# Patient Record
Sex: FEMALE | Race: BLACK OR AFRICAN AMERICAN | Employment: FULL TIME | URBAN - METROPOLITAN AREA
[De-identification: names, ages, dates, MRNs, and addresses within clinical notes are randomized per-mention and may not be internally consistent; named-entity substitution may affect disease eponyms.]

---

## 2017-01-01 ENCOUNTER — HOSPITAL ENCOUNTER (OUTPATIENT)
Dept: RADIATION ONCOLOGY | Age: 57
Discharge: HOME | End: 2017-12-05
Payer: COMMERCIAL

## 2017-01-01 ENCOUNTER — HOSPITAL ENCOUNTER (OUTPATIENT)
Dept: INFUSION THERAPY | Age: 57
Discharge: HOME | End: 2017-09-01
Payer: COMMERCIAL

## 2017-01-01 ENCOUNTER — HOSPITAL ENCOUNTER (OUTPATIENT)
Dept: LAB | Age: 57
Discharge: HOME | End: 2017-11-16
Payer: COMMERCIAL

## 2017-01-01 ENCOUNTER — PATIENT OUTREACH (OUTPATIENT)
Dept: CASE MANAGEMENT | Age: 57
End: 2017-01-01

## 2017-01-01 ENCOUNTER — DOCUMENTATION ONLY (OUTPATIENT)
Dept: ONCOLOGY | Age: 57
End: 2017-01-01

## 2017-01-01 ENCOUNTER — HOSPITAL ENCOUNTER (OUTPATIENT)
Dept: INFUSION THERAPY | Age: 57
Discharge: HOME | End: 2017-08-04
Payer: MEDICAID

## 2017-01-01 ENCOUNTER — TELEPHONE (OUTPATIENT)
Dept: ONCOLOGY | Age: 57
End: 2017-01-01

## 2017-01-01 ENCOUNTER — HOSPITAL ENCOUNTER (OUTPATIENT)
Dept: CT IMAGING | Age: 57
Discharge: HOME | End: 2017-11-15
Attending: INTERNAL MEDICINE
Payer: COMMERCIAL

## 2017-01-01 ENCOUNTER — HOSPITAL ENCOUNTER (OUTPATIENT)
Dept: RADIATION ONCOLOGY | Age: 57
Discharge: HOME | End: 2017-12-20
Payer: COMMERCIAL

## 2017-01-01 ENCOUNTER — HOSPITAL ENCOUNTER (OUTPATIENT)
Dept: INFUSION THERAPY | Age: 57
Discharge: HOME | End: 2017-07-05
Payer: MEDICAID

## 2017-01-01 ENCOUNTER — HOSPITAL ENCOUNTER (OUTPATIENT)
Dept: LAB | Age: 57
Discharge: HOME | End: 2017-07-05
Payer: MEDICAID

## 2017-01-01 ENCOUNTER — HOSPITAL ENCOUNTER (OUTPATIENT)
Dept: INFUSION THERAPY | Age: 57
Discharge: HOME | End: 2017-07-07
Payer: MEDICAID

## 2017-01-01 ENCOUNTER — HOSPITAL ENCOUNTER (OUTPATIENT)
Dept: RADIATION ONCOLOGY | Age: 57
Discharge: HOME | End: 2017-11-22
Payer: COMMERCIAL

## 2017-01-01 ENCOUNTER — HOSPITAL ENCOUNTER (OUTPATIENT)
Dept: RADIATION ONCOLOGY | Age: 57
Discharge: HOME | End: 2017-12-18
Payer: COMMERCIAL

## 2017-01-01 ENCOUNTER — ANESTHESIA EVENT (OUTPATIENT)
Dept: SURGERY | Age: 57
End: 2017-01-01
Payer: COMMERCIAL

## 2017-01-01 ENCOUNTER — APPOINTMENT (OUTPATIENT)
Dept: RADIATION ONCOLOGY | Age: 57
End: 2017-01-01
Payer: COMMERCIAL

## 2017-01-01 ENCOUNTER — HOSPITAL ENCOUNTER (OUTPATIENT)
Dept: LAB | Age: 57
Discharge: HOME | End: 2017-06-13
Payer: COMMERCIAL

## 2017-01-01 ENCOUNTER — HOSPITAL ENCOUNTER (OUTPATIENT)
Dept: INFUSION THERAPY | Age: 57
Discharge: HOME | End: 2017-08-02
Payer: MEDICAID

## 2017-01-01 ENCOUNTER — HOSPITAL ENCOUNTER (OUTPATIENT)
Dept: INFUSION THERAPY | Age: 57
End: 2017-01-01
Payer: MEDICAID

## 2017-01-01 ENCOUNTER — HOSPITAL ENCOUNTER (OUTPATIENT)
Dept: INFUSION THERAPY | Age: 57
Discharge: HOME | End: 2017-10-27
Payer: COMMERCIAL

## 2017-01-01 ENCOUNTER — HOSPITAL ENCOUNTER (OUTPATIENT)
Dept: INFUSION THERAPY | Age: 57
Discharge: HOME | End: 2017-05-16
Payer: COMMERCIAL

## 2017-01-01 ENCOUNTER — HOSPITAL ENCOUNTER (OUTPATIENT)
Dept: INFUSION THERAPY | Age: 57
Discharge: HOME | End: 2017-12-22
Payer: COMMERCIAL

## 2017-01-01 ENCOUNTER — HOSPITAL ENCOUNTER (OUTPATIENT)
Dept: INFUSION THERAPY | Age: 57
Discharge: HOME | End: 2017-06-15
Payer: MEDICAID

## 2017-01-01 ENCOUNTER — APPOINTMENT (OUTPATIENT)
Dept: RADIATION ONCOLOGY | Age: 57
End: 2017-01-01

## 2017-01-01 ENCOUNTER — HOSPITAL ENCOUNTER (OUTPATIENT)
Dept: RADIATION ONCOLOGY | Age: 57
Discharge: HOME | End: 2017-12-06
Payer: COMMERCIAL

## 2017-01-01 ENCOUNTER — HOSPITAL ENCOUNTER (OUTPATIENT)
Dept: SURGERY | Age: 57
Discharge: HOME | End: 2017-06-09

## 2017-01-01 ENCOUNTER — HOSPITAL ENCOUNTER (OUTPATIENT)
Dept: INFUSION THERAPY | Age: 57
Discharge: HOME | End: 2017-08-03
Payer: MEDICAID

## 2017-01-01 ENCOUNTER — HOSPITAL ENCOUNTER (OUTPATIENT)
Dept: INFUSION THERAPY | Age: 57
Discharge: HOME | End: 2017-07-06
Payer: MEDICAID

## 2017-01-01 ENCOUNTER — HOSPITAL ENCOUNTER (OUTPATIENT)
Dept: INFUSION THERAPY | Age: 57
Discharge: HOME | End: 2017-08-30
Payer: MEDICAID

## 2017-01-01 ENCOUNTER — HOSPITAL ENCOUNTER (OUTPATIENT)
Dept: RADIATION ONCOLOGY | Age: 57
Discharge: HOME | End: 2017-12-27
Payer: COMMERCIAL

## 2017-01-01 ENCOUNTER — HOSPITAL ENCOUNTER (OUTPATIENT)
Dept: RADIATION ONCOLOGY | Age: 57
Discharge: HOME | End: 2017-12-07
Payer: COMMERCIAL

## 2017-01-01 ENCOUNTER — HOSPITAL ENCOUNTER (OUTPATIENT)
Dept: LAB | Age: 57
Discharge: HOME | End: 2017-09-29
Payer: COMMERCIAL

## 2017-01-01 ENCOUNTER — HOSPITAL ENCOUNTER (OUTPATIENT)
Dept: CT IMAGING | Age: 57
Discharge: HOME | End: 2017-06-22
Attending: INTERNAL MEDICINE
Payer: COMMERCIAL

## 2017-01-01 ENCOUNTER — HOSPITAL ENCOUNTER (OUTPATIENT)
Dept: RADIATION ONCOLOGY | Age: 57
Discharge: HOME | End: 2017-12-14
Payer: COMMERCIAL

## 2017-01-01 ENCOUNTER — HOSPITAL ENCOUNTER (OUTPATIENT)
Dept: INFUSION THERAPY | Age: 57
Discharge: HOME | End: 2017-06-13
Payer: MEDICAID

## 2017-01-01 ENCOUNTER — HOSPITAL ENCOUNTER (OUTPATIENT)
Dept: RADIATION ONCOLOGY | Age: 57
Discharge: HOME | End: 2017-12-28
Payer: COMMERCIAL

## 2017-01-01 ENCOUNTER — HOSPITAL ENCOUNTER (OUTPATIENT)
Dept: INFUSION THERAPY | Age: 57
Discharge: HOME | End: 2017-08-31
Payer: MEDICAID

## 2017-01-01 ENCOUNTER — HOSPITAL ENCOUNTER (OUTPATIENT)
Dept: LAB | Age: 57
Discharge: HOME | End: 2017-08-30
Payer: MEDICAID

## 2017-01-01 ENCOUNTER — HOSPITAL ENCOUNTER (OUTPATIENT)
Dept: RADIATION ONCOLOGY | Age: 57
Discharge: HOME | End: 2017-12-21
Payer: COMMERCIAL

## 2017-01-01 ENCOUNTER — HOSPITAL ENCOUNTER (OUTPATIENT)
Dept: LAB | Age: 57
Discharge: HOME | End: 2017-08-02
Payer: MEDICAID

## 2017-01-01 ENCOUNTER — ANESTHESIA (OUTPATIENT)
Dept: SURGERY | Age: 57
End: 2017-01-01
Payer: COMMERCIAL

## 2017-01-01 ENCOUNTER — HOSPITAL ENCOUNTER (OUTPATIENT)
Dept: RADIATION ONCOLOGY | Age: 57
Discharge: HOME | End: 2017-11-27
Payer: COMMERCIAL

## 2017-01-01 ENCOUNTER — HOSPITAL ENCOUNTER (OUTPATIENT)
Dept: INFUSION THERAPY | Age: 57
Discharge: HOME | End: 2017-06-06
Payer: MEDICAID

## 2017-01-01 ENCOUNTER — HOSPITAL ENCOUNTER (OUTPATIENT)
Dept: RADIATION ONCOLOGY | Age: 57
Discharge: HOME | End: 2017-12-22
Payer: COMMERCIAL

## 2017-01-01 ENCOUNTER — HOSPITAL ENCOUNTER (OUTPATIENT)
Dept: RADIATION ONCOLOGY | Age: 57
Discharge: HOME | End: 2017-12-19
Payer: COMMERCIAL

## 2017-01-01 ENCOUNTER — HOSPITAL ENCOUNTER (OUTPATIENT)
Dept: LAB | Age: 57
Discharge: HOME | End: 2017-05-15
Payer: COMMERCIAL

## 2017-01-01 ENCOUNTER — HOSPITAL ENCOUNTER (OUTPATIENT)
Dept: CT IMAGING | Age: 57
Discharge: HOME | End: 2017-09-25
Attending: INTERNAL MEDICINE
Payer: COMMERCIAL

## 2017-01-01 ENCOUNTER — APPOINTMENT (OUTPATIENT)
Dept: INTERVENTIONAL RADIOLOGY/VASCULAR | Age: 57
End: 2017-01-01
Attending: RADIOLOGY
Payer: COMMERCIAL

## 2017-01-01 ENCOUNTER — HOSPITAL ENCOUNTER (OUTPATIENT)
Dept: LAB | Age: 57
Discharge: HOME | End: 2017-12-05
Payer: COMMERCIAL

## 2017-01-01 ENCOUNTER — HOSPITAL ENCOUNTER (OUTPATIENT)
Dept: INFUSION THERAPY | Age: 57
Discharge: HOME | End: 2017-10-03
Payer: COMMERCIAL

## 2017-01-01 ENCOUNTER — HOSPITAL ENCOUNTER (OUTPATIENT)
Dept: RADIATION ONCOLOGY | Age: 57
Discharge: HOME | End: 2017-12-26
Payer: COMMERCIAL

## 2017-01-01 ENCOUNTER — HOSPITAL ENCOUNTER (OUTPATIENT)
Dept: INFUSION THERAPY | Age: 57
Discharge: HOME | End: 2017-05-17
Payer: COMMERCIAL

## 2017-01-01 ENCOUNTER — HOSPITAL ENCOUNTER (OUTPATIENT)
Dept: PET IMAGING | Age: 57
Discharge: HOME | End: 2017-11-30
Payer: COMMERCIAL

## 2017-01-01 ENCOUNTER — HOSPITAL ENCOUNTER (OUTPATIENT)
Dept: LAB | Age: 57
Discharge: HOME | End: 2017-06-06
Payer: COMMERCIAL

## 2017-01-01 ENCOUNTER — HOSPITAL ENCOUNTER (OUTPATIENT)
Dept: INFUSION THERAPY | Age: 57
Discharge: HOME | End: 2017-05-19
Payer: COMMERCIAL

## 2017-01-01 ENCOUNTER — HOSPITAL ENCOUNTER (OUTPATIENT)
Dept: RADIATION ONCOLOGY | Age: 57
Discharge: HOME | End: 2017-12-11
Payer: COMMERCIAL

## 2017-01-01 ENCOUNTER — HOSPITAL ENCOUNTER (OUTPATIENT)
Dept: INFUSION THERAPY | Age: 57
Discharge: HOME | End: 2017-11-24
Payer: COMMERCIAL

## 2017-01-01 ENCOUNTER — HOSPITAL ENCOUNTER (OUTPATIENT)
Dept: RADIATION ONCOLOGY | Age: 57
Discharge: HOME | End: 2017-12-04
Payer: COMMERCIAL

## 2017-01-01 ENCOUNTER — HOSPITAL ENCOUNTER (OUTPATIENT)
Dept: INFUSION THERAPY | Age: 57
Discharge: HOME | End: 2017-06-14
Payer: MEDICAID

## 2017-01-01 ENCOUNTER — HOSPITAL ENCOUNTER (OUTPATIENT)
Dept: RADIATION ONCOLOGY | Age: 57
Discharge: HOME | End: 2017-12-15
Payer: COMMERCIAL

## 2017-01-01 ENCOUNTER — HOSPITAL ENCOUNTER (OUTPATIENT)
Dept: INFUSION THERAPY | Age: 57
Discharge: HOME | End: 2017-05-18
Payer: COMMERCIAL

## 2017-01-01 ENCOUNTER — HOSPITAL ENCOUNTER (OUTPATIENT)
Dept: RADIATION ONCOLOGY | Age: 57
Discharge: HOME | End: 2017-12-13
Payer: COMMERCIAL

## 2017-01-01 ENCOUNTER — HOSPITAL ENCOUNTER (OUTPATIENT)
Dept: RADIATION ONCOLOGY | Age: 57
Discharge: HOME | End: 2017-12-12
Payer: COMMERCIAL

## 2017-01-01 ENCOUNTER — HOSPITAL ENCOUNTER (OUTPATIENT)
Age: 57
Setting detail: OUTPATIENT SURGERY
Discharge: HOME | End: 2017-06-12
Attending: RADIOLOGY | Admitting: RADIOLOGY
Payer: COMMERCIAL

## 2017-01-01 VITALS
DIASTOLIC BLOOD PRESSURE: 78 MMHG | TEMPERATURE: 98.1 F | BODY MASS INDEX: 17.34 KG/M2 | WEIGHT: 117.4 LBS | RESPIRATION RATE: 16 BRPM | HEART RATE: 103 BPM | OXYGEN SATURATION: 96 % | SYSTOLIC BLOOD PRESSURE: 133 MMHG

## 2017-01-01 VITALS
DIASTOLIC BLOOD PRESSURE: 95 MMHG | WEIGHT: 122 LBS | HEART RATE: 106 BPM | HEIGHT: 68 IN | BODY MASS INDEX: 18.49 KG/M2 | SYSTOLIC BLOOD PRESSURE: 150 MMHG | OXYGEN SATURATION: 92 % | RESPIRATION RATE: 18 BRPM | TEMPERATURE: 97.9 F

## 2017-01-01 VITALS
SYSTOLIC BLOOD PRESSURE: 159 MMHG | RESPIRATION RATE: 16 BRPM | TEMPERATURE: 98.2 F | OXYGEN SATURATION: 98 % | HEART RATE: 88 BPM | WEIGHT: 109 LBS | DIASTOLIC BLOOD PRESSURE: 95 MMHG | HEIGHT: 67 IN | BODY MASS INDEX: 17.11 KG/M2

## 2017-01-01 VITALS
OXYGEN SATURATION: 96 % | RESPIRATION RATE: 16 BRPM | TEMPERATURE: 97.7 F | HEART RATE: 70 BPM | SYSTOLIC BLOOD PRESSURE: 171 MMHG | DIASTOLIC BLOOD PRESSURE: 81 MMHG

## 2017-01-01 VITALS
WEIGHT: 115 LBS | SYSTOLIC BLOOD PRESSURE: 150 MMHG | DIASTOLIC BLOOD PRESSURE: 90 MMHG | TEMPERATURE: 97.7 F | RESPIRATION RATE: 16 BRPM | HEART RATE: 87 BPM | OXYGEN SATURATION: 100 % | BODY MASS INDEX: 17.49 KG/M2

## 2017-01-01 VITALS
WEIGHT: 119.4 LBS | OXYGEN SATURATION: 94 % | BODY MASS INDEX: 18.15 KG/M2 | HEART RATE: 95 BPM | TEMPERATURE: 98.2 F | RESPIRATION RATE: 16 BRPM | SYSTOLIC BLOOD PRESSURE: 172 MMHG | DIASTOLIC BLOOD PRESSURE: 93 MMHG

## 2017-01-01 VITALS
TEMPERATURE: 98 F | DIASTOLIC BLOOD PRESSURE: 74 MMHG | SYSTOLIC BLOOD PRESSURE: 128 MMHG | OXYGEN SATURATION: 98 % | BODY MASS INDEX: 17.54 KG/M2 | HEART RATE: 82 BPM | WEIGHT: 112 LBS | RESPIRATION RATE: 18 BRPM

## 2017-01-01 VITALS
RESPIRATION RATE: 18 BRPM | HEART RATE: 109 BPM | TEMPERATURE: 97.7 F | DIASTOLIC BLOOD PRESSURE: 85 MMHG | BODY MASS INDEX: 19.45 KG/M2 | OXYGEN SATURATION: 94 % | WEIGHT: 124.2 LBS | SYSTOLIC BLOOD PRESSURE: 139 MMHG

## 2017-01-01 VITALS
DIASTOLIC BLOOD PRESSURE: 43 MMHG | OXYGEN SATURATION: 96 % | HEART RATE: 87 BPM | SYSTOLIC BLOOD PRESSURE: 115 MMHG | RESPIRATION RATE: 16 BRPM | TEMPERATURE: 97.8 F | BODY MASS INDEX: 16.91 KG/M2 | WEIGHT: 111.2 LBS

## 2017-01-01 VITALS
DIASTOLIC BLOOD PRESSURE: 84 MMHG | OXYGEN SATURATION: 97 % | WEIGHT: 119.4 LBS | BODY MASS INDEX: 17.63 KG/M2 | SYSTOLIC BLOOD PRESSURE: 136 MMHG | TEMPERATURE: 98 F | RESPIRATION RATE: 16 BRPM | HEART RATE: 88 BPM

## 2017-01-01 VITALS
RESPIRATION RATE: 18 BRPM | DIASTOLIC BLOOD PRESSURE: 83 MMHG | TEMPERATURE: 97.8 F | BODY MASS INDEX: 17.45 KG/M2 | WEIGHT: 111.4 LBS | SYSTOLIC BLOOD PRESSURE: 159 MMHG | OXYGEN SATURATION: 98 % | HEART RATE: 75 BPM

## 2017-01-01 VITALS
HEART RATE: 88 BPM | OXYGEN SATURATION: 95 % | WEIGHT: 110.8 LBS | DIASTOLIC BLOOD PRESSURE: 92 MMHG | RESPIRATION RATE: 18 BRPM | SYSTOLIC BLOOD PRESSURE: 160 MMHG | BODY MASS INDEX: 17.35 KG/M2 | TEMPERATURE: 97.8 F

## 2017-01-01 VITALS
SYSTOLIC BLOOD PRESSURE: 140 MMHG | WEIGHT: 116.4 LBS | HEART RATE: 89 BPM | BODY MASS INDEX: 17.19 KG/M2 | OXYGEN SATURATION: 99 % | TEMPERATURE: 97.8 F | DIASTOLIC BLOOD PRESSURE: 83 MMHG

## 2017-01-01 VITALS
TEMPERATURE: 98.1 F | BODY MASS INDEX: 17.79 KG/M2 | SYSTOLIC BLOOD PRESSURE: 138 MMHG | WEIGHT: 117 LBS | RESPIRATION RATE: 16 BRPM | HEART RATE: 89 BPM | DIASTOLIC BLOOD PRESSURE: 91 MMHG | OXYGEN SATURATION: 97 %

## 2017-01-01 VITALS
DIASTOLIC BLOOD PRESSURE: 99 MMHG | RESPIRATION RATE: 18 BRPM | TEMPERATURE: 98 F | OXYGEN SATURATION: 94 % | HEART RATE: 92 BPM | BODY MASS INDEX: 18.14 KG/M2 | SYSTOLIC BLOOD PRESSURE: 146 MMHG | WEIGHT: 115.8 LBS

## 2017-01-01 VITALS
RESPIRATION RATE: 16 BRPM | BODY MASS INDEX: 17.63 KG/M2 | TEMPERATURE: 97.9 F | OXYGEN SATURATION: 99 % | DIASTOLIC BLOOD PRESSURE: 75 MMHG | WEIGHT: 119.4 LBS | SYSTOLIC BLOOD PRESSURE: 124 MMHG | HEART RATE: 89 BPM

## 2017-01-01 VITALS
HEART RATE: 97 BPM | DIASTOLIC BLOOD PRESSURE: 92 MMHG | WEIGHT: 115.4 LBS | SYSTOLIC BLOOD PRESSURE: 157 MMHG | RESPIRATION RATE: 18 BRPM | BODY MASS INDEX: 18.07 KG/M2 | OXYGEN SATURATION: 96 % | TEMPERATURE: 97.8 F

## 2017-01-01 VITALS — WEIGHT: 110 LBS | BODY MASS INDEX: 16.29 KG/M2 | HEIGHT: 69 IN

## 2017-01-01 VITALS
WEIGHT: 119.7 LBS | BODY MASS INDEX: 17.68 KG/M2 | HEART RATE: 89 BPM | DIASTOLIC BLOOD PRESSURE: 80 MMHG | TEMPERATURE: 97.9 F | SYSTOLIC BLOOD PRESSURE: 133 MMHG | OXYGEN SATURATION: 97 % | RESPIRATION RATE: 18 BRPM

## 2017-01-01 VITALS
BODY MASS INDEX: 18.67 KG/M2 | WEIGHT: 119.2 LBS | RESPIRATION RATE: 18 BRPM | DIASTOLIC BLOOD PRESSURE: 97 MMHG | HEART RATE: 78 BPM | SYSTOLIC BLOOD PRESSURE: 156 MMHG | TEMPERATURE: 97.2 F | OXYGEN SATURATION: 99 %

## 2017-01-01 DIAGNOSIS — C34.91 SMALL CELL CARCINOMA OF RIGHT LUNG (HCC): ICD-10-CM

## 2017-01-01 DIAGNOSIS — C34.90 SMALL CELL CARCINOMA OF LUNG, UNSPECIFIED LATERALITY: ICD-10-CM

## 2017-01-01 DIAGNOSIS — I87.1 SVC (SUPERIOR VENA CAVA OBSTRUCTION): ICD-10-CM

## 2017-01-01 DIAGNOSIS — C79.51 BONE METASTASES (HCC): ICD-10-CM

## 2017-01-01 DIAGNOSIS — M54.6 THORACIC BACK PAIN: ICD-10-CM

## 2017-01-01 DIAGNOSIS — F32.89 OTHER DEPRESSION: Primary | ICD-10-CM

## 2017-01-01 DIAGNOSIS — F41.9 ANXIETY: ICD-10-CM

## 2017-01-01 DIAGNOSIS — S22.009A CLOSED FRACTURE OF DORSAL (THORACIC) VERTEBRA (HCC): ICD-10-CM

## 2017-01-01 LAB
ALBUMIN SERPL BCP-MCNC: 3.8 G/DL (ref 3.5–5)
ALBUMIN SERPL BCP-MCNC: 3.9 G/DL (ref 3.5–5)
ALBUMIN SERPL BCP-MCNC: 3.9 G/DL (ref 3.5–5)
ALBUMIN SERPL BCP-MCNC: 4 G/DL (ref 3.5–5)
ALBUMIN SERPL BCP-MCNC: 4 G/DL (ref 3.5–5)
ALBUMIN SERPL-MCNC: 3.8 G/DL (ref 3.5–5)
ALBUMIN SERPL-MCNC: 3.9 G/DL (ref 3.5–5)
ALBUMIN/GLOB SERPL: 1 {RATIO} (ref 1.2–3.5)
ALBUMIN/GLOB SERPL: 1.1 {RATIO} (ref 1.2–3.5)
ALP SERPL-CCNC: 104 U/L (ref 50–136)
ALP SERPL-CCNC: 117 U/L (ref 50–136)
ALP SERPL-CCNC: 122 U/L (ref 50–136)
ALP SERPL-CCNC: 77 U/L (ref 50–136)
ALP SERPL-CCNC: 77 U/L (ref 50–136)
ALP SERPL-CCNC: 88 U/L (ref 50–136)
ALP SERPL-CCNC: 90 U/L (ref 50–136)
ALP SERPL-CCNC: 95 U/L (ref 50–136)
ALP SERPL-CCNC: 97 U/L (ref 50–136)
ALT SERPL-CCNC: 16 U/L (ref 12–65)
ALT SERPL-CCNC: 17 U/L (ref 12–65)
ALT SERPL-CCNC: 20 U/L (ref 12–65)
ALT SERPL-CCNC: 20 U/L (ref 12–65)
ALT SERPL-CCNC: 31 U/L (ref 12–65)
ALT SERPL-CCNC: 31 U/L (ref 12–65)
ALT SERPL-CCNC: 35 U/L (ref 12–65)
ALT SERPL-CCNC: 36 U/L (ref 12–65)
ALT SERPL-CCNC: 40 U/L (ref 12–65)
ANION GAP BLD CALC-SCNC: 6 MMOL/L (ref 7–16)
ANION GAP BLD CALC-SCNC: 6 MMOL/L (ref 7–16)
ANION GAP BLD CALC-SCNC: 7 MMOL/L (ref 7–16)
ANION GAP BLD CALC-SCNC: 8 MMOL/L (ref 7–16)
ANION GAP BLD CALC-SCNC: 8 MMOL/L (ref 7–16)
ANION GAP SERPL CALC-SCNC: 4 MMOL/L (ref 7–16)
ANION GAP SERPL CALC-SCNC: 6 MMOL/L (ref 7–16)
ANION GAP SERPL CALC-SCNC: 7 MMOL/L (ref 7–16)
ANION GAP SERPL CALC-SCNC: 7 MMOL/L (ref 7–16)
ANION GAP SERPL CALC-SCNC: 8 MMOL/L (ref 7–16)
ANION GAP SERPL CALC-SCNC: 8 MMOL/L (ref 7–16)
AST SERPL W P-5'-P-CCNC: 21 U/L (ref 15–37)
AST SERPL W P-5'-P-CCNC: 24 U/L (ref 15–37)
AST SERPL W P-5'-P-CCNC: 30 U/L (ref 15–37)
AST SERPL-CCNC: 15 U/L (ref 15–37)
AST SERPL-CCNC: 15 U/L (ref 15–37)
AST SERPL-CCNC: 18 U/L (ref 15–37)
AST SERPL-CCNC: 19 U/L (ref 15–37)
BASOPHILS # BLD AUTO: 0 K/UL (ref 0–0.2)
BASOPHILS # BLD: 0 % (ref 0–2)
BASOPHILS # BLD: 0 K/UL (ref 0–0.2)
BASOPHILS # BLD: 1 % (ref 0–2)
BASOPHILS NFR BLD: 0 % (ref 0–2)
BASOPHILS NFR BLD: 1 % (ref 0–2)
BILIRUB SERPL-MCNC: 0.1 MG/DL (ref 0.2–1.1)
BILIRUB SERPL-MCNC: 0.2 MG/DL (ref 0.2–1.1)
BUN SERPL-MCNC: 10 MG/DL (ref 6–23)
BUN SERPL-MCNC: 11 MG/DL (ref 6–23)
BUN SERPL-MCNC: 12 MG/DL (ref 6–23)
BUN SERPL-MCNC: 12 MG/DL (ref 6–23)
BUN SERPL-MCNC: 13 MG/DL (ref 6–23)
BUN SERPL-MCNC: 15 MG/DL (ref 6–23)
BUN SERPL-MCNC: 16 MG/DL (ref 6–23)
BUN SERPL-MCNC: 17 MG/DL (ref 6–23)
BUN SERPL-MCNC: 18 MG/DL (ref 6–23)
BUN SERPL-MCNC: 20 MG/DL (ref 6–23)
BUN SERPL-MCNC: 25 MG/DL (ref 6–23)
CALCIUM SERPL-MCNC: 8.1 MG/DL (ref 8.3–10.4)
CALCIUM SERPL-MCNC: 8.2 MG/DL (ref 8.3–10.4)
CALCIUM SERPL-MCNC: 8.2 MG/DL (ref 8.3–10.4)
CALCIUM SERPL-MCNC: 8.8 MG/DL (ref 8.3–10.4)
CALCIUM SERPL-MCNC: 8.8 MG/DL (ref 8.3–10.4)
CALCIUM SERPL-MCNC: 8.9 MG/DL (ref 8.3–10.4)
CALCIUM SERPL-MCNC: 8.9 MG/DL (ref 8.3–10.4)
CALCIUM SERPL-MCNC: 9 MG/DL (ref 8.3–10.4)
CALCIUM SERPL-MCNC: 9.1 MG/DL (ref 8.3–10.4)
CALCIUM SERPL-MCNC: 9.6 MG/DL (ref 8.3–10.4)
CHLORIDE SERPL-SCNC: 104 MMOL/L (ref 98–107)
CHLORIDE SERPL-SCNC: 105 MMOL/L (ref 98–107)
CHLORIDE SERPL-SCNC: 106 MMOL/L (ref 98–107)
CHLORIDE SERPL-SCNC: 107 MMOL/L (ref 98–107)
CHLORIDE SERPL-SCNC: 108 MMOL/L (ref 98–107)
CO2 SERPL-SCNC: 24 MMOL/L (ref 21–32)
CO2 SERPL-SCNC: 25 MMOL/L (ref 21–32)
CO2 SERPL-SCNC: 26 MMOL/L (ref 21–32)
CO2 SERPL-SCNC: 27 MMOL/L (ref 21–32)
CO2 SERPL-SCNC: 28 MMOL/L (ref 21–32)
CO2 SERPL-SCNC: 29 MMOL/L (ref 21–32)
CREAT BLD-MCNC: 1.3 MG/DL (ref 0.8–1.5)
CREAT SERPL-MCNC: 0.83 MG/DL (ref 0.6–1)
CREAT SERPL-MCNC: 0.97 MG/DL (ref 0.6–1)
CREAT SERPL-MCNC: 0.97 MG/DL (ref 0.6–1)
CREAT SERPL-MCNC: 1.02 MG/DL (ref 0.6–1)
CREAT SERPL-MCNC: 1.03 MG/DL (ref 0.6–1)
CREAT SERPL-MCNC: 1.05 MG/DL (ref 0.6–1)
CREAT SERPL-MCNC: 1.09 MG/DL (ref 0.6–1)
CREAT SERPL-MCNC: 1.28 MG/DL (ref 0.6–1)
CREAT SERPL-MCNC: 1.31 MG/DL (ref 0.6–1)
CREAT SERPL-MCNC: 1.42 MG/DL (ref 0.6–1)
CREAT SERPL-MCNC: 1.5 MG/DL (ref 0.6–1)
DIFFERENTIAL METHOD BLD: ABNORMAL
EOSINOPHIL # BLD: 0 K/UL (ref 0–0.8)
EOSINOPHIL # BLD: 0.1 K/UL (ref 0–0.8)
EOSINOPHIL NFR BLD: 0 % (ref 0.5–7.8)
EOSINOPHIL NFR BLD: 1 % (ref 0.5–7.8)
EOSINOPHIL NFR BLD: 2 % (ref 0.5–7.8)
ERYTHROCYTE [DISTWIDTH] IN BLOOD BY AUTOMATED COUNT: 15.5 % (ref 11.9–14.6)
ERYTHROCYTE [DISTWIDTH] IN BLOOD BY AUTOMATED COUNT: 16 % (ref 11.9–14.6)
ERYTHROCYTE [DISTWIDTH] IN BLOOD BY AUTOMATED COUNT: 16.1 % (ref 11.9–14.6)
ERYTHROCYTE [DISTWIDTH] IN BLOOD BY AUTOMATED COUNT: 16.3 % (ref 11.9–14.6)
ERYTHROCYTE [DISTWIDTH] IN BLOOD BY AUTOMATED COUNT: 16.7 % (ref 11.9–14.6)
ERYTHROCYTE [DISTWIDTH] IN BLOOD BY AUTOMATED COUNT: 17.6 % (ref 11.9–14.6)
ERYTHROCYTE [DISTWIDTH] IN BLOOD BY AUTOMATED COUNT: 18.2 % (ref 11.9–14.6)
ERYTHROCYTE [DISTWIDTH] IN BLOOD BY AUTOMATED COUNT: 18.3 % (ref 11.9–14.6)
ERYTHROCYTE [DISTWIDTH] IN BLOOD BY AUTOMATED COUNT: 19.4 % (ref 11.9–14.6)
FERRITIN SERPL-MCNC: 84 NG/ML (ref 8–388)
GLOBULIN SER CALC-MCNC: 3.6 G/DL (ref 2.3–3.5)
GLOBULIN SER CALC-MCNC: 3.7 G/DL (ref 2.3–3.5)
GLOBULIN SER CALC-MCNC: 3.8 G/DL (ref 2.3–3.5)
GLOBULIN SER CALC-MCNC: 3.9 G/DL (ref 2.3–3.5)
GLOBULIN SER CALC-MCNC: 4 G/DL (ref 2.3–3.5)
GLUCOSE SERPL-MCNC: 101 MG/DL (ref 65–100)
GLUCOSE SERPL-MCNC: 120 MG/DL (ref 65–100)
GLUCOSE SERPL-MCNC: 126 MG/DL (ref 65–100)
GLUCOSE SERPL-MCNC: 137 MG/DL (ref 65–100)
GLUCOSE SERPL-MCNC: 172 MG/DL (ref 65–100)
GLUCOSE SERPL-MCNC: 91 MG/DL (ref 65–100)
GLUCOSE SERPL-MCNC: 93 MG/DL (ref 65–100)
GLUCOSE SERPL-MCNC: 95 MG/DL (ref 65–100)
GLUCOSE SERPL-MCNC: 99 MG/DL (ref 65–100)
HCT VFR BLD AUTO: 31.6 % (ref 35.8–46.3)
HCT VFR BLD AUTO: 32.1 % (ref 35.8–46.3)
HCT VFR BLD AUTO: 32.2 % (ref 35.8–46.3)
HCT VFR BLD AUTO: 32.2 % (ref 35.8–46.3)
HCT VFR BLD AUTO: 33.7 % (ref 35.8–46.3)
HCT VFR BLD AUTO: 33.7 % (ref 35.8–46.3)
HCT VFR BLD AUTO: 34.3 % (ref 35.8–46.3)
HCT VFR BLD AUTO: 36.3 % (ref 35.8–46.3)
HCT VFR BLD AUTO: 37.4 % (ref 35.8–46.3)
HGB BLD-MCNC: 10 G/DL (ref 11.7–15.4)
HGB BLD-MCNC: 10.2 G/DL (ref 11.7–15.4)
HGB BLD-MCNC: 10.3 G/DL (ref 11.7–15.4)
HGB BLD-MCNC: 10.7 G/DL (ref 11.7–15.4)
HGB BLD-MCNC: 10.9 G/DL (ref 11.7–15.4)
HGB BLD-MCNC: 11 G/DL (ref 11.7–15.4)
HGB BLD-MCNC: 11.5 G/DL (ref 11.7–15.4)
HGB BLD-MCNC: 11.8 G/DL (ref 11.7–15.4)
HGB BLD-MCNC: 9.9 G/DL (ref 11.7–15.4)
IRON SATN MFR SERPL: 23 %
IRON SERPL-MCNC: 74 UG/DL (ref 35–150)
LYMPHOCYTES # BLD AUTO: 33 % (ref 13–44)
LYMPHOCYTES # BLD AUTO: 35 % (ref 13–44)
LYMPHOCYTES # BLD AUTO: 39 % (ref 13–44)
LYMPHOCYTES # BLD AUTO: 50 % (ref 13–44)
LYMPHOCYTES # BLD AUTO: 59 % (ref 13–44)
LYMPHOCYTES # BLD: 1.2 K/UL (ref 0.5–4.6)
LYMPHOCYTES # BLD: 1.8 K/UL (ref 0.5–4.6)
LYMPHOCYTES # BLD: 2.2 K/UL (ref 0.5–4.6)
LYMPHOCYTES # BLD: 2.3 K/UL (ref 0.5–4.6)
LYMPHOCYTES # BLD: 2.3 K/UL (ref 0.5–4.6)
LYMPHOCYTES # BLD: 2.5 K/UL (ref 0.5–4.6)
LYMPHOCYTES # BLD: 2.5 K/UL (ref 0.5–4.6)
LYMPHOCYTES # BLD: 2.6 K/UL (ref 0.5–4.6)
LYMPHOCYTES # BLD: 3.3 K/UL (ref 0.5–4.6)
LYMPHOCYTES NFR BLD: 23 % (ref 13–44)
LYMPHOCYTES NFR BLD: 32 % (ref 13–44)
LYMPHOCYTES NFR BLD: 33 % (ref 13–44)
LYMPHOCYTES NFR BLD: 39 % (ref 13–44)
MAGNESIUM SERPL-MCNC: 1.9 MG/DL (ref 1.8–2.4)
MAGNESIUM SERPL-MCNC: 2.1 MG/DL (ref 1.8–2.4)
MAGNESIUM SERPL-MCNC: 2.2 MG/DL (ref 1.8–2.4)
MAGNESIUM SERPL-MCNC: 2.4 MG/DL (ref 1.8–2.4)
MCH RBC QN AUTO: 23.6 PG (ref 26.1–32.9)
MCH RBC QN AUTO: 23.7 PG (ref 26.1–32.9)
MCH RBC QN AUTO: 23.7 PG (ref 26.1–32.9)
MCH RBC QN AUTO: 23.8 PG (ref 26.1–32.9)
MCH RBC QN AUTO: 24 PG (ref 26.1–32.9)
MCH RBC QN AUTO: 24.1 PG (ref 26.1–32.9)
MCH RBC QN AUTO: 24.1 PG (ref 26.1–32.9)
MCH RBC QN AUTO: 24.2 PG (ref 26.1–32.9)
MCH RBC QN AUTO: 24.6 PG (ref 26.1–32.9)
MCHC RBC AUTO-ENTMCNC: 31.2 G/DL (ref 31.4–35)
MCHC RBC AUTO-ENTMCNC: 31.3 G/DL (ref 31.4–35)
MCHC RBC AUTO-ENTMCNC: 31.6 G/DL (ref 31.4–35)
MCHC RBC AUTO-ENTMCNC: 31.7 G/DL (ref 31.4–35)
MCHC RBC AUTO-ENTMCNC: 31.7 G/DL (ref 31.4–35)
MCHC RBC AUTO-ENTMCNC: 31.8 G/DL (ref 31.4–35)
MCHC RBC AUTO-ENTMCNC: 32 G/DL (ref 31.4–35)
MCHC RBC AUTO-ENTMCNC: 32.1 G/DL (ref 31.4–35)
MCHC RBC AUTO-ENTMCNC: 32.3 G/DL (ref 31.4–35)
MCV RBC AUTO: 74.4 FL (ref 79.6–97.8)
MCV RBC AUTO: 74.4 FL (ref 79.6–97.8)
MCV RBC AUTO: 74.7 FL (ref 79.6–97.8)
MCV RBC AUTO: 75.4 FL (ref 79.6–97.8)
MCV RBC AUTO: 75.6 FL (ref 79.6–97.8)
MCV RBC AUTO: 75.8 FL (ref 79.6–97.8)
MCV RBC AUTO: 75.9 FL (ref 79.6–97.8)
MCV RBC AUTO: 76.1 FL (ref 79.6–97.8)
MCV RBC AUTO: 77.6 FL (ref 79.6–97.8)
MONOCYTES # BLD: 0.4 K/UL (ref 0.1–1.3)
MONOCYTES # BLD: 0.4 K/UL (ref 0.1–1.3)
MONOCYTES # BLD: 0.6 K/UL (ref 0.1–1.3)
MONOCYTES # BLD: 0.7 K/UL (ref 0.1–1.3)
MONOCYTES # BLD: 0.8 K/UL (ref 0.1–1.3)
MONOCYTES # BLD: 0.9 K/UL (ref 0.1–1.3)
MONOCYTES # BLD: 1.1 K/UL (ref 0.1–1.3)
MONOCYTES # BLD: 1.1 K/UL (ref 0.1–1.3)
MONOCYTES # BLD: 1.3 K/UL (ref 0.1–1.3)
MONOCYTES NFR BLD AUTO: 12 % (ref 4–12)
MONOCYTES NFR BLD AUTO: 13 % (ref 4–12)
MONOCYTES NFR BLD AUTO: 16 % (ref 4–12)
MONOCYTES NFR BLD AUTO: 18 % (ref 4–12)
MONOCYTES NFR BLD AUTO: 23 % (ref 4–12)
MONOCYTES NFR BLD: 11 % (ref 4–12)
MONOCYTES NFR BLD: 12 % (ref 4–12)
MONOCYTES NFR BLD: 8 % (ref 4–12)
MONOCYTES NFR BLD: 8 % (ref 4–12)
NEUTS SEG # BLD: 0.9 K/UL (ref 1.7–8.2)
NEUTS SEG # BLD: 1.2 K/UL (ref 1.7–8.2)
NEUTS SEG # BLD: 2.9 K/UL (ref 1.7–8.2)
NEUTS SEG # BLD: 3.2 K/UL (ref 1.7–8.2)
NEUTS SEG # BLD: 3.4 K/UL (ref 1.7–8.2)
NEUTS SEG # BLD: 3.6 K/UL (ref 1.7–8.2)
NEUTS SEG # BLD: 3.9 K/UL (ref 1.7–8.2)
NEUTS SEG # BLD: 4 K/UL (ref 1.7–8.2)
NEUTS SEG # BLD: 4.2 K/UL (ref 1.7–8.2)
NEUTS SEG NFR BLD AUTO: 22 % (ref 43–78)
NEUTS SEG NFR BLD AUTO: 25 % (ref 43–78)
NEUTS SEG NFR BLD AUTO: 45 % (ref 43–78)
NEUTS SEG NFR BLD AUTO: 52 % (ref 43–78)
NEUTS SEG NFR BLD AUTO: 53 % (ref 43–78)
NEUTS SEG NFR BLD: 50 % (ref 43–78)
NEUTS SEG NFR BLD: 55 % (ref 43–78)
NEUTS SEG NFR BLD: 59 % (ref 43–78)
NEUTS SEG NFR BLD: 68 % (ref 43–78)
NRBC # BLD: 0 K/UL (ref 0–0.2)
NRBC # BLD: 0.01 K/UL (ref 0–0.2)
NRBC BLD-RTO: 0 PER 100 WBC (ref 0–2)
NRBC BLD-RTO: 0.2 PER 100 WBC (ref 0–2)
PHOSPHATE SERPL-MCNC: 2.4 MG/DL (ref 2.5–4.5)
PHOSPHATE SERPL-MCNC: 2.8 MG/DL (ref 2.5–4.5)
PHOSPHATE SERPL-MCNC: 3.9 MG/DL (ref 2.5–4.5)
PLATELET # BLD AUTO: 297 K/UL (ref 150–450)
PLATELET # BLD AUTO: 312 K/UL (ref 150–450)
PLATELET # BLD AUTO: 346 K/UL (ref 150–450)
PLATELET # BLD AUTO: 369 K/UL (ref 150–450)
PLATELET # BLD AUTO: 383 K/UL (ref 150–450)
PLATELET # BLD AUTO: 393 K/UL (ref 150–450)
PLATELET # BLD AUTO: 452 K/UL (ref 150–450)
PLATELET # BLD AUTO: 455 K/UL (ref 150–450)
PLATELET # BLD AUTO: 547 K/UL (ref 150–450)
PMV BLD AUTO: 10.2 FL (ref 10.8–14.1)
PMV BLD AUTO: 8.9 FL (ref 10.8–14.1)
PMV BLD AUTO: 9.1 FL (ref 10.8–14.1)
PMV BLD AUTO: 9.4 FL (ref 10.8–14.1)
PMV BLD AUTO: 9.5 FL (ref 10.8–14.1)
PMV BLD AUTO: 9.5 FL (ref 10.8–14.1)
POTASSIUM SERPL-SCNC: 3.6 MMOL/L (ref 3.5–5.1)
POTASSIUM SERPL-SCNC: 4 MMOL/L (ref 3.5–5.1)
POTASSIUM SERPL-SCNC: 4.1 MMOL/L (ref 3.5–5.1)
POTASSIUM SERPL-SCNC: 4.2 MMOL/L (ref 3.5–5.1)
POTASSIUM SERPL-SCNC: 4.4 MMOL/L (ref 3.5–5.1)
POTASSIUM SERPL-SCNC: 4.5 MMOL/L (ref 3.5–5.1)
POTASSIUM SERPL-SCNC: 4.7 MMOL/L (ref 3.5–5.1)
POTASSIUM SERPL-SCNC: 4.9 MMOL/L (ref 3.5–5.1)
PROT SERPL-MCNC: 7.5 G/DL (ref 6.3–8.2)
PROT SERPL-MCNC: 7.6 G/DL (ref 6.3–8.2)
PROT SERPL-MCNC: 7.7 G/DL (ref 6.3–8.2)
PROT SERPL-MCNC: 7.7 G/DL (ref 6.3–8.2)
PROT SERPL-MCNC: 7.8 G/DL (ref 6.3–8.2)
PROT SERPL-MCNC: 8 G/DL (ref 6.3–8.2)
RBC # BLD AUTO: 4.15 M/UL (ref 4.05–5.25)
RBC # BLD AUTO: 4.18 M/UL (ref 4.05–5.25)
RBC # BLD AUTO: 4.22 M/UL (ref 4.05–5.25)
RBC # BLD AUTO: 4.25 M/UL (ref 4.05–5.25)
RBC # BLD AUTO: 4.44 M/UL (ref 4.05–5.25)
RBC # BLD AUTO: 4.53 M/UL (ref 4.05–5.25)
RBC # BLD AUTO: 4.59 M/UL (ref 4.05–5.25)
RBC # BLD AUTO: 4.88 M/UL (ref 4.05–5.25)
RBC # BLD AUTO: 4.96 M/UL (ref 4.05–5.25)
SODIUM SERPL-SCNC: 137 MMOL/L (ref 136–145)
SODIUM SERPL-SCNC: 138 MMOL/L (ref 136–145)
SODIUM SERPL-SCNC: 139 MMOL/L (ref 136–145)
SODIUM SERPL-SCNC: 142 MMOL/L (ref 136–145)
TIBC SERPL-MCNC: 323 UG/DL (ref 250–450)
WBC # BLD AUTO: 4 K/UL (ref 4.3–11.1)
WBC # BLD AUTO: 4.9 K/UL (ref 4.3–11.1)
WBC # BLD AUTO: 5.2 K/UL (ref 4.3–11.1)
WBC # BLD AUTO: 5.5 K/UL (ref 4.3–11.1)
WBC # BLD AUTO: 5.7 K/UL (ref 4.3–11.1)
WBC # BLD AUTO: 6.6 K/UL (ref 4.3–11.1)
WBC # BLD AUTO: 7.4 K/UL (ref 4.3–11.1)
WBC # BLD AUTO: 7.9 K/UL (ref 4.3–11.1)
WBC # BLD AUTO: 8.6 K/UL (ref 4.3–11.1)

## 2017-01-01 PROCEDURE — 74011000258 HC RX REV CODE- 258: Performed by: INTERNAL MEDICINE

## 2017-01-01 PROCEDURE — 77387 GUIDANCE FOR RADJ TX DLVR: CPT

## 2017-01-01 PROCEDURE — 74011636320 HC RX REV CODE- 636/320: Performed by: INTERNAL MEDICINE

## 2017-01-01 PROCEDURE — 96415 CHEMO IV INFUSION ADDL HR: CPT

## 2017-01-01 PROCEDURE — 76060000033 HC ANESTHESIA 1 TO 1.5 HR: Performed by: RADIOLOGY

## 2017-01-01 PROCEDURE — 77412 RADIATION TX DELIVERY LVL 3: CPT

## 2017-01-01 PROCEDURE — 96375 TX/PRO/DX INJ NEW DRUG ADDON: CPT

## 2017-01-01 PROCEDURE — 74177 CT ABD & PELVIS W/CONTRAST: CPT

## 2017-01-01 PROCEDURE — 96417 CHEMO IV INFUS EACH ADDL SEQ: CPT

## 2017-01-01 PROCEDURE — 96372 THER/PROPH/DIAG INJ SC/IM: CPT

## 2017-01-01 PROCEDURE — 74011250636 HC RX REV CODE- 250/636: Performed by: INTERNAL MEDICINE

## 2017-01-01 PROCEDURE — 77280 THER RAD SIMULAJ FIELD SMPL: CPT

## 2017-01-01 PROCEDURE — A9552 F18 FDG: HCPCS

## 2017-01-01 PROCEDURE — 96413 CHEMO IV INFUSION 1 HR: CPT

## 2017-01-01 PROCEDURE — 85025 COMPLETE CBC W/AUTO DIFF WBC: CPT | Performed by: INTERNAL MEDICINE

## 2017-01-01 PROCEDURE — 88305 TISSUE EXAM BY PATHOLOGIST: CPT | Performed by: RADIOLOGY

## 2017-01-01 PROCEDURE — 74011250636 HC RX REV CODE- 250/636

## 2017-01-01 PROCEDURE — 82728 ASSAY OF FERRITIN: CPT | Performed by: INTERNAL MEDICINE

## 2017-01-01 PROCEDURE — 96361 HYDRATE IV INFUSION ADD-ON: CPT

## 2017-01-01 PROCEDURE — 74011250636 HC RX REV CODE- 250/636: Performed by: RADIOLOGY

## 2017-01-01 PROCEDURE — 83540 ASSAY OF IRON: CPT | Performed by: INTERNAL MEDICINE

## 2017-01-01 PROCEDURE — 77334 RADIATION TREATMENT AID(S): CPT

## 2017-01-01 PROCEDURE — 99211 OFF/OP EST MAY X REQ PHY/QHP: CPT

## 2017-01-01 PROCEDURE — 96367 TX/PROPH/DG ADDL SEQ IV INF: CPT

## 2017-01-01 PROCEDURE — 77399 UNLISTED PX MED RADJ PHYSICS: CPT

## 2017-01-01 PROCEDURE — 96523 IRRIG DRUG DELIVERY DEVICE: CPT

## 2017-01-01 PROCEDURE — 84100 ASSAY OF PHOSPHORUS: CPT | Performed by: INTERNAL MEDICINE

## 2017-01-01 PROCEDURE — 80053 COMPREHEN METABOLIC PANEL: CPT | Performed by: INTERNAL MEDICINE

## 2017-01-01 PROCEDURE — 77030003560 HC NDL HUBR BARD -A

## 2017-01-01 PROCEDURE — 83735 ASSAY OF MAGNESIUM: CPT | Performed by: INTERNAL MEDICINE

## 2017-01-01 PROCEDURE — 77030011218 HC DEV BIOP BN KYPH -C

## 2017-01-01 PROCEDURE — 80048 BASIC METABOLIC PNL TOTAL CA: CPT | Performed by: INTERNAL MEDICINE

## 2017-01-01 PROCEDURE — 82310 ASSAY OF CALCIUM: CPT | Performed by: INTERNAL MEDICINE

## 2017-01-01 PROCEDURE — 22513 PERQ VERTEBRAL AUGMENTATION: CPT

## 2017-01-01 PROCEDURE — 77030010507 HC ADH SKN DERMBND J&J -B

## 2017-01-01 PROCEDURE — 77300 RADIATION THERAPY DOSE PLAN: CPT

## 2017-01-01 PROCEDURE — 77336 RADIATION PHYSICS CONSULT: CPT

## 2017-01-01 PROCEDURE — 74011000250 HC RX REV CODE- 250: Performed by: RADIOLOGY

## 2017-01-01 PROCEDURE — 77295 3-D RADIOTHERAPY PLAN: CPT

## 2017-01-01 PROCEDURE — C1713 ANCHOR/SCREW BN/BN,TIS/BN: HCPCS

## 2017-01-01 PROCEDURE — 77290 THER RAD SIMULAJ FIELD CPLX: CPT

## 2017-01-01 PROCEDURE — 77030034842 HC TAMP SPN BN INFL EXP II KYPH -I

## 2017-01-01 PROCEDURE — 77331 SPECIAL RADIATION DOSIMETRY: CPT

## 2017-01-01 PROCEDURE — 36415 COLL VENOUS BLD VENIPUNCTURE: CPT | Performed by: INTERNAL MEDICINE

## 2017-01-01 PROCEDURE — 77293 RESPIRATOR MOTION MGMT SIMUL: CPT

## 2017-01-01 PROCEDURE — 82565 ASSAY OF CREATININE: CPT

## 2017-01-01 PROCEDURE — 74011250637 HC RX REV CODE- 250/637: Performed by: RADIOLOGY

## 2017-01-01 RX ORDER — HEPARIN 100 UNIT/ML
500 SYRINGE INTRAVENOUS AS NEEDED
Status: DISPENSED | OUTPATIENT
Start: 2017-01-01 | End: 2017-01-01

## 2017-01-01 RX ORDER — FUROSEMIDE 10 MG/ML
20 INJECTION INTRAMUSCULAR; INTRAVENOUS AS NEEDED
Status: ACTIVE | OUTPATIENT
Start: 2017-01-01 | End: 2017-01-01

## 2017-01-01 RX ORDER — METOCLOPRAMIDE HYDROCHLORIDE 5 MG/ML
10 INJECTION INTRAMUSCULAR; INTRAVENOUS ONCE
Status: COMPLETED | OUTPATIENT
Start: 2017-01-01 | End: 2017-01-01

## 2017-01-01 RX ORDER — SODIUM CHLORIDE 9 MG/ML
500 INJECTION, SOLUTION INTRAVENOUS ONCE
Status: COMPLETED | OUTPATIENT
Start: 2017-01-01 | End: 2017-01-01

## 2017-01-01 RX ORDER — SODIUM CHLORIDE 0.9 % (FLUSH) 0.9 %
10 SYRINGE (ML) INJECTION AS NEEDED
Status: ACTIVE | OUTPATIENT
Start: 2017-01-01 | End: 2017-01-01

## 2017-01-01 RX ORDER — HEPARIN 100 UNIT/ML
300-500 SYRINGE INTRAVENOUS AS NEEDED
Status: DISPENSED | OUTPATIENT
Start: 2017-01-01 | End: 2017-01-01

## 2017-01-01 RX ORDER — ONDANSETRON 2 MG/ML
8 INJECTION INTRAMUSCULAR; INTRAVENOUS ONCE
Status: COMPLETED | OUTPATIENT
Start: 2017-01-01 | End: 2017-01-01

## 2017-01-01 RX ORDER — BUPIVACAINE HYDROCHLORIDE 5 MG/ML
5-10 INJECTION, SOLUTION EPIDURAL; INTRACAUDAL
Status: DISCONTINUED | OUTPATIENT
Start: 2017-01-01 | End: 2017-01-01 | Stop reason: HOSPADM

## 2017-01-01 RX ORDER — HEPARIN 100 UNIT/ML
500 SYRINGE INTRAVENOUS ONCE
Status: ACTIVE | OUTPATIENT
Start: 2017-01-01 | End: 2017-01-01

## 2017-01-01 RX ORDER — HEPARIN 100 UNIT/ML
500 SYRINGE INTRAVENOUS AS NEEDED
Status: DISCONTINUED | OUTPATIENT
Start: 2017-01-01 | End: 2017-01-01 | Stop reason: HOSPADM

## 2017-01-01 RX ORDER — DEXAMETHASONE SODIUM PHOSPHATE 100 MG/10ML
10 INJECTION INTRAMUSCULAR; INTRAVENOUS ONCE
Status: COMPLETED | OUTPATIENT
Start: 2017-01-01 | End: 2017-01-01

## 2017-01-01 RX ORDER — LORAZEPAM 2 MG/ML
0.5 INJECTION INTRAMUSCULAR
Status: ACTIVE | OUTPATIENT
Start: 2017-01-01 | End: 2017-01-01

## 2017-01-01 RX ORDER — SODIUM CHLORIDE 9 MG/ML
250 INJECTION, SOLUTION INTRAVENOUS ONCE
Status: COMPLETED | OUTPATIENT
Start: 2017-01-01 | End: 2017-01-01

## 2017-01-01 RX ORDER — CEFAZOLIN SODIUM IN 0.9 % NACL 2 G/50 ML
2 INTRAVENOUS SOLUTION, PIGGYBACK (ML) INTRAVENOUS ONCE
Status: COMPLETED | OUTPATIENT
Start: 2017-01-01 | End: 2017-01-01

## 2017-01-01 RX ORDER — SODIUM CHLORIDE 0.9 % (FLUSH) 0.9 %
10 SYRINGE (ML) INJECTION
Status: COMPLETED | OUTPATIENT
Start: 2017-01-01 | End: 2017-01-01

## 2017-01-01 RX ORDER — SODIUM CHLORIDE 0.9 % (FLUSH) 0.9 %
10-30 SYRINGE (ML) INJECTION AS NEEDED
Status: DISCONTINUED | OUTPATIENT
Start: 2017-01-01 | End: 2017-01-01 | Stop reason: HOSPADM

## 2017-01-01 RX ORDER — SODIUM CHLORIDE, SODIUM LACTATE, POTASSIUM CHLORIDE, CALCIUM CHLORIDE 600; 310; 30; 20 MG/100ML; MG/100ML; MG/100ML; MG/100ML
INJECTION, SOLUTION INTRAVENOUS
Status: DISCONTINUED | OUTPATIENT
Start: 2017-01-01 | End: 2017-01-01 | Stop reason: HOSPADM

## 2017-01-01 RX ORDER — PROCHLORPERAZINE EDISYLATE 5 MG/ML
10 INJECTION INTRAMUSCULAR; INTRAVENOUS
Status: ACTIVE | OUTPATIENT
Start: 2017-01-01 | End: 2017-01-01

## 2017-01-01 RX ORDER — HEPARIN 100 UNIT/ML
500 SYRINGE INTRAVENOUS AS NEEDED
Status: ACTIVE | OUTPATIENT
Start: 2017-01-01 | End: 2017-01-01

## 2017-01-01 RX ORDER — LIDOCAINE HYDROCHLORIDE 20 MG/ML
20-200 INJECTION, SOLUTION INFILTRATION; PERINEURAL
Status: DISCONTINUED | OUTPATIENT
Start: 2017-01-01 | End: 2017-01-01 | Stop reason: HOSPADM

## 2017-01-01 RX ORDER — SODIUM CHLORIDE 9 MG/ML
125 INJECTION, SOLUTION INTRAVENOUS CONTINUOUS
Status: DISCONTINUED | OUTPATIENT
Start: 2017-01-01 | End: 2017-01-01 | Stop reason: HOSPADM

## 2017-01-01 RX ORDER — PROPOFOL 10 MG/ML
INJECTION, EMULSION INTRAVENOUS AS NEEDED
Status: DISCONTINUED | OUTPATIENT
Start: 2017-01-01 | End: 2017-01-01 | Stop reason: HOSPADM

## 2017-01-01 RX ORDER — ONDANSETRON 2 MG/ML
8 INJECTION INTRAMUSCULAR; INTRAVENOUS AS NEEDED
Status: DISPENSED | OUTPATIENT
Start: 2017-01-01 | End: 2017-01-01

## 2017-01-01 RX ORDER — DIPHENHYDRAMINE HYDROCHLORIDE 50 MG/ML
25 INJECTION, SOLUTION INTRAMUSCULAR; INTRAVENOUS
Status: ACTIVE | OUTPATIENT
Start: 2017-01-01 | End: 2017-01-01

## 2017-01-01 RX ORDER — SODIUM CHLORIDE 0.9 % (FLUSH) 0.9 %
5-10 SYRINGE (ML) INJECTION AS NEEDED
Status: CANCELLED | OUTPATIENT
Start: 2017-01-01

## 2017-01-01 RX ORDER — SODIUM CHLORIDE 0.9 % (FLUSH) 0.9 %
10-40 SYRINGE (ML) INJECTION AS NEEDED
Status: DISCONTINUED | OUTPATIENT
Start: 2017-01-01 | End: 2017-01-01 | Stop reason: HOSPADM

## 2017-01-01 RX ORDER — HEPARIN 100 UNIT/ML
300 SYRINGE INTRAVENOUS AS NEEDED
Status: ACTIVE | OUTPATIENT
Start: 2017-01-01 | End: 2017-01-01

## 2017-01-01 RX ORDER — SODIUM CHLORIDE, SODIUM LACTATE, POTASSIUM CHLORIDE, CALCIUM CHLORIDE 600; 310; 30; 20 MG/100ML; MG/100ML; MG/100ML; MG/100ML
75 INJECTION, SOLUTION INTRAVENOUS CONTINUOUS
Status: CANCELLED | OUTPATIENT
Start: 2017-01-01 | End: 2017-01-01

## 2017-01-01 RX ORDER — NICOTINE 7MG/24HR
1 PATCH, TRANSDERMAL 24 HOURS TRANSDERMAL EVERY 24 HOURS
COMMUNITY
End: 2017-01-01 | Stop reason: ALTCHOICE

## 2017-01-01 RX ORDER — PROPOFOL 10 MG/ML
INJECTION, EMULSION INTRAVENOUS
Status: DISCONTINUED | OUTPATIENT
Start: 2017-01-01 | End: 2017-01-01 | Stop reason: HOSPADM

## 2017-01-01 RX ORDER — HEPARIN 100 UNIT/ML
500 SYRINGE INTRAVENOUS AS NEEDED
Status: COMPLETED | OUTPATIENT
Start: 2017-01-01 | End: 2017-01-01

## 2017-01-01 RX ORDER — MIDAZOLAM HYDROCHLORIDE 1 MG/ML
2 INJECTION, SOLUTION INTRAMUSCULAR; INTRAVENOUS
Status: CANCELLED | OUTPATIENT
Start: 2017-01-01

## 2017-01-01 RX ORDER — ACETAMINOPHEN 325 MG/1
650 TABLET ORAL
Status: DISCONTINUED | OUTPATIENT
Start: 2017-01-01 | End: 2017-01-01 | Stop reason: HOSPADM

## 2017-01-01 RX ORDER — HYDROGEN PEROXIDE 3 %
SOLUTION, NON-ORAL MISCELLANEOUS ONCE
Status: COMPLETED | OUTPATIENT
Start: 2017-01-01 | End: 2017-01-01

## 2017-01-01 RX ORDER — HYDROMORPHONE HYDROCHLORIDE 2 MG/ML
0.5 INJECTION, SOLUTION INTRAMUSCULAR; INTRAVENOUS; SUBCUTANEOUS
Status: CANCELLED | OUTPATIENT
Start: 2017-01-01

## 2017-01-01 RX ADMIN — DEXAMETHASONE SODIUM PHOSPHATE 10 MG: 10 INJECTION INTRAMUSCULAR; INTRAVENOUS at 11:44

## 2017-01-01 RX ADMIN — Medication 10 ML: at 11:42

## 2017-01-01 RX ADMIN — ONDANSETRON 8 MG: 2 INJECTION INTRAMUSCULAR; INTRAVENOUS at 11:07

## 2017-01-01 RX ADMIN — Medication 10 ML: at 17:16

## 2017-01-01 RX ADMIN — ONDANSETRON 8 MG: 2 INJECTION INTRAMUSCULAR; INTRAVENOUS at 10:44

## 2017-01-01 RX ADMIN — Medication 500 UNITS: at 10:16

## 2017-01-01 RX ADMIN — SODIUM CHLORIDE 500 ML: 900 INJECTION, SOLUTION INTRAVENOUS at 15:16

## 2017-01-01 RX ADMIN — Medication 10 ML: at 10:10

## 2017-01-01 RX ADMIN — SODIUM CHLORIDE 150 MG: 900 INJECTION, SOLUTION INTRAVENOUS at 11:08

## 2017-01-01 RX ADMIN — METOCLOPRAMIDE 10 MG: 5 INJECTION, SOLUTION INTRAMUSCULAR; INTRAVENOUS at 15:34

## 2017-01-01 RX ADMIN — METOCLOPRAMIDE 10 MG: 5 INJECTION, SOLUTION INTRAMUSCULAR; INTRAVENOUS at 11:25

## 2017-01-01 RX ADMIN — DEXAMETHASONE SODIUM PHOSPHATE 10 MG: 10 INJECTION INTRAMUSCULAR; INTRAVENOUS at 11:42

## 2017-01-01 RX ADMIN — POTASSIUM CHLORIDE: 2 INJECTION, SOLUTION, CONCENTRATE INTRAVENOUS at 11:00

## 2017-01-01 RX ADMIN — SODIUM CHLORIDE, SODIUM LACTATE, POTASSIUM CHLORIDE, CALCIUM CHLORIDE: 600; 310; 30; 20 INJECTION, SOLUTION INTRAVENOUS at 13:12

## 2017-01-01 RX ADMIN — SODIUM CHLORIDE, PRESERVATIVE FREE 500 UNITS: 5 INJECTION INTRAVENOUS at 13:05

## 2017-01-01 RX ADMIN — Medication 10 ML: at 16:36

## 2017-01-01 RX ADMIN — BUPIVACAINE HYDROCHLORIDE 50 MG: 5 INJECTION, SOLUTION EPIDURAL; INTRACAUDAL at 13:45

## 2017-01-01 RX ADMIN — METOCLOPRAMIDE 10 MG: 5 INJECTION, SOLUTION INTRAMUSCULAR; INTRAVENOUS at 11:26

## 2017-01-01 RX ADMIN — PROPOFOL 20 MG: 10 INJECTION, EMULSION INTRAVENOUS at 13:30

## 2017-01-01 RX ADMIN — SODIUM CHLORIDE, PRESERVATIVE FREE 500 UNITS: 5 INJECTION INTRAVENOUS at 16:50

## 2017-01-01 RX ADMIN — ONDANSETRON 8 MG: 2 INJECTION INTRAMUSCULAR; INTRAVENOUS at 10:26

## 2017-01-01 RX ADMIN — DENOSUMAB 120 MG: 120 INJECTION SUBCUTANEOUS at 11:44

## 2017-01-01 RX ADMIN — ETOPOSIDE 186 MG: 20 INJECTION, SOLUTION, CONCENTRATE INTRAVENOUS at 09:10

## 2017-01-01 RX ADMIN — Medication 10 ML: at 10:52

## 2017-01-01 RX ADMIN — Medication 10 ML: at 16:50

## 2017-01-01 RX ADMIN — SODIUM CHLORIDE, PRESERVATIVE FREE 500 UNITS: 5 INJECTION INTRAVENOUS at 11:32

## 2017-01-01 RX ADMIN — SODIUM CHLORIDE 500 ML: 900 INJECTION, SOLUTION INTRAVENOUS at 11:18

## 2017-01-01 RX ADMIN — DENOSUMAB 120 MG: 120 INJECTION SUBCUTANEOUS at 13:52

## 2017-01-01 RX ADMIN — DEXAMETHASONE SODIUM PHOSPHATE 10 MG: 10 INJECTION INTRAMUSCULAR; INTRAVENOUS at 10:45

## 2017-01-01 RX ADMIN — Medication 10 ML: at 08:10

## 2017-01-01 RX ADMIN — SODIUM CHLORIDE 500 ML: 900 INJECTION, SOLUTION INTRAVENOUS at 15:35

## 2017-01-01 RX ADMIN — Medication 10 ML: at 10:38

## 2017-01-01 RX ADMIN — SODIUM CHLORIDE, PRESERVATIVE FREE 500 UNITS: 5 INJECTION INTRAVENOUS at 15:25

## 2017-01-01 RX ADMIN — DEXAMETHASONE SODIUM PHOSPHATE 10 MG: 10 INJECTION INTRAMUSCULAR; INTRAVENOUS at 11:28

## 2017-01-01 RX ADMIN — SODIUM CHLORIDE, PRESERVATIVE FREE 500 UNITS: 5 INJECTION INTRAVENOUS at 15:56

## 2017-01-01 RX ADMIN — METOCLOPRAMIDE 10 MG: 5 INJECTION, SOLUTION INTRAMUSCULAR; INTRAVENOUS at 08:22

## 2017-01-01 RX ADMIN — Medication 10 ML: at 11:05

## 2017-01-01 RX ADMIN — ACETAMINOPHEN 650 MG: 325 TABLET, FILM COATED ORAL at 16:24

## 2017-01-01 RX ADMIN — IOPAMIDOL 100 ML: 755 INJECTION, SOLUTION INTRAVENOUS at 11:05

## 2017-01-01 RX ADMIN — SODIUM CHLORIDE 100 ML: 900 INJECTION, SOLUTION INTRAVENOUS at 16:36

## 2017-01-01 RX ADMIN — SODIUM CHLORIDE 500 ML: 900 INJECTION, SOLUTION INTRAVENOUS at 08:45

## 2017-01-01 RX ADMIN — SODIUM CHLORIDE 500 ML: 900 INJECTION, SOLUTION INTRAVENOUS at 08:11

## 2017-01-01 RX ADMIN — Medication 10 ML: at 09:55

## 2017-01-01 RX ADMIN — Medication 10 ML: at 15:00

## 2017-01-01 RX ADMIN — Medication 10 ML: at 16:20

## 2017-01-01 RX ADMIN — SODIUM CHLORIDE, PRESERVATIVE FREE 500 UNITS: 5 INJECTION INTRAVENOUS at 09:55

## 2017-01-01 RX ADMIN — ETOPOSIDE 139.6 MG: 20 INJECTION, SOLUTION, CONCENTRATE INTRAVENOUS at 15:50

## 2017-01-01 RX ADMIN — ETOPOSIDE 186 MG: 20 INJECTION, SOLUTION, CONCENTRATE INTRAVENOUS at 11:59

## 2017-01-01 RX ADMIN — DENOSUMAB 120 MG: 120 INJECTION SUBCUTANEOUS at 13:05

## 2017-01-01 RX ADMIN — SODIUM CHLORIDE, PRESERVATIVE FREE 500 UNITS: 5 INJECTION INTRAVENOUS at 16:15

## 2017-01-01 RX ADMIN — Medication 10 ML: at 08:35

## 2017-01-01 RX ADMIN — CISPLATIN 93 MG: 1 INJECTION, SOLUTION INTRAVENOUS at 14:10

## 2017-01-01 RX ADMIN — PROPOFOL 30 MG: 10 INJECTION, EMULSION INTRAVENOUS at 13:21

## 2017-01-01 RX ADMIN — METOCLOPRAMIDE 10 MG: 5 INJECTION, SOLUTION INTRAMUSCULAR; INTRAVENOUS at 11:46

## 2017-01-01 RX ADMIN — POTASSIUM CHLORIDE: 2 INJECTION, SOLUTION, CONCENTRATE INTRAVENOUS at 11:45

## 2017-01-01 RX ADMIN — IOPAMIDOL 100 ML: 755 INJECTION, SOLUTION INTRAVENOUS at 16:36

## 2017-01-01 RX ADMIN — SODIUM CHLORIDE, PRESERVATIVE FREE 500 UNITS: 5 INJECTION INTRAVENOUS at 16:55

## 2017-01-01 RX ADMIN — ETOPOSIDE 186 MG: 20 INJECTION, SOLUTION, CONCENTRATE INTRAVENOUS at 12:45

## 2017-01-01 RX ADMIN — DEXAMETHASONE SODIUM PHOSPHATE 10 MG: 10 INJECTION INTRAMUSCULAR; INTRAVENOUS at 11:09

## 2017-01-01 RX ADMIN — SODIUM CHLORIDE 500 ML: 900 INJECTION, SOLUTION INTRAVENOUS at 15:33

## 2017-01-01 RX ADMIN — Medication 10 ML: at 12:51

## 2017-01-01 RX ADMIN — DEXAMETHASONE SODIUM PHOSPHATE 10 MG: 10 INJECTION INTRAMUSCULAR; INTRAVENOUS at 13:39

## 2017-01-01 RX ADMIN — Medication 10 ML: at 10:55

## 2017-01-01 RX ADMIN — CISPLATIN 93 MG: 1 INJECTION, SOLUTION INTRAVENOUS at 13:49

## 2017-01-01 RX ADMIN — SODIUM CHLORIDE 500 ML: 900 INJECTION, SOLUTION INTRAVENOUS at 10:38

## 2017-01-01 RX ADMIN — Medication 10 ML: at 11:45

## 2017-01-01 RX ADMIN — Medication 10 ML: at 15:13

## 2017-01-01 RX ADMIN — SODIUM CHLORIDE, PRESERVATIVE FREE 500 UNITS: 5 INJECTION INTRAVENOUS at 11:45

## 2017-01-01 RX ADMIN — SODIUM CHLORIDE, PRESERVATIVE FREE 500 UNITS: 5 INJECTION INTRAVENOUS at 10:18

## 2017-01-01 RX ADMIN — Medication 10 ML: at 10:16

## 2017-01-01 RX ADMIN — METOCLOPRAMIDE 10 MG: 5 INJECTION, SOLUTION INTRAMUSCULAR; INTRAVENOUS at 08:27

## 2017-01-01 RX ADMIN — DEXAMETHASONE SODIUM PHOSPHATE 10 MG: 10 INJECTION INTRAMUSCULAR; INTRAVENOUS at 15:36

## 2017-01-01 RX ADMIN — DEXAMETHASONE SODIUM PHOSPHATE 10 MG: 10 INJECTION INTRAMUSCULAR; INTRAVENOUS at 08:40

## 2017-01-01 RX ADMIN — POTASSIUM CHLORIDE: 2 INJECTION, SOLUTION, CONCENTRATE INTRAVENOUS at 14:25

## 2017-01-01 RX ADMIN — SODIUM CHLORIDE 150 MG: 900 INJECTION, SOLUTION INTRAVENOUS at 13:43

## 2017-01-01 RX ADMIN — SODIUM CHLORIDE 500 ML: 900 INJECTION, SOLUTION INTRAVENOUS at 10:20

## 2017-01-01 RX ADMIN — ONDANSETRON 8 MG: 2 INJECTION INTRAMUSCULAR; INTRAVENOUS at 13:37

## 2017-01-01 RX ADMIN — SODIUM CHLORIDE, PRESERVATIVE FREE 500 UNITS: 5 INJECTION INTRAVENOUS at 15:11

## 2017-01-01 RX ADMIN — PROPOFOL 100 MCG/KG/MIN: 10 INJECTION, EMULSION INTRAVENOUS at 13:15

## 2017-01-01 RX ADMIN — DEXAMETHASONE SODIUM PHOSPHATE 10 MG: 10 INJECTION INTRAMUSCULAR; INTRAVENOUS at 08:31

## 2017-01-01 RX ADMIN — SODIUM CHLORIDE, PRESERVATIVE FREE 500 UNITS: 5 INJECTION INTRAVENOUS at 12:50

## 2017-01-01 RX ADMIN — DIATRIZOATE MEGLUMINE AND DIATRIZOATE SODIUM 15 ML: 660; 100 LIQUID ORAL; RECTAL at 12:51

## 2017-01-01 RX ADMIN — SODIUM CHLORIDE 150 MG: 900 INJECTION, SOLUTION INTRAVENOUS at 11:18

## 2017-01-01 RX ADMIN — DEXAMETHASONE SODIUM PHOSPHATE 10 MG: 10 INJECTION INTRAMUSCULAR; INTRAVENOUS at 15:14

## 2017-01-01 RX ADMIN — POTASSIUM CHLORIDE: 2 INJECTION, SOLUTION, CONCENTRATE INTRAVENOUS at 10:35

## 2017-01-01 RX ADMIN — DIATRIZOATE MEGLUMINE AND DIATRIZOATE SODIUM 15 ML: 660; 100 LIQUID ORAL; RECTAL at 11:04

## 2017-01-01 RX ADMIN — ETOPOSIDE 186 MG: 20 INJECTION, SOLUTION, CONCENTRATE INTRAVENOUS at 15:29

## 2017-01-01 RX ADMIN — Medication 500 UNITS: at 16:20

## 2017-01-01 RX ADMIN — ETOPOSIDE 186 MG: 20 INJECTION, SOLUTION, CONCENTRATE INTRAVENOUS at 12:25

## 2017-01-01 RX ADMIN — Medication 10 ML: at 12:50

## 2017-01-01 RX ADMIN — METOCLOPRAMIDE 10 MG: 5 INJECTION, SOLUTION INTRAMUSCULAR; INTRAVENOUS at 15:30

## 2017-01-01 RX ADMIN — Medication 10 ML: at 10:20

## 2017-01-01 RX ADMIN — SODIUM CHLORIDE, PRESERVATIVE FREE 500 UNITS: 5 INJECTION INTRAVENOUS at 17:18

## 2017-01-01 RX ADMIN — SODIUM CHLORIDE 500 ML: 900 INJECTION, SOLUTION INTRAVENOUS at 15:15

## 2017-01-01 RX ADMIN — METOCLOPRAMIDE 10 MG: 5 INJECTION, SOLUTION INTRAMUSCULAR; INTRAVENOUS at 08:36

## 2017-01-01 RX ADMIN — CISPLATIN 93 MG: 1 INJECTION, SOLUTION INTRAVENOUS at 13:31

## 2017-01-01 RX ADMIN — DENOSUMAB 120 MG: 120 INJECTION SUBCUTANEOUS at 16:45

## 2017-01-01 RX ADMIN — ONDANSETRON 8 MG: 2 INJECTION INTRAMUSCULAR; INTRAVENOUS at 11:43

## 2017-01-01 RX ADMIN — SODIUM CHLORIDE 500 ML: 900 INJECTION, SOLUTION INTRAVENOUS at 11:10

## 2017-01-01 RX ADMIN — Medication 10 ML: at 16:55

## 2017-01-01 RX ADMIN — MAGNESIUM SULFATE HEPTAHYDRATE: 500 INJECTION, SOLUTION INTRAMUSCULAR; INTRAVENOUS at 11:30

## 2017-01-01 RX ADMIN — CEFAZOLIN 2 G: 1 INJECTION, POWDER, FOR SOLUTION INTRAMUSCULAR; INTRAVENOUS; PARENTERAL at 13:19

## 2017-01-01 RX ADMIN — ETOPOSIDE 186 MG: 20 INJECTION, SOLUTION, CONCENTRATE INTRAVENOUS at 09:05

## 2017-01-01 RX ADMIN — SODIUM CHLORIDE, PRESERVATIVE FREE 300 UNITS: 5 INJECTION INTRAVENOUS at 15:32

## 2017-01-01 RX ADMIN — SODIUM CHLORIDE, PRESERVATIVE FREE 300 UNITS: 5 INJECTION INTRAVENOUS at 13:00

## 2017-01-01 RX ADMIN — ETOPOSIDE 186 MG: 20 INJECTION, SOLUTION, CONCENTRATE INTRAVENOUS at 12:11

## 2017-01-01 RX ADMIN — SODIUM CHLORIDE 500 ML: 900 INJECTION, SOLUTION INTRAVENOUS at 10:52

## 2017-01-01 RX ADMIN — CISPLATIN 70 MG: 1 INJECTION, SOLUTION INTRAVENOUS at 16:38

## 2017-01-01 RX ADMIN — SODIUM CHLORIDE, PRESERVATIVE FREE 500 UNITS: 5 INJECTION INTRAVENOUS at 13:30

## 2017-01-01 RX ADMIN — SODIUM CHLORIDE, PRESERVATIVE FREE 500 UNITS: 5 INJECTION INTRAVENOUS at 17:05

## 2017-01-01 RX ADMIN — SODIUM CHLORIDE 250 ML: 900 INJECTION, SOLUTION INTRAVENOUS at 12:20

## 2017-01-01 RX ADMIN — DIATRIZOATE MEGLUMINE AND DIATRIZOATE SODIUM 15 ML: 660; 100 LIQUID ORAL; RECTAL at 16:37

## 2017-01-01 RX ADMIN — DENOSUMAB 120 MG: 120 INJECTION SUBCUTANEOUS at 11:40

## 2017-01-01 RX ADMIN — SODIUM CHLORIDE 100 ML: 900 INJECTION, SOLUTION INTRAVENOUS at 11:05

## 2017-01-01 RX ADMIN — DIATRIZOATE MEGLUMINE AND DIATRIZOATE SODIUM 10 ML: 660; 100 LIQUID ORAL; RECTAL at 12:05

## 2017-01-01 RX ADMIN — SODIUM CHLORIDE, PRESERVATIVE FREE 500 UNITS: 5 INJECTION INTRAVENOUS at 11:27

## 2017-01-01 RX ADMIN — PROPOFOL 30 MG: 10 INJECTION, EMULSION INTRAVENOUS at 13:15

## 2017-01-01 RX ADMIN — Medication 10 ML: at 15:56

## 2017-01-01 RX ADMIN — Medication 10 ML: at 10:18

## 2017-01-01 RX ADMIN — ETOPOSIDE 139.6 MG: 20 INJECTION, SOLUTION, CONCENTRATE INTRAVENOUS at 15:25

## 2017-01-01 RX ADMIN — CISPLATIN 93 MG: 100 INJECTION, SOLUTION INTRAVENOUS at 13:18

## 2017-01-01 RX ADMIN — Medication 10 ML: at 11:10

## 2017-01-01 RX ADMIN — DEXAMETHASONE SODIUM PHOSPHATE 10 MG: 10 INJECTION INTRAMUSCULAR; INTRAVENOUS at 15:32

## 2017-01-01 RX ADMIN — ETOPOSIDE 186 MG: 20 INJECTION, SOLUTION, CONCENTRATE INTRAVENOUS at 15:57

## 2017-01-01 RX ADMIN — SODIUM BICARBONATE 2 ML: 0.2 INJECTION, SOLUTION INTRAVENOUS at 13:32

## 2017-01-01 RX ADMIN — SODIUM CHLORIDE, PRESERVATIVE FREE 500 UNITS: 5 INJECTION INTRAVENOUS at 18:59

## 2017-01-01 RX ADMIN — Medication 30 ML: at 14:17

## 2017-01-01 RX ADMIN — Medication 10 ML: at 13:04

## 2017-01-01 RX ADMIN — ETOPOSIDE 186 MG: 20 INJECTION, SOLUTION, CONCENTRATE INTRAVENOUS at 12:15

## 2017-01-01 RX ADMIN — ETOPOSIDE 186 MG: 20 INJECTION, SOLUTION, CONCENTRATE INTRAVENOUS at 11:45

## 2017-01-01 RX ADMIN — Medication 10 ML: at 15:33

## 2017-01-01 RX ADMIN — DEXAMETHASONE SODIUM PHOSPHATE 10 MG: 10 INJECTION INTRAMUSCULAR; INTRAVENOUS at 10:27

## 2017-01-01 RX ADMIN — Medication 20 ML: at 13:00

## 2017-01-01 RX ADMIN — ONDANSETRON 8 MG: 2 INJECTION INTRAMUSCULAR; INTRAVENOUS at 11:44

## 2017-01-01 RX ADMIN — SODIUM CHLORIDE 150 MG: 900 INJECTION, SOLUTION INTRAVENOUS at 10:37

## 2017-01-01 RX ADMIN — SODIUM CHLORIDE 500 ML: 900 INJECTION, SOLUTION INTRAVENOUS at 11:28

## 2017-01-01 RX ADMIN — DEXAMETHASONE SODIUM PHOSPHATE 10 MG: 10 INJECTION INTRAMUSCULAR; INTRAVENOUS at 15:08

## 2017-01-01 RX ADMIN — LIDOCAINE HYDROCHLORIDE 80 MG: 20 INJECTION, SOLUTION INFILTRATION; PERINEURAL at 13:32

## 2017-01-01 RX ADMIN — METOCLOPRAMIDE 10 MG: 5 INJECTION, SOLUTION INTRAMUSCULAR; INTRAVENOUS at 15:04

## 2017-01-01 RX ADMIN — ETOPOSIDE 186 MG: 20 INJECTION, SOLUTION, CONCENTRATE INTRAVENOUS at 08:47

## 2017-01-01 RX ADMIN — SODIUM CHLORIDE 100 ML: 900 INJECTION, SOLUTION INTRAVENOUS at 12:51

## 2017-01-01 RX ADMIN — Medication 10 ML: at 13:30

## 2017-01-01 RX ADMIN — Medication 10 ML: at 08:11

## 2017-01-01 RX ADMIN — Medication 10 ML: at 17:05

## 2017-01-01 RX ADMIN — DEXAMETHASONE SODIUM PHOSPHATE 10 MG: 10 INJECTION INTRAMUSCULAR; INTRAVENOUS at 08:13

## 2017-01-01 RX ADMIN — Medication 10 ML: at 11:18

## 2017-01-01 RX ADMIN — SODIUM CHLORIDE 500 ML: 900 INJECTION, SOLUTION INTRAVENOUS at 08:10

## 2017-01-01 RX ADMIN — Medication 10 ML: at 15:25

## 2017-01-01 RX ADMIN — METOCLOPRAMIDE 10 MG: 5 INJECTION, SOLUTION INTRAMUSCULAR; INTRAVENOUS at 15:15

## 2017-01-01 RX ADMIN — ETOPOSIDE 186 MG: 20 INJECTION, SOLUTION, CONCENTRATE INTRAVENOUS at 13:00

## 2017-01-01 RX ADMIN — SODIUM CHLORIDE 150 MG: 900 INJECTION, SOLUTION INTRAVENOUS at 11:46

## 2017-01-01 RX ADMIN — SODIUM CHLORIDE 500 ML: 900 INJECTION, SOLUTION INTRAVENOUS at 13:25

## 2017-01-01 RX ADMIN — IOPAMIDOL 100 ML: 755 INJECTION, SOLUTION INTRAVENOUS at 12:51

## 2017-04-14 ENCOUNTER — HOSPITAL ENCOUNTER (EMERGENCY)
Age: 57
Discharge: HOME | End: 2017-04-14
Attending: EMERGENCY MEDICINE
Payer: COMMERCIAL

## 2017-04-14 VITALS
HEIGHT: 68 IN | SYSTOLIC BLOOD PRESSURE: 144 MMHG | TEMPERATURE: 98 F | WEIGHT: 110 LBS | OXYGEN SATURATION: 97 % | HEART RATE: 82 BPM | RESPIRATION RATE: 1 BRPM | DIASTOLIC BLOOD PRESSURE: 95 MMHG | BODY MASS INDEX: 16.67 KG/M2

## 2017-04-14 DIAGNOSIS — J30.89 ENVIRONMENTAL AND SEASONAL ALLERGIES: Primary | ICD-10-CM

## 2017-04-14 PROCEDURE — 96372 THER/PROPH/DIAG INJ SC/IM: CPT | Performed by: PHYSICIAN ASSISTANT

## 2017-04-14 PROCEDURE — 74011250636 HC RX REV CODE- 250/636: Performed by: PHYSICIAN ASSISTANT

## 2017-04-14 PROCEDURE — 99284 EMERGENCY DEPT VISIT MOD MDM: CPT | Performed by: PHYSICIAN ASSISTANT

## 2017-04-14 RX ORDER — DEXAMETHASONE SODIUM PHOSPHATE 100 MG/10ML
10 INJECTION INTRAMUSCULAR; INTRAVENOUS
Status: COMPLETED | OUTPATIENT
Start: 2017-04-14 | End: 2017-04-14

## 2017-04-14 RX ORDER — PREDNISONE 50 MG/1
50 TABLET ORAL DAILY
Qty: 3 TAB | Refills: 0 | Status: SHIPPED | OUTPATIENT
Start: 2017-04-14 | End: 2017-04-18

## 2017-04-14 RX ADMIN — DEXAMETHASONE SODIUM PHOSPHATE 10 MG: 10 INJECTION INTRAMUSCULAR; INTRAVENOUS at 09:57

## 2017-04-14 NOTE — LETTER
[address] [facility] EMERGENCY DEPT 
[facility] [address] [place] [id number] 
[phone] Work/School Note Date: 4/14/2017 To Whom It May concern: 
 
Maryjane Fisher was seen and treated today in the emergency room by the following provider(s): 
Attending Provider: Angeles Alex MD 
Physician Assistant: GEN Rivera. Maryjane Fisher may return to work on 4/17/17.  
 
Sincerely, 
 
 
 
 
Ceelstina Crandall RN

## 2017-04-14 NOTE — ED PROVIDER NOTES
HPI Comments: Pt c/o selling to face for past 2 weeks, + h/o seasona allergies but no meds taken, no problems breathing and swallowing     Patient is a 65 y.o. female presenting with other event. The history is provided by the patient. Other   This is a new problem. The current episode started more than 1 week ago. The problem has not changed since onset. Pertinent negatives include no chest pain, no abdominal pain, no headaches and no shortness of breath. Nothing aggravates the symptoms. Nothing relieves the symptoms. Treatments tried: asa. The treatment provided no relief. History reviewed. No pertinent past medical history. History reviewed. No pertinent surgical history. History reviewed. No pertinent family history. Social History     Social History    Marital status: N/A     Spouse name: N/A    Number of children: N/A    Years of education: N/A     Occupational History    Not on file. Social History Main Topics    Smoking status: Current Every Day Smoker    Smokeless tobacco: Not on file    Alcohol use No    Drug use: Not on file    Sexual activity: Not on file     Other Topics Concern    Not on file     Social History Narrative    No narrative on file         ALLERGIES: Review of patient's allergies indicates no known allergies. Review of Systems   Respiratory: Negative for shortness of breath. Cardiovascular: Negative for chest pain. Gastrointestinal: Negative for abdominal pain. Neurological: Negative for headaches. All other systems reviewed and are negative. Vitals:    04/14/17 0939 04/14/17 0945 04/14/17 0949   BP: (!) 188/99 186/84    Pulse: 88 86    Resp: 18 18    Temp: 98.1 °F (36.7 °C)     SpO2: 100% 100% 100%   Weight: 49.9 kg (110 lb)     Height: 5' 8\" (1.727 m)              Physical Exam   Constitutional: She is oriented to person, place, and time. She appears well-developed and well-nourished. No distress.    HENT:   Head: Normocephalic and atraumatic. Right Ear: External ear normal.   Left Ear: External ear normal.   Mouth/Throat: Oropharynx is clear and moist.   Eyes: Conjunctivae and EOM are normal. Pupils are equal, round, and reactive to light. Mild puffiness/edema about the upper and lower lids, sclerae clear, no redness noted,    Neck: Normal range of motion. Neck supple. Cardiovascular: Normal rate and regular rhythm. Pulmonary/Chest: Effort normal and breath sounds normal. No respiratory distress. She has no wheezes. She has no rales. Abdominal: Soft. Bowel sounds are normal. There is no tenderness. There is no rebound and no guarding. Musculoskeletal: She exhibits no edema. Patient feels there is edema to arms but no swelling noted skin turgor is good intact radial pulses bilaterally swelling about the joints no redness or skin lesions   Neurological: She is alert and oriented to person, place, and time. Skin: Skin is warm. Nursing note and vitals reviewed. MDM  Number of Diagnoses or Management Options  Diagnosis management comments: Swelling about the eyes probable from seasonal allergies, patient given Decadron 10 mg IM, will give 3 days of oral prednisone and stressed over-the-counter daily allergy medicine, patient has primary care appointment first week of May stressed to keep that appointment.   Blood pressure slightly elevated emergency room patient has no symptoms of previous history of hypertension and again stressed to follow-up with primary care for recheck    Risk of Complications, Morbidity, and/or Mortality  Presenting problems: moderate  Diagnostic procedures: low  Management options: low    Patient Progress  Patient progress: improved    ED Course       Procedures

## 2017-04-14 NOTE — DISCHARGE INSTRUCTIONS
Seasonal Allergies: Care Instructions  Your Care Instructions  Allergies occur when your body's defense system (immune system) overreacts to certain substances. The immune system treats a harmless substance as if it were a harmful germ or virus. Many things can cause this to happen. Examples include pollens, medicine, food, dust, animal dander, and mold. Your allergies are seasonal if you have symptoms just at certain times of the year. In that case, you are probably allergic to pollens from certain trees, grasses, or weeds. Allergies can be mild or severe. Over-the-counter allergy medicine may help with some symptoms. Read and follow all instructions on the label. Managing your allergies is an important part of staying healthy. Your doctor may suggest that you have tests to help find the cause of your allergies. When you know what things trigger your symptoms, you can avoid them. This can prevent allergy symptoms and other health problems. In some cases, immunotherapy might help. For this treatment, you get shots or use pills that have a small amount of certain allergens in them. Your body \"gets used to\" the allergen, so you react less to it over time. This kind of treatment may help prevent or reduce some allergy symptoms. Follow-up care is a key part of your treatment and safety. Be sure to make and go to all appointments, and call your doctor if you are having problems. It's also a good idea to know your test results and keep a list of the medicines you take. How can you care for yourself at home? · Be safe with medicines. Take your medicines exactly as prescribed. Call your doctor if you think you are having a problem with your medicine. · During your allergy season, keep windows closed. If you need to use air-conditioning, change or clean all filters every month. Take a shower and change your clothes after you have been outside. · Stay inside when pollen counts are high.  Vacuum once or twice a week. Use a vacuum  with a HEPA filter or a double-thickness filter. When should you call for help? Call 911 anytime you think you may need emergency care. For example, call if:  · You have symptoms of a severe allergic reaction. These may include:  ¨ Sudden raised, red areas (hives) all over your body. ¨ Swelling of the throat, mouth, lips, or tongue. ¨ Trouble breathing. ¨ Passing out (losing consciousness). Or you may feel very lightheaded or suddenly feel weak, confused, or restless. Watch closely for changes in your health, and be sure to contact your doctor if:  · You need help controlling your allergies. · You have questions about allergy testing. · You do not get better as expected. Where can you learn more? Go to http://neville-joaquin.info/. Enter J912 in the search box to learn more about \"Seasonal Allergies: Care Instructions. \"  Current as of: February 12, 2016  Content Version: 11.2  © 6251-1840 [employer]. Care instructions adapted under license by [employer] (which disclaims liability or warranty for this information). If you have questions about a medical condition or this instruction, always ask your healthcare professional. [address] any warranty or liability for your use of this information.

## 2017-04-14 NOTE — Clinical Note
Use meds as directed along with daily over the counter zyrtec, keep appt with pmd to recheck blood pressure

## 2017-04-14 NOTE — ED NOTES
I have reviewed discharge instructions with the patient. Printed instructions, follow-up information, and a prescription was given. The patient verbalized understanding. Pt left ambulatory in no acute distress.

## 2017-04-18 ENCOUNTER — APPOINTMENT (OUTPATIENT)
Dept: GENERAL RADIOLOGY | Age: 57
DRG: 829 | End: 2017-04-18
Attending: EMERGENCY MEDICINE
Payer: COMMERCIAL

## 2017-04-18 ENCOUNTER — APPOINTMENT (OUTPATIENT)
Dept: CT IMAGING | Age: 57
DRG: 829 | End: 2017-04-18
Attending: EMERGENCY MEDICINE
Payer: COMMERCIAL

## 2017-04-18 ENCOUNTER — HOSPITAL ENCOUNTER (EMERGENCY)
Age: 57
Discharge: OTHER INSTITUTION | DRG: 829 | End: 2017-04-18
Attending: EMERGENCY MEDICINE
Payer: COMMERCIAL

## 2017-04-18 ENCOUNTER — HOSPITAL ENCOUNTER (INPATIENT)
Age: 57
LOS: 3 days | Discharge: HOME | DRG: 829 | End: 2017-04-21
Attending: INTERNAL MEDICINE | Admitting: INTERNAL MEDICINE
Payer: COMMERCIAL

## 2017-04-18 VITALS
HEART RATE: 75 BPM | WEIGHT: 110 LBS | TEMPERATURE: 98.2 F | RESPIRATION RATE: 16 BRPM | OXYGEN SATURATION: 96 % | SYSTOLIC BLOOD PRESSURE: 176 MMHG | BODY MASS INDEX: 16.67 KG/M2 | HEIGHT: 68 IN | DIASTOLIC BLOOD PRESSURE: 104 MMHG

## 2017-04-18 DIAGNOSIS — J98.59 MEDIASTINAL MASS: ICD-10-CM

## 2017-04-18 DIAGNOSIS — C34.91 SMALL CELL CARCINOMA OF RIGHT LUNG (HCC): Primary | ICD-10-CM

## 2017-04-18 DIAGNOSIS — I87.1 SUPERIOR VENA CAVA COMPRESSION SYNDROME: Primary | ICD-10-CM

## 2017-04-18 DIAGNOSIS — I87.1 SVC (SUPERIOR VENA CAVA OBSTRUCTION): ICD-10-CM

## 2017-04-18 DIAGNOSIS — F17.200 SMOKER: Chronic | ICD-10-CM

## 2017-04-18 DIAGNOSIS — I87.1 SVC SYNDROME: ICD-10-CM

## 2017-04-18 DIAGNOSIS — C34.2 MALIGNANT NEOPLASM OF MIDDLE LOBE OF RIGHT LUNG (HCC): ICD-10-CM

## 2017-04-18 PROBLEM — I10 ESSENTIAL HYPERTENSION: Status: ACTIVE | Noted: 2017-04-18

## 2017-04-18 PROBLEM — R91.8 LUNG MASS: Status: ACTIVE | Noted: 2017-04-18

## 2017-04-18 LAB
ANION GAP BLD CALC-SCNC: 7 MMOL/L (ref 7–16)
BASOPHILS # BLD AUTO: 0 K/UL (ref 0–0.2)
BASOPHILS # BLD: 0 % (ref 0–2)
BUN SERPL-MCNC: 12 MG/DL (ref 6–23)
CALCIUM SERPL-MCNC: 8.6 MG/DL (ref 8.3–10.4)
CHLORIDE SERPL-SCNC: 103 MMOL/L (ref 98–107)
CO2 SERPL-SCNC: 32 MMOL/L (ref 21–32)
CREAT SERPL-MCNC: 0.73 MG/DL (ref 0.6–1)
DIFFERENTIAL METHOD BLD: ABNORMAL
EOSINOPHIL # BLD: 0.2 K/UL (ref 0–0.8)
EOSINOPHIL NFR BLD: 2 % (ref 0.5–7.8)
ERYTHROCYTE [DISTWIDTH] IN BLOOD BY AUTOMATED COUNT: 15.9 % (ref 11.9–14.6)
GLUCOSE SERPL-MCNC: 83 MG/DL (ref 65–100)
HCT VFR BLD AUTO: 38.1 % (ref 35.8–46.3)
HGB BLD-MCNC: 12.3 G/DL (ref 11.7–15.4)
IMM GRANULOCYTES # BLD: 0 K/UL (ref 0–0.5)
IMM GRANULOCYTES NFR BLD AUTO: 0.3 % (ref 0–5)
LYMPHOCYTES # BLD AUTO: 31 % (ref 13–44)
LYMPHOCYTES # BLD: 3.1 K/UL (ref 0.5–4.6)
MCH RBC QN AUTO: 23.8 PG (ref 26.1–32.9)
MCHC RBC AUTO-ENTMCNC: 32.3 G/DL (ref 31.4–35)
MCV RBC AUTO: 73.7 FL (ref 79.6–97.8)
MONOCYTES # BLD: 1.1 K/UL (ref 0.1–1.3)
MONOCYTES NFR BLD AUTO: 11 % (ref 4–12)
NEUTS SEG # BLD: 5.4 K/UL (ref 1.7–8.2)
NEUTS SEG NFR BLD AUTO: 56 % (ref 43–78)
PLATELET # BLD AUTO: 402 K/UL (ref 150–450)
PMV BLD AUTO: 9.7 FL (ref 10.8–14.1)
POTASSIUM SERPL-SCNC: 3.9 MMOL/L (ref 3.5–5.1)
RBC # BLD AUTO: 5.17 M/UL (ref 4.05–5.25)
SODIUM SERPL-SCNC: 142 MMOL/L (ref 136–145)
TSH SERPL DL<=0.005 MIU/L-ACNC: 3.43 UIU/ML (ref 0.36–3.74)
WBC # BLD AUTO: 9.8 K/UL (ref 4.3–11.1)

## 2017-04-18 PROCEDURE — 74011250637 HC RX REV CODE- 250/637: Performed by: INTERNAL MEDICINE

## 2017-04-18 PROCEDURE — 65270000029 HC RM PRIVATE

## 2017-04-18 PROCEDURE — 74011250636 HC RX REV CODE- 250/636: Performed by: INTERNAL MEDICINE

## 2017-04-18 RX ORDER — AMLODIPINE BESYLATE 5 MG/1
5 TABLET ORAL DAILY
Status: DISCONTINUED | OUTPATIENT
Start: 2017-04-18 | End: 2017-04-21 | Stop reason: HOSPADM

## 2017-04-18 RX ORDER — SODIUM CHLORIDE 0.9 % (FLUSH) 0.9 %
10 SYRINGE (ML) INJECTION
Status: COMPLETED | OUTPATIENT
Start: 2017-04-18 | End: 2017-04-18

## 2017-04-18 RX ORDER — HEPARIN SODIUM 5000 [USP'U]/ML
5000 INJECTION, SOLUTION INTRAVENOUS; SUBCUTANEOUS EVERY 8 HOURS
Status: DISCONTINUED | OUTPATIENT
Start: 2017-04-18 | End: 2017-04-19

## 2017-04-18 RX ORDER — SODIUM CHLORIDE 0.9 % (FLUSH) 0.9 %
5-10 SYRINGE (ML) INJECTION AS NEEDED
Status: DISCONTINUED | OUTPATIENT
Start: 2017-04-18 | End: 2017-04-21 | Stop reason: HOSPADM

## 2017-04-18 RX ORDER — HYDROCODONE BITARTRATE AND ACETAMINOPHEN 7.5; 325 MG/1; MG/1
1 TABLET ORAL
Status: DISCONTINUED | OUTPATIENT
Start: 2017-04-18 | End: 2017-04-21 | Stop reason: HOSPADM

## 2017-04-18 RX ORDER — NICOTINE 7MG/24HR
1 PATCH, TRANSDERMAL 24 HOURS TRANSDERMAL EVERY 24 HOURS
Status: DISCONTINUED | OUTPATIENT
Start: 2017-04-18 | End: 2017-04-21 | Stop reason: HOSPADM

## 2017-04-18 RX ORDER — SODIUM CHLORIDE 0.9 % (FLUSH) 0.9 %
5-10 SYRINGE (ML) INJECTION EVERY 8 HOURS
Status: DISCONTINUED | OUTPATIENT
Start: 2017-04-18 | End: 2017-04-21 | Stop reason: HOSPADM

## 2017-04-18 RX ORDER — ACETAMINOPHEN 325 MG/1
650 TABLET ORAL
Status: DISCONTINUED | OUTPATIENT
Start: 2017-04-18 | End: 2017-04-21 | Stop reason: HOSPADM

## 2017-04-18 RX ORDER — NALOXONE HYDROCHLORIDE 0.4 MG/ML
0.4 INJECTION, SOLUTION INTRAMUSCULAR; INTRAVENOUS; SUBCUTANEOUS AS NEEDED
Status: DISCONTINUED | OUTPATIENT
Start: 2017-04-18 | End: 2017-04-21 | Stop reason: HOSPADM

## 2017-04-18 RX ADMIN — HEPARIN SODIUM 5000 UNITS: 5000 INJECTION, SOLUTION INTRAVENOUS; SUBCUTANEOUS at 17:19

## 2017-04-18 RX ADMIN — SODIUM CHLORIDE 100 ML: 900 INJECTION, SOLUTION INTRAVENOUS at 11:25

## 2017-04-18 RX ADMIN — AMLODIPINE BESYLATE 5 MG: 5 TABLET ORAL at 17:19

## 2017-04-18 RX ADMIN — ACETAMINOPHEN 650 MG: 325 TABLET, FILM COATED ORAL at 19:48

## 2017-04-18 RX ADMIN — IOVERSOL 80 ML: 741 INJECTION INTRA-ARTERIAL; INTRAVENOUS at 11:25

## 2017-04-18 RX ADMIN — Medication 10 ML: at 22:00

## 2017-04-18 RX ADMIN — Medication 10 ML: at 11:25

## 2017-04-18 RX ADMIN — Medication 10 ML: at 17:00

## 2017-04-18 NOTE — H&P
HOSPITALIST H&P/CONSULT  NAME:  Narinder Osei   Age:  65 y.o.  :   1960   MRN:   [id number]  PCP: Leonardo Sosa MD  Consulting MD:  Treatment Team: Attending Provider: Odin North DO  HPI:   Narinder Osei is a 66 yo AA  F without significant pmhx, aside from smoking, who presents to the ED with worsening facial swelling. Seen for similar episode on 17 and treated for presumed allergies. Symptoms did not improve and have worsened some since then. She denies chest pain or shortness of breath, but states her throat feels full when she swallows. CXR showed R sided mediastinal mass. CT chest showed 'A 1.3 x 1.6 cm irregular right upper lobe mass with 4.1 x 4.6 and 2.1 x 2.7 cm mediastinal masses. This is very suspicious for primary lung carcinoma in metastatic lymphadenopathy. The superior vena cava is draped over the larger mass and partially compressed.'    She was accompanied by her sister, Missy Hendrickson. Case discussed with Dr. Gabriela Lynne, and he states oncologist would like her admitted to [facility]. Hospitalist service asked to see her for admission. Complete ROS done and is as stated in HPI or otherwise negative  Past Medical History:   Diagnosis Date    Smoker       No past surgical history on file. Cannot display prior to admission medications because the patient has not been admitted in this contact. No Known Allergies   Social History   Substance Use Topics    Smoking status: Current Every Day Smoker     Packs/day: 0.50    Smokeless tobacco: Not on file    Alcohol use No      Family History   Problem Relation Age of Onset    Hypertension Mother    Heriberto Gehrig Asthma Mother     No Known Problems Father     Thyroid Disease Sister     Hypertension Brother     Diabetes Brother       Objective: There were no vitals taken for this visit.    Temp (24hrs), Av.7 °F (36.5 °C), Min:97.7 °F (36.5 °C), Max:97.7 °F (36.5 °C)       Physical Exam:  General:    Alert, cooperative, no distress, appears stated age. Head:   Normocephalic, without obvious abnormality, atraumatic. Nose:  Nares normal. No drainage or sinus tenderness. Lungs:   Clear to auscultation bilaterally. No Wheezing or Rhonchi. No rales. Heart:   Regular rate and rhythm,  no murmur, rub or gallop, JVD present. Abdomen:   Soft, non-tender. Not distended. Bowel sounds normal.   Extremities: No cyanosis. No edema. No clubbing  Skin:     Texture, turgor normal. No rashes or lesions. Not Jaundiced  Neurologic: Alert and oriented x 3, no focal deficits   Data Review:   Recent Results (from the past 24 hour(s))   CBC WITH AUTOMATED DIFF    Collection Time: 04/18/17  8:35 AM   Result Value Ref Range    WBC 9.8 4.3 - 11.1 K/uL    RBC 5.17 4.05 - 5.25 M/uL    HGB 12.3 11.7 - 15.4 g/dL    HCT 38.1 35.8 - 46.3 %    MCV 73.7 (L) 79.6 - 97.8 FL    MCH 23.8 (L) 26.1 - 32.9 PG    MCHC 32.3 31.4 - 35.0 g/dL    RDW 15.9 (H) 11.9 - 14.6 %    PLATELET 422 559 - 494 K/uL    MPV 9.7 (L) 10.8 - 14.1 FL    DF AUTOMATED      NEUTROPHILS 56 43 - 78 %    LYMPHOCYTES 31 13 - 44 %    MONOCYTES 11 4.0 - 12.0 %    EOSINOPHILS 2 0.5 - 7.8 %    BASOPHILS 0 0.0 - 2.0 %    IMMATURE GRANULOCYTES 0.3 0.0 - 5.0 %    ABS. NEUTROPHILS 5.4 1.7 - 8.2 K/UL    ABS. LYMPHOCYTES 3.1 0.5 - 4.6 K/UL    ABS. MONOCYTES 1.1 0.1 - 1.3 K/UL    ABS. EOSINOPHILS 0.2 0.0 - 0.8 K/UL    ABS. BASOPHILS 0.0 0.0 - 0.2 K/UL    ABS. IMM.  GRANS. 0.0 0.0 - 0.5 K/UL   METABOLIC PANEL, BASIC    Collection Time: 04/18/17  8:35 AM   Result Value Ref Range    Sodium 142 136 - 145 mmol/L    Potassium 3.9 3.5 - 5.1 mmol/L    Chloride 103 98 - 107 mmol/L    CO2 32 21 - 32 mmol/L    Anion gap 7 7 - 16 mmol/L    Glucose 83 65 - 100 mg/dL    BUN 12 6 - 23 MG/DL    Creatinine 0.73 0.6 - 1.0 MG/DL    GFR est AA >60 >60 ml/min/1.73m2    GFR est non-AA >60 >60 ml/min/1.73m2    Calcium 8.6 8.3 - 10.4 MG/DL   TSH 3RD GENERATION    Collection Time: 04/18/17  8:35 AM   Result Value Ref Range    TSH 3.427 0.358 - 3.740 uIU/mL     Imaging /Procedures /Studies   No orders to display       Assessment and Plan: Active Hospital Problems    Diagnosis Date Noted    Lung mass 04/18/2017    Mediastinal mass 04/18/2017    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 · Consult oncology. · HTN- start amlodipine and monitor. Code Status: Full    Anticipated discharge: 2-3 days    Signed By: Randee Lawrence.  Dallas Farley MD     April 18, 2017

## 2017-04-18 NOTE — IP AVS SNAPSHOT
Jennifer Larios 
 
 
 [address] [facility] [address] [facility] [place] 
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 
[phone] [facility] Radiology On 2017 at 8:30 patient need to arrive at 8:00. nothing to eat or drink after midnight except water. [address] [facility] [address] 
[phone] Johanna Ley MD   [facility]0 [address] [address] 
[phone] Lisa Spivey MD On 2017 at 11:15 [address] [place] [address] 
[phone] Your Appointments 2017  9:00 AM EDT Chemo with [facility] ([address] [facility] 5th Floor Infusion [address] [facility] [address] Jessica Page [phone]  [facility] [address]  
[phone] [facility] [place] [facility] Floor [phone] ext. [phone] [facility] Monday April 24, 2017 11:15 AM EDT New Patient with Kalpana Bazan MD  
[facility] Hematology and Oncology [facility]) C/ Raul Brewster [address] [facility] [address]  
[phone] Friday April 28, 2017  8:30 AM EDT  
NM PET/CT DOSE with [facility] NM PET/CT INJ RM  
ST. Amelia Zarate PET [facility]) C/ Raul Brewster [address] [facility] [address]  
[phone] * FOR ALL APPOINTMENTS BEFORE NOON: Nothing to eat or drink after midnight except for water ONLY. * AFTERNOON APPOINTMENTS: May eat a light breakfast, but without carbohydrates or sugars (We have a list of suggested foods). They must be NPO except for water for at least 4 hours before their appointment time. Patient should be well hydrated (at least 24 oz of water). Do not participate in strenuous activity the day before or the morning before afternoon appointments. Diabetic patients should refrain from insulin 4 hours prior to their appointment. No pregnant patients may have a PET/CT exam, breast feeding mothers should pump enough breast milk for 24 hours as they will not be able to breast feed for 1 day after the scan. Contact Nuclear Medicine with any questions. Procedure takes anywhere from 2-3 hours. If the patient requires pain or anxiety medications, arrangements must be made prior to patient's arrival with their ordering physician. The patient should wait 8 weeks after radiation therapy and 2 weeks after chemotherapy has been completed. * PATIENT ARRIVAL Please report 30 minutes early to check in, except for 7 am patient 7am arrival.  
  
    
 Thursday May 04, 2017  9:30 AM EDT  
[facility] OFFICE VISIT with MD Sabra Davila [facility]) [address] [facility] [address] [facility] [address] [phone] Thursday May 04, 2017 10:00 AM EDT  
CT SIM with [facility] RADIATION ONCOLOGY [address] ([facility]) [address] [address] [facility] [address]  
[phone] Monday May 22, 2017  9:20 AM EDT HOSPITAL with [facility] ([facility]) [address] [place] [address] [facility]  
[phone] Current Discharge Medication List  
  
START taking these medications Dose & Instructions Dispensing Information Comments Morning Noon Evening Bedtime  
 acetaminophen 325 mg tablet Commonly known as:  TYLENOL Your last dose was: Your next dose is:    
   
   
 Dose:  650 mg Take 2 Tabs by mouth every four (4) hours as needed. Quantity:  30 Tab Refills:  0  
     
   
   
   
  
 nicotine 7 mg/24 hr  
Commonly known as:  Mayur Doyle Your last dose was: Your next dose is:    
   
   
 Dose:  1 Patch 1 Patch by TransDERmal route every twenty-four (24) hours for 30 days. Quantity:  42 Patch Refills:  0 Where to Get Your Medications Information on where to get these meds will be given to you by the nurse or doctor. ! Ask your nurse or doctor about these medications  
  acetaminophen 325 mg tablet  
 nicotine 7 mg/24 hr  
  
  
 
  
  
Discharge Instructions Acute High Blood Pressure: Care Instructions Your Care Instructions Acute high blood pressure is very high blood pressure. It's a serious problem. Very high blood pressure can damage your brain, heart, eyes, and kidneys. You may have been given medicines to lower your blood pressure. You may have gotten them as pills or through a needle in one of your veins. This is called an IV. And maybe you were given other medicines too. These can be needed when high blood pressure causes other problems.  
To keep your blood pressure at a lower level, you may need to make healthy lifestyle changes. And you will probably need to take medicines. Be sure to follow up with your doctor about your blood pressure and what you can do about it. Follow-up care is a key part of your treatment and safety. Be sure to make and go to all appointments, and call your doctor if you are having problems. It's also a good idea to know your test results and keep a list of the medicines you take. How can you care for yourself at home? · See your doctor as often as he or she recommends. This is to make sure your blood pressure is under control. You will probably go at least 2 times a year. But it may be more often at first. 
· Take your blood pressure medicine exactly as prescribed. You may take one or more types. They include diuretics, beta-blockers, ACE inhibitors, calcium channel blockers, and angiotensin II receptor blockers. Call your doctor if you think you are having a problem with your medicine. · If you take blood pressure medicine, talk to your doctor before you take decongestants or anti-inflammatory medicine, such as ibuprofen. These can raise blood pressure. · Learn how to check your blood pressure at home. Check it often. · Ask your doctor if it's okay to drink alcohol. · Talk to your doctor about lifestyle changes that can help blood pressure. These include being active and not smoking. When should you call for help? Call 911 anytime you think you may need emergency care. This may mean having symptoms that suggest that your blood pressure is causing a serious heart or blood vessel problem. Your blood pressure may be over 180/110. For example, call 911 if: 
· You have symptoms of a heart attack. These may include: ¨ Chest pain or pressure, or a strange feeling in the chest. 
¨ Sweating. ¨ Shortness of breath. ¨ Nausea or vomiting. ¨ Pain, pressure, or a strange feeling in the back, neck, jaw, or upper belly or in one or both shoulders or arms. ¨ Lightheadedness or sudden weakness. ¨ A fast or irregular heartbeat. · You have symptoms of a stroke. These may include: 
¨ Sudden numbness, tingling, weakness, or loss of movement in your face, arm, or leg, especially on only one side of your body. ¨ Sudden vision changes. ¨ Sudden trouble speaking. ¨ Sudden confusion or trouble understanding simple statements. ¨ Sudden problems with walking or balance. ¨ A sudden, severe headache that is different from past headaches. · You have severe back or belly pain. Do not wait until your blood pressure comes down on its own. Get help right away. Call your doctor now or seek immediate care if: 
· Your blood pressure is much higher than normal (such as 180/110 or higher), but you don't have symptoms. · You think high blood pressure is causing symptoms, such as: ¨ Severe headache. ¨ Blurry vision. Watch closely for changes in your health, and be sure to contact your doctor if: 
· Your blood pressure measures 140/90 or higher at least 2 times. That means the top number is 140 or higher or the bottom number is 90 or higher, or both. · You think you may be having side effects from your blood pressure medicine. · Your blood pressure is usually normal, but it goes above normal at least 2 times. Where can you learn more? Go to http://neville-joaquin.info/. Enter W014 in the search box to learn more about \"Acute High Blood Pressure: Care Instructions. \" Current as of: August 8, 2016 Content Version: 11.2 © 8717-7853 [employer]. Care instructions adapted under license by [employer] (which disclaims liability or warranty for this information). If you have questions about a medical condition or this instruction, always ask your healthcare professional. [place] [address] any warranty or liability for your use of this information. DISCHARGE SUMMARY from Nurse The following personal items are in your possession at time of discharge: Dental Appliances: Uppers Home Medications: None Jewelry: None Clothing: Shirt, Pants, Undergarments, Footwear Other Valuables: Cell Phone, Jolly Mann (purse and wallet sent home, cell phone at bedside. ) PATIENT INSTRUCTIONS: 
 
After general anesthesia or intravenous sedation, for 24 hours or while taking prescription Narcotics: 
Limit your activities Do not drive and operate hazardous machinery Do not make important personal or business decisions Do  not drink alcoholic beverages If you have not urinated within 8 hours after discharge, please contact your surgeon on call. Report the following to your surgeon: Excessive pain, swelling, redness or odor of or around the surgical area Temperature over 100.5 Nausea and vomiting lasting longer than 4 hours or if unable to take medications Any signs of decreased circulation or nerve impairment to extremity: change in color, persistent  numbness, tingling, coldness or increase pain Any questions What to do at Home: 
Recommended activity: Activity as tolerated, discuss future activity levels with follow up providers at appropriate appointments. If you experience any of the following symptoms fever above 101, nausea, vomiting, pain or diarrhea please follow up with your primary care provider, the ER, or your oncologists. *  Please give a list of your current medications to your Primary Care Provider. *  Please update this list whenever your medications are discontinued, doses are 
    changed, or new medications (including over-the-counter products) are added. *  Please carry medication information at all times in case of emergency situations. These are general instructions for a healthy lifestyle: No smoking/ No tobacco products/ Avoid exposure to second hand smoke Surgeon General's Warning:  Quitting smoking now greatly reduces serious risk to your health. Obesity, smoking, and sedentary lifestyle greatly increases your risk for illness A healthy diet, regular physical exercise & weight monitoring are important for maintaining a healthy lifestyle You may be retaining fluid if you have a history of heart failure or if you experience any of the following symptoms:  Weight gain of 3 pounds or more overnight or 5 pounds in a week, increased swelling in our hands or feet or shortness of breath while lying flat in bed. Please call your doctor as soon as you notice any of these symptoms; do not wait until your next office visit. Recognize signs and symptoms of STROKE: 
 
F-face looks uneven A-arms unable to move or move unevenly S-speech slurred or non-existent T-time-call 911 as soon as signs and symptoms begin-DO NOT go Back to bed or wait to see if you get better-TIME IS BRAIN. Warning Signs of HEART ATTACK Call 911 if you have these symptoms: 
Chest discomfort. Most heart attacks involve discomfort in the center of the chest that lasts more than a few minutes, or that goes away and comes back. It can feel like uncomfortable pressure, squeezing, fullness, or pain. Discomfort in other areas of the upper body. Symptoms can include pain or discomfort in one or both arms, the back, neck, jaw, or stomach. Shortness of breath with or without chest discomfort. Other signs may include breaking out in a cold sweat, nausea, or lightheadedness. Don't wait more than five minutes to call [address]! Fast action can save your life. Calling 911 is almost always the fastest way to get lifesaving treatment. Emergency Medical Services staff can begin treatment when they arrive  up to an hour sooner than if someone gets to the hospital by car. The discharge information has been reviewed with the patient. The patient verbalized understanding.  
 
Discharge medications reviewed with the patient and appropriate educational materials and side effects teaching were provided. Lung Cancer: Care Instructions Your Care Instructions Lung cancer occurs when abnormal cells grow out of control in the lung. Lung cancer can start anywhere in the lungs and spread to other parts of the body. Treatment for lung cancer depends on what type of lung cancer you have and how advanced it is. Treatment may include surgery to remove the cancer. It could also include medicines (chemotherapy) or radiation to destroy cancer cells. Being treated for cancer can weaken your body, and you may feel very tired. Home treatment and certain medicines can help you feel better. Finding out that you have cancer is scary. You may feel many emotions and may need some help coping. Seek out family, friends, and counselors for support. You also can do things at home to make yourself feel better while you go through treatment. Call the [employer] (1-937.645.6433) or visit its website at 0812 [employer]. ShuttleCloud for more information. Follow-up care is a key part of your treatment and safety. Be sure to make and go to all appointments, and call your doctor if you are having problems. It's also a good idea to know your test results and keep a list of the medicines you take. How can you care for yourself at home? · Take your medicines exactly as prescribed. Call your doctor if you think you are having a problem with your medicine. You will get more details on the specific medicines your doctor prescribes. · Follow your doctor's instructions to relieve pain. Use pain medicine when you first feel pain, before it becomes severe. Taking pain medicines regularly is often the best way to keep pain under control. · Eat healthy food. If you do not feel like eating, try to eat food that has protein and extra calories to keep up your strength and prevent weight loss. Drink liquid meal replacements for extra calories and protein.  Try to eat your main meal early. Eating smaller portions more often may help as well. · Get some physical activity every day, but do not get too tired. Keep doing the hobbies you enjoy as your energy allows. · Do not smoke. Smoking can make your cancer symptoms worse. If you need help quitting, talk to your doctor about stop-smoking programs and medicines. These can increase your chances of quitting for good. · If you use oxygen, do not smoke, light a cigarette, or use a flame while your oxygen is on. Smoking while using oxygen can lead to fire and even explosion. · If you have nausea, try to eat several small meals a day. When you feel better, eat clear soups and mild foods until all symptoms are gone for 12 to 48 hours. Other good choices include dry toast, crackers, cooked cereal, and gelatin dessert, such as Jell-O. · If you are vomiting or have diarrhea: ¨ Drink plenty of fluids (enough so that your urine is light yellow or clear like water) to prevent dehydration. Choose water and other caffeine-free clear liquids. If you have kidney, heart, or liver disease and have to limit fluids, talk with your doctor before you increase the amount of fluids you drink. ¨ When you are able to eat, try clear soups, mild foods, and liquids until all symptoms are gone for 12 to 48 hours. Other good choices include dry toast, crackers, cooked cereal, and gelatin dessert, such as Jell-O. 
· Take steps to control your stress and workload. Learn relaxation techniques. ¨ Share your feelings. Stress and tension affect our emotions. By expressing your feelings to others, you may be able to understand and cope with them. ¨ Consider joining a support group. Talking about a problem with your spouse, a good friend, or other people with similar problems is a good way to reduce tension and stress. ¨ Express yourself with art.  Try writing, crafts, dance, or art to relieve stress. Some dance, writing, or art groups may be available just for people who have cancer. ¨ Be kind to your body and mind. Getting enough sleep, eating a healthy diet, and taking time to do things you enjoy can contribute to an overall feeling of balance in your life and help reduce stress. ¨ Get help if you need it. Discuss your concerns with your doctor or counselor. · If you have not already done so, prepare a list of advance directives. Advance directives are instructions to your doctor and family members about what kind of care you want if you become unable to speak or express yourself. When should you call for help? Call 911 anytime you think you may need emergency care. For example, call if: 
· You have sudden or severe chest pain. · You have severe trouble breathing. · You cough up a lot of blood. · You vomit and feel like you may faint when you sit up or stand. Call your doctor now or seek immediate medical care if: 
· You are short of breath. · You have new chest pain. · You have a fever. · Your neck and face swell. · You have unexpected or severe nausea or vomiting. · Your pain is not controlled by medicine. · Your symptoms get worse or are not getting better. Watch closely for changes in your health, and be sure to contact your doctor if: 
· You develop a new cough, or your cough does not go away. · You cough up yellow or green mucus for longer than 2 days. · You are constipated or have diarrhea. Where can you learn more? Go to http://neville-joaquin.info/. Enter I830 in the search box to learn more about \"Lung Cancer: Care Instructions. \" Current as of: July 26, 2016 Content Version: 11.2 © 3708-0556 [employer] by [employer]. Care instructions adapted under license by [employer] (which disclaims liability or warranty for this information).  If you have questions about a medical condition or this instruction, always ask your healthcare professional. [place] [address] any warranty or liability for your use of this information. Discharge Orders None [employer] Announcement We are excited to announce that we are making your provider's discharge notes available to you in [employer]. You will see these notes when they are completed and signed by the physician that discharged you from your recent hospital stay. If you have any questions or concerns about any information you see in [employer], please call the Health Information Department where you were seen or reach out to your Primary Care Provider for more information about your plan of care. Introducing [facility]! Katherine Qiana introduces [employer] patient portal. Now you can access parts of your medical record, email your doctor's office, and request medication refills online. 1. In your internet browser, go to [email or web address] 2. Click on the First Time User? Click Here link in the Sign In box. You will see the New Member Sign Up page. 3. Enter your [employer] Access Code exactly as it appears below. You will not need to use this code after youve completed the sign-up process. If you do not sign up before the expiration date, you must request a new code. · [employer] Access Code: [email or web address] Expires: 7/13/2017  9:35 AM 
 
4. Enter the last four digits of your Social Security Number (xxxx) and Date of Birth (mm/dd/yyyy) as indicated and click Submit. You will be taken to the next sign-up page. 5. Create a [employer] ID. This will be your [employer] login ID and cannot be changed, so think of one that is secure and easy to remember. 6. Create a [employer] password. You can change your password at any time. 7. Enter your Password Reset Question and Answer. This can be used at a later time if you forget your password. 8. Enter your e-mail address.  You will receive e-mail notification when new information is available in "[employer]t. 9. Click Sign Up. You can now view and download portions of your medical record. 10. Click the Download Summary menu link to download a portable copy of your medical information. If you have questions, please visit the Frequently Asked Questions section of the [facility] Access website. Remember, [facility] Access is NOT to be used for urgent needs. For medical emergencies, dial 911. Now available from your iPhone and Android! General Information Please provide this summary of care documentation to your next provider. Patient Signature:  ____________________________________________________________ Date:  ____________________________________________________________  
  
[place] Provider Signature:  ____________________________________________________________ Date:  ____________________________________________________________

## 2017-04-18 NOTE — ED NOTES
Patient status is unchanged, resting but easily aroused, family is not at bedside, call button within reach. Patient  did not need to use bathroom. Patient was asked to rate their pain, patient was then repositioned for comfort.

## 2017-04-18 NOTE — ED PROVIDER NOTES
HPI     CDNotesTM Templates                    Emergency Department     Chief Complaint:  Facial and neck swelling  HPI:  66 yo female with facial and neck swelling; ongoing for several days; was seen here and given steroids; facial, neck swelling. Some voice changes. Swallowing feels funny but not painful. Onset of symptoms was gradually worsening  Duration of symptoms has been 1 week  Associated symptoms include no rash no itching no shortness of breath  Symptoms may have been precipitated by: none  Severity of symptoms is described as moderate  Historian:   patient  Review of Systems:  Include pertinent positives and negatives. CONST:  Denies fever, chills, weakness  ENT:  No tongue swelling. No difficulty swallowing. EYES:  Denies vision changes  CARDIO: Denies chest pain, palpitations   RESP:  No cough, shortness of breath  GI:  Denies: abdominal pain, nausea, vomiting  MUSC: Denies: muscle, joint pain  SKIN:  No rash or lesions  NEURO: Denies headache  ALLERG/IMMUN: no urticaria,   Past Medical History:  History reviewed. No pertinent past medical history. History reviewed. No pertinent surgical history. Social History   Substance Use Topics    Smoking status: Current Every Day Smoker    Smokeless tobacco: None    Alcohol use No     History reviewed. No pertinent family history. Previous Medications    No medications on file     Allergies as of 04/18/2017    (No Known Allergies)       Physical Exam:    Vital signs:   Visit Vitals    BP (!) 160/92    Pulse 77    Temp 97.7 °F (36.5 °C)    Resp 16    Ht 5' 8\" (1.727 m)    Wt 49.9 kg (110 lb)    SpO2 98%    BMI 16.73 kg/m2   Vital signs were reviewed. Pulse oximetry interpretation: saturation 98%, normal    General Appear: alert, no acute distress  Ears/Nose/Throat: Oropharynx is pink and moist.  No swelling noted. The uvula is midline. No peritonsillar cellulitis or abscess noted. NECK:   The face and neck are swollen.   When compared with recent pictures she has edema. There is submental swelling and tenderness. The neck is swollen. Eyes:   PERRL, anicteric  Cardiovascular: regular rate and rhythm, no murmur  Respiratory:  Respirations easy and nonlabored, no wheezing  Abdomen:  soft, non-tender, non-distended  Musculoskeletal: no joint tenderness  Skin:   No rash or lesions  Neurologic:  alert, non-focal    ________________________________________________________________________  Assessment/Plan:    52-year-old female with swelling of the face and neck. Differential diagnosis includes allergic reaction, infection, less likely would be some sort of neoplastic or paraneoplastic syndrome. We'll check labs to include thyroid CBC with differential as well as x-rays of the neck and chest.  ______________________________________________________________________  Progress:    Patient resting. Chest x-ray showed a mediastinal mass. CT of the chest was ordered to further characterize this    CT is pending at this time    Nursing notes were reviewed: yes  Old medical records: obtained and reviewed:  no  Discussed patient with another provider: yes    Diagnostic/Management Options: This is a New Problem needs additional work up      RISK:  Problem:  moderate  Diagnosis:  low  Management:  Moderate    Jennifer Doyle MD; 4/18/2017 @9:46 AM===========================================      _______________________________________________________________________    Condition:  fair  Disposition:  Transfer to [facility]  Diagnosis:  Superior vena cava syndrome      Mesha Dyer M.D.      Sanaz Smith; version 2.0; revised April, 2016.                 Review of Systems    Vitals:    04/18/17 0803 04/18/17 0816   BP: (!) 216/95    Pulse: 85    Resp: 16    Temp: 97.7 °F (36.5 °C)    SpO2: 98% 100%   Weight: 49.9 kg (110 lb)    Height: 5' 8\" (1.727 m)             Physical Exam     [facility]  ED Course Procedures

## 2017-04-18 NOTE — PROGRESS NOTES
TRANSFER - IN REPORT:    Verbal report received from Rylee Ely RN(name) on Michel Paz  being received from [facility](unit) for routine progression of care      Report consisted of patients Situation, Background, Assessment and   Recommendations(SBAR). Information from the following report(s) SBAR and Kardex was reviewed with the receiving nurse. Opportunity for questions and clarification was provided. Assessment completed upon patients arrival to unit and care assumed.

## 2017-04-18 NOTE — CONSULTS
[address] Hematology Ongology        Inpatient Hematology / Oncology Consult Note    Reason for Consult:  facial swelling  Lung mass  SVC syndrome  Referring Physician:  Feliciano Hodge DO    History of Present Illness:  Ms. Terese Miller is a 65 y.o. female admitted on 4/18/2017 with a primary diagnosis of superior vena cava syndrome. She has a primary medical history of hypertension. She presented to the ER with complaints of facial and bilateral upper arm swelling x 1 week. She was given steroids and discharged to home. She then returned yesterday feeling no better with possibly worsening of her symptoms. She denied any dyspnea, cough, wheezing. She does state that she feels a full sensation when she swallows. CT of her chest shows a 1.3 x 1.6 cm irregular right upper lobe mass with 4.1 x 4.6 and 2.1 x 2.7 cm mediastinal masses concerning for a primary lung carcinoma. The superior vena cava is draped over the larger mass and partially compressed which is likely the cause of her symptoms. She has a history of smoking a half-pack a day since she was about 17years old. Review of Systems:  Constitutional + fatigue. Denies fever, chills, weight loss, appetite fatigue, night sweats. HEENT Denies trauma, blurry vision, hearing loss, ear pain, nosebleeds, sore throat, neck pain and ear discharge. + facial swelling. Skin Denies lesions or rashes. Lungs + exertional dyspnea. Denies cough, sputum production or hemoptysis. Cardiovascular Denies chest pain, palpitations, or lower extremity edema. Gastrointestinal Denies nausea, vomiting, changes in bowel habits, bloody or black stools, abdominal pain.  Denies dysuria, frequency or hesitancy of urination. Neuro Denies headaches, visual changes or ataxia. Denies dizziness, tingling, tremors, sensory change, speech change, focal weakness or headaches. Hematology Denies easy bruising or bleeding, denies gingival bleeding or epistaxis. MSK + swelling in upper extremities bilaterally with increased veins. Denies back pain, arthralgias, myalgias or frequent falls. Psychiatric/Behavioral Denies depression and substance abuse. The patient is not nervous/anxious. No Known Allergies  Past Medical History:   Diagnosis Date    Smoker      No past surgical history on file. Family History   Problem Relation Age of Onset    Hypertension Mother     Asthma Mother     No Known Problems Father     Thyroid Disease Sister     Hypertension Brother     Diabetes Brother      Social History     Social History    Marital status:      Spouse name: N/A    Number of children: N/A    Years of education: N/A     Occupational History    Not on file. Social History Main Topics    Smoking status: Current Every Day Smoker     Packs/day: 0.50    Smokeless tobacco: Not on file    Alcohol use No    Drug use: No    Sexual activity: Not on file     Other Topics Concern    Not on file     Social History Narrative    . 1 daughter and 1 son. Works as  at [employer] in [employer]. Current Facility-Administered Medications   Medication Dose Route Frequency Provider Last Rate Last Dose    sodium chloride (NS) flush 5-10 mL  5-10 mL IntraVENous Q8H Edmundo Mcnair MD        sodium chloride (NS) flush 5-10 mL  5-10 mL IntraVENous PRN Lore Wojciech. James Mcnair MD        acetaminophen (TYLENOL) tablet 650 mg  650 mg Oral Q4H PRN Lore Wojciech. James Mcnair MD        HYDROcodone-acetaminophen [facility]) 7.5-325 mg per tablet 1 Tab  1 Tab Oral Q4H PRN Lore Wojciech. James Mcnair MD        naloxone [facility]) injection 0.4 mg  0.4 mg IntraVENous PRN Lore Wojciech. James Mcnair MD        nicotine (NICODERM CQ) 7 mg/24 hr patch 1 Patch  1 Patch TransDERmal Q24H Lore Wojciech. James Mcnair MD   1 Patch at 04/18/17 1720    heparin (porcine) injection 5,000 Units  5,000 Units SubCUTAneous Q8H Edmundo Mcnair MD   5,000 Units at 04/18/17 1719    amLODIPine (NORVASC) tablet 5 mg  5 mg Oral DAILY Sarah Fruits. Nela Romero MD   5 mg at 17 6647       OBJECTIVE:  Patient Vitals for the past 8 hrs:   BP Temp Pulse Resp SpO2   17 1617 (!) 174/94 97.7 °F (36.5 °C) 74 16 98 %     Temp (24hrs), Av.9 °F (36.6 °C), Min:97.7 °F (36.5 °C), Max:98.2 °F (36.8 °C)     07 -  190  In: 240 [P.O.:240]  Out: -     Physical Exam:  Constitutional: Well developed, well nourished female in no acute distress, sitting comfortably in the hospital bed. HEENT: Normocephalic and atraumatic. Oropharynx is clear, mucous membranes are moist.  Extraocular muscles are intact. Sclerae anicteric. Neck supple. Mild facial swelling noted. Lymph node   No palpable submandibular, cervical, axillary or inguinal lymph nodes. + large right supraclavicular node palpated. Skin Warm and dry. No bruising and no rash noted. No erythema. No pallor. Veins very prominent throughout arms, chest and neck. Respiratory Lungs are clear to auscultation bilaterally without wheezes, rales or rhonchi, normal air exchange without accessory muscle use. CVS Normal rate, regular rhythm and normal S1 and S2. No murmurs, gallops, or rubs. Abdomen Soft, nontender and nondistended, normoactive bowel sounds. No palpable mass. No hepatosplenomegaly. Neuro Grossly nonfocal with no obvious sensory or motor deficits. MSK Normal range of motion in general.  No edema and no tenderness. Psych Appropriate mood and affect.         Labs:    Recent Results (from the past 24 hour(s))   CBC WITH AUTOMATED DIFF    Collection Time: 17  8:35 AM   Result Value Ref Range    WBC 9.8 4.3 - 11.1 K/uL    RBC 5.17 4.05 - 5.25 M/uL    HGB 12.3 11.7 - 15.4 g/dL    HCT 38.1 35.8 - 46.3 %    MCV 73.7 (L) 79.6 - 97.8 FL    MCH 23.8 (L) 26.1 - 32.9 PG    MCHC 32.3 31.4 - 35.0 g/dL    RDW 15.9 (H) 11.9 - 14.6 %    PLATELET 845 452 - 619 K/uL    MPV 9.7 (L) 10.8 - 14.1 FL    DF AUTOMATED      NEUTROPHILS 56 43 - 78 % LYMPHOCYTES 31 13 - 44 %    MONOCYTES 11 4.0 - 12.0 %    EOSINOPHILS 2 0.5 - 7.8 %    BASOPHILS 0 0.0 - 2.0 %    IMMATURE GRANULOCYTES 0.3 0.0 - 5.0 %    ABS. NEUTROPHILS 5.4 1.7 - 8.2 K/UL    ABS. LYMPHOCYTES 3.1 0.5 - 4.6 K/UL    ABS. MONOCYTES 1.1 0.1 - 1.3 K/UL    ABS. EOSINOPHILS 0.2 0.0 - 0.8 K/UL    ABS. BASOPHILS 0.0 0.0 - 0.2 K/UL    ABS. IMM. GRANS. 0.0 0.0 - 0.5 K/UL   METABOLIC PANEL, BASIC    Collection Time: 04/18/17  8:35 AM   Result Value Ref Range    Sodium 142 136 - 145 mmol/L    Potassium 3.9 3.5 - 5.1 mmol/L    Chloride 103 98 - 107 mmol/L    CO2 32 21 - 32 mmol/L    Anion gap 7 7 - 16 mmol/L    Glucose 83 65 - 100 mg/dL    BUN 12 6 - 23 MG/DL    Creatinine 0.73 0.6 - 1.0 MG/DL    GFR est AA >60 >60 ml/min/1.73m2    GFR est non-AA >60 >60 ml/min/1.73m2    Calcium 8.6 8.3 - 10.4 MG/DL   TSH 3RD GENERATION    Collection Time: 04/18/17  8:35 AM   Result Value Ref Range    TSH 3.427 0.358 - 3.740 uIU/mL       Imaging:  CT OF THE CHEST WITH INTRAVENOUS CONTRAST 04/18/17     INDICATION: Mediastinal mass on chest x-ray.      COMPARISON: None. Recent chest x-rays reviewed.     TECHNIQUE: 2.5 mm axial scans from above the aortic arch to the lung bases  with 80 cc nonionic intravenous contrast without acute complication. Intravenous contrast was given to evaluate for pulmonary embolism.     FINDINGS: Dilated airspaces consistent with emphysema. A 13 x 16 mm mass right  apex suspicious for primary lung cancer.      Large heterogeneous mass between the superior vena cava and trachea measures 4.1  x 4.6 cm, possibly a necrotic node. The superior vena cava is partially  compressed. A similar precarinal density measures 2.1 x 2.7 cm. No  contralateral adenopathy. Adrenal glands are not enlarged. Included portion of  the upper abdomen is unremarkable. No gross bony lesions.     IMPRESSION: A 1.3 x 1.6 cm irregular right upper lobe mass with 4.1 x 4.6 and  2.1 x 2.7 cm mediastinal masses.  This is very suspicious for primary lung  carcinoma in metastatic lymphadenopathy. The superior vena cava is draped over  the larger mass and partially compressed. ASSESSMENT:  Problem List  Never Reviewed          Codes Class Noted    Lung mass ICD-10-CM: R91.8  ICD-9-CM: 786.6  4/18/2017        Mediastinal mass ICD-10-CM: J98.59  ICD-9-CM: 786.6  4/18/2017        * (Principal)SVC (superior vena cava obstruction) ICD-10-CM: I87.1  ICD-9-CM: 459.2  4/18/2017        Essential hypertension ICD-10-CM: I10  ICD-9-CM: 401.9  4/18/2017        SVC syndrome ICD-10-CM: I87.1  ICD-9-CM: 459.2  4/18/2017                RECOMMENDATIONS:  Ms. Carmen Colindres is a 65 y.o. 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 Will ask pulmonary to review to see if EBUS would be appropriate for biopsy. Need CT abdomen and pelvis to complete staging. Will also plan for an MRI of the brain when she is discharged. Consult radiation oncology as well for SVC. When we have final diagnosis, we can make further recommendations regarding systemic treatment. Lab studies and imaging studies were personally reviewed. Pertinent old records were reviewed. Thank you for allowing us to participate in the care of Ms. Carmen Colindres. Will follow along with you. Josy Barnard NP   [address] Hematology Ongology  [address] [place] [address]  Office : [phone]  Fax : [phone]     Attending Addendum:  I personally evaluated the patient with Colten Workman N.P.,  and agree with the assessment, findings and plan as documented. Appears stable, she needs biopsy to ascertain the correct diagnosis.               Mabel Boeck, MD  [address]  Office : [phone]  Fax : [phone]

## 2017-04-18 NOTE — ED TRIAGE NOTES
Facial and throat swelling x 2 wks. Denies new med, soaps, hair products etc, Seen here Friday for same not getting better.

## 2017-04-19 ENCOUNTER — APPOINTMENT (OUTPATIENT)
Dept: CT IMAGING | Age: 57
DRG: 829 | End: 2017-04-19
Attending: NURSE PRACTITIONER
Payer: COMMERCIAL

## 2017-04-19 LAB
ANION GAP BLD CALC-SCNC: 8 MMOL/L (ref 7–16)
BUN SERPL-MCNC: 12 MG/DL (ref 6–23)
CALCIUM SERPL-MCNC: 8.6 MG/DL (ref 8.3–10.4)
CHLORIDE SERPL-SCNC: 102 MMOL/L (ref 98–107)
CO2 SERPL-SCNC: 32 MMOL/L (ref 21–32)
CREAT SERPL-MCNC: 0.77 MG/DL (ref 0.6–1)
GLUCOSE SERPL-MCNC: 82 MG/DL (ref 65–100)
POTASSIUM SERPL-SCNC: 3.9 MMOL/L (ref 3.5–5.1)
SODIUM SERPL-SCNC: 142 MMOL/L (ref 136–145)

## 2017-04-19 PROCEDURE — 74011000258 HC RX REV CODE- 258: Performed by: INTERNAL MEDICINE

## 2017-04-19 PROCEDURE — 99407 BEHAV CHNG SMOKING > 10 MIN: CPT

## 2017-04-19 PROCEDURE — 74177 CT ABD & PELVIS W/CONTRAST: CPT

## 2017-04-19 PROCEDURE — 80048 BASIC METABOLIC PNL TOTAL CA: CPT | Performed by: INTERNAL MEDICINE

## 2017-04-19 PROCEDURE — 99254 IP/OBS CNSLTJ NEW/EST MOD 60: CPT | Performed by: INTERNAL MEDICINE

## 2017-04-19 PROCEDURE — 36415 COLL VENOUS BLD VENIPUNCTURE: CPT | Performed by: INTERNAL MEDICINE

## 2017-04-19 PROCEDURE — 74011636320 HC RX REV CODE- 636/320: Performed by: INTERNAL MEDICINE

## 2017-04-19 PROCEDURE — 74011250637 HC RX REV CODE- 250/637: Performed by: INTERNAL MEDICINE

## 2017-04-19 PROCEDURE — 65270000029 HC RM PRIVATE

## 2017-04-19 PROCEDURE — 74011250636 HC RX REV CODE- 250/636: Performed by: INTERNAL MEDICINE

## 2017-04-19 RX ORDER — SODIUM CHLORIDE 0.9 % (FLUSH) 0.9 %
10 SYRINGE (ML) INJECTION
Status: COMPLETED | OUTPATIENT
Start: 2017-04-19 | End: 2017-04-19

## 2017-04-19 RX ADMIN — DIATRIZOATE MEGLUMINE AND DIATRIZOATE SODIUM 15 ML: 660; 100 LIQUID ORAL; RECTAL at 16:31

## 2017-04-19 RX ADMIN — SODIUM CHLORIDE 100 ML: 900 INJECTION, SOLUTION INTRAVENOUS at 18:23

## 2017-04-19 RX ADMIN — Medication 10 ML: at 18:23

## 2017-04-19 RX ADMIN — HEPARIN SODIUM 5000 UNITS: 5000 INJECTION, SOLUTION INTRAVENOUS; SUBCUTANEOUS at 01:34

## 2017-04-19 RX ADMIN — IOPAMIDOL 100 ML: 755 INJECTION, SOLUTION INTRAVENOUS at 18:23

## 2017-04-19 RX ADMIN — ACETAMINOPHEN 650 MG: 325 TABLET, FILM COATED ORAL at 11:07

## 2017-04-19 RX ADMIN — Medication 10 ML: at 22:00

## 2017-04-19 RX ADMIN — HEPARIN SODIUM 5000 UNITS: 5000 INJECTION, SOLUTION INTRAVENOUS; SUBCUTANEOUS at 10:56

## 2017-04-19 RX ADMIN — AMLODIPINE BESYLATE 5 MG: 5 TABLET ORAL at 10:57

## 2017-04-19 RX ADMIN — Medication 10 ML: at 15:40

## 2017-04-19 NOTE — CONSULTS
CONSULT NOTE    Shahnaz Becker    4/19/2017    Date of Admission:  4/18/2017    The patient's chart is reviewed and the patient is discussed with the staff. Subjective:     Patient is a 65 y.o.  female seen and evaluated at the request of Dr. José Luis Cardenas. For the past 1 to 2 weeks, she has noticed facial swelling and she has felt a fullness in her throat in addition to discomfort in the right side of her back and her right arm. She was found to have a large medistinal mass on CT scan with SVC compression. Radiation oncology has been consulted. She is a smoker - 0.5 ppd for the past 40 years. She denies weight loss. She has not experienced any shortness of breath or hemoptysis. Review of Systems  A comprehensive review of systems was negative except for: Constitutional: positive for none  Eyes: positive for none  Ears, nose, mouth, throat, and face: positive for facial swelling, fullness in neck  Respiratory: positive for negative  Cardiovascular: positive for none  Gastrointestinal: positive for none  Genitourinary: positive for none  Integument/breast: positive for none  Hematologic/lymphatic: positive for none  Musculoskeletal: positive for right sided back pain  Neurological: positive for none  Behvioral/Psych: positive for tobacco use  Endocrine: positive for none  Allergic/Immunologic: positive for none    Patient Active Problem List   Diagnosis Code    Mediastinal mass J98.59    SVC (superior vena cava obstruction) I87.1    Essential hypertension I10    SVC syndrome I87.1    Smoker F17.200         None       No past medical history on file. Active Ambulatory Problems     Diagnosis Date Noted    No Active Ambulatory Problems     Resolved Ambulatory Problems     Diagnosis Date Noted    No Resolved Ambulatory Problems     No Additional Past Medical History     No past surgical history on file.   Social History     Social History    Marital status:      Spouse name: N/A    Number of children: N/A    Years of education: N/A     Occupational History    works at [employer] [address] [employer] Smoking status: Current Every Day Smoker     Packs/day: 0.50    Smokeless tobacco: Not on file    Alcohol use No    Drug use: No    Sexual activity: Not on file     Other Topics Concern    Not on file     Social History Narrative    . 1 daughter and 1 son. Works as  at [employer] in [employer]. Family History   Problem Relation Age of Onset    Hypertension Mother    [address] Ast[facility] Mother     No Known Problems Father     Thyroid Disease Sister     Hypertension Brother     Diabetes Brother      No Known Allergies    Current Facility-Administered Medications   Medication Dose Route Frequency    sodium chloride (NS) flush 5-10 mL  5-10 mL IntraVENous Q8H    sodium chloride (NS) flush 5-10 mL  5-10 mL IntraVENous PRN    acetaminophen (TYLENOL) tablet 650 mg  650 mg Oral Q4H PRN    HYDROcodone-acetaminophen (NORCO) 7.5-325 mg per tablet 1 Tab  1 Tab Oral Q4H PRN    naloxone (NARCAN) injection 0.4 mg  0.4 mg IntraVENous PRN    nicotine (NICODERM CQ) 7 mg/24 hr patch 1 Patch  1 Patch TransDERmal Q24H    amLODIPine (NORVASC) tablet 5 mg  5 mg Oral DAILY         Objective:     Vitals:    04/18/17 2351 04/19/17 0447 04/19/17 0709 04/19/17 1150   BP: 119/74 135/85 152/87 117/74   Pulse: 84 78 86 81   Resp: 18 18 18 18   Temp: 98.1 °F (36.7 °C) 98 °F (36.7 °C) 98.1 °F (36.7 °C) 98 °F (36.7 °C)   SpO2: 94% 99% 99% 98%       PHYSICAL EXAM     Constitutional:  the patient is well developed and in no acute distress  HEENT:  Sclera clear, pupils equal, oral mucosa moist. Noted JVD on the right  Lungs: clear bilaterally  Cardiovascular:  RRR without M,G,R  Abd/GI: soft and non-tender; with positive bowel sounds. Ext: warm without cyanosis. There is no lower leg edema.   Skin:  no jaundice or rashes, no wounds   Neuro: no gross neuro deficits. Alert and oriented  Musculoskeletal: moves all four extremities. No deformities. Psychiatric: calm. Does not appear anxious or depressed  Chest X-ray:          A 1.3 x 1.6 cm irregular right upper lobe mass with 4.1 x 4.6 and  2.1 x 2.7 cm mediastinal masses. This is very suspicious for primary lung  carcinoma in metastatic lymphadenopathy. The superior vena cava is draped over  the larger mass and partially compressed. Recent Labs      04/18/17   0835   WBC  9.8   HGB  12.3   HCT  38.1   PLT  402     Recent Labs      04/19/17   0724  04/18/17   0835   NA  142  142   K  3.9  3.9   CL  102  103   GLU  82  83   CO2  32  32   BUN  12  12   CREA  0.77  0.73   CA  8.6  8.6       Assessment:  (Medical Decision Making)     Hospital Problems  Never Reviewed          Codes Class Noted POA    Smoker (Chronic) ICD-10-CM: F17.200  ICD-9-CM: 305.1  Unknown Yes        Mediastinal mass ICD-10-CM: J98.59  ICD-9-CM: 786.6  4/18/2017 Yes        * (Principal)SVC (superior vena cava obstruction) ICD-10-CM: I87.1  ICD-9-CM: 459.2  4/18/2017 Yes        Essential hypertension ICD-10-CM: I10  ICD-9-CM: 401.9  4/18/2017 Yes        SVC syndrome ICD-10-CM: I87.1  ICD-9-CM: 459.2  4/18/2017 Yes              Plan:  (Medical Decision Making)   1. Radiation oncology has been consulted by oncology - CT abdomen/pelvis pending. Plans for outpatient MRI brain  2. Will discuss with Dr. Louis Chow - need for biopsy/EBUS. Prashant Rodriguez NP      More than 50% of time documented was spent face-to-face contact with the patient and in the care of the patient on the floor/unit where the patient is located  Lungs:  clwear  Heart:  RRR with no Murmur/Rubs/Gallops    Additional Comments:  rul and mediastinal mass with svc syndrome. abd ct is pending but nothing in liver on chest ct. May need ebus for dx    I have spoken with and examined the patient. I agree with the above assessment and plan as documented.     Glo Mason MD

## 2017-04-19 NOTE — PROGRESS NOTES
TRANSFER - IN REPORT:    Verbal report received from Jeffrey Emery RN from [facility] [address] [place] [address] ER nurse(name) on Jairo Ground  being received from [facility](unit) for routine progression of care      Report consisted of patients Situation, Background, Assessment and   Recommendations(SBAR). Information from the following report(s) SBAR and ED Summary was reviewed with the receiving nurse. Opportunity for questions and clarification was provided. Assessment completed upon patients arrival to unit and care assumed.

## 2017-04-19 NOTE — PROGRESS NOTES
Discussed case with bronch scheduling. Can probably squeeze patient in for EBUS noon on Friday. Does not have to stay here inpt just for that. If ready to be discharged tomorrow can come back as outpatient to have this done. Will need to be NPO after MN on Thursday.      Yassine Srivastava MD

## 2017-04-19 NOTE — PROGRESS NOTES
Hospitalist Progress Note    2017  Admit Date: 2017  4:06 PM   NAME: Santiago Moreau   :  1960   MRN:  [id number]   Attending: Re Oreilly DO  PCP:  Trisha Quinn MD    SUBJECTIVE:     Santiago Moreau is a 58yoF who was admitted  with facial swelling, found to be secondary to SVC syndrome. She also had a CXR that showed a RUL mass and mediastinal mass. Has hx of tobacco abuse. She reports some mild improvement in facial swelling today. Denies CP, SOB, cough. Spoke with her about possible cancer diagnosis, she is appropriately tearful. Questions answered. Review of Systems negative with exception of pertinent positives noted above      PHYSICAL EXAM       Visit Vitals    /74    Pulse 81    Temp 98 °F (36.7 °C)    Resp 18    SpO2 98%      Temp (24hrs), Av °F (36.7 °C), Min:97.7 °F (36.5 °C), Max:98.2 °F (36.8 °C)    Oxygen Therapy  O2 Sat (%): 98 % (17 1150)    Intake/Output Summary (Last 24 hours) at 17 1213  Last data filed at 17 1803   Gross per 24 hour   Intake              240 ml   Output                0 ml   Net              240 ml          General: No acute distress. Head:  Atraumatic Normocephalic. Lungs:  CTA Bilaterally. Nonlabored  CVS:  RRR, no BLE edema  Abdomen: Soft, NTTP, +BS  MSK:  Spontaneously moves extremities. Neurologic:  AOx3. No focal deficits      Recent Results (from the past 24 hour(s))   METABOLIC PANEL, BASIC    Collection Time: 17  7:24 AM   Result Value Ref Range    Sodium 142 136 - 145 mmol/L    Potassium 3.9 3.5 - 5.1 mmol/L    Chloride 102 98 - 107 mmol/L    CO2 32 21 - 32 mmol/L    Anion gap 8 7 - 16 mmol/L    Glucose 82 65 - 100 mg/dL    BUN 12 6 - 23 MG/DL    Creatinine 0.77 0.6 - 1.0 MG/DL    GFR est AA >60 >60 ml/min/1.73m2    GFR est non-AA >60 >60 ml/min/1.73m2    Calcium 8.6 8.3 - 10.4 MG/DL         Imaging Nataliia Crooked /Studies     XRAY : IMPRESSION: Right-sided mediastinal mass.  Routine nonemergent outpatient CT scan  of the chest with contrast recommended. CT Chest 4/18: IMPRESSION:  A 1.3 x 1.6 cm irregular right upper lobe mass with 4.1 x 4.6 and  2.1 x 2.7 cm mediastinal masses. This is very suspicious for primary lung  carcinoma in metastatic lymphadenopathy. The superior vena cava is draped over  the larger mass and partially compressed. ASSESSMENT      Hospital Problems as of 4/19/2017  Never Reviewed          Codes Class Noted - Resolved POA    Lung mass ICD-10-CM: R91.8  ICD-9-CM: 786.6  4/18/2017 - Present Yes        Mediastinal mass ICD-10-CM: J98.59  ICD-9-CM: 786.6  4/18/2017 - Present Yes        * (Principal)SVC (superior vena cava obstruction) ICD-10-CM: I87.1  ICD-9-CM: 459.2  4/18/2017 - Present Yes        Essential hypertension ICD-10-CM: I10  ICD-9-CM: 401.9  4/18/2017 - Present Yes        SVC syndrome ICD-10-CM: I87.1  ICD-9-CM: 459.2  4/18/2017 - Present Unknown                  Plan:  - Lung mass: will ask pulm to evaluate for biopsy. Onc consulted for further workup    - HTN: norvasc started on admission. Will continue to monitor    - Tobacco abuse: nicotine patch.  Encouraged cessation    DVT Prophylaxis: Hep on hold for possible bx  Dispo: home with medically appropriate    Antonia Hanna MD

## 2017-04-20 LAB
ANION GAP BLD CALC-SCNC: 7 MMOL/L (ref 7–16)
BUN SERPL-MCNC: 12 MG/DL (ref 6–23)
CALCIUM SERPL-MCNC: 8.6 MG/DL (ref 8.3–10.4)
CHLORIDE SERPL-SCNC: 104 MMOL/L (ref 98–107)
CO2 SERPL-SCNC: 30 MMOL/L (ref 21–32)
CREAT SERPL-MCNC: 0.71 MG/DL (ref 0.6–1)
GLUCOSE SERPL-MCNC: 84 MG/DL (ref 65–100)
POTASSIUM SERPL-SCNC: 4.1 MMOL/L (ref 3.5–5.1)
SODIUM SERPL-SCNC: 141 MMOL/L (ref 136–145)

## 2017-04-20 PROCEDURE — 88305 TISSUE EXAM BY PATHOLOGIST: CPT | Performed by: INTERNAL MEDICINE

## 2017-04-20 PROCEDURE — 99153 MOD SED SAME PHYS/QHP EA: CPT | Performed by: INTERNAL MEDICINE

## 2017-04-20 PROCEDURE — 88342 IMHCHEM/IMCYTCHM 1ST ANTB: CPT | Performed by: INTERNAL MEDICINE

## 2017-04-20 PROCEDURE — 80048 BASIC METABOLIC PNL TOTAL CA: CPT | Performed by: INTERNAL MEDICINE

## 2017-04-20 PROCEDURE — 77030003406 HC NDL ASPIR BIOP OCOA -C: Performed by: INTERNAL MEDICINE

## 2017-04-20 PROCEDURE — 31652 BRONCH EBUS SAMPLNG 1/2 NODE: CPT | Performed by: INTERNAL MEDICINE

## 2017-04-20 PROCEDURE — 88172 CYTP DX EVAL FNA 1ST EA SITE: CPT | Performed by: INTERNAL MEDICINE

## 2017-04-20 PROCEDURE — 74011250636 HC RX REV CODE- 250/636: Performed by: INTERNAL MEDICINE

## 2017-04-20 PROCEDURE — 77030009046 HC CATH BRNCH BLLN OCOA -B: Performed by: INTERNAL MEDICINE

## 2017-04-20 PROCEDURE — 65270000029 HC RM PRIVATE

## 2017-04-20 PROCEDURE — 74011250636 HC RX REV CODE- 250/636

## 2017-04-20 PROCEDURE — 77030012699 HC VLV SUC CNTRL OCOA -A: Performed by: INTERNAL MEDICINE

## 2017-04-20 PROCEDURE — 88173 CYTOPATH EVAL FNA REPORT: CPT | Performed by: INTERNAL MEDICINE

## 2017-04-20 PROCEDURE — 07974ZX DRAINAGE OF THORAX LYMPHATIC, PERCUTANEOUS ENDOSCOPIC APPROACH, DIAGNOSTIC: ICD-10-PCS | Performed by: INTERNAL MEDICINE

## 2017-04-20 PROCEDURE — 36415 COLL VENOUS BLD VENIPUNCTURE: CPT | Performed by: INTERNAL MEDICINE

## 2017-04-20 PROCEDURE — 76040000025: Performed by: INTERNAL MEDICINE

## 2017-04-20 PROCEDURE — 74011250637 HC RX REV CODE- 250/637: Performed by: INTERNAL MEDICINE

## 2017-04-20 PROCEDURE — 88177 CYTP FNA EVAL EA ADDL: CPT | Performed by: INTERNAL MEDICINE

## 2017-04-20 PROCEDURE — 99152 MOD SED SAME PHYS/QHP 5/>YRS: CPT | Performed by: INTERNAL MEDICINE

## 2017-04-20 PROCEDURE — 88341 IMHCHEM/IMCYTCHM EA ADD ANTB: CPT | Performed by: INTERNAL MEDICINE

## 2017-04-20 PROCEDURE — 99232 SBSQ HOSP IP/OBS MODERATE 35: CPT | Performed by: INTERNAL MEDICINE

## 2017-04-20 RX ORDER — SODIUM CHLORIDE 9 MG/ML
125 INJECTION, SOLUTION INTRAVENOUS CONTINUOUS
Status: DISCONTINUED | OUTPATIENT
Start: 2017-04-20 | End: 2017-04-21 | Stop reason: HOSPADM

## 2017-04-20 RX ORDER — MIDAZOLAM HYDROCHLORIDE 1 MG/ML
.25-5 INJECTION, SOLUTION INTRAMUSCULAR; INTRAVENOUS
Status: DISCONTINUED | OUTPATIENT
Start: 2017-04-20 | End: 2017-04-20 | Stop reason: HOSPADM

## 2017-04-20 RX ORDER — FLUMAZENIL 0.1 MG/ML
0.2 INJECTION INTRAVENOUS
Status: DISCONTINUED | OUTPATIENT
Start: 2017-04-20 | End: 2017-04-20 | Stop reason: HOSPADM

## 2017-04-20 RX ORDER — NALOXONE HYDROCHLORIDE 0.4 MG/ML
0.4 INJECTION, SOLUTION INTRAMUSCULAR; INTRAVENOUS; SUBCUTANEOUS
Status: DISCONTINUED | OUTPATIENT
Start: 2017-04-20 | End: 2017-04-20 | Stop reason: HOSPADM

## 2017-04-20 RX ORDER — LIDOCAINE HYDROCHLORIDE 40 MG/ML
SOLUTION TOPICAL ONCE
Status: DISCONTINUED | OUTPATIENT
Start: 2017-04-20 | End: 2017-04-20 | Stop reason: HOSPADM

## 2017-04-20 RX ORDER — FENTANYL CITRATE 50 UG/ML
25-100 INJECTION, SOLUTION INTRAMUSCULAR; INTRAVENOUS
Status: DISCONTINUED | OUTPATIENT
Start: 2017-04-20 | End: 2017-04-20 | Stop reason: HOSPADM

## 2017-04-20 RX ADMIN — ACETAMINOPHEN 650 MG: 325 TABLET, FILM COATED ORAL at 22:54

## 2017-04-20 RX ADMIN — ACETAMINOPHEN 650 MG: 325 TABLET, FILM COATED ORAL at 02:05

## 2017-04-20 RX ADMIN — Medication 10 ML: at 13:15

## 2017-04-20 RX ADMIN — AMLODIPINE BESYLATE 5 MG: 5 TABLET ORAL at 08:18

## 2017-04-20 RX ADMIN — Medication 10 ML: at 06:00

## 2017-04-20 RX ADMIN — Medication 10 ML: at 23:10

## 2017-04-20 RX ADMIN — SODIUM CHLORIDE 125 ML/HR: 900 INJECTION, SOLUTION INTRAVENOUS at 11:32

## 2017-04-20 NOTE — PROCEDURES
PROCEDURE  Bronchoscopy with Endobronchial Ultrasound Guided Fine Needle Aspiration Of Medistinal Lymph nodes and airway inspection. INDICATION   Diagnosis of Mediastinal Lymphadenopathy/Staging of Lung Cancer      POST OP DIAGNOSIS:  Station 4R location mass was  biopsied and positive for malignancy on MAIKOL. - likely small cell cancer- await IHC. Dedicated sample was sent for cell block and flow cytometry to exclude lumphoma    Based on CT chestand EBUS pt is a STAGE as of now limited small cell lung Cancer  Patient will need a whole body PETCT and brain MRI to complete staging  Will refer to oncology and Rad Onc. ANESTHESIA  Concious sedation with: Fentanyl 200 mcg IV; Versed 3 mg IV; Lidocaine 220 mg to tracheo-bronchial tree and vocal cords    AIRWAY INSPECTION  After obtaining informed consent, using a bite block, an Olympus Q 180 viedeo bronchoscope was  introduced into the trachea through the vocal cords without complications. RIGHT  LOCATION NORM/ABNORMAL DESCRIPTION   VOCAL CORDS NL    TRACHEA NL    JOHN NL    RMSB NL    RUL NL    BI NL    RML NL    SUP SEGM RLL NL    MED BASAL NL    ANTERIOR BASAL NL    LATERAL BASAL NL    POSTERIOR BASAL NL                                               LEFT  LOCATION NORMAL/ABNORMAL TYPE   LMSB NL    MECCA NL    LINGULA NL    SUPERIOR DIVISION NL    SUPERIOR SEG LLL NL    NEFTALI-MEDIAL LLL NL    LATERAL LLL NL    POSTERIOR LLL NL                         EBUS  After completing the airway inspection an Olympus  F EBUS bronchoscope was introduced into the trachea through the vocal chords without complication.   The balloon was inflated with saline and a mediastinal inspection commenced:      STATION SIZE IN CM   11R sup Mo tgt   11R inf No tgt   10R No tgt   7 No tgt   4R Mass >4cm   11L No tgt   10L No tgt   4L No tgt   2L No tgt   2R No tgt         After identifying targets the following samples were obtained:    STATION PASS# LYMPHOCYTES MALIGNANT SUSPICIOUS GRANULOMA NONDGNSTC   2R-4R location mass 1 + - - -     2 - - + - necrosis    3 - - + - \"    4 - - + - \"    5 - ++++ - -              6 -- -- -- -- In toto for IHC    7 -- -- -- -- \"    8 -- -- -- -- \"             9 -- -- -- -- In toto for flow    10 -- -- -- -- \"    11 -- -- -- -- \"    12 -- -- -- -- \"              EBL: negligible    Qasim Westbrook MD.

## 2017-04-20 NOTE — PROGRESS NOTES
TRANSFER - IN REPORT:    Verbal report received from Laci Sandoval RN on Yas Sees  being received from [address] [place] [address] for routine progression of care      Report consisted of patients Situation, Background, Assessment and   Recommendations(SBAR). Information from the following report(s) SBAR and Procedure Summary was reviewed with the receiving nurse. Opportunity for questions and clarification was provided. Assessment completed upon patients arrival to unit and care assumed.

## 2017-04-20 NOTE — PROGRESS NOTES
Hospitalist Progress Note    2017  Admit Date: 2017  4:06 PM   NAME: Michel Paz   :  1960   MRN:  [id number]   Attending: Hudson Issa DO  PCP:  Lili Bronson MD    SUBJECTIVE:     Michel Paz is a 58yoF who was admitted  with facial swelling, found to be secondary to SVC syndrome. She also had a CXR that showed a RUL mass and mediastinal mass. Has hx of tobacco abuse. : seen after EBUS. Some throat pain and dry cough. Otherwise doing well without complaints. Review of Systems negative with exception of pertinent positives noted above      PHYSICAL EXAM       Visit Vitals    /76    Pulse 83    Temp 97.4 °F (36.3 °C)    Resp 16    SpO2 100%      Temp (24hrs), Av.7 °F (36.5 °C), Min:97.1 °F (36.2 °C), Max:98.2 °F (36.8 °C)    Oxygen Therapy  O2 Sat (%): 100 % (17 1556)  Pulse via Oximetry: 76 beats per minute (17 1231)  O2 Device: Room air (17 1231)  O2 Flow Rate (L/min): 4 l/min (17 1156)    Intake/Output Summary (Last 24 hours) at 17 1806  Last data filed at 17 1452   Gross per 24 hour   Intake              240 ml   Output                0 ml   Net              240 ml          General: No acute distress. Head:  Atraumatic Normocephalic. Lungs:  CTA Bilaterally. Nonlabored  CVS:  RRR, no BLE edema  Abdomen: Soft, NTTP, +BS  MSK:  Spontaneously moves extremities. Neurologic:  AOx3.  No focal deficits      Recent Results (from the past 24 hour(s))   METABOLIC PANEL, BASIC    Collection Time: 17  7:33 AM   Result Value Ref Range    Sodium 141 136 - 145 mmol/L    Potassium 4.1 3.5 - 5.1 mmol/L    Chloride 104 98 - 107 mmol/L    CO2 30 21 - 32 mmol/L    Anion gap 7 7 - 16 mmol/L    Glucose 84 65 - 100 mg/dL    BUN 12 6 - 23 MG/DL    Creatinine 0.71 0.6 - 1.0 MG/DL    GFR est AA >60 >60 ml/min/1.73m2    GFR est non-AA >60 >60 ml/min/1.73m2    Calcium 8.6 8.3 - 10.4 MG/DL         Imaging /Procedures /Studies  CT ABD PELV W CONT Final Result   IMPRESSION:     1. No evidence for metastatic disease in the abdomen or pelvis. MRI BRAIN W WO CONT    (Results Pending)       XRAY 4/18: IMPRESSION: Right-sided mediastinal mass. Routine nonemergent outpatient CT scan  of the chest with contrast recommended. CT Chest 4/18: IMPRESSION:  A 1.3 x 1.6 cm irregular right upper lobe mass with 4.1 x 4.6 and  2.1 x 2.7 cm mediastinal masses. This is very suspicious for primary lung  carcinoma in metastatic lymphadenopathy. The superior vena cava is draped over  the larger mass and partially compressed. ASSESSMENT      Hospital Problems as of 4/20/2017  Never Reviewed          Codes Class Noted - Resolved POA    Smoker (Chronic) ICD-10-CM: F17.200  ICD-9-CM: 305.1  Unknown - Present Yes        Mediastinal mass ICD-10-CM: J98.59  ICD-9-CM: 786.6  4/18/2017 - Present Yes        * (Principal)SVC (superior vena cava obstruction) ICD-10-CM: I87.1  ICD-9-CM: 459.2  4/18/2017 - Present Yes        Essential hypertension ICD-10-CM: I10  ICD-9-CM: 401.9  4/18/2017 - Present Yes        SVC syndrome ICD-10-CM: I87.1  ICD-9-CM: 459.2  4/18/2017 - Present Yes                  Plan:  - Lung mass: EBUS today, likely small cell lung ca. MRI brain pending    - SVC syndrome: rad/onc consulted. Awaiting recommendations.     - HTN: norvasc started on admission. Will continue to monitor    - Tobacco abuse: nicotine patch.  Encouraged cessation    DVT Prophylaxis: Hep SQ  Dispo: home tomorrow after rad/onc evaluation    Ene Schuster MD

## 2017-04-20 NOTE — PROGRESS NOTES
TRANSFER - OUT REPORT:    Verbal report given to Greg Wilson RN on John Mcintosh  being transferred to [address] [place] [address] for routine progression of care       Report consisted of patients Situation, Background, Assessment and   Recommendations(SBAR). Information from the following report(s) SBAR was reviewed with the receiving nurse. Lines:   Peripheral IV 04/18/17 Right Forearm (Active)   Site Assessment Clean, dry, & intact 4/20/2017 11:11 AM   Phlebitis Assessment 0 4/20/2017 11:11 AM   Infiltration Assessment 0 4/20/2017 11:11 AM   Dressing Status Clean, dry, & intact 4/20/2017 11:11 AM   Dressing Type Tape;Transparent 4/20/2017 11:11 AM   Hub Color/Line Status Pink; Infusing 4/20/2017 11:11 AM   Action Taken Blood drawn 4/18/2017  8:40 AM   Alcohol Cap Used No 4/19/2017  3:43 PM        Opportunity for questions and clarification was provided.       Patient transported with:   [facility]

## 2017-04-20 NOTE — ROUTINE PROCESS
TRANSFER - OUT REPORT:    Verbal report given to Josette UGARTE on Maryjane Fisher  for routine progression of care       Report consisted of patients Situation, Background, Assessment and   Recommendations(SBAR). Information from the following report(s) Procedure Summary was reviewed with the receiving nurse. Lines:   Peripheral IV 04/18/17 Right Forearm (Active)   Site Assessment Clean, dry, & intact 4/20/2017 11:11 AM   Phlebitis Assessment 0 4/20/2017 11:11 AM   Infiltration Assessment 0 4/20/2017 11:11 AM   Dressing Status Clean, dry, & intact 4/20/2017 11:11 AM   Dressing Type Tape;Transparent 4/20/2017 11:11 AM   Hub Color/Line Status Pink; Infusing 4/20/2017 11:11 AM   Action Taken Blood drawn 4/18/2017  8:40 AM   Alcohol Cap Used No 4/19/2017  3:43 PM        Opportunity for questions and clarification was provided.

## 2017-04-20 NOTE — PROGRESS NOTES
Judd Cervantes  Admission Date: 4/18/2017             Daily Progress Note: 4/20/2017  Subjective:     Seen prior to bronchoscopy for establishement of diagnosis of suspected lung cancer. Current Facility-Administered Medications   Medication Dose Route Frequency    sodium chloride (NS) flush 5-10 mL  5-10 mL IntraVENous Q8H    sodium chloride (NS) flush 5-10 mL  5-10 mL IntraVENous PRN    acetaminophen (TYLENOL) tablet 650 mg  650 mg Oral Q4H PRN    HYDROcodone-acetaminophen (NORCO) 7.5-325 mg per tablet 1 Tab  1 Tab Oral Q4H PRN    naloxone (NARCAN) injection 0.4 mg  0.4 mg IntraVENous PRN    nicotine (NICODERM CQ) 7 mg/24 hr patch 1 Patch  1 Patch TransDERmal Q24H    amLODIPine (NORVASC) tablet 5 mg  5 mg Oral DAILY         Objective:     Vitals:    04/19/17 2357 04/20/17 0449 04/20/17 0735 04/20/17 1112   BP: 133/69 126/87 122/77 144/71   Pulse: 61 78 83 81   Resp: 18 18 18 18   Temp: 98.2 °F (36.8 °C) 97.9 °F (36.6 °C) 97.5 °F (36.4 °C)    SpO2: 90% 99% 99% 100%     Intake and Output:   04/18 1901 - 04/20 0700  In: 240 [P.O.:240]  Out: -        Physical Exam:          GEN: well developed and in no acute distress, Oxygen per RA  HEENT:  PERRL, EOMI, no alar flaring or epistaxis, oral mucosa moist without cyanosis,   NECK:  no JVD, no retractions, no thyromegaly or masses,   LUNGS:  CTA  HEART:  RRR with no M,G,R;  ABDOMEN:  soft with no tenderness; positive bowel sounds present  EXTREMITIES:  warm with no cyanosis, trace lower leg edema  SKIN:  no jaundice or ecchymosis   NEURO:  alert and oriented, grossly non-focal        LAB  Recent Labs      04/18/17   0835   WBC  9.8   HGB  12.3   HCT  38.1   PLT  402     Recent Labs      04/20/17   0733  04/19/17   0724  04/18/17   0835   NA  141  142  142   K  4.1  3.9  3.9   CL  104  102  103   CO2  30  32  32   GLU  84  82  83   BUN  12  12  12   CREA  0.71  0.77  0.73     No results for input(s): PH, PCO2, PO2, HCO3 in the last 72 hours.   No results for input(s): LCAD, LAC in the last 72 hours.   Assessment:     Patient Active Problem List   Diagnosis Code    Mediastinal mass J98.59    SVC (superior vena cava obstruction) I87.1    Essential hypertension I10    SVC syndrome I87.1    Smoker [facility] Problems  Never Reviewed          Codes Class Noted POA    Smoker (Chronic) ICD-10-CM: F17.200  ICD-9-CM: 305.1  Unknown Yes        Mediastinal mass ICD-10-CM: J98.59  ICD-9-CM: 786.6  4/18/2017 Yes    With concomitant lung mass biopsy and diagnosis    * (Principal)SVC (superior vena cava obstruction) ICD-10-CM: I87.1  ICD-9-CM: 459.2  4/18/2017 Yes        Essential hypertension ICD-10-CM: I10  ICD-9-CM: 401.9  4/18/2017 Yes        SVC syndrome ICD-10-CM: I87.1  ICD-9-CM: 459.2  4/18/2017 Yes    Due to large mediastinal mass            More than 50% of time documented was spent in face-to-face contact with the patient and in the care of the patient on the floor/unit where the patient is located.              Roddy Torres MD

## 2017-04-21 ENCOUNTER — HOSPITAL ENCOUNTER (OUTPATIENT)
Dept: RADIATION ONCOLOGY | Age: 57
Discharge: HOME | End: 2017-04-21
Payer: COMMERCIAL

## 2017-04-21 ENCOUNTER — APPOINTMENT (OUTPATIENT)
Dept: MRI IMAGING | Age: 57
DRG: 829 | End: 2017-04-21
Attending: INTERNAL MEDICINE
Payer: COMMERCIAL

## 2017-04-21 VITALS
TEMPERATURE: 98.9 F | OXYGEN SATURATION: 100 % | SYSTOLIC BLOOD PRESSURE: 142 MMHG | DIASTOLIC BLOOD PRESSURE: 80 MMHG | HEART RATE: 88 BPM

## 2017-04-21 VITALS
HEART RATE: 85 BPM | DIASTOLIC BLOOD PRESSURE: 75 MMHG | TEMPERATURE: 98.3 F | OXYGEN SATURATION: 97 % | RESPIRATION RATE: 15 BRPM | SYSTOLIC BLOOD PRESSURE: 103 MMHG

## 2017-04-21 PROBLEM — C34.91 SMALL CELL CARCINOMA OF RIGHT LUNG (HCC): Status: ACTIVE | Noted: 2017-04-21

## 2017-04-21 LAB
ANION GAP BLD CALC-SCNC: 6 MMOL/L (ref 7–16)
BUN SERPL-MCNC: 11 MG/DL (ref 6–23)
CALCIUM SERPL-MCNC: 8.5 MG/DL (ref 8.3–10.4)
CHLORIDE SERPL-SCNC: 104 MMOL/L (ref 98–107)
CO2 SERPL-SCNC: 29 MMOL/L (ref 21–32)
CREAT SERPL-MCNC: 0.76 MG/DL (ref 0.6–1)
GLUCOSE SERPL-MCNC: 87 MG/DL (ref 65–100)
INR PPP: 1 (ref 0.9–1.2)
POTASSIUM SERPL-SCNC: 4.3 MMOL/L (ref 3.5–5.1)
PROTHROMBIN TIME: 10.5 SEC (ref 9.6–12)
SODIUM SERPL-SCNC: 139 MMOL/L (ref 136–145)

## 2017-04-21 PROCEDURE — 74011250637 HC RX REV CODE- 250/637: Performed by: INTERNAL MEDICINE

## 2017-04-21 PROCEDURE — 74011250636 HC RX REV CODE- 250/636: Performed by: INTERNAL MEDICINE

## 2017-04-21 PROCEDURE — 70553 MRI BRAIN STEM W/O & W/DYE: CPT

## 2017-04-21 PROCEDURE — 80048 BASIC METABOLIC PNL TOTAL CA: CPT | Performed by: INTERNAL MEDICINE

## 2017-04-21 PROCEDURE — 85610 PROTHROMBIN TIME: CPT | Performed by: NURSE PRACTITIONER

## 2017-04-21 PROCEDURE — 36415 COLL VENOUS BLD VENIPUNCTURE: CPT | Performed by: INTERNAL MEDICINE

## 2017-04-21 PROCEDURE — A9577 INJ MULTIHANCE: HCPCS | Performed by: INTERNAL MEDICINE

## 2017-04-21 PROCEDURE — 77470 SPECIAL RADIATION TREATMENT: CPT

## 2017-04-21 PROCEDURE — 99232 SBSQ HOSP IP/OBS MODERATE 35: CPT | Performed by: INTERNAL MEDICINE

## 2017-04-21 RX ORDER — FUROSEMIDE 10 MG/ML
20 INJECTION INTRAMUSCULAR; INTRAVENOUS AS NEEDED
Status: CANCELLED
Start: 2017-04-22

## 2017-04-21 RX ORDER — DEXAMETHASONE SODIUM PHOSPHATE 100 MG/10ML
10 INJECTION INTRAMUSCULAR; INTRAVENOUS ONCE
Status: CANCELLED
Start: 2017-04-22 | End: 2017-04-22

## 2017-04-21 RX ORDER — HEPARIN SODIUM 1000 [USP'U]/ML
2000 INJECTION, SOLUTION INTRAVENOUS; SUBCUTANEOUS AS NEEDED
Status: CANCELLED
Start: 2017-04-22

## 2017-04-21 RX ORDER — HEPARIN 100 UNIT/ML
300-500 SYRINGE INTRAVENOUS AS NEEDED
Status: CANCELLED
Start: 2017-04-24

## 2017-04-21 RX ORDER — ALBUTEROL SULFATE 0.83 MG/ML
2.5 SOLUTION RESPIRATORY (INHALATION) AS NEEDED
Status: CANCELLED
Start: 2017-04-24

## 2017-04-21 RX ORDER — DEXAMETHASONE SODIUM PHOSPHATE 100 MG/10ML
10 INJECTION INTRAMUSCULAR; INTRAVENOUS ONCE
Status: CANCELLED
Start: 2017-04-23 | End: 2017-04-23

## 2017-04-21 RX ORDER — ONDANSETRON 2 MG/ML
8 INJECTION INTRAMUSCULAR; INTRAVENOUS AS NEEDED
Status: CANCELLED | OUTPATIENT
Start: 2017-04-22

## 2017-04-21 RX ORDER — SODIUM CHLORIDE 0.9 % (FLUSH) 0.9 %
10 SYRINGE (ML) INJECTION AS NEEDED
Status: CANCELLED
Start: 2017-04-23

## 2017-04-21 RX ORDER — PROCHLORPERAZINE EDISYLATE 5 MG/ML
10 INJECTION INTRAMUSCULAR; INTRAVENOUS
Status: CANCELLED
Start: 2017-04-24

## 2017-04-21 RX ORDER — SODIUM CHLORIDE 0.9 % (FLUSH) 0.9 %
10 SYRINGE (ML) INJECTION AS NEEDED
Status: CANCELLED
Start: 2017-04-22

## 2017-04-21 RX ORDER — ONDANSETRON 2 MG/ML
8 INJECTION INTRAMUSCULAR; INTRAVENOUS ONCE
Status: CANCELLED | OUTPATIENT
Start: 2017-04-22 | End: 2017-04-22

## 2017-04-21 RX ORDER — SODIUM CHLORIDE 0.9 % (FLUSH) 0.9 %
10 SYRINGE (ML) INJECTION
Status: COMPLETED | OUTPATIENT
Start: 2017-04-21 | End: 2017-04-21

## 2017-04-21 RX ORDER — LORAZEPAM 2 MG/ML
0.5 INJECTION INTRAMUSCULAR
Status: CANCELLED
Start: 2017-04-23

## 2017-04-21 RX ORDER — HEPARIN SODIUM 1000 [USP'U]/ML
2000 INJECTION, SOLUTION INTRAVENOUS; SUBCUTANEOUS AS NEEDED
Status: CANCELLED
Start: 2017-04-24

## 2017-04-21 RX ORDER — EPINEPHRINE 1 MG/ML
0.3 INJECTION, SOLUTION, CONCENTRATE INTRAVENOUS AS NEEDED
Status: CANCELLED | OUTPATIENT
Start: 2017-04-23

## 2017-04-21 RX ORDER — ACETAMINOPHEN 325 MG/1
650 TABLET ORAL AS NEEDED
Status: CANCELLED
Start: 2017-04-24

## 2017-04-21 RX ORDER — EPINEPHRINE 1 MG/ML
0.3 INJECTION, SOLUTION, CONCENTRATE INTRAVENOUS AS NEEDED
Status: CANCELLED | OUTPATIENT
Start: 2017-04-22

## 2017-04-21 RX ORDER — METOCLOPRAMIDE HYDROCHLORIDE 5 MG/ML
10 INJECTION INTRAMUSCULAR; INTRAVENOUS ONCE
Status: CANCELLED
Start: 2017-04-23 | End: 2017-04-23

## 2017-04-21 RX ORDER — LORAZEPAM 2 MG/ML
0.5 INJECTION INTRAMUSCULAR
Status: CANCELLED
Start: 2017-04-24

## 2017-04-21 RX ORDER — DIPHENHYDRAMINE HYDROCHLORIDE 50 MG/ML
25 INJECTION, SOLUTION INTRAMUSCULAR; INTRAVENOUS
Status: CANCELLED
Start: 2017-04-23

## 2017-04-21 RX ORDER — NICOTINE 7MG/24HR
1 PATCH, TRANSDERMAL 24 HOURS TRANSDERMAL EVERY 24 HOURS
Qty: 42 PATCH | Refills: 0 | Status: SHIPPED | OUTPATIENT
Start: 2017-04-21 | End: 2017-01-01

## 2017-04-21 RX ORDER — ALBUTEROL SULFATE 0.83 MG/ML
2.5 SOLUTION RESPIRATORY (INHALATION) AS NEEDED
Status: CANCELLED
Start: 2017-04-22

## 2017-04-21 RX ORDER — SODIUM CHLORIDE 0.9 % (FLUSH) 0.9 %
10 SYRINGE (ML) INJECTION AS NEEDED
Status: CANCELLED
Start: 2017-04-24

## 2017-04-21 RX ORDER — EPINEPHRINE 1 MG/ML
0.3 INJECTION, SOLUTION, CONCENTRATE INTRAVENOUS AS NEEDED
Status: CANCELLED | OUTPATIENT
Start: 2017-04-24

## 2017-04-21 RX ORDER — HYDROCORTISONE SODIUM SUCCINATE 100 MG/2ML
100 INJECTION, POWDER, FOR SOLUTION INTRAMUSCULAR; INTRAVENOUS AS NEEDED
Status: CANCELLED | OUTPATIENT
Start: 2017-04-24

## 2017-04-21 RX ORDER — HYDROCORTISONE SODIUM SUCCINATE 100 MG/2ML
100 INJECTION, POWDER, FOR SOLUTION INTRAMUSCULAR; INTRAVENOUS AS NEEDED
Status: CANCELLED | OUTPATIENT
Start: 2017-04-22

## 2017-04-21 RX ORDER — ALBUTEROL SULFATE 0.83 MG/ML
2.5 SOLUTION RESPIRATORY (INHALATION) AS NEEDED
Status: CANCELLED
Start: 2017-04-23

## 2017-04-21 RX ORDER — SODIUM CHLORIDE 9 MG/ML
500 INJECTION, SOLUTION INTRAVENOUS ONCE
Status: CANCELLED | OUTPATIENT
Start: 2017-04-22 | End: 2017-04-22

## 2017-04-21 RX ORDER — ONDANSETRON 2 MG/ML
8 INJECTION INTRAMUSCULAR; INTRAVENOUS AS NEEDED
Status: CANCELLED | OUTPATIENT
Start: 2017-04-24

## 2017-04-21 RX ORDER — PROCHLORPERAZINE EDISYLATE 5 MG/ML
10 INJECTION INTRAMUSCULAR; INTRAVENOUS
Status: CANCELLED
Start: 2017-04-23

## 2017-04-21 RX ORDER — DIPHENHYDRAMINE HYDROCHLORIDE 50 MG/ML
50 INJECTION, SOLUTION INTRAMUSCULAR; INTRAVENOUS AS NEEDED
Status: CANCELLED
Start: 2017-04-23

## 2017-04-21 RX ORDER — DIPHENHYDRAMINE HYDROCHLORIDE 50 MG/ML
50 INJECTION, SOLUTION INTRAMUSCULAR; INTRAVENOUS AS NEEDED
Status: CANCELLED
Start: 2017-04-24

## 2017-04-21 RX ORDER — HEPARIN 100 UNIT/ML
300-500 SYRINGE INTRAVENOUS AS NEEDED
Status: CANCELLED
Start: 2017-04-23

## 2017-04-21 RX ORDER — ONDANSETRON 2 MG/ML
8 INJECTION INTRAMUSCULAR; INTRAVENOUS AS NEEDED
Status: CANCELLED | OUTPATIENT
Start: 2017-04-23

## 2017-04-21 RX ORDER — ACETAMINOPHEN 325 MG/1
650 TABLET ORAL AS NEEDED
Status: CANCELLED
Start: 2017-04-23

## 2017-04-21 RX ORDER — DIPHENHYDRAMINE HYDROCHLORIDE 50 MG/ML
50 INJECTION, SOLUTION INTRAMUSCULAR; INTRAVENOUS AS NEEDED
Status: CANCELLED
Start: 2017-04-22

## 2017-04-21 RX ORDER — ACETAMINOPHEN 325 MG/1
650 TABLET ORAL AS NEEDED
Status: CANCELLED
Start: 2017-04-22

## 2017-04-21 RX ORDER — ACETAMINOPHEN 325 MG/1
650 TABLET ORAL
Qty: 30 TAB | Refills: 0 | Status: SHIPPED | OUTPATIENT
Start: 2017-04-21 | End: 2018-01-01

## 2017-04-21 RX ORDER — HEPARIN SODIUM 1000 [USP'U]/ML
2000 INJECTION, SOLUTION INTRAVENOUS; SUBCUTANEOUS AS NEEDED
Status: CANCELLED
Start: 2017-04-23

## 2017-04-21 RX ORDER — METOCLOPRAMIDE HYDROCHLORIDE 5 MG/ML
10 INJECTION INTRAMUSCULAR; INTRAVENOUS ONCE
Status: CANCELLED
Start: 2017-04-24 | End: 2017-04-24

## 2017-04-21 RX ORDER — DEXAMETHASONE SODIUM PHOSPHATE 100 MG/10ML
10 INJECTION INTRAMUSCULAR; INTRAVENOUS ONCE
Status: CANCELLED
Start: 2017-04-24 | End: 2017-04-24

## 2017-04-21 RX ORDER — HYDROCORTISONE SODIUM SUCCINATE 100 MG/2ML
100 INJECTION, POWDER, FOR SOLUTION INTRAMUSCULAR; INTRAVENOUS AS NEEDED
Status: CANCELLED | OUTPATIENT
Start: 2017-04-23

## 2017-04-21 RX ORDER — HEPARIN 100 UNIT/ML
300-500 SYRINGE INTRAVENOUS AS NEEDED
Status: CANCELLED
Start: 2017-04-22

## 2017-04-21 RX ORDER — DIPHENHYDRAMINE HYDROCHLORIDE 50 MG/ML
25 INJECTION, SOLUTION INTRAMUSCULAR; INTRAVENOUS
Status: CANCELLED
Start: 2017-04-24

## 2017-04-21 RX ADMIN — GADOBENATE DIMEGLUMINE 10 ML: 529 INJECTION, SOLUTION INTRAVENOUS at 10:19

## 2017-04-21 RX ADMIN — ACETAMINOPHEN 650 MG: 325 TABLET, FILM COATED ORAL at 17:56

## 2017-04-21 RX ADMIN — Medication 10 ML: at 10:20

## 2017-04-21 NOTE — PROGRESS NOTES
Pt is not in room, off floor on oncology appt, waiting to hear final plan about port placement prior to be being discharged today, will follow.

## 2017-04-21 NOTE — PROGRESS NOTES
Pt and family expressing concerns about care team.    Pt relations notified. Veronica Momin making rounds at this time. Emotional support provided to pt and family.

## 2017-04-21 NOTE — CONSULTS
Patient: Michel Paz MRN: [id number]  SSN: [id number]    YOB: 1960  Age: 65 y.o. Sex: female      Other Providers:  Ryan Valencia MD    CHIEF COMPLAINT: Facial swelling. DIAGNOSIS: T1N3M0 NSCLC with SVC syndrome from a 4x4 cm mediastinal maninder mass. PREVIOUS TREATMENT:  1) None    HISTORY OF PRESENT ILLNESS:  Michel Paz is a 65 y.o. female who I am seeing at the request of in-patient oncology. She was admitted on 4/18/2017 with a primary diagnosis of superior vena cava syndrome. Her only other medical history is that of hypertension. She presented to the ER with complaints of facial and bilateral upper arm swelling that had been present for about a week. She was given steroids and discharged to home. She then returned to the ER feeling no better with possibly worsening of her symptoms. She denied any dyspnea, cough, wheezing but had the full sensation in her throat when she swallowed. CT of her chest showed a 1.3 x 1.6 cm irregular right upper lobe mass with a 4.1 x 4.6 and 2.1 x 2.7 cm mediastinal mass concerning for a primary lung carcinoma. The superior vena cava was draped over the larger mass and partially compressed which was likely the cause of her symptoms. She has a history of smoking a half-pack a day since she was about 17years old. She is  and works as  at [email or web address] in [facility]. Pulmonary medicine was consulted for pathologic diagnosis. Dr. Savi Baumann completed biopsy and EBUS 4/18/17 although final pathology has not returned but the specimen was positive on MAIKOL and noted to likely be SCLC. She is being seen urgently because of the SVC but systemic staging has not yet been completed and at this point she would be limited stage and could qualify for definitive management as compared to palliative treatment. MRI was completed prior to our visit and showed no evidence of disease.        PAST MEDICAL HISTORY:    Past Medical History:   Diagnosis Date    Cancer [facility])     GERD (gastroesophageal reflux disease)     Hypertension        The patient denies history of collagen vascular diseases, pacemaker insertion, prior radiation or prior chemotherapy. PAST SURGICAL HISTORY:   No past surgical history on file. MEDICATIONS:   No current facility-administered medications for this encounter. Current Outpatient Prescriptions:     acetaminophen (TYLENOL) 325 mg tablet, Take 2 Tabs by mouth every four (4) hours as needed. , Disp: 30 Tab, Rfl: 0    nicotine (NICODERM CQ) 7 mg/24 hr, 1 Patch by TransDERmal route every twenty-four (24) hours for 30 days. , Disp: 42 Patch, Rfl: 0    Facility-Administered Medications Ordered in Other Encounters:     0.9% sodium chloride infusion, 125 mL/hr, IntraVENous, CONTINUOUS, Gómez Wood MD, Last Rate: 125 mL/hr at 04/20/17 1132, 125 mL/hr at 04/20/17 1132    sodium chloride (NS) flush 5-10 mL, 5-10 mL, IntraVENous, Q8H, Juan Learned. Esther Farr MD, 10 mL at 04/20/17 2310    sodium chloride (NS) flush 5-10 mL, 5-10 mL, IntraVENous, PRN, Murdock Learned. Esther Farr MD    acetaminophen (TYLENOL) tablet 650 mg, 650 mg, Oral, Q4H PRN, Murdock Learned. Esther Farr MD, 650 mg at 04/20/17 2254    HYDROcodone-acetaminophen (NORCO) 7.5-325 mg per tablet 1 Tab, 1 Tab, Oral, Q4H PRN, Juan Learned. Esther Farr MD    naloxone [facility]) injection 0.4 mg, 0.4 mg, IntraVENous, PRN, Murdock Learned. Esther Farr MD    nicotine (NICODERM CQ) 7 mg/24 hr patch 1 Patch, 1 Patch, TransDERmal, Q24H, Murdock Learned. Esther Farr MD, 1 Patch at 04/20/17 1701    amLODIPine (NORVASC) tablet 5 mg, 5 mg, Oral, DAILY, Juan Learned.  Esther Farr MD, 5 mg at 04/20/17 0818    ALLERGIES:   No Known Allergies    SOCIAL HISTORY:   Social History     Social History    Marital status:      Spouse name: N/A    Number of children: N/A    Years of education: N/A     Occupational History    works at [employer] Smoking status: Current Every Day Smoker Packs/day: 0.50    Smokeless tobacco: Not on file    Alcohol use No    Drug use: No    Sexual activity: Not on file     Other Topics Concern    Not on file     Social History Narrative    . 1 daughter and 1 son. Works as  at [employer] in [facility]. FAMILY HISTORY:   Family History   Problem Relation Age of Onset   [place] Hypertension Mother     Asthma Mother     No Known Problems Father     Thyroid Disease Sister     Hypertension Brother     Diabetes Brother        REVIEW OF SYSTEMS: Please see the completed review of systems sheet in the chart that I have reviewed today. PHYSICAL EXAMINATION:   ECOG Performance status 1  VITAL SIGNS:   Visit Vitals    /80 (BP 1 Location: Left arm, BP Patient Position: Supine)    Pulse 88    Temp 98.9 °F (37.2 °C) (Oral)    SpO2 100%        GENERAL: The patient is well-developed, ambulatory, alert and in no acute distress. HEENT: Head is normocephalic although she subjectively complains of facial swelling, atraumatic. Pupils are equal, round and reactive to light and accommodation. Extraocular movement intact. Hearing is intact bilaterally to finger rub. Oral cavity reveals no lesions. Mucous membranes are moist. NECK: Neck is supple with no masses. CARDIOVASCULAR: Heart is regular rate and rhythm. There are no murmurs rubs or gallups. Radial pulses are 2+ RESPIRATORY: Lungs are clear to auscultation and percussion. There is normal respiratory effort. GASTROINTESTINAL: The abdomen is soft, non-tender, nondistended with no hepatospelnomagaly. Digital rectal examination: deferred LYMPHATIC: There is no cervical, supraclavicular or axillary lymphadenopathy bilaterally. MUSCULOSKELETAL: Extremities reveal no cyanosis, clubbing or edema.  is 5+/5. NEURO:  Cranial nerves II-XII grossly intact. Muscular strength and sensation are intact throughout all four extremities.         PATHOLOGY:    4/18/17:  POST OP DIAGNOSIS:  Station 4R location mass was  biopsied and positive for malignancy on MAIKOL. - likely small cell cancer- await IHC. Dedicated sample was sent for cell block and flow cytometry to exclude lumphoma    LABORATORY:   Lab Results   Component Value Date/Time    Sodium 139 04/21/2017 07:35 AM    Potassium 4.3 04/21/2017 07:35 AM    Chloride 104 04/21/2017 07:35 AM    CO2 29 04/21/2017 07:35 AM    Anion gap 6 04/21/2017 07:35 AM    Glucose 87 04/21/2017 07:35 AM    BUN 11 04/21/2017 07:35 AM    Creatinine 0.76 04/21/2017 07:35 AM    GFR est AA >60 04/21/2017 07:35 AM    GFR est non-AA >60 04/21/2017 07:35 AM    Calcium 8.5 04/21/2017 07:35 AM     Lab Results   Component Value Date/Time    WBC 9.8 04/18/2017 08:35 AM    HGB 12.3 04/18/2017 08:35 AM    HCT 38.1 04/18/2017 08:35 AM    PLATELET 536 32/25/7363 08:35 AM       RADIOLOGY:  I have personally reviewed the imaging and agree with the reports below. Xr Neck Soft Tissue    Result Date: 4/18/2017  NECK, 2 views. HISTORY: Neck and facial swelling. TECHNIQUE:  AP, lateral views. FINDINGS: Epiglottis is unremarkable. Precervical soft tissues unremarkable. Minimal degenerative changes in the spine. The patient is nearly E dentulous. Hair obscures the AP view. IMPRESSION:  Unremarkable. Xr Chest Pa Lat    Result Date: 4/18/2017  CHEST X-RAY, 2 views. HISTORY:  Neck and facial swelling. TECHNIQUE: PA and lateral views. COMPARISON: None. FINDINGS: There is a mass at the right azygos arch. Otherwise lungs are clear. Nipple shadows are present. Lungs are mildly hyperinflated. Arch size is normal. Costophrenic angles are sharp. IMPRESSION: Right-sided mediastinal mass. Routine nonemergent outpatient CT scan of the chest with contrast recommended. Ct Chest W Cont    Result Date: 4/18/2017  CT OF THE CHEST WITH INTRAVENOUS CONTRAST. INDICATION: Mediastinal mass on chest x-ray. COMPARISON: None. Recent chest x-rays reviewed.  TECHNIQUE:   2.5 mm axial scans from above the aortic arch to the lung bases with 80 cc nonionic intravenous contrast without acute complication. Intravenous contrast was given to evaluate for pulmonary embolism. FINDINGS: Dilated airspaces consistent with emphysema. A 13 x 16 mm mass right apex suspicious for primary lung cancer. Large heterogeneous mass between the superior vena cava and trachea measures 4.1 x 4.6 cm, possibly a necrotic node. The superior vena cava is partially compressed. A similar precarinal density measures 2.1 x 2.7 cm. No contralateral adenopathy. Adrenal glands are not enlarged. Included portion of the upper abdomen is unremarkable. No gross bony lesions. IMPRESSION:  A 1.3 x 1.6 cm irregular right upper lobe mass with 4.1 x 4.6 and 2.1 x 2.7 cm mediastinal masses. This is very suspicious for primary lung carcinoma in metastatic lymphadenopathy. The superior vena cava is draped over the larger mass and partially compressed. Ct Abd Pelv W Cont    Result Date: 4/19/2017  CT ABDOMEN AND PELVIS WITH INTRAVENOUS CONTRAST DATED 4/19/2017. History: Large mediastinal mass. Staging. Comparison: None. Technique:   Multiple contiguous helical CT images reconstructed at 5 mm intervals were obtained from above the diaphragms through the ischial tuberosities following oral and 100 cc Isovue-370 without acute complication. All CT scans performed at this facility use one or all of the following: Automated exposure control, adjustment of the mA and/or kVp according to patient's size, iterative reconstruction. Findings: CT ABDOMEN:  Limited evaluation of the lung bases and base of the mediastinum demonstrates no significant abnormalities. The Liver is homogeneous in attenuation. The spleen is homogeneous in attenuation. No contour deforming or enhancing mass lesions are seen of the pancreas or adrenal glands.   The gallbladder has an unremarkable CT appearance without radiopaque stones or pericholecystic fluid/inflammatory changes. The kidneys enhance symmetrically and no evidence of hydronephrosis is seen. Despite the administration of oral contrast, assessment for subtle findings in the peritoneum is limited by the patient's very thin body habitus. The visualized loops of small bowel and colon are normal in caliber. The appendix is seen on image 53 and is unremarkable. No free fluid, free air, or focal inflammatory changes are seen in the abdomen. No adenopathy is seen. The abdominal aorta is unremarkable in appearance. No aggressive osseous lesion is seen. CT PELVIS: No abnormal pelvic fluid collections or inflammatory changes are present. No pelvic adenopathy is seen. The urinary bladder is unremarkable. The uterus is enlarged, heterogeneous, and lobulated felt to represent moderate fibroid changes No aggressive osseous lesion is seen. IMPRESSION:  1. No evidence for metastatic disease in the abdomen or pelvis. MRI of the Brain with contrast: 4/21/2017  History: Staging for lung cancer  Sequences: Axial pre and post contrast T1, FLAIR, T2, diffusion, coronal post  contrast T1 and sagittal precontrast T1-weighted images of the brain. Comparison: None  10 cc of IV MultiHance was injected to aid in the detection of intracranial  pathology. Findings: There are no restricted diffusion abnormalities. The ventricles and basilar  vascular flow voids are unremarkable. There are a few scattered foci of T2  prolongation within the subcortical and periventricular white matter. There are  no abnormal areas of enhancement or hemorrhage. The pituitary and sinuses are  unremarkable.     IMPRESSION  IMPRESSION: Mild supratentorial microischemia. IMPRESSION:  Cody Montalvo is a 65 y.o. female with what appears to be a limited stage SCLC. While she is symptomatic with an SVC syndrome, this is not compromising her airway I don't feel she is at substantial risk for cerebral edema.   Furthermore, it appears the initial pathology is consistent with small cell lung cancer. Her systemic staging has suggested limited disease, although would certainly agree with PET CT for more sensitive staging which has been ordereded to definitively suggest she has disease confined to the chest.  In this setting, I would certainly agree with a combined approach with chemotherapy and radiation. However, considering the time necessary  to create a definitive radiation plan, I would suggest starting with systemic chemotherapy over the weekend and allowing radiation to be delivered concurrently starting with her second cycle. If the pathology returns as a non-small cell lung cancer, or there is more advanced disease noted on her staging, this plan could change, but I feel this is in her best interest at the current time. We did discuss definitive management of small cell lung cancer and its natural history including treatment for 6 weeks with combined chemotherapy and radiation. I would treat her daily to 60 Gy and consider prophylactic cranial irradiation if she were to have a response to her initial therapy. This plan was conveyed to medical oncology who is in agreement. I will therefore plan to see her back in 2 weeks with simulation. She knows to call with questions or concerns prior to this as needed. PLAN:    1) Discussed treatment with external beam radiation reviewing risk, benefits, and side effects from treatment. 2) Reviewed available research treatment and cancer care protocols for which patient may be eligible. Unfortunately there are no matching clinical trials available at this time. 3) Coordinate care and start date with medical oncology. Will plan to start with chemotherapy and add radiation with second cycle. 4) CT Simulation planned for two weeks. Would plan to deliver 60 Gy in 30 fractions.       Fly Camacho MD   April 21, 2017

## 2017-04-21 NOTE — PROGRESS NOTES
Pt and family are agreeable to Oncology appointment now at the [facility]. Transportation arranged for round trip via [employer].  at 1pm today for 2pm appointment.

## 2017-04-21 NOTE — PROGRESS NOTES
FELTON met with patient and family this afternoon prior to discharge to provide resources for transportation post discharge for back and forth transportation to the [facility]. Pt does drive and has family support for weekends. Pt is concerned she may not feel well enough to drive on Monday following her treatment. Resources for [employer] giver services, Senior Living and a list of other care agencies provided. We also discussed potential reimbursement via the [employer] for mileage if pt chooses to complete application. FELTON provided contact information for FELTON at [facility] should pt feel she needs additional support and alerted FELTON at [facility] that pt/ family may be reaching out.

## 2017-04-21 NOTE — PROGRESS NOTES
Care Management Interventions  PCP Verified by CM: Yes  Transition of Care Consult (CM Consult): Discharge Planning  Current Support Network: Family Lives Nearby  Discharge Location  Discharge Placement: Home with family assistance    FELTON was alerted that pt was scheduled for a consult at the [facility] today at 2pm- and would need transport     FELTON met with pt and family/ Brandon Jayden [phone] (sister) - pt and family requested that this appt be cancelled and ask that Cancer center call them post discharge to reschedule. Sister advised that they were advised yesterday that pt is to have MRI today and if results are good than hopefully home later today. Pt stated she has no anticipated needs at discharge- she declined home health- stating \" I am a very strong and independent woman\" and advised she has good family support. FELTON advised family that we are available for any concerns or additional needs. FELTON called cancer center per pt/ family request- cancelled appointment and asked [facility] to call pt or pt/ sister to reschedule post discharge. Update: 9:10am    Received call from [facility] that [address] would like for patient to come today for appointment at cancer center. FELTON advised [facility] of above information- that pt wants to wait on MRI here -     FELTON provided contact information for hospitalist assigned here so that Kina Becerril and Dr Oneil Gómez can further discuss patient's medical needs. SW will remain available for any needs. FELTON did advise  of concern.

## 2017-04-21 NOTE — DISCHARGE INSTRUCTIONS
Acute High Blood Pressure: Care Instructions  Your Care Instructions  Acute high blood pressure is very high blood pressure. It's a serious problem. Very high blood pressure can damage your brain, heart, eyes, and kidneys. You may have been given medicines to lower your blood pressure. You may have gotten them as pills or through a needle in one of your veins. This is called an IV. And maybe you were given other medicines too. These can be needed when high blood pressure causes other problems. To keep your blood pressure at a lower level, you may need to make healthy lifestyle changes. And you will probably need to take medicines. Be sure to follow up with your doctor about your blood pressure and what you can do about it. Follow-up care is a key part of your treatment and safety. Be sure to make and go to all appointments, and call your doctor if you are having problems. It's also a good idea to know your test results and keep a list of the medicines you take. How can you care for yourself at home? · See your doctor as often as he or she recommends. This is to make sure your blood pressure is under control. You will probably go at least 2 times a year. But it may be more often at first.  · Take your blood pressure medicine exactly as prescribed. You may take one or more types. They include diuretics, beta-blockers, ACE inhibitors, calcium channel blockers, and angiotensin II receptor blockers. Call your doctor if you think you are having a problem with your medicine. · If you take blood pressure medicine, talk to your doctor before you take decongestants or anti-inflammatory medicine, such as ibuprofen. These can raise blood pressure. · Learn how to check your blood pressure at home. Check it often. · Ask your doctor if it's okay to drink alcohol. · Talk to your doctor about lifestyle changes that can help blood pressure. These include being active and not smoking.   When should you call for help?  Call 911 anytime you think you may need emergency care. This may mean having symptoms that suggest that your blood pressure is causing a serious heart or blood vessel problem. Your blood pressure may be over 180/110. For example, call 911 if:  · You have symptoms of a heart attack. These may include:  ¨ Chest pain or pressure, or a strange feeling in the chest.  ¨ Sweating. ¨ Shortness of breath. ¨ Nausea or vomiting. ¨ Pain, pressure, or a strange feeling in the back, neck, jaw, or upper belly or in one or both shoulders or arms. ¨ Lightheadedness or sudden weakness. ¨ A fast or irregular heartbeat. · You have symptoms of a stroke. These may include:  ¨ Sudden numbness, tingling, weakness, or loss of movement in your face, arm, or leg, especially on only one side of your body. ¨ Sudden vision changes. ¨ Sudden trouble speaking. ¨ Sudden confusion or trouble understanding simple statements. ¨ Sudden problems with walking or balance. ¨ A sudden, severe headache that is different from past headaches. · You have severe back or belly pain. Do not wait until your blood pressure comes down on its own. Get help right away. Call your doctor now or seek immediate care if:  · Your blood pressure is much higher than normal (such as 180/110 or higher), but you don't have symptoms. · You think high blood pressure is causing symptoms, such as:  ¨ Severe headache. ¨ Blurry vision. Watch closely for changes in your health, and be sure to contact your doctor if:  · Your blood pressure measures 140/90 or higher at least 2 times. That means the top number is 140 or higher or the bottom number is 90 or higher, or both. · You think you may be having side effects from your blood pressure medicine. · Your blood pressure is usually normal, but it goes above normal at least 2 times. Where can you learn more? Go to http://neville-joaquin.info/.   Enter E836 in the search box to learn more about \"Acute High Blood Pressure: Care Instructions. \"  Current as of: August 8, 2016  Content Version: 11.2  © 4461-7845 [employer]. Care instructions adapted under license by [employer] (which disclaims liability or warranty for this information). If you have questions about a medical condition or this instruction, always ask your healthcare professional. [address] any warranty or liability for your use of this information. DISCHARGE SUMMARY from Nurse    The following personal items are in your possession at time of discharge:    Dental Appliances: Uppers        Home Medications: None  Jewelry: None  Clothing: Shirt, Pants, Undergarments, Footwear  Other Valuables: Cell Phone, [employer] (purse and wallet sent home, cell phone at bedside. )             PATIENT INSTRUCTIONS:    After general anesthesia or intravenous sedation, for 24 hours or while taking prescription Narcotics:  Limit your activities  Do not drive and operate hazardous machinery  Do not make important personal or business decisions  Do  not drink alcoholic beverages  If you have not urinated within 8 hours after discharge, please contact your surgeon on call. Report the following to your surgeon:  Excessive pain, swelling, redness or odor of or around the surgical area  Temperature over 100.5  Nausea and vomiting lasting longer than 4 hours or if unable to take medications  Any signs of decreased circulation or nerve impairment to extremity: change in color, persistent  numbness, tingling, coldness or increase pain  Any questions        What to do at Home:  Recommended activity: Activity as tolerated, discuss future activity levels with follow up providers at appropriate appointments. If you experience any of the following symptoms fever above 101, nausea, vomiting, pain or diarrhea please follow up with your primary care provider, the ER, or your oncologists.       *  Please give a list of your current medications to your Primary Care Provider. *  Please update this list whenever your medications are discontinued, doses are      changed, or new medications (including over-the-counter products) are added. *  Please carry medication information at all times in case of emergency situations. These are general instructions for a healthy lifestyle:    No smoking/ No tobacco products/ Avoid exposure to second hand smoke    Surgeon General's Warning:  Quitting smoking now greatly reduces serious risk to your health. Obesity, smoking, and sedentary lifestyle greatly increases your risk for illness    A healthy diet, regular physical exercise & weight monitoring are important for maintaining a healthy lifestyle    You may be retaining fluid if you have a history of heart failure or if you experience any of the following symptoms:  Weight gain of 3 pounds or more overnight or 5 pounds in a week, increased swelling in our hands or feet or shortness of breath while lying flat in bed. Please call your doctor as soon as you notice any of these symptoms; do not wait until your next office visit. Recognize signs and symptoms of STROKE:    F-face looks uneven    A-arms unable to move or move unevenly    S-speech slurred or non-existent    T-time-call 911 as soon as signs and symptoms begin-DO NOT go       Back to bed or wait to see if you get better-TIME IS BRAIN. Warning Signs of HEART ATTACK     Call 911 if you have these symptoms:  Chest discomfort. Most heart attacks involve discomfort in the center of the chest that lasts more than a few minutes, or that goes away and comes back. It can feel like uncomfortable pressure, squeezing, fullness, or pain. Discomfort in other areas of the upper body. Symptoms can include pain or discomfort in one or both arms, the back, neck, jaw, or stomach. Shortness of breath with or without chest discomfort.   Other signs may include breaking out in a cold sweat, nausea, or lightheadedness. Don't wait more than five minutes to call 911 - MINUTES MATTER! Fast action can save your life. Calling 911 is almost always the fastest way to get lifesaving treatment. Emergency Medical Services staff can begin treatment when they arrive -- up to an hour sooner than if someone gets to the hospital by car. The discharge information has been reviewed with the patient. The patient verbalized understanding. Discharge medications reviewed with the patient and appropriate educational materials and side effects teaching were provided. Lung Cancer: Care Instructions  Your Care Instructions  Lung cancer occurs when abnormal cells grow out of control in the lung. Lung cancer can start anywhere in the lungs and spread to other parts of the body. Treatment for lung cancer depends on what type of lung cancer you have and how advanced it is. Treatment may include surgery to remove the cancer. It could also include medicines (chemotherapy) or radiation to destroy cancer cells. Being treated for cancer can weaken your body, and you may feel very tired. Home treatment and certain medicines can help you feel better. Finding out that you have cancer is scary. You may feel many emotions and may need some help coping. Seek out family, friends, and counselors for support. You also can do things at home to make yourself feel better while you go through treatment. Call the [employer] (5-501.320.7052) or visit its website at [address] [employer] Cont[employer] for more information. Follow-up care is a key part of your treatment and safety. Be sure to make and go to all appointments, and call your doctor if you are having problems. It's also a good idea to know your test results and keep a list of the medicines you take. How can you care for yourself at home? · Take your medicines exactly as prescribed. Call your doctor if you think you are having a problem with your medicine.  You will get more details on the specific medicines your doctor prescribes. · Follow your doctor's instructions to relieve pain. Use pain medicine when you first feel pain, before it becomes severe. Taking pain medicines regularly is often the best way to keep pain under control. · Eat healthy food. If you do not feel like eating, try to eat food that has protein and extra calories to keep up your strength and prevent weight loss. Drink liquid meal replacements for extra calories and protein. Try to eat your main meal early. Eating smaller portions more often may help as well. · Get some physical activity every day, but do not get too tired. Keep doing the hobbies you enjoy as your energy allows. · Do not smoke. Smoking can make your cancer symptoms worse. If you need help quitting, talk to your doctor about stop-smoking programs and medicines. These can increase your chances of quitting for good. · If you use oxygen, do not smoke, light a cigarette, or use a flame while your oxygen is on. Smoking while using oxygen can lead to fire and even explosion. · If you have nausea, try to eat several small meals a day. When you feel better, eat clear soups and mild foods until all symptoms are gone for 12 to 48 hours. Other good choices include dry toast, crackers, cooked cereal, and gelatin dessert, such as Jell-O.  · If you are vomiting or have diarrhea:  ¨ Drink plenty of fluids (enough so that your urine is light yellow or clear like water) to prevent dehydration. Choose water and other caffeine-free clear liquids. If you have kidney, heart, or liver disease and have to limit fluids, talk with your doctor before you increase the amount of fluids you drink. ¨ When you are able to eat, try clear soups, mild foods, and liquids until all symptoms are gone for 12 to 48 hours.  Other good choices include dry toast, crackers, cooked cereal, and gelatin dessert, such as Jell-O.  · Take steps to control your stress and workload. Learn relaxation techniques. ¨ Share your feelings. Stress and tension affect our emotions. By expressing your feelings to others, you may be able to understand and cope with them. ¨ Consider joining a support group. Talking about a problem with your spouse, a good friend, or other people with similar problems is a good way to reduce tension and stress. ¨ Express yourself with art. Try writing, crafts, dance, or art to relieve stress. Some dance, writing, or art groups may be available just for people who have cancer. ¨ Be kind to your body and mind. Getting enough sleep, eating a healthy diet, and taking time to do things you enjoy can contribute to an overall feeling of balance in your life and help reduce stress. ¨ Get help if you need it. Discuss your concerns with your doctor or counselor. · If you have not already done so, prepare a list of advance directives. Advance directives are instructions to your doctor and family members about what kind of care you want if you become unable to speak or express yourself. When should you call for help? Call 911 anytime you think you may need emergency care. For example, call if:  · You have sudden or severe chest pain. · You have severe trouble breathing. · You cough up a lot of blood. · You vomit and feel like you may faint when you sit up or stand. Call your doctor now or seek immediate medical care if:  · You are short of breath. · You have new chest pain. · You have a fever. · Your neck and face swell. · You have unexpected or severe nausea or vomiting. · Your pain is not controlled by medicine. · Your symptoms get worse or are not getting better. Watch closely for changes in your health, and be sure to contact your doctor if:  · You develop a new cough, or your cough does not go away. · You cough up yellow or green mucus for longer than 2 days. · You are constipated or have diarrhea. Where can you learn more?   Go to http://neville-joaquin.info/. Enter N355 in the search box to learn more about \"Lung Cancer: Care Instructions. \"  Current as of: July 26, 2016  Content Version: 11.2  © 1248-0113 [employer]. Care instructions adapted under license by [employer] (which disclaims liability or warranty for this information). If you have questions about a medical condition or this instruction, always ask your healthcare professional. [place] [address] any warranty or liability for your use of this information.

## 2017-04-21 NOTE — PROGRESS NOTES
[facility] [place]  Admission Date: 4/18/2017             Daily Progress Note: 4/21/2017  Subjective:     Had bronch yesterday    Current Facility-Administered Medications   Medication Dose Route Frequency    0.9% sodium chloride infusion  125 mL/hr IntraVENous CONTINUOUS    sodium chloride (NS) flush 5-10 mL  5-10 mL IntraVENous Q8H    sodium chloride (NS) flush 5-10 mL  5-10 mL IntraVENous PRN    acetaminophen (TYLENOL) tablet 650 mg  650 mg Oral Q4H PRN    HYDROcodone-acetaminophen (NORCO) 7.5-325 mg per tablet 1 Tab  1 Tab Oral Q4H PRN    naloxone (NARCAN) injection 0.4 mg  0.4 mg IntraVENous PRN    nicotine (NICODERM CQ) 7 mg/24 hr patch 1 Patch  1 Patch TransDERmal Q24H    amLODIPine (NORVASC) tablet 5 mg  5 mg Oral DAILY         Objective:     Vitals:    04/20/17 1932 04/20/17 2236 04/21/17 0250 04/21/17 0742   BP: 124/75 135/78 119/62 108/67   Pulse: 88 (!) 103 87 91   Resp: 16 16 18 15   Temp: 97.4 °F (36.3 °C) 98.7 °F (37.1 °C) 98.1 °F (36.7 °C) 99.2 °F (37.3 °C)   SpO2: 97% 97% 97% 92%     Intake and Output:   04/19 1901 - 04/21 0700  In: 5 [P.O.:720]  Out: -        Physical Exam:          GEN: well developed and in no acute distress, Oxygen per RA  HEENT:  PERRL, EOMI, no alar flaring or epistaxis, oral mucosa moist without cyanosis,   NECK:  no JVD, no retractions, no thyromegaly or masses,   LUNGS:  CTA  HEART:  RRR with no M,G,R;  ABDOMEN:  soft with no tenderness; positive bowel sounds present  EXTREMITIES:  warm with no cyanosis, trace lower leg edema  SKIN:  no jaundice or ecchymosis   NEURO:  alert and oriented, grossly non-focal        LAB  No results for input(s): WBC, HGB, HCT, PLT, HGBEXT, HCTEXT, PLTEXT, HGBEXT, HCTEXT, PLTEXT in the last 72 hours. Recent Labs      04/20/17   0733  04/19/17   0724   NA  141  142   K  4.1  3.9   CL  104  102   CO2  30  32   GLU  84  82   BUN  12  12   CREA  0.71  0.77     No results for input(s): PH, PCO2, PO2, HCO3 in the last 72 hours.   No results for input(s): LCAD, LAC in the last 72 hours.   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  S/p EBUS yesterday      * (Principal)SVC (superior vena cava obstruction) ICD-10-CM: I87.1  ICD-9-CM: 459.2  4/18/2017 Yes        Essential hypertension ICD-10-CM: I10  ICD-9-CM: 401.9  4/18/2017 Yes        SVC syndrome ICD-10-CM: I87.1  ICD-9-CM: 459.2  4/18/2017 Yes    Due to large mediastinal mass        - she is stable for discharge  - will follow up with Moriah Henriquez  -will make follow up with [facility] with spiroemtry in few weeks     More than 50% of time documented was spent in face-to-face contact with the patient and in the care of the patient on the floor/unit where the patient is located.              Stan Mcelroy MD

## 2017-04-21 NOTE — PROGRESS NOTES
DC instructions and prescriptions given and reviewed with pt and sister. Opportunities for questions and clarification provided, verbalized understanding. Pt to DC home with sister at this time. Pt leaving with peripheral IV per Luisito Alberto NP. Signed copy of AVS placed on chart.

## 2017-04-21 NOTE — PROGRESS NOTES
[address] Hematology Ongology        Inpatient Hematology / Oncology Progress Note      Admission Date: 2017  4:06 PM  Reason for Admission/Hospital Course: Adenopathy [R59.1]      24 Hour Events:  Up in bed. Going for radiation consult. No worsening of facial or UE swelling. No difficulty eating or swallowing. ROS:  Constitutional: Positive for fatigue. Negative for fever, chills, weakness. CV: Negative for chest pain, palpitations. Positive for mild facial swelling, swollen veins in neck, chest and UE. Respiratory: Positive for exertional dyspnea. Negative for cough, wheezing. GI: Negative for nausea, abdominal pain, diarrhea. 10 point review of systems is otherwise negative with the exception of the elements mentioned above in the HPI. No Known Allergies    OBJECTIVE:  Patient Vitals for the past 8 hrs:   BP Temp Pulse Resp SpO2   17 1114 121/76 98.6 °F (37 °C) 83 15 94 %   17 0742 108/67 99.2 °F (37.3 °C) 91 15 92 %     Temp (24hrs), Av.2 °F (36.8 °C), Min:97.4 °F (36.3 °C), Max:99.2 °F (37.3 °C)     07 -  1900  In: 240 [P.O.:240]  Out: -     Physical Exam:  Constitutional: Well developed, well nourished female in no acute distress, sitting comfortably in the hospital bed. HEENT: Normocephalic and atraumatic. Oropharynx is clear, mucous membranes are moist. Extraocular muscles are intact. Sclerae anicteric. Neck supple. Mild facial swelling noted. Lymph node No palpable submandibular, cervical, axillary or inguinal lymph nodes. + large right supraclavicular node palpated. Skin Warm and dry. No bruising and no rash noted. No erythema. No pallor. Veins very prominent throughout arms, chest and neck. Respiratory Lungs are clear to auscultation bilaterally without wheezes, rales or rhonchi, normal air exchange without accessory muscle use. CVS Normal rate, regular rhythm and normal S1 and S2. No murmurs, gallops, or rubs.    Abdomen Soft, nontender and nondistended, normoactive bowel sounds. No palpable mass. No hepatosplenomegaly. Neuro Grossly nonfocal with no obvious sensory or motor deficits. MSK Normal range of motion in general. No edema and no tenderness. Psych Appropriate mood and affect. Labs:    No results for input(s): WBC, RBC, HGB, HCT, MCV, MCH, MCHC, RDW, PLT, GRANS, LYMPH, MONOS, EOS, BASOS, IG, DF, ANEU, ABL, ABM, LESLIE, ABB, AIG, HGBEXT, HCTEXT, PLTEXT in the last 72 hours. No lab exists for component: MPV   Recent Labs      04/21/17   0735  04/20/17   0733  04/19/17   0724   NA  139  141  142   K  4.3  4.1  3.9   CL  104  104  102   CO2  29  30  32   AGAP  6*  7  8   GLU  87  84  82   BUN  11  12  12   CREA  0.76  0.71  0.77   GFRAA  >60  >60  >60   GFRNA  >60  >60  >60   CA  8.5  8.6  8.6         Imaging:  CT OF THE CHEST WITH INTRAVENOUS CONTRAST 04/18/17      INDICATION: Mediastinal mass on chest x-ray.       COMPARISON: None. Recent chest x-rays reviewed.      TECHNIQUE: 2.5 mm axial scans from above the aortic arch to the lung bases  with 80 cc nonionic intravenous contrast without acute complication. Intravenous contrast was given to evaluate for pulmonary embolism.      FINDINGS: Dilated airspaces consistent with emphysema. A 13 x 16 mm mass right  apex suspicious for primary lung cancer.       Large heterogeneous mass between the superior vena cava and trachea measures 4.1  x 4.6 cm, possibly a necrotic node. The superior vena cava is partially  compressed. A similar precarinal density measures 2.1 x 2.7 cm. No  contralateral adenopathy. Adrenal glands are not enlarged. Included portion of  the upper abdomen is unremarkable. No gross bony lesions.      IMPRESSION: A 1.3 x 1.6 cm irregular right upper lobe mass with 4.1 x 4.6 and  2.1 x 2.7 cm mediastinal masses. This is very suspicious for primary lung  carcinoma in metastatic lymphadenopathy.  The superior vena cava is draped over  the larger mass and partially compressed. CT ABDOMEN AND PELVIS WITH INTRAVENOUS CONTRAST DATED 4/19/2017.     History: Large mediastinal mass. Staging.      Comparison: None.      Technique: Multiple contiguous helical CT images reconstructed at 5 mm  intervals were obtained from above the diaphragms through the ischial  tuberosities following oral and 100 cc Isovue-370 without acute complication. All CT scans performed at this facility use one or all of the following:  Automated exposure control, adjustment of the mA and/or kVp according to  patient's size, iterative reconstruction.     Findings:  CT ABDOMEN:   Limited evaluation of the lung bases and base of the mediastinum demonstrates no  significant abnormalities.      The Liver is homogeneous in attenuation. The spleen is homogeneous in  attenuation. No contour deforming or enhancing mass lesions are seen of the  pancreas or adrenal glands. The gallbladder has an unremarkable CT appearance  without radiopaque stones or pericholecystic fluid/inflammatory changes. The  kidneys enhance symmetrically and no evidence of hydronephrosis is seen.      Despite the administration of oral contrast, assessment for subtle findings in  the peritoneum is limited by the patient's very thin body habitus. The  visualized loops of small bowel and colon are normal in caliber. The appendix  is seen on image 53 and is unremarkable. No free fluid, free air, or focal  inflammatory changes are seen in the abdomen. No adenopathy is seen. The  abdominal aorta is unremarkable in appearance. No aggressive osseous lesion is  seen.     CT PELVIS:  No abnormal pelvic fluid collections or inflammatory changes are present. No  pelvic adenopathy is seen. The urinary bladder is unremarkable. The uterus is  enlarged, heterogeneous, and lobulated felt to represent moderate fibroid  changes No aggressive osseous lesion is seen.     IMPRESSION  IMPRESSION:   1.  No evidence for metastatic disease in the abdomen or pelvis. MRI of the Brain with contrast: 4/21/2017  History: Staging for lung cancer  Sequences: Axial pre and post contrast T1, FLAIR, T2, diffusion, coronal post  contrast T1 and sagittal precontrast T1-weighted images of the brain. Comparison: None  10 cc of IV MultiHance was injected to aid in the detection of intracranial  pathology. Findings: There are no restricted diffusion abnormalities. The ventricles and basilar  vascular flow voids are unremarkable. There are a few scattered foci of T2  prolongation within the subcortical and periventricular white matter. There are  no abnormal areas of enhancement or hemorrhage. The pituitary and sinuses are  unremarkable.     IMPRESSION  IMPRESSION: Mild supratentorial microischemia. ASSESSMENT:    Problem List  Never Reviewed          Codes Class Noted    Smoker (Chronic) ICD-10-CM: X65.648  ICD-9-CM: 305.1  Unknown        Mediastinal mass ICD-10-CM: J98.59  ICD-9-CM: 786.6  4/18/2017        * (Principal)SVC (superior vena cava obstruction) ICD-10-CM: I87.1  ICD-9-CM: 459.2  4/18/2017        Essential hypertension ICD-10-CM: I10  ICD-9-CM: 401.9  4/18/2017        SVC syndrome ICD-10-CM: I87.1  ICD-9-CM: 459.2  4/18/2017                PLAN:    Ms. Brandy Amato is a 65 y.o. 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 Will ask pulmonary to review to see if EBUS would be appropriate for biopsy. Need CT abdomen and pelvis to complete staging. Will also plan for an MRI of the brain when she is discharged. Consult radiation oncology as well for SVC. When we have final diagnosis, we can make further recommendations regarding systemic treatment. 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 Discussed with Dr. Piyush Null and Dr. Helga Fernandes.  Will plan to start chemotherapy immediately with Cisplatin/Etoposide tomorrow as an outpatient and then start radiation with cycle 2. Her first cycle will be given through a peripheral IV and we will have a port placed prior to cycle 2 as well. MRI brain negative. She agrees. Anti-emetics called in to her pharmacy. She knows what time and where to be tomorrow to initiate chemotherapy and she has a new pt apt with Dr. Lien Mojica on Monday.      Lab studies and imaging studies were personally reviewed. Pertinent old records were reviewed.     Thank you for allowing us to participate in the care of Ms. Malika Garcia. Annelise Shaver NP   [address]  Office : [phone]  Fax : [phone]       Attending Addendum:  I personally evaluated the patient with Anahi Ellis N.P.,  and agree with the assessment, findings and plan as documented. She will start chemo as an outpatient tomorrow.               Lena Obrien MD  [address]  Office : [phone]  Fax : [phone]

## 2017-04-21 NOTE — NURSE NAVIGATOR
Pt here for consult for SVC. Transported from [facility] per stretcher by Rhett Haque. Pt c/o swelling to right  face and bulging of left neck veins. Denies dyspnea at present. C/o pain to back. Not taking pain meds. Pt is smoker and states is quitting now. Per pt will be discharged tonight. Picc line insertion today. Biopsy on 4-20-17. Mri brain today.     Midgalia Duarte RN

## 2017-04-21 NOTE — PROGRESS NOTES
Hospitalist Progress Note    2017  Admit Date: 2017  4:06 PM   NAME: Ivette Deng   :  1960   MRN:  [id number]   Attending: Libia Espinoza DO  PCP:  Lukas Ledesma MD    SUBJECTIVE:   Patient met at her bedside,sister present,on her way to staging MRI. She is a 65year old nearly diagnosed with Small cell lung cancer. Radiation Oncologist called   and wants her seen today after staging MRI of chest and abdomen. The [facility] Oncologist Dr Nova Davidson? [phone] told me they would see her today and start [facility] Rx plan and patient   May start Medical oncology Rx with chemotherapy tomorrow. Discharge today would not be an option under these   Circumstances. Review of Systems negative with exception of pertinent positives noted above  PHYSICAL EXAM     Visit Vitals    /67    Pulse 91    Temp 99.2 °F (37.3 °C)    Resp 15    SpO2 92%      Temp (24hrs), Av °F (36.7 °C), Min:97.1 °F (36.2 °C), Max:99.2 °F (37.3 °C)    Oxygen Therapy  O2 Sat (%): 92 % (17 0742)  Pulse via Oximetry: 76 beats per minute (17 1231)  O2 Device: Room air (17 1231)  O2 Flow Rate (L/min): 4 l/min (17 1156)    Intake/Output Summary (Last 24 hours) at 17 1034  Last data filed at 17 1814   Gross per 24 hour   Intake              720 ml   Output                0 ml   Net              720 ml      General: Well developed well nourished Black female in no apparent distress.    Lungs:  Scattered rhonchi bilaterally. Heart:  S1S2 RRR. No murmur, rub, or gallop  Abdomen: Soft, Non distended, Non tender, Positive bowel sounds  Extremities: No cyanosis, clubbing or edema  Neurologic:  Alert and well oriented in all spheres. No focal deficits    ASSESSMENT      Active Hospital Problems    Diagnosis Date Noted    Smoker     Mediastinal mass 2017    SVC (superior vena cava obstruction) 2017    Essential hypertension 2017    SVC syndrome 2017     Plan:  -FOR RADIATION ONCOLOGY TODAY AFTER STAGING MRI  -POSSIBLE CHEMOTHERAPY TOMORROW. ·     DVT Prophylaxis: Heparin    Signed By: Bassem Farmer MD     April 21, 2017      MEDICAL ONCOLOGY YET TO PUT DOWN CONSULTS NOTES BUT INFO GOTTEN FROM THE NP/PA FROM ONCOLOGY SUGGEST CHEMOTHERAPY WOULD BE ON STARTING TOMORROW ON OUTPATIENT. SHE ALSO INFORMED ME LATER THAT RADIATION VISIT TODAY WOULD BE FOR PLANNING  AND RADIATION TREATMENT WOULD BE IN 1-2 WEEKS. PLAN IS TO HAVE IV ACCESS TODAY BY PICC Ivis Pennington AND SEE IN MEDICAL ONCOLOGY CLINIC FOR OUTPATIENT THERAPY. PLEASE SEE DISCHARGE SUMMARY.

## 2017-04-21 NOTE — PROGRESS NOTES
Pt agreeable to radiation and oncology consult today. However, pt would like it to be known, she is NOT agreeable with chemo. \"I am not at peace with that decision. I have seen 2 people close to me suffer from taking chemo. \"    Emotional support provided, reassured pt she has the right to be involved in her plan of care.

## 2017-04-21 NOTE — PROGRESS NOTES
Have been discussing pt case with oncology NP, Jennifer Carvajal. Pt not candidate for PICC. Also asked Herb if pt could have midline per Anastasia Gong doesn't suggest midline for chemo therapy. Relayed this information to Leonel Case, who will let Jennifer Carvajal know. Will await decision about access from Jennifer Carvajal at this time.

## 2017-04-21 NOTE — DISCHARGE SUMMARY
Hospitalist Discharge Summary     Patient ID:  Jairo Heck  [id number]  15 y.o.  1960  Admit date: 4/18/2017  4:06 PM  Discharge date and time: 4/21/2017  Attending: Brandi Platt DO  PCP:  Daril Bosworth, MD  Treatment Team: Attending Provider: Brandi Platt DO; Consulting Provider: David Houston MD; Utilization Review: Zahra Petersen RN; Consulting Provider: Miguel Angel Tao MD; Care Manager: Tamika Mata    Principal Diagnosis SVC (superior vena cava obstruction)   Principal Problem:    SVC (superior vena cava obstruction) (4/18/2017)    Active Problems:    Mediastinal mass (4/18/2017)      Essential hypertension (4/18/2017)      SVC syndrome (4/18/2017)      Smoker ()      Small cell carcinoma of right lung ([address] (4/21/2017)             Hospital Course:  Please refer to the admission H&P for details of presentation. In summary, the patient is She is a 65year old newly diagnosed with Small cell lung cancer. She had staging MRI brain today-negative. She also had her first visit to Radiation Oncology today by ambulance transportation   The [facility] Oncologist Dr Matthew Hadley -[phone] told me they would see her today and start [facility] Mx plan. Everyone now seems to agree she can go home and get with Medical [facility] tomorrow for outpatient Chemotherapy . She would be discharged after she has her IV chemo accesss with Port a cath/ Picc line to be done at radiology here today. Significant Diagnostic Studies:       Labs: Results:       Chemistry Recent Labs      04/21/17   0735  04/20/17   0733  04/19/17   0724   GLU  87  84  82   NA  139  141  142   K  4.3  4.1  3.9   CL  104  104  102   CO2  29  30  32   BUN  11  12  12   CREA  0.76  0.71  0.77   CA  8.5  8.6  8.6   AGAP  6*  7  8      CBC w/Diff No results for input(s): WBC, RBC, HGB, HCT, PLT, GRANS, LYMPH, EOS, HGBEXT, HCTEXT, PLTEXT in the last 72 hours.    Cardiac Enzymes No results for input(s): CPK, CKND1, STARR in the last 72 hours.    No lab exists for component: CKRMB, TROIP   Coagulation Recent Labs      04/21/17   1300   PTP  10.5   INR  1.0       Lipid Panel No results found for: CHOL, CHOLPOCT, CHOLX, CHLST, CHOLV, [id number], HDL, LDL, NLDLCT, DLDL, LDLC, DLDLP, 696934, VLDLC, VLDL, TGL, TGLX, TRIGL, [id number], TRIGP, TGLPOCT, [id number], CHHD, CHHDX   BNP No results for input(s): BNPP in the last 72 hours. Liver Enzymes No results for input(s): TP, ALB, TBIL, AP, SGOT, GPT in the last 72 hours. No lab exists for component: DBIL   Thyroid Studies Lab Results   Component Value Date/Time    TSH 3.427 04/18/2017 08:35 AM            Discharge Exam:  Visit Vitals    /76    Pulse 83    Temp 98.6 °F (37 °C)    Resp 15    SpO2 94%     GENERAL: Well developed well nourished Black lady in no apparent distress. Lungs: clear to auscultation bilaterally  Heart: regular rate and rhythm, S1, S2 normal, no murmur, click, rub or gallop  Abdomen: soft, non-tender. Bowel sounds normal. No masses,  no organomegaly  Extremities: no cyanosis or edema  Neurologic: Grossly normal.No focal deficit    Disposition: Home  Discharge Condition: stable  Patient Instructions:   meds-see reconciliation sheet. Activity: AS TOLERATED  Diet: cardiac  Follow-up-radiation oncology today at the clinic-now  -Medical Oncology tomorrow  -Pulmonology  as arranged.     Time spent to discharge patient 65 minutes  Signed:  Claudine Riggs MD  4/21/2017  2:06 PM

## 2017-04-22 ENCOUNTER — HOSPITAL ENCOUNTER (OUTPATIENT)
Dept: INFUSION THERAPY | Age: 57
Discharge: HOME | End: 2017-04-22
Payer: COMMERCIAL

## 2017-04-22 VITALS
WEIGHT: 107 LBS | SYSTOLIC BLOOD PRESSURE: 129 MMHG | OXYGEN SATURATION: 98 % | TEMPERATURE: 98.2 F | DIASTOLIC BLOOD PRESSURE: 79 MMHG | BODY MASS INDEX: 16.27 KG/M2 | HEART RATE: 93 BPM | RESPIRATION RATE: 18 BRPM

## 2017-04-22 DIAGNOSIS — C34.91 SMALL CELL CARCINOMA OF RIGHT LUNG (HCC): ICD-10-CM

## 2017-04-22 LAB
ALBUMIN SERPL BCP-MCNC: 3.8 G/DL (ref 3.5–5)
ALBUMIN/GLOB SERPL: ABNORMAL {RATIO} (ref 1.2–3.5)
ALP SERPL-CCNC: 95 U/L (ref 50–136)
ALT SERPL-CCNC: 67 U/L (ref 12–65)
ANION GAP BLD CALC-SCNC: 7 MMOL/L (ref 7–16)
AST SERPL W P-5'-P-CCNC: 56 U/L (ref 15–37)
BASOPHILS # BLD AUTO: 0 K/UL (ref 0–0.2)
BASOPHILS # BLD: 0 % (ref 0–2)
BILIRUB SERPL-MCNC: 0.5 MG/DL (ref 0.2–1.1)
BUN SERPL-MCNC: 15 MG/DL (ref 6–23)
CALCIUM SERPL-MCNC: 9.2 MG/DL (ref 8.3–10.4)
CHLORIDE SERPL-SCNC: 101 MMOL/L (ref 98–107)
CO2 SERPL-SCNC: 29 MMOL/L (ref 21–32)
CREAT SERPL-MCNC: 0.73 MG/DL (ref 0.6–1)
DIFFERENTIAL METHOD BLD: ABNORMAL
EOSINOPHIL # BLD: 0.1 K/UL (ref 0–0.8)
EOSINOPHIL NFR BLD: 1 % (ref 0.5–7.8)
ERYTHROCYTE [DISTWIDTH] IN BLOOD BY AUTOMATED COUNT: 15.6 % (ref 11.9–14.6)
GLOBULIN SER CALC-MCNC: ABNORMAL G/DL (ref 2.3–3.5)
GLUCOSE SERPL-MCNC: 90 MG/DL (ref 65–100)
HCT VFR BLD AUTO: 36.8 % (ref 35.8–46.3)
HGB BLD-MCNC: 11.8 G/DL (ref 11.7–15.4)
IMM GRANULOCYTES # BLD: 0 K/UL (ref 0–0.5)
IMM GRANULOCYTES NFR BLD AUTO: 0.1 % (ref 0–5)
LYMPHOCYTES # BLD AUTO: 23 % (ref 13–44)
LYMPHOCYTES # BLD: 1.8 K/UL (ref 0.5–4.6)
MCH RBC QN AUTO: 23.7 PG (ref 26.1–32.9)
MCHC RBC AUTO-ENTMCNC: 32.1 G/DL (ref 31.4–35)
MCV RBC AUTO: 74 FL (ref 79.6–97.8)
MONOCYTES # BLD: 1.2 K/UL (ref 0.1–1.3)
MONOCYTES NFR BLD AUTO: 16 % (ref 4–12)
NEUTS SEG # BLD: 4.7 K/UL (ref 1.7–8.2)
NEUTS SEG NFR BLD AUTO: 60 % (ref 43–78)
PLATELET # BLD AUTO: 378 K/UL (ref 150–450)
PMV BLD AUTO: 9.8 FL (ref 10.8–14.1)
POTASSIUM SERPL-SCNC: 4.5 MMOL/L (ref 3.5–5.1)
PROT SERPL-MCNC: 7.7 G/DL (ref 6.3–8.2)
RBC # BLD AUTO: 4.97 M/UL (ref 4.05–5.25)
SODIUM SERPL-SCNC: 137 MMOL/L (ref 136–145)
WBC # BLD AUTO: 7.9 K/UL (ref 4.3–11.1)

## 2017-04-22 PROCEDURE — 96417 CHEMO IV INFUS EACH ADDL SEQ: CPT

## 2017-04-22 PROCEDURE — 74011250636 HC RX REV CODE- 250/636

## 2017-04-22 PROCEDURE — 74011000258 HC RX REV CODE- 258: Performed by: INTERNAL MEDICINE

## 2017-04-22 PROCEDURE — 96375 TX/PRO/DX INJ NEW DRUG ADDON: CPT

## 2017-04-22 PROCEDURE — 74011250636 HC RX REV CODE- 250/636: Performed by: INTERNAL MEDICINE

## 2017-04-22 PROCEDURE — 96415 CHEMO IV INFUSION ADDL HR: CPT

## 2017-04-22 PROCEDURE — 80053 COMPREHEN METABOLIC PANEL: CPT | Performed by: INTERNAL MEDICINE

## 2017-04-22 PROCEDURE — 96367 TX/PROPH/DG ADDL SEQ IV INF: CPT

## 2017-04-22 PROCEDURE — 85025 COMPLETE CBC W/AUTO DIFF WBC: CPT | Performed by: INTERNAL MEDICINE

## 2017-04-22 PROCEDURE — 96413 CHEMO IV INFUSION 1 HR: CPT

## 2017-04-22 RX ORDER — ONDANSETRON 2 MG/ML
8 INJECTION INTRAMUSCULAR; INTRAVENOUS ONCE
Status: COMPLETED | OUTPATIENT
Start: 2017-04-22 | End: 2017-04-22

## 2017-04-22 RX ORDER — HEPARIN 100 UNIT/ML
300-500 SYRINGE INTRAVENOUS AS NEEDED
Status: DISCONTINUED | OUTPATIENT
Start: 2017-04-22 | End: 2017-04-22

## 2017-04-22 RX ORDER — FUROSEMIDE 10 MG/ML
20 INJECTION INTRAMUSCULAR; INTRAVENOUS AS NEEDED
Status: DISPENSED | OUTPATIENT
Start: 2017-04-22 | End: 2017-04-22

## 2017-04-22 RX ORDER — DEXAMETHASONE SODIUM PHOSPHATE 100 MG/10ML
10 INJECTION INTRAMUSCULAR; INTRAVENOUS ONCE
Status: COMPLETED | OUTPATIENT
Start: 2017-04-22 | End: 2017-04-22

## 2017-04-22 RX ORDER — SODIUM CHLORIDE 0.9 % (FLUSH) 0.9 %
10 SYRINGE (ML) INJECTION AS NEEDED
Status: ACTIVE | OUTPATIENT
Start: 2017-04-22 | End: 2017-04-22

## 2017-04-22 RX ORDER — SODIUM CHLORIDE 9 MG/ML
250 INJECTION, SOLUTION INTRAVENOUS ONCE
Status: COMPLETED | OUTPATIENT
Start: 2017-04-22 | End: 2017-04-22

## 2017-04-22 RX ADMIN — SODIUM CHLORIDE 150 MG: 900 INJECTION, SOLUTION INTRAVENOUS at 11:28

## 2017-04-22 RX ADMIN — FUROSEMIDE 20 MG: 10 INJECTION, SOLUTION INTRAMUSCULAR; INTRAVENOUS at 14:26

## 2017-04-22 RX ADMIN — Medication 10 ML: at 17:03

## 2017-04-22 RX ADMIN — ETOPOSIDE 186 MG: 20 INJECTION, SOLUTION, CONCENTRATE INTRAVENOUS at 13:15

## 2017-04-22 RX ADMIN — CISPLATIN 93 MG: 100 INJECTION, SOLUTION INTRAVENOUS at 14:30

## 2017-04-22 RX ADMIN — ONDANSETRON 8 MG: 2 INJECTION INTRAMUSCULAR; INTRAVENOUS at 11:06

## 2017-04-22 RX ADMIN — DEXAMETHASONE SODIUM PHOSPHATE 10 MG: 10 INJECTION INTRAMUSCULAR; INTRAVENOUS at 11:08

## 2017-04-22 RX ADMIN — POTASSIUM CHLORIDE: 2 INJECTION, SOLUTION, CONCENTRATE INTRAVENOUS at 12:08

## 2017-04-22 RX ADMIN — SODIUM CHLORIDE 250 ML: 900 INJECTION, SOLUTION INTRAVENOUS at 10:48

## 2017-04-22 NOTE — PROGRESS NOTES
Problem: Chemotherapy Treatment  Goal: *Chemotherapy regimen followed  Outcome: Progressing Towards Goal  Review plan of care  Review patient education  Monitor for reactions

## 2017-04-22 NOTE — PROGRESS NOTES
Arrived to the [facility]. Upon arrival chemo teaching performed with patient and sister. Discussed general chemotherapy, drugs Cisplatin and Etopposide, side effects, fernando, neutropenic precautions, increased fluid intake to 2 quarts daily on treatment day and for 2 days after unless contraindicated by physician, diet, toxicities and sexuality. Allowed time to ask questions. Verbal and written instructions given and verbal understanding obtained. Instructed patient to call doctor's office or physician on call with any problems or questions. Verbal understanding obtained. 20 minutes spent on chemo education. D1C1 chemo Cisplatin/Etoposide completed via peripheral IV. Patient tolerated well. Observed patient x 30 minutes post chemo, no reactions. PIV flushed and wrapped and left in place for use in AM.  Patient given lasix IV to assist with diuresis. Patient had only voided x 1 prior to lasix. Any issues or concerns during appointment: None. Patient aware of next infusion appointment on 4/23 (date) at 1300 (time). Discharged via wheelchair in stable condition accompanied by sister.

## 2017-04-23 ENCOUNTER — HOSPITAL ENCOUNTER (OUTPATIENT)
Dept: INFUSION THERAPY | Age: 57
Discharge: HOME | End: 2017-04-23
Payer: COMMERCIAL

## 2017-04-23 VITALS
RESPIRATION RATE: 18 BRPM | OXYGEN SATURATION: 99 % | WEIGHT: 109 LBS | DIASTOLIC BLOOD PRESSURE: 74 MMHG | HEART RATE: 83 BPM | BODY MASS INDEX: 16.57 KG/M2 | TEMPERATURE: 98 F | SYSTOLIC BLOOD PRESSURE: 153 MMHG

## 2017-04-23 DIAGNOSIS — C34.91 SMALL CELL CARCINOMA OF RIGHT LUNG (HCC): ICD-10-CM

## 2017-04-23 PROCEDURE — 74011250636 HC RX REV CODE- 250/636: Performed by: INTERNAL MEDICINE

## 2017-04-23 PROCEDURE — 96413 CHEMO IV INFUSION 1 HR: CPT

## 2017-04-23 PROCEDURE — 74011250636 HC RX REV CODE- 250/636

## 2017-04-23 PROCEDURE — 96375 TX/PRO/DX INJ NEW DRUG ADDON: CPT

## 2017-04-23 RX ORDER — METOCLOPRAMIDE HYDROCHLORIDE 5 MG/ML
10 INJECTION INTRAMUSCULAR; INTRAVENOUS ONCE
Status: COMPLETED | OUTPATIENT
Start: 2017-04-23 | End: 2017-04-23

## 2017-04-23 RX ORDER — SODIUM CHLORIDE 0.9 % (FLUSH) 0.9 %
10 SYRINGE (ML) INJECTION AS NEEDED
Status: ACTIVE | OUTPATIENT
Start: 2017-04-23 | End: 2017-04-24

## 2017-04-23 RX ORDER — DEXAMETHASONE SODIUM PHOSPHATE 100 MG/10ML
10 INJECTION INTRAMUSCULAR; INTRAVENOUS ONCE
Status: COMPLETED | OUTPATIENT
Start: 2017-04-23 | End: 2017-04-23

## 2017-04-23 RX ADMIN — SODIUM CHLORIDE 500 ML: 900 INJECTION, SOLUTION INTRAVENOUS at 13:05

## 2017-04-23 RX ADMIN — METOCLOPRAMIDE 10 MG: 5 INJECTION, SOLUTION INTRAMUSCULAR; INTRAVENOUS at 13:32

## 2017-04-23 RX ADMIN — ETOPOSIDE 186 MG: 20 INJECTION, SOLUTION, CONCENTRATE INTRAVENOUS at 13:52

## 2017-04-23 RX ADMIN — DEXAMETHASONE SODIUM PHOSPHATE 10 MG: 10 INJECTION INTRAMUSCULAR; INTRAVENOUS at 13:30

## 2017-04-23 NOTE — PROGRESS NOTES
[facility]rived to the [facility]. -16 completed. Patient tolerated well. Any issues or concerns during appointment: none. Patient aware of next infusion appointment on 4-24-17 (date) at 1:15 (time). IV left in for next appointment per pt request.  Discharged via ambulatory.

## 2017-04-24 ENCOUNTER — HOSPITAL ENCOUNTER (OUTPATIENT)
Dept: INFUSION THERAPY | Age: 57
Discharge: HOME | End: 2017-04-24
Payer: COMMERCIAL

## 2017-04-24 ENCOUNTER — DOCUMENTATION ONLY (OUTPATIENT)
Dept: ONCOLOGY | Age: 57
End: 2017-04-24

## 2017-04-24 VITALS
HEART RATE: 72 BPM | SYSTOLIC BLOOD PRESSURE: 146 MMHG | DIASTOLIC BLOOD PRESSURE: 68 MMHG | RESPIRATION RATE: 18 BRPM | BODY MASS INDEX: 15.93 KG/M2 | HEIGHT: 68 IN

## 2017-04-24 DIAGNOSIS — C34.91 SMALL CELL CARCINOMA OF RIGHT LUNG (HCC): ICD-10-CM

## 2017-04-24 PROCEDURE — 96361 HYDRATE IV INFUSION ADD-ON: CPT

## 2017-04-24 PROCEDURE — 96375 TX/PRO/DX INJ NEW DRUG ADDON: CPT

## 2017-04-24 PROCEDURE — 74011250636 HC RX REV CODE- 250/636: Performed by: INTERNAL MEDICINE

## 2017-04-24 PROCEDURE — 96413 CHEMO IV INFUSION 1 HR: CPT

## 2017-04-24 RX ORDER — DEXAMETHASONE SODIUM PHOSPHATE 100 MG/10ML
10 INJECTION INTRAMUSCULAR; INTRAVENOUS ONCE
Status: COMPLETED | OUTPATIENT
Start: 2017-04-24 | End: 2017-04-24

## 2017-04-24 RX ORDER — SODIUM CHLORIDE 0.9 % (FLUSH) 0.9 %
10 SYRINGE (ML) INJECTION AS NEEDED
Status: ACTIVE | OUTPATIENT
Start: 2017-04-24 | End: 2017-04-25

## 2017-04-24 RX ORDER — METOCLOPRAMIDE HYDROCHLORIDE 5 MG/ML
10 INJECTION INTRAMUSCULAR; INTRAVENOUS ONCE
Status: COMPLETED | OUTPATIENT
Start: 2017-04-24 | End: 2017-04-24

## 2017-04-24 RX ORDER — HEPARIN 100 UNIT/ML
300-500 SYRINGE INTRAVENOUS AS NEEDED
Status: ACTIVE | OUTPATIENT
Start: 2017-04-24 | End: 2017-04-25

## 2017-04-24 RX ADMIN — Medication 10 ML: at 13:10

## 2017-04-24 RX ADMIN — ETOPOSIDE 186 MG: 20 INJECTION, SOLUTION, CONCENTRATE INTRAVENOUS at 14:10

## 2017-04-24 RX ADMIN — DEXAMETHASONE SODIUM PHOSPHATE 10 MG: 10 INJECTION INTRAMUSCULAR; INTRAVENOUS at 13:17

## 2017-04-24 RX ADMIN — SODIUM CHLORIDE, PRESERVATIVE FREE 500 UNITS: 5 INJECTION INTRAVENOUS at 15:20

## 2017-04-24 RX ADMIN — SODIUM CHLORIDE 500 ML: 900 INJECTION, SOLUTION INTRAVENOUS at 13:25

## 2017-04-24 RX ADMIN — METOCLOPRAMIDE 10 MG: 5 INJECTION, SOLUTION INTRAMUSCULAR; INTRAVENOUS at 13:15

## 2017-04-24 RX ADMIN — Medication 10 ML: at 15:18

## 2017-04-24 NOTE — PROGRESS NOTES
FELTON received referral from inamarilis YA to follow up with pt PRN. SW met with pt at her MD appt to introduce self and services. Pt verbalized understanding of her dx and agreement to tx plan. FELTON assessed pt needs. Pt stated she lives with her aging mother and has intended not to share her dx with her mother at this time. Pt stated she has a 21year old son and a daughter with two grandchildren who live in [facility]. Per pt, both children are aware of pt's dx. 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 Pt stated she has not smoked cigarettes since the first day of her hospitalization and is using nicotine patches. SW actively listened and provided psychosocial support. Pt stated she spent 10 years working at [employer] and has worked at her current job for 11 years and is a  at a plastics . Pt requested information about FMLA. SW encouraged pt to speak with her HR department and an [facility] YANA could facilitate completion of application. Pt requested information about disability. SW provided education about disability and stated that should pt apply, her med records would be submitted and pt verbalized understanding. SW provided information about [employer] of [address] and [address] and offered referrals. Pt verbalized agreement. FELTON completed and faxed referrals to [phone] and [phone] respectively. No other needs identified. FELTON provided SW contact information and encouraged pt to call should any needs arise. Pt verbalized understanding. FELTON intends to follow up with pt at her next appt. This note will not be viewable in [address]

## 2017-04-24 NOTE — PROGRESS NOTES
Arrived ambulatory for infusion  Etoposide infused   Tolerated well  No concerns voiced  Next appt 5/22 @ 0476 on the schedule, Msg sent to Dr Mary Anne Melendez pod to clarify appointment date  Treatment plan shows 5/13.

## 2017-04-26 ENCOUNTER — TELEPHONE (OUTPATIENT)
Dept: ONCOLOGY | Age: 57
End: 2017-04-26

## 2017-04-27 ENCOUNTER — HOSPITAL ENCOUNTER (OUTPATIENT)
Dept: NUCLEAR MEDICINE | Age: 57
Discharge: HOME | End: 2017-04-27
Attending: INTERNAL MEDICINE
Payer: COMMERCIAL

## 2017-04-27 DIAGNOSIS — C34.91 SMALL CELL CARCINOMA OF RIGHT LUNG (HCC): ICD-10-CM

## 2017-04-27 DIAGNOSIS — I87.1 SVC (SUPERIOR VENA CAVA OBSTRUCTION): ICD-10-CM

## 2017-04-27 PROCEDURE — 78306 BONE IMAGING WHOLE BODY: CPT

## 2017-04-27 NOTE — TELEPHONE ENCOUNTER
SW received voicemail from pt. SW returned pt's voicemail. Pt stated she has FMLA paperwork from her employer and requested assistance with completion. SW provided education that typically FCs complete it but that SW would facilitate completion. Pt verbalized understanding and stated she did not have short term disability but was interested in applying for social security disability. SW offered referral to [facility] for screening and pt verbalized agreement. SW completed referral to [address] [place] [address] for pt disability screening. No other needs identified. SW intends to follow up with pt.

## 2017-04-28 ENCOUNTER — HOSPITAL ENCOUNTER (OUTPATIENT)
Dept: PET IMAGING | Age: 57
Discharge: HOME | End: 2017-04-28
Payer: COMMERCIAL

## 2017-04-28 ENCOUNTER — DOCUMENTATION ONLY (OUTPATIENT)
Dept: ONCOLOGY | Age: 57
End: 2017-04-28

## 2017-04-28 DIAGNOSIS — J98.59 MEDIASTINAL MASS: ICD-10-CM

## 2017-04-28 PROCEDURE — 74011636320 HC RX REV CODE- 636/320: Performed by: NURSE PRACTITIONER

## 2017-04-28 PROCEDURE — A9552 F18 FDG: HCPCS

## 2017-04-28 RX ADMIN — DIATRIZOATE MEGLUMINE AND DIATRIZOATE SODIUM 10 ML: 660; 100 LIQUID ORAL; RECTAL at 07:57

## 2017-04-28 NOTE — PROGRESS NOTES
SW followed up with pt following her PET scan. Pt presented her FMLA paperwork and SW facilitated delivery of paperwork for completion to [facility]. Pt verbalized appreciation. 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 SW coordinated with FC to complete letter and intends to follow up with pt at her MD appt to give it to her. Pt verbalized understanding. SW provided pt with list of community resources should she need them. Pt verbalized understanding and appreciation. No other needs at this time. SW encouraged pt to call should any needs arise and pt verbalized understanding. SW intends to follow up with pt at her appt on 5/1/17.

## 2017-05-01 ENCOUNTER — HOSPITAL ENCOUNTER (OUTPATIENT)
Dept: LAB | Age: 57
Discharge: HOME | End: 2017-05-01
Payer: COMMERCIAL

## 2017-05-01 DIAGNOSIS — C34.91 SMALL CELL CARCINOMA OF RIGHT LUNG (HCC): ICD-10-CM

## 2017-05-01 LAB
ALBUMIN SERPL BCP-MCNC: 4 G/DL (ref 3.5–5)
ALBUMIN/GLOB SERPL: 1 {RATIO} (ref 1.2–3.5)
ALP SERPL-CCNC: 100 U/L (ref 50–136)
ALT SERPL-CCNC: 49 U/L (ref 12–65)
ANION GAP BLD CALC-SCNC: 7 MMOL/L (ref 7–16)
APTT PPP: 23.9 SEC (ref 23.5–31.7)
AST SERPL W P-5'-P-CCNC: 24 U/L (ref 15–37)
BASOPHILS # BLD AUTO: 0 K/UL (ref 0–0.2)
BASOPHILS # BLD: 1 % (ref 0–2)
BILIRUB SERPL-MCNC: 0.3 MG/DL (ref 0.2–1.1)
BUN SERPL-MCNC: 13 MG/DL (ref 6–23)
CALCIUM SERPL-MCNC: 9.5 MG/DL (ref 8.3–10.4)
CHLORIDE SERPL-SCNC: 100 MMOL/L (ref 98–107)
CO2 SERPL-SCNC: 29 MMOL/L (ref 21–32)
CREAT SERPL-MCNC: 0.8 MG/DL (ref 0.6–1)
DIFFERENTIAL METHOD BLD: ABNORMAL
EOSINOPHIL # BLD: 0 K/UL (ref 0–0.8)
EOSINOPHIL NFR BLD: 0 % (ref 0.5–7.8)
ERYTHROCYTE [DISTWIDTH] IN BLOOD BY AUTOMATED COUNT: 14.6 % (ref 11.9–14.6)
GLOBULIN SER CALC-MCNC: 3.9 G/DL (ref 2.3–3.5)
GLUCOSE SERPL-MCNC: 96 MG/DL (ref 65–100)
HCT VFR BLD AUTO: 38.3 % (ref 35.8–46.3)
HGB BLD-MCNC: 12 G/DL (ref 11.7–15.4)
INR PPP: 0.9 (ref 0.9–1.2)
LYMPHOCYTES # BLD AUTO: 52 % (ref 13–44)
LYMPHOCYTES # BLD: 1.7 K/UL (ref 0.5–4.6)
MCH RBC QN AUTO: 23.6 PG (ref 26.1–32.9)
MCHC RBC AUTO-ENTMCNC: 31.3 G/DL (ref 31.4–35)
MCV RBC AUTO: 75.4 FL (ref 79.6–97.8)
MONOCYTES # BLD: 0.1 K/UL (ref 0.1–1.3)
MONOCYTES NFR BLD AUTO: 3 % (ref 4–12)
NEUTS SEG # BLD: 1.4 K/UL (ref 1.7–8.2)
NEUTS SEG NFR BLD AUTO: 44 % (ref 43–78)
NRBC # BLD: 0 K/UL (ref 0–0.2)
PLATELET # BLD AUTO: 295 K/UL (ref 150–450)
PMV BLD AUTO: 9.4 FL (ref 10.8–14.1)
POTASSIUM SERPL-SCNC: 4.1 MMOL/L (ref 3.5–5.1)
PROT SERPL-MCNC: 7.9 G/DL (ref 6.3–8.2)
PROTHROMBIN TIME: 9.6 SEC (ref 9.6–12)
RBC # BLD AUTO: 5.08 M/UL (ref 4.05–5.25)
SODIUM SERPL-SCNC: 136 MMOL/L (ref 136–145)
WBC # BLD AUTO: 3.3 K/UL (ref 4.3–11.1)

## 2017-05-01 PROCEDURE — 36415 COLL VENOUS BLD VENIPUNCTURE: CPT | Performed by: INTERNAL MEDICINE

## 2017-05-01 PROCEDURE — 85610 PROTHROMBIN TIME: CPT | Performed by: INTERNAL MEDICINE

## 2017-05-01 PROCEDURE — 85025 COMPLETE CBC W/AUTO DIFF WBC: CPT | Performed by: INTERNAL MEDICINE

## 2017-05-01 PROCEDURE — 85730 THROMBOPLASTIN TIME PARTIAL: CPT | Performed by: INTERNAL MEDICINE

## 2017-05-01 PROCEDURE — 80053 COMPREHEN METABOLIC PANEL: CPT | Performed by: INTERNAL MEDICINE

## 2017-05-02 ENCOUNTER — TELEPHONE (OUTPATIENT)
Dept: ONCOLOGY | Age: 57
End: 2017-05-02

## 2017-05-02 NOTE — TELEPHONE ENCOUNTER
SW received voicemail from pt requesting information regarding paperwork for her appt with ROBERT. SW attempted to call pt back and left voicemail for pt. SW intends to follow up.

## 2017-05-03 ENCOUNTER — HOSPITAL ENCOUNTER (OUTPATIENT)
Dept: INTERVENTIONAL RADIOLOGY/VASCULAR | Age: 57
Discharge: HOME | End: 2017-05-03
Attending: INTERNAL MEDICINE
Payer: COMMERCIAL

## 2017-05-03 ENCOUNTER — ANESTHESIA (OUTPATIENT)
Dept: SURGERY | Age: 57
End: 2017-05-03
Payer: COMMERCIAL

## 2017-05-03 ENCOUNTER — PATIENT OUTREACH (OUTPATIENT)
Dept: CASE MANAGEMENT | Age: 57
End: 2017-05-03

## 2017-05-03 ENCOUNTER — HOSPITAL ENCOUNTER (OUTPATIENT)
Age: 57
Setting detail: OUTPATIENT SURGERY
Discharge: HOME | End: 2017-05-03
Attending: RADIOLOGY | Admitting: RADIOLOGY
Payer: COMMERCIAL

## 2017-05-03 ENCOUNTER — ANESTHESIA EVENT (OUTPATIENT)
Dept: SURGERY | Age: 57
End: 2017-05-03
Payer: COMMERCIAL

## 2017-05-03 VITALS
SYSTOLIC BLOOD PRESSURE: 152 MMHG | RESPIRATION RATE: 18 BRPM | HEIGHT: 68 IN | TEMPERATURE: 98.1 F | DIASTOLIC BLOOD PRESSURE: 87 MMHG | OXYGEN SATURATION: 98 % | WEIGHT: 107 LBS | BODY MASS INDEX: 16.22 KG/M2 | HEART RATE: 78 BPM

## 2017-05-03 VITALS
OXYGEN SATURATION: 98 % | TEMPERATURE: 97.5 F | SYSTOLIC BLOOD PRESSURE: 143 MMHG | DIASTOLIC BLOOD PRESSURE: 81 MMHG | RESPIRATION RATE: 17 BRPM | HEART RATE: 95 BPM

## 2017-05-03 DIAGNOSIS — C34.91 SMALL CELL CARCINOMA OF RIGHT LUNG (HCC): ICD-10-CM

## 2017-05-03 PROCEDURE — C1788 PORT, INDWELLING, IMP: HCPCS

## 2017-05-03 PROCEDURE — C1894 INTRO/SHEATH, NON-LASER: HCPCS

## 2017-05-03 PROCEDURE — 76060000032 HC ANESTHESIA 0.5 TO 1 HR: Performed by: RADIOLOGY

## 2017-05-03 PROCEDURE — 74011000250 HC RX REV CODE- 250: Performed by: PHYSICIAN ASSISTANT

## 2017-05-03 PROCEDURE — 74011250636 HC RX REV CODE- 250/636: Performed by: PHYSICIAN ASSISTANT

## 2017-05-03 PROCEDURE — 74011250636 HC RX REV CODE- 250/636: Performed by: ANESTHESIOLOGY

## 2017-05-03 PROCEDURE — 77030031139 HC SUT VCRL2 J&J -A

## 2017-05-03 PROCEDURE — 77030031131 HC SUT MXN P COVD -B

## 2017-05-03 PROCEDURE — 74011250636 HC RX REV CODE- 250/636

## 2017-05-03 PROCEDURE — C1769 GUIDE WIRE: HCPCS

## 2017-05-03 PROCEDURE — 77030010507 HC ADH SKN DERMBND J&J -B

## 2017-05-03 PROCEDURE — 36561 INSERT TUNNELED CV CATH: CPT

## 2017-05-03 PROCEDURE — 74011250637 HC RX REV CODE- 250/637: Performed by: ANESTHESIOLOGY

## 2017-05-03 RX ORDER — DIPHENHYDRAMINE HYDROCHLORIDE 50 MG/ML
12.5 INJECTION, SOLUTION INTRAMUSCULAR; INTRAVENOUS ONCE
Status: CANCELLED | OUTPATIENT
Start: 2017-05-03 | End: 2017-05-03

## 2017-05-03 RX ORDER — FAMOTIDINE 20 MG/1
20 TABLET, FILM COATED ORAL ONCE
Status: COMPLETED | OUTPATIENT
Start: 2017-05-03 | End: 2017-05-03

## 2017-05-03 RX ORDER — SODIUM CHLORIDE 0.9 % (FLUSH) 0.9 %
5-10 SYRINGE (ML) INJECTION AS NEEDED
Status: CANCELLED | OUTPATIENT
Start: 2017-05-03

## 2017-05-03 RX ORDER — FAMOTIDINE 20 MG/1
20 TABLET, FILM COATED ORAL ONCE
Status: CANCELLED | OUTPATIENT
Start: 2017-05-03 | End: 2017-05-03

## 2017-05-03 RX ORDER — PROPOFOL 10 MG/ML
INJECTION, EMULSION INTRAVENOUS
Status: DISCONTINUED | OUTPATIENT
Start: 2017-05-03 | End: 2017-05-03 | Stop reason: HOSPADM

## 2017-05-03 RX ORDER — MIDAZOLAM HYDROCHLORIDE 1 MG/ML
2 INJECTION, SOLUTION INTRAMUSCULAR; INTRAVENOUS ONCE
Status: CANCELLED | OUTPATIENT
Start: 2017-05-03 | End: 2017-05-03

## 2017-05-03 RX ORDER — MIDAZOLAM HYDROCHLORIDE 1 MG/ML
2 INJECTION, SOLUTION INTRAMUSCULAR; INTRAVENOUS ONCE
Status: ACTIVE | OUTPATIENT
Start: 2017-05-03 | End: 2017-01-01

## 2017-05-03 RX ORDER — MIDAZOLAM HYDROCHLORIDE 1 MG/ML
2 INJECTION, SOLUTION INTRAMUSCULAR; INTRAVENOUS
Status: DISCONTINUED | OUTPATIENT
Start: 2017-05-03 | End: 2017-01-01 | Stop reason: HOSPADM

## 2017-05-03 RX ORDER — HYDROMORPHONE HYDROCHLORIDE 2 MG/ML
0.5 INJECTION, SOLUTION INTRAMUSCULAR; INTRAVENOUS; SUBCUTANEOUS
Status: CANCELLED | OUTPATIENT
Start: 2017-05-03

## 2017-05-03 RX ORDER — LIDOCAINE HYDROCHLORIDE 10 MG/ML
0.1 INJECTION INFILTRATION; PERINEURAL AS NEEDED
Status: DISCONTINUED | OUTPATIENT
Start: 2017-05-03 | End: 2017-01-01 | Stop reason: HOSPADM

## 2017-05-03 RX ORDER — FENTANYL CITRATE 50 UG/ML
25 INJECTION, SOLUTION INTRAMUSCULAR; INTRAVENOUS ONCE
Status: CANCELLED | OUTPATIENT
Start: 2017-05-03 | End: 2017-05-03

## 2017-05-03 RX ORDER — HEPARIN SODIUM 200 [USP'U]/100ML
1000 INJECTION, SOLUTION INTRAVENOUS ONCE
Status: COMPLETED | OUTPATIENT
Start: 2017-05-03 | End: 2017-05-03

## 2017-05-03 RX ORDER — SODIUM CHLORIDE 0.9 % (FLUSH) 0.9 %
5-10 SYRINGE (ML) INJECTION AS NEEDED
Status: DISCONTINUED | OUTPATIENT
Start: 2017-05-03 | End: 2017-01-01 | Stop reason: HOSPADM

## 2017-05-03 RX ORDER — SODIUM CHLORIDE 9 MG/ML
50 INJECTION, SOLUTION INTRAVENOUS CONTINUOUS
Status: ACTIVE | OUTPATIENT
Start: 2017-05-03 | End: 2017-01-01

## 2017-05-03 RX ORDER — SODIUM CHLORIDE 0.9 % (FLUSH) 0.9 %
5-10 SYRINGE (ML) INJECTION EVERY 8 HOURS
Status: CANCELLED | OUTPATIENT
Start: 2017-05-03

## 2017-05-03 RX ORDER — OXYCODONE HYDROCHLORIDE 5 MG/1
5 TABLET ORAL
Status: CANCELLED | OUTPATIENT
Start: 2017-05-03

## 2017-05-03 RX ORDER — LIDOCAINE HYDROCHLORIDE AND EPINEPHRINE 20; 10 MG/ML; UG/ML
10-20 INJECTION, SOLUTION INFILTRATION; PERINEURAL ONCE
Status: DISCONTINUED | OUTPATIENT
Start: 2017-05-03 | End: 2017-05-03

## 2017-05-03 RX ORDER — CEFAZOLIN SODIUM IN 0.9 % NACL 2 G/50 ML
2 INTRAVENOUS SOLUTION, PIGGYBACK (ML) INTRAVENOUS ONCE
Status: COMPLETED | OUTPATIENT
Start: 2017-05-03 | End: 2017-05-03

## 2017-05-03 RX ORDER — SODIUM CHLORIDE, SODIUM LACTATE, POTASSIUM CHLORIDE, CALCIUM CHLORIDE 600; 310; 30; 20 MG/100ML; MG/100ML; MG/100ML; MG/100ML
100 INJECTION, SOLUTION INTRAVENOUS CONTINUOUS
Status: CANCELLED | OUTPATIENT
Start: 2017-05-03

## 2017-05-03 RX ORDER — SODIUM CHLORIDE, SODIUM LACTATE, POTASSIUM CHLORIDE, CALCIUM CHLORIDE 600; 310; 30; 20 MG/100ML; MG/100ML; MG/100ML; MG/100ML
100 INJECTION, SOLUTION INTRAVENOUS CONTINUOUS
Status: CANCELLED | OUTPATIENT
Start: 2017-05-03 | End: 2017-01-01

## 2017-05-03 RX ORDER — LIDOCAINE HYDROCHLORIDE 20 MG/ML
2-10 INJECTION, SOLUTION INFILTRATION; PERINEURAL
Status: COMPLETED | OUTPATIENT
Start: 2017-05-03 | End: 2017-05-03

## 2017-05-03 RX ORDER — FENTANYL CITRATE 50 UG/ML
25 INJECTION, SOLUTION INTRAMUSCULAR; INTRAVENOUS ONCE
Status: ACTIVE | OUTPATIENT
Start: 2017-05-03 | End: 2017-01-01

## 2017-05-03 RX ORDER — MIDAZOLAM HYDROCHLORIDE 1 MG/ML
2 INJECTION, SOLUTION INTRAMUSCULAR; INTRAVENOUS
Status: CANCELLED | OUTPATIENT
Start: 2017-05-03

## 2017-05-03 RX ORDER — LIDOCAINE HYDROCHLORIDE 10 MG/ML
0.1 INJECTION INFILTRATION; PERINEURAL AS NEEDED
Status: CANCELLED | OUTPATIENT
Start: 2017-05-03

## 2017-05-03 RX ORDER — SODIUM CHLORIDE 0.9 % (FLUSH) 0.9 %
5-10 SYRINGE (ML) INJECTION EVERY 8 HOURS
Status: DISCONTINUED | OUTPATIENT
Start: 2017-05-03 | End: 2017-01-01 | Stop reason: HOSPADM

## 2017-05-03 RX ORDER — OXYCODONE AND ACETAMINOPHEN 5; 325 MG/1; MG/1
1 TABLET ORAL AS NEEDED
Status: CANCELLED | OUTPATIENT
Start: 2017-05-03

## 2017-05-03 RX ORDER — LIDOCAINE HYDROCHLORIDE AND EPINEPHRINE 20; 10 MG/ML; UG/ML
5-10 INJECTION, SOLUTION INFILTRATION; PERINEURAL ONCE
Status: COMPLETED | OUTPATIENT
Start: 2017-05-03 | End: 2017-05-03

## 2017-05-03 RX ORDER — SODIUM CHLORIDE, SODIUM LACTATE, POTASSIUM CHLORIDE, CALCIUM CHLORIDE 600; 310; 30; 20 MG/100ML; MG/100ML; MG/100ML; MG/100ML
100 INJECTION, SOLUTION INTRAVENOUS CONTINUOUS
Status: ACTIVE | OUTPATIENT
Start: 2017-05-03 | End: 2017-01-01

## 2017-05-03 RX ORDER — SODIUM CHLORIDE 9 MG/ML
50 INJECTION, SOLUTION INTRAVENOUS CONTINUOUS
Status: CANCELLED | OUTPATIENT
Start: 2017-05-03 | End: 2017-01-01

## 2017-05-03 RX ORDER — HEPARIN SODIUM (PORCINE) LOCK FLUSH IV SOLN 100 UNIT/ML 100 UNIT/ML
500 SOLUTION INTRAVENOUS ONCE
Status: COMPLETED | OUTPATIENT
Start: 2017-05-03 | End: 2017-05-03

## 2017-05-03 RX ORDER — HEPARIN SODIUM (PORCINE) LOCK FLUSH IV SOLN 100 UNIT/ML 100 UNIT/ML
300 SOLUTION INTRAVENOUS AS NEEDED
Status: DISCONTINUED | OUTPATIENT
Start: 2017-05-03 | End: 2017-01-01 | Stop reason: HOSPADM

## 2017-05-03 RX ADMIN — FAMOTIDINE 20 MG: 20 TABLET, FILM COATED ORAL at 12:57

## 2017-05-03 RX ADMIN — PROPOFOL 140 MCG/KG/MIN: 10 INJECTION, EMULSION INTRAVENOUS at 13:52

## 2017-05-03 RX ADMIN — HEPARIN SODIUM 2000 UNITS: 200 INJECTION, SOLUTION INTRAVENOUS at 13:55

## 2017-05-03 RX ADMIN — LIDOCAINE HYDROCHLORIDE AND EPINEPHRINE 120 MG: 20; 10 INJECTION, SOLUTION INFILTRATION; PERINEURAL at 14:18

## 2017-05-03 RX ADMIN — SODIUM CHLORIDE, SODIUM LACTATE, POTASSIUM CHLORIDE, AND CALCIUM CHLORIDE: 600; 310; 30; 20 INJECTION, SOLUTION INTRAVENOUS at 13:46

## 2017-05-03 RX ADMIN — HEPARIN SODIUM (PORCINE) LOCK FLUSH IV SOLN 100 UNIT/ML 500 UNITS: 100 SOLUTION at 14:22

## 2017-05-03 RX ADMIN — LIDOCAINE HYDROCHLORIDE 60 MG: 20 INJECTION, SOLUTION INFILTRATION; PERINEURAL at 14:13

## 2017-05-03 RX ADMIN — CEFAZOLIN 2 G: 1 INJECTION, POWDER, FOR SOLUTION INTRAMUSCULAR; INTRAVENOUS; PARENTERAL at 13:52

## 2017-05-03 NOTE — ANESTHESIA POSTPROCEDURE EVALUATION
Post-Anesthesia Evaluation and Assessment    Patient: Santiago Moreau MRN: [id number]  SSN: [id number]    YOB: 1960  Age: 65 y.o. Sex: female       Cardiovascular Function/Vital Signs  Visit Vitals    /81    Pulse 95    Temp 36.4 °C (97.5 °F)    Resp 17    SpO2 98%       Patient is status post total IV anesthesia anesthesia for Procedure(s):  PORT PLACEMENT/ PRINCESS. Nausea/Vomiting: None    Postoperative hydration reviewed and adequate. Pain:      Managed    Neurological Status: At baseline    Mental Status and Level of Consciousness: Arousable    Pulmonary Status:   O2 Device: Nasal cannula (05/03/17 1442)   Adequate oxygenation and airway patent    Complications related to anesthesia: None    Post-anesthesia assessment completed.  No concerns    Signed By: Delmy Lambert MD     May 3, 2017

## 2017-05-03 NOTE — PROGRESS NOTES
Patient supine with sister at bedside  Patient receiving port today for chemo treatments  Patient expressed her regret over years of tobacco abuse but affirmed her spirituality as source of comfort  I prayed for the patient per the patient's request  Patient thanked me for my visit with her    Afsaneh Hernandes III, PhD  [facility]  [phone]

## 2017-05-03 NOTE — PROGRESS NOTES
Interventional Radiology Prep Area Handoff      No Known Allergies    IRSummary reviewed. Pt identified with two identifiers. Verified procedure with Patient and IR Provider as chest port placement. Consent obtained: no H&P complete/updated: no  MD airway complete: yes by anesthesia    Sedation:MAC   NPO: yes   Anticoagulation: no     Pt shaved: n/a   Antibiotics: Ancef  Anesthesia notified: yes    Additional Notes: n/a      Lines:  Peripherally Inserted Central Catheter:     Central Venous Catheter:     Venous Access Device:     Peripheral Intravenous Line:  Peripheral IV 04/22/17 Left Forearm (Active)       Peripheral IV 05/03/17 Left Forearm (Active)   Site Assessment Clean, dry, & intact 5/3/2017 12:58 PM   Phlebitis Assessment 0 5/3/2017 12:58 PM   Infiltration Assessment 0 5/3/2017 12:58 PM   Dressing Status Clean, dry, & intact 5/3/2017 12:58 PM   Dressing Type Transparent 5/3/2017 12:58 PM     Hemodialysis Catheter:     Drain(s):       Labs verified: yes  No results found for this or any previous visit (from the past 24 hour(s)).   Patient Vitals for the past 8 hrs:   Temp Pulse Resp BP SpO2   05/03/17 1220 98.1 °F (36.7 °C) 77 16 151/73 100 %

## 2017-05-03 NOTE — PROGRESS NOTES
TRANSFER - IN REPORT:    Verbal report received from [facility] RN(name) on Fan Jones  being received for routine post - op      Report consisted of patients Situation, Background, Assessment and   Recommendations(SBAR). Information from the following report(s) Procedure Summary and MAR was reviewed with the receiving nurse. Opportunity for questions and clarification was provided. Assessment completed upon patients arrival to unit and care assumed.

## 2017-05-03 NOTE — PROGRESS NOTES
Recovery period without difficulty. Pt alert and oriented and denies pain. Dressing is clean, dry, and intact. Reviewed discharge instructions with patient and sister, both verbalized understanding. [facility] Vital signs and Jeronimo score completed.

## 2017-05-03 NOTE — PROGRESS NOTES
I saw pt on 5-1-17 with Dr Wade Flanagan. He spoke to her about last scans and current stage of tumor. He recommended changing current regimen to [id number] [facility]. email sent to financial counselors concerning pt new therapy approval needs. Side effects were reviewed with pt concerning Opdivo and consent was signed. Pt will be back to start in approx 2 weeks.  I also told pt she will need to obtain dental clearance and this was written out for pt so she could give this to dentist. Nurse navigation is following

## 2017-05-03 NOTE — PROGRESS NOTES
Pt in IR for procedure. Pt receiving MAC anesthesia. Please refer to anesthesia notes for all vital signs documented.

## 2017-05-03 NOTE — PROGRESS NOTES
Dr. Ilsa Dunbar at pt's bedside at this time. Per Dr. Ilsa Dunbar, anesthesia to sign off at this time.

## 2017-05-03 NOTE — DISCHARGE INSTRUCTIONS
[address]  Department of Interventional Radiology  [facility]  [phone] Office  [phone] Fax  Implanted Port Discharge Instructions      General Instructions:   A port is like an implanted IV. They are usually ordered for patients who will be getting chemotherapy, but can also be used as an IV for long term antibiotics, large amounts of fluids, and/or blood products. Your blood can be drawn from your port for labs also. Those patients who do not have good veins find the ports convenient as they can get the IV they need with one stick. The port can be used long term, and the care is easy. The device is under the skin, and once the skin heals, care is minimal. All that is required is the nurse who accesses the port will need to flush it with heparinized saline after each use. Ports are usually placed in the chest wall, usually on the right side. But they can be place in the arms and in the abdomen. Home Care Instructions: If your port is in your arm, do not allow blood pressure or other IVs to be place in that arm. Do not allow bra straps or any clothing to rub the skin over the port. Do not bathe or swim until the skin has healed and if the port is accessed. Once it is healed, and when the port is not accessed, it is okay to bathe and swim. Restrict yourself to light activity for the first 5 days after getting the port put in, after that, resume normal activity slowly. You may resume your normal diet and medications. Follow-Up Instructions: Please see your oncologist, or whatever physician ordered the port as he/she has requested of you. Call If: You should call your Physician and/or the Radiology Nurse if you notice redness, pus, swelling, or pain from the area of your incision. Call if you should develop a fever. The nurses who access your port will know to call your doctor if the port does not seem to be working properly. You need to tell the nurses who use the port if you should have any pain or swelling at the site during an infusion. To Reach Us: If you have any questions about your procedure, please call the Interventional Radiology department at [phone]. After business hours (5pm) and weekends, call the answering service at [phone] and ask for the Radiologist on call to be paged. Interventional Radiology General Nurse Discharge    After general anesthesia or intravenous sedation, for 24 hours or while taking prescription Narcotics:  · Limit your activities  · Do not drive and operate hazardous machinery  · Do not make important personal or business decisions  · Do  not drink alcoholic beverages  · If you have not urinated within 8 hours after discharge, please contact your surgeon on call. * Please give a list of your current medications to your Primary Care Provider. * Please update this list whenever your medications are discontinued, doses are     changed, or new medications (including over-the-counter products) are added. * Please carry medication information at all times in case of emergency situations. These are general instructions for a healthy lifestyle:    No smoking/ No tobacco products/ Avoid exposure to second hand smoke  Surgeon General's Warning:  Quitting smoking now greatly reduces serious risk to your health. Obesity, smoking, and sedentary lifestyle greatly increases your risk for illness  A healthy diet, regular physical exercise & weight monitoring are important for maintaining a healthy lifestyle    You may be retaining fluid if you have a history of heart failure or if you experience any of the following symptoms:  Weight gain of 3 pounds or more overnight or 5 pounds in a week, increased swelling in our hands or feet or shortness of breath while lying flat in bed.   Please call your doctor as soon as you notice any of these symptoms; do not wait until your next office visit. Recognize signs and symptoms of STROKE:  F-face looks uneven    A-arms unable to move or move unevenly    S-speech slurred or non-existent    T-time-call 911 as soon as signs and symptoms begin-DO NOT go       Back to bed or wait to see if you get better-TIME IS BRAIN.       Patient Signature:  Date: 5/3/2017  Discharging Nurse: Zabrina Nguyễn RN

## 2017-05-03 NOTE — PROGRESS NOTES
TRANSFER - OUT REPORT:    Verbal report given to [facility], RN (name) on Tonja Carrillo  being transferred to IR Recovery (unit) for routine progression of care       Report consisted of patients Situation, Background, Assessment and   Recommendations(SBAR). Information from the following report(s) Procedure Summary and MAR was reviewed with the receiving nurse. Lines:   Venous Access Device 05/03/17 Upper chest (subclavicular area), left (Active)       Peripheral IV 04/22/17 Left Forearm (Active)       Peripheral IV 05/03/17 Left Forearm (Active)   Site Assessment Clean, dry, & intact 5/3/2017 12:58 PM   Phlebitis Assessment 0 5/3/2017 12:58 PM   Infiltration Assessment 0 5/3/2017 12:58 PM   Dressing Status Clean, dry, & intact 5/3/2017 12:58 PM   Dressing Type Transparent 5/3/2017 12:58 PM        Opportunity for questions and clarification was provided.       Patient transported with:   Registered Nurse

## 2017-05-03 NOTE — IP AVS SNAPSHOT
[address] [facility] 
 
 
 [address] [facility] 
[phone] Patient: Venkat Schofield MRN: [id number] :1960 You are allergic to the following No active allergies Recent Documentation Height Weight BMI OB Status Smoking Status 1.727 m 48.5 kg 16.27 kg/m2 Postmenopausal Former Smoker Emergency Contacts Name Discharge Info Relation Home Work Mobile Thi Christian  Sister [23] [phone] Elisha Irving  Daughter [21] [phone] About your hospitalization You were admitted on:  May 3, 2017 You last received care in the:  [facility] Radiology Specials You were discharged on:  May 3, 2017 Unit phone number:  [phone] Why you were hospitalized Your primary diagnosis was:  Not on File Providers Seen During Your Hospitalizations Provider Role Specialty Primary office phone Brandy Carrillo MD Attending Provider Internal Medicine [phone] Your Primary Care Physician (PCP) Primary Care Physician Office Phone Office Fax [facility] [phone] Follow-up Information Follow up With Details Comments Contact Info Pamela Jolly MD   [facility] [place] [address] 
[phone] Your Appointments Monday May 15, 2017 12:15 PM EDT  
LAB with [address]  
[address] [place] [address] OUTREACH INSURANCE [facility]) [address]  
[phone] Monday May 15, 2017 12:45 PM EDT Follow Up with Brandy Carrillo MD  
[facility] Hematology and Oncology [facility] KATHERINE/ Raul Brewster [address] [facility] [address]  
[phone] Tuesday May 16, 2017  1:15 PM EDT Chemo with NUS10  
[address] [facility] ([facility]) [address] Araceli Spencer [phone] [facility] [address]  
[phone] [address] Wednesday May 17, 2017  3:00 PM EDT Chemo with [address] [facility]) [address] Raul Mancilla [phone] [facility] [address]  
[phone] [address] Thursday May 18, 2017  3:00 PM EDT Chemo with [address]. [address] ([facility] [address] Raul Mancilla [phone] [facility] [address]  
[phone] [address] Friday May 19, 2017  3:00 PM EDT Chemo with [address] [facility]) [address] Raul Mancilla [phone] [facility] [address]  
[phone] [address] Monday May 22, 2017  9:20 AM EDT HOSPITAL with [facility] ([facility]) [address]  [address] [facility]  
[phone] Current Discharge Medication List  
  
Notice This visit is during an admission. Changes to the med list made in this visit will be reflected in the After Visit Summary of the admission. Discharge Instructions [place] [address] Department of Interventional Radiology [facility] 
[phone] Office 
[phone] Fax Implanted [facility] Discharge Instructions General Instructions: 
 A port is like an implanted IV. They are usually ordered for patients who will be getting chemotherapy, but can also be used as an IV for long term antibiotics, large amounts of fluids, and/or blood products. Your blood can be drawn from your port for labs also.  Those patients who do not have good veins find the ports convenient as they can get the IV they need with one stick. The port can be used long term, and the care is easy. The device is under the skin, and once the skin heals, care is minimal. All that is required is the nurse who accesses the port will need to flush it with heparinized saline after each use. Ports are usually placed in the chest wall, usually on the right side. But they can be place in the arms and in the abdomen. Home Care Instructions: If your port is in your arm, do not allow blood pressure or other IVs to be place in that arm. Do not allow bra straps or any clothing to rub the skin over the port. Do not bathe or swim until the skin has healed and if the port is accessed. Once it is healed, and when the port is not accessed, it is okay to bathe and swim. Restrict yourself to light activity for the first 5 days after getting the port put in, after that, resume normal activity slowly. You may resume your normal diet and medications. Follow-Up Instructions: Please see your oncologist, or whatever physician ordered the port as he/she has requested of you. Call If: You should call your Physician and/or the Radiology Nurse if you notice redness, pus, swelling, or pain from the area of your incision. Call if you should develop a fever. The nurses who access your port will know to call your doctor if the port does not seem to be working properly. You need to tell the nurses who use the port if you should have any pain or swelling at the site during an infusion. To Reach Us: If you have any questions about your procedure, please call the Interventional Radiology department at [phone]. After business hours (5pm) and weekends, call the answering service at [phone] and ask for the Radiologist on call to be paged. Interventional Radiology General Nurse Discharge After general anesthesia or intravenous sedation, for 24 hours or while taking prescription Narcotics: · Limit your activities · Do not drive and operate hazardous machinery · Do not make important personal or business decisions · Do  not drink alcoholic beverages · If you have not urinated within 8 hours after discharge, please contact your surgeon on call. * Please give a list of your current medications to your Primary Care Provider. * Please update this list whenever your medications are discontinued, doses are 
   changed, or new medications (including over-the-counter products) are added. * Please carry medication information at all times in case of emergency situations. These are general instructions for a healthy lifestyle: No smoking/ No tobacco products/ Avoid exposure to second hand smoke Surgeon General's Warning:  Quitting smoking now greatly reduces serious risk to your health. Obesity, smoking, and sedentary lifestyle greatly increases your risk for illness A healthy diet, regular physical exercise & weight monitoring are important for maintaining a healthy lifestyle You may be retaining fluid if you have a history of heart failure or if you experience any of the following symptoms:  Weight gain of 3 pounds or more overnight or 5 pounds in a week, increased swelling in our hands or feet or shortness of breath while lying flat in bed. Please call your doctor as soon as you notice any of these symptoms; do not wait until your next office visit. Recognize signs and symptoms of STROKE: 
F-face looks uneven A-arms unable to move or move unevenly S-speech slurred or non-existent T-time-call 911 as soon as signs and symptoms begin-DO NOT go Back to bed or wait to see if you get better-TIME IS BRAIN. Patient Signature: 
Date: 5/3/2017 Discharging Nurse: Julius Waller RN Discharge Orders None Introducing [facility] & HEALTH SERVICES!    
 [facility] introduces [employer] patient portal. Now you can access parts of your medical record, email your doctor's office, and request medication refills online. 1. In your internet browser, go to [email or web address] 2. Click on the First Time User? Click Here link in the Sign In box. You will see the New Member Sign Up page. 3. Enter your shopkick Access Code exactly as it appears below. You will not need to use this code after youve completed the sign-up process. If you do not sign up before the expiration date, you must request a new code. · shopkick Access Code: [email or web address] Expires: 7/13/2017  9:35 AM 
 
4. Enter the last four digits of your Social Security Number (xxxx) and Date of Birth (mm/dd/yyyy) as indicated and click Submit. You will be taken to the next sign-up page. 5. Create a shopkick ID. This will be your shopkick login ID and cannot be changed, so think of one that is secure and easy to remember. 6. Create a shopkick password. You can change your password at any time. 7. Enter your Password Reset Question and Answer. This can be used at a later time if you forget your password. 8. Enter your e-mail address. You will receive e-mail notification when new information is available in [address] 9. Click Sign Up. You can now view and download portions of your medical record. 10. Click the Download Summary menu link to download a portable copy of your medical information. If you have questions, please visit the Frequently Asked Questions section of the shopkick website. Remember, shopkick is NOT to be used for urgent needs. For medical emergencies, dial 911. Now available from your iPhone and Android! General Information Please provide this summary of care documentation to your next provider. Patient Signature:  ____________________________________________________________ Date:  ____________________________________________________________  
  
Magda Urbano Provider Signature:  ____________________________________________________________ Date:  ____________________________________________________________

## 2017-05-03 NOTE — PROCEDURES
Department of Interventional Radiology  [phone]        Interventional Radiology Brief Procedure Note    Patient: Jairo Heck MRN: [id number]  SSN: [id number]    YOB: 1960  Age: 65 y.o.   Sex: female      Date of Procedure: 5/3/2017    Pre-Procedure Diagnosis: lung cancer    Post-Procedure Diagnosis: SAME    Procedure(s): Venous Chest Port Placement    Brief Description of Procedure: US, fluoro guided left IJ venous chest port placed    Performed By: Cyrus Reed PA-C     Assistants: None    Anesthesia:TIVS/MAC    Estimated Blood Loss: Less than 10ml    Specimens: None    Implants: Subcutaneous Port    Findings: left IJ patent    Complications: None    Recommendations: ok to use port     Follow Up: referring MD    Signed By: Cyrus Reed PA-C     May 3, 2017

## 2017-05-03 NOTE — H&P
Department of Interventional Radiology  [phone]    History and Physical    Patient:  Katie Mcdaniel MRN:  [id number]  SSN:  [id number]    YOB: 1960  Age:  65 y.o. Sex:  female      Primary Care Provider:  Callie Reyes MD  Referring Physician:  Narinder Velasquez MD    Subjective:     Chief Complaint: port    History of the Present Illness: The patient is a 65 y.o. female who presents for venous chest port placement. Small cell lung cancer. Presented with SVC syndrome-facial swelling. This has resolved with starting chemotherapy. NPO. Past Medical History:   Diagnosis Date    Former cigarette smoker     using Nicotine patch    GERD (gastroesophageal reflux disease)     managed with Zantac    Hypertension     no complications at this time, no current medications    Lung cancer [facility])      Past Surgical History:   Procedure Laterality Date    HX TUBAL LIGATION          Review of Systems:    Pertinent items are noted in the History of Present Illness.     Current Facility-Administered Medications   Medication Dose Route Frequency    0.9% sodium chloride infusion  50 mL/hr IntraVENous CONTINUOUS    fentaNYL citrate (PF) injection 25 mcg  25 mcg IntraVENous ONCE    lactated ringers infusion  100 mL/hr IntraVENous CONTINUOUS    midazolam (VERSED) injection 2 mg  2 mg IntraVENous ONCE PRN    midazolam (VERSED) injection 2 mg  2 mg IntraVENous ONCE    sodium chloride (NS) flush 5-10 mL  5-10 mL IntraVENous Q8H    sodium chloride (NS) flush 5-10 mL  5-10 mL IntraVENous PRN    lidocaine (XYLOCAINE) 10 mg/mL (1 %) injection 0.1 mL  0.1 mL SubCUTAneous PRN    lidocaine (XYLOCAINE) 20 mg/mL (2 %) injection  mg  2-10 mL SubCUTAneous RAD ONCE    heparin (PF) 2 units/ml in NS infusion 2,000 Units  1,000 mL Irrigation ONCE    heparin (porcine) 100 unit/mL injection 500 Units  500 Units InterCATHeter ONCE    ceFAZolin in 0.9% NS (ANCEF) IVPB soln 2 g  2 g IntraVENous ONCE    lidocaine-EPINEPHrine (XYLOCAINE) 2 %-1:100,000 injection 100-200 mg  5-10 mL SubCUTAneous ONCE     No current outpatient prescriptions on file. No Known Allergies    Family History   Problem Relation Age of Onset   Georganna Duverney Hypertension Mother    Georganna Duverney Asthma Mother     No Known Problems Father     Thyroid Disease Sister     Hypertension Brother     Diabetes Brother      Social History   Substance Use Topics    Smoking status: Former Smoker     Packs/day: 0.50     Years: 39.00     Quit date: 4/18/2017    Smokeless tobacco: Never Used    Alcohol use No        Objective:       Physical Examination:    Vitals:    05/03/17 1218 05/03/17 1220   BP:  151/73   Pulse:  77   Resp:  16   Temp:  98.1 °F (36.7 °C)   SpO2:  100%   Weight: 48.5 kg (107 lb)    Height: 5' 8\" (1.727 m)      Blood pressure 151/73, pulse 77, temperature 98.1 °F (36.7 °C), resp. rate 16, height 5' 8\" (1.727 m), weight 48.5 kg (107 lb), SpO2 100 %.   HEART: regular rate and rhythm  LUNG: clear to auscultation bilaterally  ABDOMEN: normal findings: soft, non-tender  EXTREMITIES: warm, no edema    Laboratory:     Lab Results   Component Value Date/Time    Sodium 136 05/01/2017 10:08 AM    Sodium 137 04/22/2017 10:20 AM    Potassium 4.1 05/01/2017 10:08 AM    Potassium 4.5 04/22/2017 10:20 AM    Chloride 100 05/01/2017 10:08 AM    Chloride 101 04/22/2017 10:20 AM    CO2 29 05/01/2017 10:08 AM    CO2 29 04/22/2017 10:20 AM    Anion gap 7 05/01/2017 10:08 AM    Anion gap 7 04/22/2017 10:20 AM    Glucose 96 05/01/2017 10:08 AM    Glucose 90 04/22/2017 10:20 AM    BUN 13 05/01/2017 10:08 AM    BUN 15 04/22/2017 10:20 AM    Creatinine 0.80 05/01/2017 10:08 AM    Creatinine 0.73 04/22/2017 10:20 AM    GFR est AA >60 05/01/2017 10:08 AM    GFR est AA >60 04/22/2017 10:20 AM    GFR est non-AA >60 05/01/2017 10:08 AM    GFR est non-AA >60 04/22/2017 10:20 AM    Calcium 9.5 05/01/2017 10:08 AM    Calcium 9.2 04/22/2017 10:20 AM    Albumin 4.0 05/01/2017 10:08 AM Albumin 3.8 04/22/2017 10:20 AM    Protein, total 7.9 05/01/2017 10:08 AM    Protein, total 7.7 04/22/2017 10:20 AM    Globulin 3.9 05/01/2017 10:08 AM    Globulin Cannot be calulated 04/22/2017 10:20 AM    A-G Ratio 1.0 05/01/2017 10:08 AM    A-G Ratio Cannot be calulated 04/22/2017 10:20 AM    AST (SGOT) 24 05/01/2017 10:08 AM    AST (SGOT) 56 04/22/2017 10:20 AM    ALT (SGPT) 49 05/01/2017 10:08 AM    ALT (SGPT) 67 04/22/2017 10:20 AM     Lab Results   Component Value Date/Time    WBC 3.3 05/01/2017 10:08 AM    WBC 7.9 04/22/2017 09:40 AM    HGB 12.0 05/01/2017 10:08 AM    HGB 11.8 04/22/2017 09:40 AM    HCT 38.3 05/01/2017 10:08 AM    HCT 36.8 04/22/2017 09:40 AM    PLATELET 596 11/86/8319 10:08 AM    PLATELET 971 77/78/0139 09:40 AM     Lab Results   Component Value Date/Time    aPTT 23.9 05/01/2017 11:23 AM    Prothrombin time 9.6 05/01/2017 11:23 AM    Prothrombin time 10.5 04/21/2017 01:00 PM    INR 0.9 05/01/2017 11:23 AM    INR 1.0 04/21/2017 01:00 PM       Assessment:     Lung cancer    Plan:     Planned Procedure:  Port placement    Risks, benefits, and alternatives reviewed with patient and she agrees to proceed with the procedure.       Signed By: Anais De Oliveira PA-C     May 3, 2017

## 2017-05-16 NOTE — PROGRESS NOTES
Tolerated chemotherapy without difficulty. Voiding without difficulty. Patient discharged via ambulation accompanied by self. Instructed to notify physician of any problems, questions or concerns. Allowed opportunity for patient/family to ask questions. Verbalized understanding. Next appointment is 05/17/17at 1500 with [facility].

## 2017-05-16 NOTE — PROGRESS NOTES
SW followed up with pt in infusion. Pt stated she has been having pain in her back, that Dr. Carly Antonio addressed and will be referring her for a kyphoplasty, but that has made it difficult for her to do her ADLs. SW offered to facilitate referral to palliative NP Israel Santamaria for pain management and pt verbalized agreement as she has been taking ibuprofen and aspirin for pain relief. SW facilitated referral to palliative NP Israel Santamaria. Pt stated her SSI has been submitted. She did not identify any other needs at this time. SW encouraged pt to call should any needs arise and pt verbalized understanding. SW intends to follow up PRN.

## 2017-05-16 NOTE — PROGRESS NOTES
Problem: Chemotherapy Treatment  Goal: *Chemotherapy regimen followed  Outcome: Progressing Towards Goal  Reviewed chemotherapy POC with patient. Verbalizes understanding.

## 2017-05-17 NOTE — PROGRESS NOTES
Arrived to the [facility]. Etoposide completed. Patient tolerated well. Any issues or concerns during appointment: none. Patient aware of next infusion appointment on 5/18/17 at 3pm.  Discharged ambulatory.

## 2017-05-19 NOTE — PROGRESS NOTES
Patient was scheduled in error. She finished day 3 of cycle yesterday. Port flushed and needle removed.

## 2017-06-05 NOTE — TELEPHONE ENCOUNTER
SW received voicemail from pt requesting information about her disability. SW returned pt's call. Pt stated she was told, when she applied for disability, that her first check would come in a month but she has not received it and she called the Social Security office who stated it could be until November before she receives her check. SW provided education about the typical wait time for disability screenings and additional processing time needed and offered to have [facility] representative follow up with pt regarding any additional questions. Pt verbalized understanding standing and agreement. No other needs. FELTON coordinated with [employer] representative Braxton Tidwell who stated the process could potentially take a year or more. SW intends to relay this to the pt and provide additional community financial resource contact information.

## 2017-06-06 NOTE — PROGRESS NOTES
SW followed up with pt prior to MD appointment. Pt stated she is focusing on having patience with her disability determination and stated she is coping well, with support from her family. Pt presented form to be filled out for her employer and FELTON coordinated with Joni Clark for form completion to be returned to pt. Pt stated she continues to rely on her ismael for strength. SW emphasized pt strengths. Pt stated she has had some pain and consulted with palliative NP Ami Nathan regarding medications. Pt did not identify any additional needs at this time. SW encouraged pt to call should any needs arise. Pt verbalized understanding.

## 2017-06-09 NOTE — TELEPHONE ENCOUNTER
SW reviewed pt's chart and called Patient [facility] [employer] [facility] in attempts to find insurance premium assistance for pt. SW left voicemails requesting additional information. SW called pt to updated her and offer to follow up with pt at her upcoming appointment to review possible local resources for other financial needs. Pt verbalized understanding and agreement. SW encouraged pt to call should any other needs arise. Pt verbalized understanding. SW intends to follow up with pt at her appointment.

## 2017-06-09 NOTE — PERIOP NOTES
Patient verified name, , and surgery as listed in [facility]. TYPE  CASE:1b  Orders per surgeon: yes Received  Labs per surgeon:cbc,bmp-from 17-pt/inr/ptt-dos: results -  Labs per anesthesia protocol: none : results -  EKG  :  na      Patient provided with handouts including guide to surgery , transfusions, pain management and hand hygiene for the family and community. Pt verbalizes understanding of all pre-op instructions . Instructed that family must be present in building at all times. Nothing to eat or drink after midnight the night prior to surgery. Hibiclens and instructions given per hospital policy. Instructed patient to continue  previous medications as prescribed prior to surgery and  to take the following medications the day of surgery according to anesthesia guidelines : tylenol as needed,zofran as needed,ranitadine       Original medication prescription bottles all, visualized during patient appointment. Continue all previous medications unless otherwise directed. Instructed patient to hold  the following medications prior to surgery: none      Patient verbalized understanding of all instructions and provided all medical/health information to the best of their ability.

## 2017-06-12 NOTE — IP AVS SNAPSHOT
Leon Ortega 
 
 
 [facility] 
[phone] Patient: Michel Paz MRN: [id number] :1960 You are allergic to the following Allergen Reactions Metaxalone Other (comments) Oxycodone Swelling Recent Documentation Breastfeeding? OB Status Smoking Status No Postmenopausal Former Smoker Emergency Contacts Name Discharge Info Relation Home Work Mobile Reyna Cole  Sister [23] [phone] Ney Aguilar  Daughter [21] [phone] About your hospitalization You were admitted on:  2017 You last received care in the:  [facility] Radiology Specials You were discharged on:  2017 Unit phone number:  [phone] Why you were hospitalized Your primary diagnosis was:  Not on File Providers Seen During Your Hospitalizations Provider Role Specialty Primary office phone Nestor Hansen MD Attending Provider Radiology [phone] Your Primary Care Physician (PCP) Primary Care Physician Office Phone Office Fax [facility] [phone] Follow-up Information None Your Appointments 2017  8:30 AM EDT  
LAB with [address]  
[address] [facility] OUTREACH INSURANCE [facility]) [address]  
[phone] 2017  9:00 AM EDT PreChemo Follow Up with Frankey Flasher, MD  
[facility] Hematology and Oncology [facility] KATHERINE/ Raul Brewster 33 [facility] [address]  
[phone] 2017 10:00 AM EDT Chemo with [facility]) [address] "[place] Dr Tom Gusman [phone] [facility] [address]  
[phone] [address]   8:00 AM EDT  
 Chemo with NUR5  
ST. [address] [facility] ([facility] [address] Mitchell Skiff [phone] [facility] [address]  
[phone] [address] Thursday Katie 15, 2017  8:00 AM EDT Chemo with NUR3  
ST. [address] [facility] ([facility] [address] Mitchell Skiff [phone] [facility] [address]  
[phone] [address] Thursday June 22, 2017  2:45 PM EDT  
CT PORT with SFD CT 59 SLICE UNIT 1  
D RADIOLOGY CT SCAN ([address] [place] [address]) [address] [facility] [address] [facility]  
[phone] PATIENT ARRIVAL  1. If patient's CT includes CT Abdomen: Please report 90 minutes before exam for registration and oral contrast; all other CT's arrive 30 minutes prior to appt time. 2. Please arrive well hydrated. Drink plenty of fluids the day before and the day of your study. Do not eat anything 4 hours before your arrival time, however please continue to drink clear liquids until 2 hours prior to the arrival time. 3. If diabetic, please bring a list of any diabetic medications. 4.Dialysis patients do not need to have hydration. It is recommended the patient have dialysis within 24 hours after the test.   
  
    
 Wednesday July 05, 2017  9:00 AM EDT  
LAB with [address]  
1808 [facility] OUTREACH INSURANCE ([facility] Eileen Crowley [address] [place] [address]  
[phone] Wednesday July 05, 2017  9:30 AM EDT PreChemo Follow Up with Damien Garcia MD  
[facility] Hematology and Oncology [facility]) C/ Raul Brewster 33 [facility] [address] [phone] Wednesday July 05, 2017 10:00 AM EDT Chemo with Dee Fernandez. 39[address] [facility] ([facility] [address] Mitchell Skiff [phone] [facility] [address]  
[phone] [address] Wednesday July 05, 2017 12:30 PM EDT Follow Up with SHAN Ledesma 52 [facility] Hematology and Oncology [facility]) KATHERINE/ Raul Brewster 33 [facility] [address]  
[phone] Thursday July 06, 2017  8:00 AM EDT Chemo with Vira Monica. 3979 [facility] ([facility]) [address] Sejal Greenberg [phone] [facility] [address]  
[phone] [address] Friday July 07, 2017 11:15 AM EDT Chemo with Nikita Zheng. 39[address] [facility] ([facility]) [address] Sejal Greenberg [phone] [facility] [address]  
[phone] [address] Current Discharge Medication List  
  
ASK your doctor about these medications Dose & Instructions Dispensing Information Comments Morning Noon Evening Bedtime  
 acetaminophen 325 mg tablet Commonly known as:  TYLENOL Your last dose was: Your next dose is:    
   
   
 Dose:  650 mg Take 2 Tabs by mouth every four (4) hours as needed. Quantity:  30 Tab Refills:  0  
     
   
   
   
  
 lidocaine-prilocaine topical cream  
Commonly known as:  EMLA Your last dose was: Your next dose is:    
   
   
 Apply 1/2 to 1 inch to port site one hour prior to needle access. Quantity:  30 g Refills:  1  
     
   
   
   
  
 nicotine 7 mg/24 hr  
Commonly known as:  Loyce Feeling Your last dose was: Your next dose is:    
   
   
 Dose:  1 Patch 1 Patch by TransDERmal route every twenty-four (24) hours. Refills:  0  
     
   
   
   
  
 ondansetron 8 mg disintegrating tablet Commonly known as:  ZOFRAN ODT Your last dose was: Your next dose is:    
   
   
 Dose:  8 mg Take 1 Tab by mouth every eight (8) hours as needed for Nausea. Indications: PREVENTION OF CHEMOTHERAPY-INDUCED NAUSEA AND VOMITING Quantity:  90 Tab Refills:  2  
     
   
   
   
  
 prochlorperazine 10 mg tablet Commonly known as:  COMPAZINE Your last dose was: Your next dose is:    
   
   
 Dose:  5 mg Take 5 mg by mouth every six (6) hours as needed for Nausea. Refills:  0  
     
   
   
   
  
 senna-docusate 8.6-50 mg per tablet Commonly known as:  SHERRI-COLACE Your last dose was: Your next dose is:    
   
   
 Dose:  1 Tab Take 1 Tab by mouth two (2) times a day. Quantity:  60 Tab Refills:  2 ZANTAC 150 mg tablet Generic drug:  raNITIdine Your last dose was: Your next dose is:    
   
   
 Dose:  150 mg Take 150 mg by mouth every morning. Refills:  0 Discharge Instructions [place] [address] Department of Interventional Radiology [facility] 
[phone] Office 
[phone] Fax KYPHOPLASTY/VERTEBROPLASTY DISCHARGE INSTRUCTIONS General Information: This procedure is done to help with the back pain that is associated with compression fractures in the spine. The kyphoplasty involves placing a balloon into the space of the vertebrae that is fractured, blowing up the balloon, therefore realigning the broken pieces of bone, and then injecting cement into the space to strengthen the vertebrae. The vertebroplasty is only slightly different in that there is no balloon used. The Interventional Radiologist will speak with you to decide which procedure is best for you. The pain experienced from compression fractures is caused by the vertebrae not being stabilized. The cement stabilizes the bone, therefore reducing the pain. Home Care Instructions: You can resume your regular diet and medication regimen. Do not drink alcohol, drive, or make any important legal decisions in the next 24 hours. Do not lift anything heavier than a gallon of milk, or do anything strenuous for the next 24 hours. You will notice a dressing on your lower back after your procedure. This dressing can be removed in 24 hours. Showering is acceptable in 24 hours, but you should refrain from tub baths or swimming for 5 days. You will be asked to come back in for a recheck about 30 days after your procedure. At that time, our physician will ask you questions about your pain and if it has improved. Call If: 
   You should call your Physician and/or the Radiology Nurse if you have any bleeding other than a small spot on your bandage. Call if you have any signs of infection, fever, or increased pain at the site. Call if you should have new or worsening pain in your back, or if you lose control of your bladder or bowel. Any tingling or loss of feeling or movement in your legs should also be reported. Follow-Up Instructions: Please see your ordering doctor as he/she has requested. To Reach Us: If you have any questions about your procedure, please call the Interventional Radiology department at [phone]. After business hours (5pm) and weekends, call the answering service at [phone] and ask for the Radiologist on call to be paged. Interventional Radiology General Nurse Discharge After general anesthesia or intravenous sedation, for 24 hours or while taking prescription Narcotics: · Limit your activities · Do not drive and operate hazardous machinery · Do not make important personal or business decisions · Do  not drink alcoholic beverages · If you have not urinated within 8 hours after discharge, please contact your surgeon on call. * Please give a list of your current medications to your Primary Care Provider. * Please update this list whenever your medications are discontinued, doses are 
   changed, or new medications (including over-the-counter products) are added. * Please carry medication information at all times in case of emergency situations. These are general instructions for a healthy lifestyle: No smoking/ No tobacco products/ Avoid exposure to second hand smoke Surgeon General's Warning:  Quitting smoking now greatly reduces serious risk to your health. Obesity, smoking, and sedentary lifestyle greatly increases your risk for illness A healthy diet, regular physical exercise & weight monitoring are important for maintaining a healthy lifestyle You may be retaining fluid if you have a history of heart failure or if you experience any of the following symptoms:  Weight gain of 3 pounds or more overnight or 5 pounds in a week, increased swelling in our hands or feet or shortness of breath while lying flat in bed. Please call your doctor as soon as you notice any of these symptoms; do not wait until your next office visit. Recognize signs and symptoms of STROKE: 
F-face looks uneven A-arms unable to move or move unevenly S-speech slurred or non-existent T-time-call 911 as soon as signs and symptoms begin-DO NOT go Back to bed or wait to see if you get better-TIME IS BRAIN. Patient Signature: 
Date: 6/12/2017 Discharging Nurse: Gary Tracey RN Discharge Orders None Introducing [facility]! Colby Berrios introduces roomlinx patient portal. Now you can access parts of your medical record, email your doctor's office, and request medication refills online. 1. In your internet browser, go to [email or web address] 2. Click on the First Time User? Click Here link in the Sign In box. You will see the New Member Sign Up page. 3. Enter your roomlinx Access Code exactly as it appears below.  You will not need to use this code after youve completed the sign-up process. If you do not sign up before the expiration date, you must request a new code. · [employer] Access Code: [email or web address] Expires: 7/13/2017  9:35 AM 
 
4. Enter the last four digits of your Social Security Number (xxxx) and Date of Birth (mm/dd/yyyy) as indicated and click Submit. You will be taken to the next sign-up page. 5. Create a [employer] ID. This will be your [employer] login ID and cannot be changed, so think of one that is secure and easy to remember. 6. Create a [employer] password. You can change your password at any time. 7. Enter your Password Reset Question and Answer. This can be used at a later time if you forget your password. 8. Enter your e-mail address. You will receive e-mail notification when new information is available in [address] 9. Click Sign Up. You can now view and download portions of your medical record. 10. Click the Download Summary menu link to download a portable copy of your medical information. If you have questions, please visit the Frequently Asked Questions section of the [employer] website. Remember, [employer] is NOT to be used for urgent needs. For medical emergencies, dial 911. Now available from your iPhone and Android! General Information Please provide this summary of care documentation to your next provider. Patient Signature:  ____________________________________________________________ Date:  ____________________________________________________________  
  
Mathew Amador Provider Signature:  ____________________________________________________________ Date:  ____________________________________________________________

## 2017-06-12 NOTE — IP AVS SNAPSHOT
Current Discharge Medication List  
  
ASK your doctor about these medications Dose & Instructions Dispensing Information Comments Morning Noon Evening Bedtime  
 acetaminophen 325 mg tablet Commonly known as:  TYLENOL Your last dose was: Your next dose is:    
   
   
 Dose:  650 mg Take 2 Tabs by mouth every four (4) hours as needed. Quantity:  30 Tab Refills:  0  
     
   
   
   
  
 lidocaine-prilocaine topical cream  
Commonly known as:  EMLA Your last dose was: Your next dose is:    
   
   
 Apply 1/2 to 1 inch to port site one hour prior to needle access. Quantity:  30 g Refills:  1  
     
   
   
   
  
 nicotine 7 mg/24 hr  
Commonly known as:  Marlene Escobedo Your last dose was: Your next dose is:    
   
   
 Dose:  1 Patch 1 Patch by TransDERmal route every twenty-four (24) hours. Refills:  0  
     
   
   
   
  
 ondansetron 8 mg disintegrating tablet Commonly known as:  ZOFRAN ODT Your last dose was: Your next dose is:    
   
   
 Dose:  8 mg Take 1 Tab by mouth every eight (8) hours as needed for Nausea. Indications: PREVENTION OF CHEMOTHERAPY-INDUCED NAUSEA AND VOMITING Quantity:  90 Tab Refills:  2  
     
   
   
   
  
 prochlorperazine 10 mg tablet Commonly known as:  COMPAZINE Your last dose was: Your next dose is:    
   
   
 Dose:  5 mg Take 5 mg by mouth every six (6) hours as needed for Nausea. Refills:  0  
     
   
   
   
  
 senna-docusate 8.6-50 mg per tablet Commonly known as:  SHERRI-COLACE Your last dose was: Your next dose is:    
   
   
 Dose:  1 Tab Take 1 Tab by mouth two (2) times a day. Quantity:  60 Tab Refills:  2 ZANTAC 150 mg tablet Generic drug:  raNITIdine Your last dose was: Your next dose is:    
   
   
 Dose:  150 mg Take 150 mg by mouth every morning. Refills:  0

## 2017-06-12 NOTE — DISCHARGE INSTRUCTIONS
[place] [address]  Department of Interventional Radiology  [facility]  [phone] Office  [phone] Fax    KYPHOPLASTY/VERTEBROPLASTY DISCHARGE INSTRUCTIONS    General Information: This procedure is done to help with the back pain that is associated with compression fractures in the spine. The kyphoplasty involves placing a balloon into the space of the vertebrae that is fractured, blowing up the balloon, therefore realigning the broken pieces of bone, and then injecting cement into the space to strengthen the vertebrae. The vertebroplasty is only slightly different in that there is no balloon used. The Interventional Radiologist will speak with you to decide which procedure is best for you. The pain experienced from compression fractures is caused by the vertebrae not being stabilized. The cement stabilizes the bone, therefore reducing the pain. Home Care Instructions: You can resume your regular diet and medication regimen. Do not drink alcohol, drive, or make any important legal decisions in the next 24 hours. Do not lift anything heavier than a gallon of milk, or do anything strenuous for the next 24 hours. You will notice a dressing on your lower back after your procedure. This dressing can be removed in 24 hours. Showering is acceptable in 24 hours, but you should refrain from tub baths or swimming for 5 days. You will be asked to come back in for a recheck about 30 days after your procedure. At that time, our physician will ask you questions about your pain and if it has improved. Call If:     You should call your Physician and/or the Radiology Nurse if you have any bleeding other than a small spot on your bandage. Call if you have any signs of infection, fever, or increased pain at the site. Call if you should have new or worsening pain in your back, or if you lose control of your bladder or bowel.  Any tingling or loss of feeling or movement in your legs should also be reported. Follow-Up Instructions: Please see your ordering doctor as he/she has requested. To Reach Us: If you have any questions about your procedure, please call the Interventional Radiology department at [phone]. After business hours (5pm) and weekends, call the answering service at [phone] and ask for the Radiologist on call to be paged. Interventional Radiology General Nurse Discharge    After general anesthesia or intravenous sedation, for 24 hours or while taking prescription Narcotics:  · Limit your activities  · Do not drive and operate hazardous machinery  · Do not make important personal or business decisions  · Do  not drink alcoholic beverages  · If you have not urinated within 8 hours after discharge, please contact your surgeon on call. * Please give a list of your current medications to your Primary Care Provider. * Please update this list whenever your medications are discontinued, doses are     changed, or new medications (including over-the-counter products) are added. * Please carry medication information at all times in case of emergency situations. These are general instructions for a healthy lifestyle:    No smoking/ No tobacco products/ Avoid exposure to second hand smoke  Surgeon General's Warning:  Quitting smoking now greatly reduces serious risk to your health. Obesity, smoking, and sedentary lifestyle greatly increases your risk for illness  A healthy diet, regular physical exercise & weight monitoring are important for maintaining a healthy lifestyle    You may be retaining fluid if you have a history of heart failure or if you experience any of the following symptoms:  Weight gain of 3 pounds or more overnight or 5 pounds in a week, increased swelling in our hands or feet or shortness of breath while lying flat in bed.   Please call your doctor as soon as you notice any of these symptoms; do not wait until your next office visit. Recognize signs and symptoms of STROKE:  F-face looks uneven    A-arms unable to move or move unevenly    S-speech slurred or non-existent    T-time-call 911 as soon as signs and symptoms begin-DO NOT go       Back to bed or wait to see if you get better-TIME IS BRAIN. Patient Signature:  Date: 6/12/2017  Discharging Nurse:  Morna Harada, RN

## 2017-06-12 NOTE — PROCEDURES
Department of Interventional Radiology  [phone]        Interventional Radiology Brief Procedure Note    Patient: Santiago Moreau MRN: [id number]  SSN: [id number]    YOB: 1960  Age: 65 y.o. Sex: female      Date of Procedure: 6/12/2017    Pre-Procedure Diagnosis: Painful, pathologic upper thoracic vertebral compression fracture. Post-Procedure Diagnosis: SAME    Procedure(s): Kyphoplasty w biopsy    Brief Description of Procedure: as above    Performed By: Danisha Padilla MD     Assistants: None    Anesthesia:TIVS/MAC    Estimated Blood Loss: Less than 10ml    Specimens: Formalin to Path    Implants: See Above    Findings: Wedge shaped compression deformity. Complications: None    Recommendations: 2 hour bedrest.       Follow Up: Office in 2-4 weeks.       Signed By: Danisha Padilla MD     June 12, 2017

## 2017-06-12 NOTE — PROGRESS NOTES
TRANSFER - IN REPORT:    [id number]  being received from IR suite for routine post - op      Report consisted of patients Situation, Background, Assessment and   Recommendations(SBAR). Information from the following report(s) SBAR, Procedure Summary and MAR was reviewed with the receiving nurse. Opportunity for questions and clarification was provided. Assessment completed upon patients arrival to unit and care assumed.

## 2017-06-12 NOTE — ANESTHESIA POSTPROCEDURE EVALUATION
Post-Anesthesia Evaluation and Assessment    Patient: Venkat Schofield MRN: [id number]  SSN: [id number]    YOB: 1960  Age: 65 y.o. Sex: female       Cardiovascular Function/Vital Signs  Visit Vitals    /81    Pulse 84    Temp 36.5 °C (97.7 °F)    Resp 16    SpO2 95%    Breastfeeding No       Patient is status post total IV anesthesia anesthesia for Procedure(s):   T- 5 KYPHOPLASTY  MD WANTS CONSULT FOR DEEP CON-SEDATION OR GENERAL ANES. Nausea/Vomiting: None    Postoperative hydration reviewed and adequate. Pain:  Pain Scale 1: Numeric (0 - 10) (06/12/17 1441)  Pain Intensity 1: 0 (06/12/17 1441)   Managed    Neurological Status: At baseline    Mental Status and Level of Consciousness: Arousable    Pulmonary Status:   O2 Device: Nasal cannula (06/12/17 1428)   Adequate oxygenation and airway patent    Complications related to anesthesia: None    Post-anesthesia assessment completed.  No concerns    Signed By: Lynn Nathan MD     June 12, 2017

## 2017-06-12 NOTE — H&P
Department of Interventional Radiology  [phone]    History and Physical    Patient:  Margarito Hines MRN:  [id number]  SSN:  [id number]    YOB: 1960  Age:  65 y.o. Sex:  female      Primary Care Provider:  Luis Paredes MD  Referring Physician:  Kena Marino MD    Subjective:     Chief Complaint: back pain    History of the Present Illness: The patient is a 65 y.o. female who presents for T5 kyphoplasty. Mid back pain for several weeks. SCLC with bone mets. T5 pathologic VCF. Tylenol for pain. Discontinued Skelaxin-gave her bad dreams. NPO. Past Medical History:   Diagnosis Date    Former cigarette smoker     using Nicotine patch    GERD (gastroesophageal reflux disease)     managed with Zantac    Hypertension     no complications at this time, no current medications    Lung cancer [facility])      Past Surgical History:   Procedure Laterality Date    HX TUBAL LIGATION      HX VASCULAR ACCESS      lt chest        Review of Systems:    Pertinent items are noted in the History of Present Illness. No current facility-administered medications for this encounter. Allergies   Allergen Reactions    Metaxalone Other (comments)    Oxycodone Swelling       Family History   Problem Relation Age of Onset    Hypertension Mother    [facility] Asthma Mother     No Known Problems Father     Thyroid Disease Sister     Hypertension Brother     Diabetes Brother      Social History   Substance Use Topics    Smoking status: Former Smoker     Packs/day: 0.50     Years: 39.00     Quit date: 4/18/2017    Smokeless tobacco: Never Used    Alcohol use No        Objective:       Physical Examination:    Vitals:    06/12/17 1053 06/12/17 1100   BP: 170/73 (!) 161/91   Pulse: 73    Temp: 98.6 °F (37 °C)    SpO2: 100%      Blood pressure (!) 161/91, pulse 73, temperature 98.6 °F (37 °C), SpO2 100 %, not currently breastfeeding.   HEART: regular rate and rhythm  LUNG: clear to auscultation bilaterally  ABDOMEN: soft, nontender  EXTREMITIES: warm, no edema    Laboratory:     Lab Results   Component Value Date/Time    Sodium 138 06/06/2017 10:06 AM    Sodium 139 05/15/2017 11:53 AM    Potassium 4.5 06/06/2017 10:06 AM    Potassium 4.7 05/15/2017 11:53 AM    Chloride 104 06/06/2017 10:06 AM    Chloride 104 05/15/2017 11:53 AM    CO2 26 06/06/2017 10:06 AM    CO2 29 05/15/2017 11:53 AM    Anion gap 8 06/06/2017 10:06 AM    Anion gap 6 05/15/2017 11:53 AM    Glucose 95 06/06/2017 10:06 AM    Glucose 93 05/15/2017 11:53 AM    BUN 25 06/06/2017 10:06 AM    BUN 16 05/15/2017 11:53 AM    Creatinine 1.02 06/06/2017 10:06 AM    Creatinine 1.31 05/15/2017 11:53 AM    GFR est AA >60 06/06/2017 10:06 AM    GFR est AA 54 05/15/2017 11:53 AM    GFR est non-AA 60 06/06/2017 10:06 AM    GFR est non-AA 45 05/15/2017 11:53 AM    Calcium 8.8 06/06/2017 10:06 AM    Calcium 9.0 05/15/2017 11:53 AM    Albumin 4.0 06/06/2017 10:06 AM    Albumin 3.8 05/15/2017 11:53 AM    Protein, total 8.0 06/06/2017 10:06 AM    Protein, total 7.8 05/15/2017 11:53 AM    Globulin 4.0 06/06/2017 10:06 AM    Globulin 4.0 05/15/2017 11:53 AM    A-G Ratio 1.0 06/06/2017 10:06 AM    A-G Ratio 1.0 05/15/2017 11:53 AM    AST (SGOT) 21 06/06/2017 10:06 AM    AST (SGOT) 21 05/15/2017 11:53 AM    ALT (SGPT) 31 06/06/2017 10:06 AM    ALT (SGPT) 40 05/15/2017 11:53 AM     Lab Results   Component Value Date/Time    WBC 4.0 06/06/2017 10:06 AM    WBC 8.6 05/15/2017 11:53 AM    HGB 11.5 06/06/2017 10:06 AM    HGB 11.8 05/15/2017 11:53 AM    HCT 36.3 06/06/2017 10:06 AM    HCT 37.4 05/15/2017 11:53 AM    PLATELET 837 42/41/7086 10:06 AM    PLATELET 315 70/61/4392 11:53 AM     Lab Results   Component Value Date/Time    aPTT 23.9 05/01/2017 11:23 AM    Prothrombin time 9.6 05/01/2017 11:23 AM    Prothrombin time 10.5 04/21/2017 01:00 PM    INR 0.9 05/01/2017 11:23 AM    INR 1.0 04/21/2017 01:00 PM       Assessment:     T5 pathologic VCF    Plan:     Planned Procedure:  kyphoplasty    Risks, benefits, and alternatives reviewed with patient and she agrees to proceed with the procedure.       Signed By: Zeke Epstein PA-C     June 12, 2017

## 2017-06-12 NOTE — PROGRESS NOTES
Recovery period without difficulty. Pt alert and oriented and denies pain. Dressing is clean, dry, and intact. Reviewed discharge instructions with patient and sister, both verbalized understanding. [facility] Vital signs and Jeronimo score completed.

## 2017-06-13 NOTE — PROGRESS NOTES
Arrived to the [facility]. Cisplatin and etoposide completed. Patient tolerated well. Any issues or concerns during appointment: none. Patient aware of next infusion appointment on 06/14  at 0800 . Discharged ambulatory.

## 2017-06-13 NOTE — PROGRESS NOTES
SW followed up with pt and provided her with contact information for [employer] for possible resources for insurance costs. SW encouraged pt to call her insurance company to inquire about assistance if possible. Pt stated she intends to follow up with these tasks. SW intends to follow up with pt regarding form submitted to our office for completion. Pt verbalized understanding. No other needs identified. SW encouraged pt to call should any needs arise and pt verbalized understanding. SW intends to follow up.

## 2017-06-14 NOTE — PROGRESS NOTES
Arrived to the [facility]. Day 2 Etoposide chemo completed. Patient tolerated well. Any issues or concerns during appointment: NO.  Patient aware of next infusion appointment on 06/15/17 (date) at 0800 (time). Discharged ambulatory.

## 2017-06-19 NOTE — TELEPHONE ENCOUNTER
SW received completed physician statement and called pt to confirm she wanted it faxed to her specified companies. Pt requested SW fax it. SW faxed it to pt's requested companies and intends to give original to pt at her follow up visit. Pt did not identify any other issues at this time. SW encouraged pt to call should any needs arise. Pt verbalized understanding.

## 2017-07-05 NOTE — PROGRESS NOTES
SW followed up with pt at MD appointment to provide pt with original, completed physician statement requested. Pt stated she was denied [employer] Income but is working with [facility] to appeal. Pt did not identify any additional needs at this time. SW encouraged pt to call should any needs arise and pt verbalized understanding. FELTON intends to follow up PRN.

## 2017-07-06 NOTE — PROGRESS NOTES
Arrived to the [facility]. Chemo completed. Patient tolerated well. Port flushed and left accessed for use in AM.  Any issues or concerns during appointment: None. Patient aware of next infusion appointment on 7/7 (date) at [phone] (time). Discharged ambulatory in stable condition.

## 2017-07-06 NOTE — TELEPHONE ENCOUNTER
SW received voicemail from pt requesting return call. SW called pt back and left voicemail requesting call back. Pt called SW back and stated she received her Medicaid card and has been working with [facility] on her disability. Pt also stated that she has accessed resources at [facility]. SW arranged to assist pt at her next infusion and pt verbalized agreement. SW intends to follow up with pt.

## 2017-07-07 NOTE — PROGRESS NOTES
Arrived to the [facility]. Etoposide completed. Patient tolerated well. Any issues or concerns during appointment: none. Patient aware of next infusion appointment on 08/02  at 0900 . Discharged ambulatory.

## 2017-07-07 NOTE — PROGRESS NOTES
SW followed up with pt in infusion at pt's request. SW facilitated scanning pt's Medicaid insurance card into pt's chart. Pt requested assistance in choosing a PCP through Medicaid and SW encouraged pt to call Medicaid directly to accurately choose a Medicaid provider. Pt verbalized understanding. SW completed pt's [employer] mileage reimbursement verifying her previous appointments. No other needs identified. SW encouraged pt to call should any needs arise. Pt verbalized understanding. SW intends to follow up PRN.

## 2017-08-03 NOTE — PROGRESS NOTES
Arrived to the [facility]. Etoposide completed. Patient tolerated well. Any issues or concerns during appointment: no.  Patient aware of next infusion appointment on 8/4/17 (date) at 200 (time). Discharged ambulatory.

## 2017-08-04 NOTE — PROGRESS NOTES
Pt arrived ambulatory today at 1105, to receive IV chemotherapy. Pt tolerated without difficulty. Patient discharged via ambulatory accompanied by self. Instructed to notify physician of any problems, questions or concerns. Allowed opportunity for patient/family to ask questions. Verbalized understanding. Next appointment is August 30 at 1000 with Troy Walters.

## 2017-08-04 NOTE — PROGRESS NOTES
Problem: Chemotherapy Treatment  Goal: *Chemotherapy regimen followed  Outcome: Progressing Towards Goal  Verbalizes/demonstrates understanding of purpose/procedure/potential side effects of etoposide.

## 2017-08-30 PROBLEM — C79.51 BONE METASTASES (HCC): Status: ACTIVE | Noted: 2017-01-01

## 2017-08-30 NOTE — PROGRESS NOTES
[facility] Cisplatin and Etoposide completed. Patient tolerated well. Any issues or concerns during appointment: none. Patient aware of next infusion appointment on 8/31/17 at 3pm.  Discharged ambulatory.

## 2017-08-31 NOTE — PROGRESS NOTES
Arrived to the [facility]. VP16 completed. Patient tolerated well. Any issues or concerns during appointment: no.  Patient aware of next infusion appointment on 9/1/17 (date) at 5 (time). Discharged ambulatory.

## 2017-09-01 NOTE — PROGRESS NOTES
Pt. Discharged ambulatory accompanied by self. Tolerated chemotherapy and injection well. No distress noted. To return to [place] [address] [place] on 9/29/17.

## 2017-09-15 NOTE — TELEPHONE ENCOUNTER
FELTON received request for physician statement from pt's life insurance company. SW called pt and pt gave verbal permission to complete form and fax to [employer] at [phone]. SW completed form and faxed it. No other needs at this time. FELTON intends to follow up PRN.

## 2017-09-29 NOTE — PROGRESS NOTES
9/29/2017 Saw the patient with Dr Lyndsay Adan. She recently had a CT scan which shows one lymph node that Dr Lyndsay Adan will follow with CT scan again in 3 months. She is feeling well. We will check Iron panel today. We will continue with Xgeva. Navigation will continue to follow.

## 2017-10-03 NOTE — PROGRESS NOTES
Arrived to the [facility]. Xgevacompleted. Patient tolerated well. Patient's calcium at 8.1. Order to proceed and patient instructed to take OTC calcium carbonate 500 mg po 3 times daily. Patient advised of medication necessity and verbalized understanding. Any issues or concerns during appointment: no.  Patient aware of next infusion appointment on 10/27/17 @ 1315 for xgeva. Discharged ambulatory.

## 2017-10-27 NOTE — PROGRESS NOTES
Arrived to the [facility]. Xgeva completed. Patient tolerated well. Any issues or concerns during appointment: none  Patient aware of next infusion appointment on 11/24/17 at 1:15pm   Discharged ambulatory.

## 2017-11-16 NOTE — TELEPHONE ENCOUNTER
FELTON received referral from [employer] to follow up with pt regarding transportation. Pt known to SW from previous appointments. SW called pt and left voicemail requesting return call. SW intends to follow up.

## 2017-11-17 NOTE — TELEPHONE ENCOUNTER
SW attempted to reach pt to assess need for transportation assistance. SW called pt's number listed and left voicemail requesting return call. SW updated pt's medical team. SW intends to follow up with pt.

## 2017-11-20 NOTE — TELEPHONE ENCOUNTER
SW received call back from pt who stated she has transportation for her appointments this week but may need transportation assistance for her daily radiation appointments. SW offered to follow up with pt at her radiation consultation to facilitate scheduling transportation. Pt verbalized agreement. SW updated pt's medical team and intends to follow up with pt.

## 2017-11-22 NOTE — TELEPHONE ENCOUNTER
SW received referral for psychosocial needs, transportation and possible counseling for pt. SW attempted to follow up with pt via phone. SW left voicemail. Pt called SW back stating she is using her sister's car and does not currently need transportation. Pt stated she would call SW should she need assistance. SW asked pt if she was interested in counseling. Pt stated, \"No, I think I'm going to be okay for now. I'll let you know if I change my mind. \" SW respected pt autonomy. SW encouraged pt to call should any needs arise and pt verbalized understanding. SW intends to follow up.

## 2017-11-22 NOTE — PROGRESS NOTES
Consult for SVC, SCLC. Pt's sister present for consult. Pt c/o facial swelling, occasional shoulder/upper arm pain. CT C/A/P 11-15-17. Pet scan 12-7-17. Kyphoplasty 6-12-17. F/u with Dr. Carolyn Banerjee 12-8-17. CONSENTS SIGNED FOR RT TO THE RIGHT LUNG. NO CHEMO. CT [facility] SCHEDULED Monday, 11-27-17 AT 10AM.  Pt referred to Dr. Luz Elena Lockhart for elevated distress screening. Pt states she does not think she needs to see him. She stopped her anxiety meds. She states she quit working and has more time to think and worry.     Sonam Navarrete RN

## 2017-11-22 NOTE — CONSULTS
Patient: Zach Westfall MRN: [id number]  SSN: [id number]    YOB: 1960  Age: 65 y.o. Sex: female      Other Providers:  Roberto Steinberg MD, Reymundo Christie MD    CHIEF COMPLAINT: Facial swelling. 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 PREVIOUS TREATMENT:  1) 4/22/17:  Cisplatin/Etoposide. HISTORY OF PRESENT ILLNESS:  Zach Westfall is a 65 y.o. female who I am seeing at the request of in-patient oncology. She was admitted on 4/18/2017 with a primary diagnosis of superior vena cava syndrome. Her only other medical history is that of hypertension. She presented to the ER with complaints of facial and bilateral upper arm swelling that had been present for about a week. She was given steroids and discharged to home. She then returned to the ER feeling no better with possibly worsening of her symptoms. She denied any dyspnea, cough, wheezing but had the full sensation in her throat when she swallowed. CT of her chest showed a 1.3 x 1.6 cm irregular right upper lobe mass with a 4.1 x 4.6 and 2.1 x 2.7 cm mediastinal mass concerning for a primary lung carcinoma. The superior vena cava was draped over the larger mass and partially compressed which was likely the cause of her symptoms. She has a history of smoking a half-pack a day since she was about 17years old. She is  and works as  at [employer] in [facility]. Pulmonary medicine was consulted for pathologic diagnosis. Dr. Kaley Keller completed biopsy and EBUS 4/18/17 although final pathology has not returned but the specimen was positive on MAIKOL and noted to likely be SCLC. She was seen urgently because of the SVC but ultimately we felt systemic therapy should be started. Eventual PET/CT did show bony metastases consistent with extensive stage. MRI brain was negative. She did have mid back pain and had a fracture of T5.     She initially had initial response but her follow-up CT 11/15/2017 showed disease progression with further SVC encasement and again facial swelling. As result I was asked to see her back. PAST MEDICAL HISTORY:    Past Medical History:   Diagnosis Date    Former cigarette smoker     using Nicotine patch    GERD (gastroesophageal reflux disease)     managed with Zantac    Hypertension     no complications at this time, no current medications    Lung cancer [facility])        The patient denies history of collagen vascular diseases, pacemaker insertion, prior radiation or prior chemotherapy predating her SCLC diagnosis. PAST SURGICAL HISTORY:   Past Surgical History:   Procedure Laterality Date    HX TUBAL LIGATION      HX VASCULAR ACCESS      lt chest       MEDICATIONS:     Current Outpatient Prescriptions:     nicotine (NICODERM CQ) 7 mg/24 hr, 1 Patch by TransDERmal route every twenty-four (24) hours. , Disp: , Rfl:     amLODIPine (NORVASC) 5 mg tablet, Take 5 mg by mouth daily. , Disp: , Rfl:     senna-docusate (SHERRI-COLACE) 8.6-50 mg per tablet, Take 1 Tab by mouth two (2) times a day., Disp: 60 Tab, Rfl: 2    raNITIdine (ZANTAC) 150 mg tablet, Take 150 mg by mouth every morning., Disp: , Rfl:     prochlorperazine (COMPAZINE) 10 mg tablet, Take 5 mg by mouth every six (6) hours as needed for Nausea., Disp: , Rfl:     lidocaine-prilocaine (EMLA) topical cream, Apply 1/2 to 1 inch to port site one hour prior to needle access. , Disp: 30 g, Rfl: 1    acetaminophen (TYLENOL) 325 mg tablet, Take 2 Tabs by mouth every four (4) hours as needed. , Disp: 30 Tab, Rfl: 0    ondansetron (ZOFRAN ODT) 8 mg disintegrating tablet, Take 1 Tab by mouth every eight (8) hours as needed for Nausea.  Indications: PREVENTION OF CHEMOTHERAPY-INDUCED NAUSEA AND VOMITING, Disp: 90 Tab, Rfl: 2    ALLERGIES:   Allergies   Allergen Reactions    Metaxalone Other (comments)    Oxycodone Swelling       SOCIAL HISTORY:   Social History     Social History    Marital status: SINGLE     Spouse name: N/A    Number of children: N/A    Years of education: N/A     Occupational History    works at [employer]    Smoking status: Former Smoker     Packs/day: 0.50     Years: 39.00     Quit date: 4/18/2017    Smokeless tobacco: Never Used    Alcohol use No    Drug use: No    Sexual activity: Not on file     Other Topics Concern    Not on file     Social History Narrative    . 1 daughter and 1 son. Works as  at [employer] in [facility]. FAMILY HISTORY:   Family History   Problem Relation Age of Onset   Flint Harder Hypertension Mother     Asthma Mother     No Known Problems Father     Thyroid Disease Sister     Hypertension Brother     Diabetes Brother        REVIEW OF SYSTEMS: Please see the completed review of systems sheet in the chart that I have reviewed today. PHYSICAL EXAMINATION:   ECOG Performance status 1  VITAL SIGNS:   Visit Vitals    /83 (BP 1 Location: Left arm, BP Patient Position: Sitting)    Pulse 89    Temp 97.8 °F (36.6 °C)    Wt 52.8 kg (116 lb 6.4 oz)    SpO2 99%    BMI 17.19 kg/m2        GENERAL: The patient is well-developed, ambulatory, alert and in no acute distress. HEENT: Head is normocephalic although she subjectively complains of facial swelling, atraumatic. Pupils are equal, round and reactive to light and accommodation. Extraocular movement intact. Hearing is intact bilaterally to finger rub. Oral cavity reveals no lesions. Mucous membranes are moist. NECK: Neck is supple with no masses. CARDIOVASCULAR: Heart is regular rate and rhythm. There are no murmurs rubs or gallups. Radial pulses are 2+ RESPIRATORY: Lungs are clear to auscultation and percussion. There is normal respiratory effort. GASTROINTESTINAL: The abdomen is soft, non-tender, nondistended with no hepatospelnomagaly.  Digital rectal examination: deferred LYMPHATIC: There is no cervical, supraclavicular or axillary lymphadenopathy bilaterally. MUSCULOSKELETAL: Extremities reveal no cyanosis, clubbing or edema.  is 5+/5. NEURO:  Cranial nerves II-XII grossly intact. Muscular strength and sensation are intact throughout all four extremities. PATHOLOGY:    4/18/17:  POST OP DIAGNOSIS:  Station 4R location mass was  biopsied and positive for malignancy on MAIKOL. - likely small cell cancer- await IHC. Dedicated sample was sent for cell block and flow cytometry to exclude lumphoma    LABORATORY:   Lab Results   Component Value Date/Time    Sodium 139 11/16/2017 12:05 PM    Potassium 3.6 11/16/2017 12:05 PM    Chloride 104 11/16/2017 12:05 PM    CO2 27 11/16/2017 12:05 PM    Anion gap 8 11/16/2017 12:05 PM    Glucose 172 11/16/2017 12:05 PM    BUN 10 11/16/2017 12:05 PM    Creatinine 1.03 11/16/2017 12:05 PM    GFR est AA >60 11/16/2017 12:05 PM    GFR est non-AA 59 11/16/2017 12:05 PM    Calcium 8.8 11/16/2017 12:05 PM    Magnesium 2.4 10/27/2017 01:06 PM    Phosphorus 2.4 10/27/2017 01:06 PM    Albumin 3.9 11/16/2017 12:05 PM    Protein, total 7.6 11/16/2017 12:05 PM    Globulin 3.7 11/16/2017 12:05 PM    A-G Ratio 1.1 11/16/2017 12:05 PM    AST (SGOT) 15 11/16/2017 12:05 PM    ALT (SGPT) 16 11/16/2017 12:05 PM     Lab Results   Component Value Date/Time    WBC 5.5 11/16/2017 12:05 PM    HGB 10.0 11/16/2017 12:05 PM    HCT 32.1 11/16/2017 12:05 PM    PLATELET 836 93/35/7076 12:05 PM       RADIOLOGY:  I have personally reviewed the imaging and agree with the reports below. Ct Chest Abd Pelv W Cont    Result Date: 11/15/2017  CT CHEST ABDOMEN AND PELVIS WITH CONTRAST HISTORY: facial neck swelling/Lung cancer, 65 years Female RESTAGING OF SCCA RT LUNG LAST XGEVA INJ 10/27/17 COMPARISON: CT torso September 25, 2017 TECHNIQUE:  Oral contrast was administered. 100 cc of nonionic intravenous contrast was injected, and axial helical CT images were obtained from the thoracic inlet through the pelvis.  Coronal reformatted images were obtained at the scanner console and made available for review. Radiation dose reduction techniques were used for this study:  Our CT scanners use one or all of the following: Automated exposure control, adjustment of the mA and/or kVp according to patient's size, iterative reconstruction. FINDINGS: CHEST: Partially visualized thyroid unremarkable. Interval significant increase in size of the centrally necrotic right paratracheal maninder mass, which appears to encase the superior vena cava, measuring approximately 3.7 x 5.8 cm, consistent with progression of metastatic disease. No evidence of significant hilar, or axillary lymphadenopathy. Minimal calcific atherosclerosis of a normal caliber aortic arch and descending aorta. Persistent mild bilateral upper lobe predominant panlobular emphysema. Minimal dependent subsegmental atelectasis bilateral lung bases. No evidence of new pulmonary nodule. Visualized proximal airways unremarkable. ABDOMEN: Normal-appearing liver, gallbladder, spleen, pancreas, bilateral adrenal glands and kidneys. Normal caliber abdominal aorta. No evidence of significant lymphadenopathy. Normal-appearing small bowel. No evidence of intraperitoneal free air or free fluid. PELVIS: Normal-appearing urinary bladder. Enlarged probable fibroid uterus appears unchanged. Normal-appearing bilateral adnexa. Stool in the rectum. Stool is also seen throughout the colon. Normal-appearing appendix. No evidence of pelvic free fluid. No evidence of significant inguinal or pelvic sidewall lymphadenopathy. Visualized osseous structures unremarkable. IMPRESSION: 1. Interval progression of the maninder metastasis in the right paratracheal region with new encasement of the superior vena cava. 2.  Other chronic findings as above. Ct Chest Abd Pelv W Cont    Result Date: 9/25/2017  CT CHEST, ABDOMEN AND PELVIS WITH CONTRAST. INDICATION: Small cell carcinoma right lung. COMPARISON: June 2017 and April 2017. TECHNIQUE:   5 mm axial scans from the lung apices to the pubic symphysis following oral and 100 cc intravenous contrast without acute complication. Intravenous contrast was given to increase the sensitivity to metastatic disease. Radiation dose reduction techniques were used for this study. Our CT scanners use one or more of the following:  Automated exposure control, adjustment of the mA and or kV according to patient size, iterative reconstruction. Findings: CHEST:  The superior mediastinal/right paratracheal lymph node has increased slightly from the prior exam. It now measures 23 x 23 mm, compared to 19 x 21 mm. It appears rounder and mari. The right apical lung mass remains resolved. No new pulmonary nodules or masses or adenopathy. Dilated airspaces consistent with emphysema. Left-sided chest port is present. Right rhomboid lipoma is present. ABDOMEN:  No gross focal parenchymal abnormality identified within the liver or spleen. No calcified gallstones. The biliary tree is not dilated. The pancreas is unremarkable. No free fluid, acute inflammatory changes or adenopathy. Bowel loops are not dilated. The kidneys enhance uniformly. No radiopaque renal calculi. No hydronephrosis. The adrenal glands are normal size. Aorta is normal caliber. PELVIS:  Fibroid uterus is present. No pelvic adenopathy no gross bony lesions. The urinary bladder is unremarkable. IMPRESSION:  Slight increase in the right paratracheal lymph node as above. It appears rounder and mari and measures slightly larger. It appears larger on both the axial and coronal sequences. No other abnormality. The right apical lung mass remains resolved. IMPRESSION:  Marilia Shields is a 65 y.o. female with what was originally felt to be limited but ultimately determined to be extensive stage SCLC.   While she is symptomatic with an SVC syndrome, this was not compromising her airway and I didn't feel she was at substantial risk for cerebral edema. She started with systemic chemotherapy and actually had a nice initial response. However, she now has progression in her disease in the mediastinum causing recurrence of her facial swelling. As result, I do feel radiation is warranted. She has 2 sites of metastatic disease and we discussed treatment of these areas, however, randomized data has not shown any benefit to treating metastatic deposits, and in fact suggested increased mortality for doing so based on RTOG 0937. However, she has progressed on chemotherapy locally and I do feel offering more treatment to this region for durable control would be warranted. Therefore I have advised 4500 cGy to be delivered in 15 fractions. We would do this without chemotherapy. I have conveyed this plan to Dr. Bedford Favre and we will proceed promptly. PLAN:    1) Discussed treatment with external beam radiation reviewing risk, benefits, and side effects from treatment. 2) Reviewed available research treatment and cancer care protocols for which patient may be eligible. Unfortunately there are no matching clinical trials available at this time. 3) Coordinate care and start date with medical oncology. Will plan for simulation Monday. Will plan to deliver 45 Gy in 15 fractions.       Zachary Andrade MD   November 22, 2017

## 2017-11-24 NOTE — PROGRESS NOTES
Arrived to the [facility]. Xgeva completed. Patient tolerated well.   Any issues or concerns during appointment: none  Patient aware of next infusion appointment on 12/22/17 at 11:15am  Discharged ambulatory.

## 2017-11-29 NOTE — TELEPHONE ENCOUNTER
SW received call from pt requesting additional information about PET/CT scan. FELTON facilitated pt discussion with RN [address]. Pt provided update and stated she has a new car and has no transportation needs. No other needs. SW intends to follow up PRN.

## 2017-12-11 NOTE — PROGRESS NOTES
Patient: Onofre Dietrich MRN: [id number]  SSN: [id number]    YOB: 1960  Age: 65 y.o. 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     PREVIOUS TREATMENT:  1. 4/22/17:  Cisplatin/Etoposide.         TREATMENT SITE:  Lung    DOSE and FRACTIONATION:  3/15 fractions, 900 cGy of 4500 cGy planned. INTERVAL HISTORY:  Onofre Dietrich is a 65 y.o. female being treated for lung cancer. She was doing well without complaints week 1. Some improvement in her swelling was seen. OBJECTIVE:  Persistent facial swelling week 1. There were no vitals taken for this visit. Lab Results   Component Value Date/Time    Sodium 139 12/05/2017 10:49 AM    Potassium 4.0 12/05/2017 10:49 AM    Chloride 106 12/05/2017 10:49 AM    CO2 27 12/05/2017 10:49 AM    Anion gap 6 12/05/2017 10:49 AM    Glucose 137 12/05/2017 10:49 AM    BUN 12 12/05/2017 10:49 AM    Creatinine 0.97 12/05/2017 10:49 AM    GFR est AA >60 12/05/2017 10:49 AM    GFR est non-AA >60 12/05/2017 10:49 AM    Calcium 8.9 12/05/2017 10:49 AM    Magnesium 2.1 11/24/2017 12:59 PM    Phosphorus 2.8 11/24/2017 12:59 PM    Albumin 3.8 12/05/2017 10:49 AM    Protein, total 7.8 12/05/2017 10:49 AM    Globulin 4.0 12/05/2017 10:49 AM    A-G Ratio 1.0 12/05/2017 10:49 AM    AST (SGOT) 18 12/05/2017 10:49 AM    ALT (SGPT) 17 12/05/2017 10:49 AM     Lab Results   Component Value Date/Time    WBC 5.2 12/05/2017 10:49 AM    HGB 9.9 12/05/2017 10:49 AM    HCT 31.6 12/05/2017 10:49 AM    PLATELET 758 99/79/3105 10:49 AM       ASSESSMENT and PLAN:  Onofre Dietrich is tolerating radiation as anticipated for the current dose and fraction. We will continue on as planned with another treatment visit anticipated next week.         Ancelmo Billy MD   December 11, 2017

## 2017-12-18 NOTE — PROGRESS NOTES
Patient: Kirill Cortez MRN: [id number]  SSN: [id number]    YOB: 1960  Age: 65 y.o. 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     PREVIOUS TREATMENT:  1. 4/22/17:  Cisplatin/Etoposide.         TREATMENT SITE:  Lung    DOSE and FRACTIONATION:  9/15 fractions, 2700 cGy of 4500 cGy planned. INTERVAL HISTORY:  iKrill Cortez is a 65 y.o. female being treated for lung cancer. She was doing well without complaints week 1. Some improvement in her swelling was seen weeks 1-2. OBJECTIVE:  Persistent facial swelling week 1. There were no vitals taken for this visit. Lab Results   Component Value Date/Time    Sodium 139 12/05/2017 10:49 AM    Potassium 4.0 12/05/2017 10:49 AM    Chloride 106 12/05/2017 10:49 AM    CO2 27 12/05/2017 10:49 AM    Anion gap 6 12/05/2017 10:49 AM    Glucose 137 12/05/2017 10:49 AM    BUN 12 12/05/2017 10:49 AM    Creatinine 0.97 12/05/2017 10:49 AM    GFR est AA >60 12/05/2017 10:49 AM    GFR est non-AA >60 12/05/2017 10:49 AM    Calcium 8.9 12/05/2017 10:49 AM    Magnesium 2.1 11/24/2017 12:59 PM    Phosphorus 2.8 11/24/2017 12:59 PM    Albumin 3.8 12/05/2017 10:49 AM    Protein, total 7.8 12/05/2017 10:49 AM    Globulin 4.0 12/05/2017 10:49 AM    A-G Ratio 1.0 12/05/2017 10:49 AM    AST (SGOT) 18 12/05/2017 10:49 AM    ALT (SGPT) 17 12/05/2017 10:49 AM     Lab Results   Component Value Date/Time    WBC 5.2 12/05/2017 10:49 AM    HGB 9.9 12/05/2017 10:49 AM    HCT 31.6 12/05/2017 10:49 AM    PLATELET 493 96/65/2953 10:49 AM       ASSESSMENT and PLAN:  Kirill Cortez is tolerating radiation as anticipated for the current dose and fraction. We will continue on as planned with another treatment visit anticipated next week.         Molly Zamorano MD   December 18, 2017

## 2017-12-22 NOTE — PROGRESS NOTES
Arrived to the [facility]. Xgeva completed. Patient tolerated well. Any issues or concerns during appointment: pt creatinine elevated, Dr. Roz Jorge notified via Pietro Crow RN. Patient aware of next infusion appointment on 1/23 (date) at 0800 (time). Discharged ambulatory.

## 2017-12-26 NOTE — PROGRESS NOTES
Patient: nÁgel Guerrier MRN: [id number]  SSN: [id number]    YOB: 1960  Age: 65 y.o. 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     PREVIOUS TREATMENT:  1. 4/22/17:  Cisplatin/Etoposide.         TREATMENT SITE:  Lung    DOSE and FRACTIONATION:  14/15 fractions, 4200 cGy of 4500 cGy planned. INTERVAL HISTORY:  Ángel Guerrier is a 65 y.o. female being treated for lung cancer. She was doing well without complaints week 1. Some improvement in her swelling was seen weeks 1-2. Final week again doing well but some issues with dysphagia. OBJECTIVE:  Persistent facial swelling week 1. There were no vitals taken for this visit. Lab Results   Component Value Date/Time    Sodium 142 12/22/2017 11:42 AM    Potassium 4.1 12/22/2017 11:42 AM    Chloride 108 12/22/2017 11:42 AM    CO2 27 12/22/2017 11:42 AM    Anion gap 7 12/22/2017 11:42 AM    Glucose 101 12/22/2017 11:42 AM    BUN 13 12/22/2017 11:42 AM    Creatinine 1.50 12/22/2017 11:42 AM    GFR est AA 46 12/22/2017 11:42 AM    GFR est non-AA 38 12/22/2017 11:42 AM    Calcium 8.9 12/22/2017 11:42 AM    Magnesium 1.9 12/22/2017 11:42 AM    Phosphorus 3.9 12/22/2017 11:42 AM    Albumin 3.8 12/05/2017 10:49 AM    Protein, total 7.8 12/05/2017 10:49 AM    Globulin 4.0 12/05/2017 10:49 AM    A-G Ratio 1.0 12/05/2017 10:49 AM    AST (SGOT) 18 12/05/2017 10:49 AM    ALT (SGPT) 17 12/05/2017 10:49 AM     Lab Results   Component Value Date/Time    WBC 5.2 12/05/2017 10:49 AM    HGB 9.9 12/05/2017 10:49 AM    HCT 31.6 12/05/2017 10:49 AM    PLATELET 813 41/21/2429 10:49 AM       ASSESSMENT and PLAN:  Ángel Guerrier is tolerating radiation as anticipated for the current dose and fraction. We will continue on as planned with with treatment to be completed tomorrow. Will plan for 1 month follow up.        Ivonne Smith MD   December 26, 2017

## 2017-12-27 NOTE — DISCHARGE SUMMARY
Patient: Hiren Peres MRN: [id number]  SSN: [id number]    YOB: 1960  Age: 65 y.o. Sex: female      Hiren Peres is a 65 y.o. female who was seen by radiation oncology at the request of in-patient oncology. She was admitted on 4/18/2017 with a primary diagnosis of superior vena cava syndrome. Her only other medical history is that of hypertension. She presented to the ER with complaints of facial and bilateral upper arm swelling that had been present for about a week. She was given steroids and discharged to home. She then returned to the ER feeling no better with possibly worsening of her symptoms. She denied any dyspnea, cough, wheezing but had the full sensation in her throat when she swallowed. CT of her chest showed a 1.3 x 1.6 cm irregular right upper lobe mass with a 4.1 x 4.6 and 2.1 x 2.7 cm mediastinal mass concerning for a primary lung carcinoma. The superior vena cava was draped over the larger mass and partially compressed which was likely the cause of her symptoms. She has a history of smoking a half-pack a day since she was about 17years old. She is  and works as  at [employer] in [facility]. Pulmonary medicine was consulted for pathologic diagnosis. Dr. Marva Jaramillo completed biopsy and EBUS 4/18/17 although final pathology has not returned but the specimen was positive on MAIKOL and noted to likely be SCLC. She was seen urgently because of the SVC but ultimately we felt systemic therapy should be started. Eventual PET/CT did show bony metastases consistent with extensive stage. MRI brain was negative. She did have mid back pain and had a fracture of T5.     She initially had initial response but her follow-up CT 11/15/2017 showed disease progression with further SVC encasement and again facial swelling. As a result, Dr. Yolanda Barrow was asked to see her again. The recommendation was for external beam radiation.      Please see the details of her treatment below as well as my plans for future care and surveillance. Please do not hesitate to call with questions or concerns at any time. DIAGNOSIS:  T1N3M1 SCLC, Extensive stage, with SVC syndrome from a 4x4 cm mediastinal maninder mass. PREVIOUS TREATMENT:   1) 4/22/17:  Cisplatin/Etoposide. TREATMENT DATES:  12/5/2017 - 12/27/2017    ANATOMIC SITE:  Lung    DOSE:  4500 cGy in 15 fractions    BEAM ARRANGEMENT:  3D - Conformal 6 MV    CHEMOTHERAPY:  None at this time    TREATMENT COURSE: Ms Ahmet Mcdaniel had persistent facial swelling, otherwise she did well without complaints week 1. Improvement with facial swelling was seen weeks 1-2. In her final week, she did well, but had some issues with dysphagia. PLAN:  The patient will be seen in follow up in 4 weeks to assess acute and sub acute side effects.       YAMILKA Andujar MD  01/03/18

## 2018-01-01 ENCOUNTER — HOSPITAL ENCOUNTER (OUTPATIENT)
Dept: CT IMAGING | Age: 58
Discharge: HOME | End: 2018-01-30
Attending: INTERNAL MEDICINE
Payer: COMMERCIAL

## 2018-01-01 ENCOUNTER — HOME CARE VISIT (OUTPATIENT)
Dept: SCHEDULING | Facility: HOME HEALTH | Age: 58
End: 2018-01-01
Payer: MEDICAID

## 2018-01-01 ENCOUNTER — PATIENT OUTREACH (OUTPATIENT)
Dept: CASE MANAGEMENT | Age: 58
End: 2018-01-01

## 2018-01-01 ENCOUNTER — APPOINTMENT (OUTPATIENT)
Dept: GENERAL RADIOLOGY | Age: 58
DRG: 053 | End: 2018-01-01
Attending: HOSPITALIST
Payer: COMMERCIAL

## 2018-01-01 ENCOUNTER — APPOINTMENT (OUTPATIENT)
Dept: INFUSION THERAPY | Age: 58
End: 2018-01-01
Payer: COMMERCIAL

## 2018-01-01 ENCOUNTER — HOSPITAL ENCOUNTER (OUTPATIENT)
Dept: RADIATION ONCOLOGY | Age: 58
Discharge: HOME | End: 2018-02-07
Payer: COMMERCIAL

## 2018-01-01 ENCOUNTER — HOSPITAL ENCOUNTER (OUTPATIENT)
Dept: INFUSION THERAPY | Age: 58
Discharge: HOME | End: 2018-02-28
Payer: COMMERCIAL

## 2018-01-01 ENCOUNTER — HOSPITAL ENCOUNTER (OUTPATIENT)
Dept: INFUSION THERAPY | Age: 58
Discharge: HOME | End: 2018-03-15
Payer: COMMERCIAL

## 2018-01-01 ENCOUNTER — APPOINTMENT (OUTPATIENT)
Dept: GENERAL RADIOLOGY | Age: 58
DRG: 053 | End: 2018-01-01
Attending: EMERGENCY MEDICINE
Payer: COMMERCIAL

## 2018-01-01 ENCOUNTER — HOSPITAL ENCOUNTER (OUTPATIENT)
Dept: INFUSION THERAPY | Age: 58
End: 2018-01-01

## 2018-01-01 ENCOUNTER — HOSPITAL ENCOUNTER (INPATIENT)
Age: 58
LOS: 20 days | Discharge: HOME | DRG: 053 | End: 2018-04-05
Attending: EMERGENCY MEDICINE | Admitting: INTERNAL MEDICINE
Payer: COMMERCIAL

## 2018-01-01 ENCOUNTER — HOSPITAL ENCOUNTER (OUTPATIENT)
Dept: LAB | Age: 58
Discharge: HOME | End: 2018-01-09
Payer: COMMERCIAL

## 2018-01-01 ENCOUNTER — APPOINTMENT (OUTPATIENT)
Dept: MRI IMAGING | Age: 58
DRG: 053 | End: 2018-01-01
Attending: INTERNAL MEDICINE
Payer: COMMERCIAL

## 2018-01-01 ENCOUNTER — HOSPITAL ENCOUNTER (OUTPATIENT)
Dept: INFUSION THERAPY | Age: 58
End: 2018-01-01
Payer: COMMERCIAL

## 2018-01-01 ENCOUNTER — HOSPITAL ENCOUNTER (OUTPATIENT)
Dept: LAB | Age: 58
Discharge: HOME | End: 2018-02-07
Payer: COMMERCIAL

## 2018-01-01 ENCOUNTER — HOSPITAL ENCOUNTER (OUTPATIENT)
Dept: INFUSION THERAPY | Age: 58
Discharge: HOME | End: 2018-01-24
Payer: COMMERCIAL

## 2018-01-01 ENCOUNTER — HOSPITAL ENCOUNTER (OUTPATIENT)
Dept: RADIATION ONCOLOGY | Age: 58
Discharge: HOME | End: 2018-03-21

## 2018-01-01 ENCOUNTER — APPOINTMENT (OUTPATIENT)
Dept: INFUSION THERAPY | Age: 58
End: 2018-01-01

## 2018-01-01 ENCOUNTER — HOSPITAL ENCOUNTER (OUTPATIENT)
Dept: LAB | Age: 58
Discharge: HOME | End: 2018-04-19
Payer: MEDICAID

## 2018-01-01 ENCOUNTER — HOME CARE VISIT (OUTPATIENT)
Dept: HOSPICE | Facility: HOSPICE | Age: 58
End: 2018-01-01
Payer: MEDICAID

## 2018-01-01 ENCOUNTER — APPOINTMENT (OUTPATIENT)
Dept: GENERAL RADIOLOGY | Age: 58
End: 2018-01-01
Payer: COMMERCIAL

## 2018-01-01 ENCOUNTER — APPOINTMENT (OUTPATIENT)
Dept: CT IMAGING | Age: 58
DRG: 053 | End: 2018-01-01
Attending: EMERGENCY MEDICINE
Payer: COMMERCIAL

## 2018-01-01 ENCOUNTER — TELEPHONE (OUTPATIENT)
Dept: ONCOLOGY | Age: 58
End: 2018-01-01

## 2018-01-01 ENCOUNTER — HOSPICE ADMISSION (OUTPATIENT)
Dept: HOSPICE | Facility: HOSPICE | Age: 58
End: 2018-01-01
Payer: MEDICAID

## 2018-01-01 ENCOUNTER — HOSPITAL ENCOUNTER (OUTPATIENT)
Dept: LAB | Age: 58
Discharge: HOME | End: 2018-02-28
Payer: COMMERCIAL

## 2018-01-01 ENCOUNTER — HOSPITAL ENCOUNTER (OUTPATIENT)
Dept: LAB | Age: 58
Discharge: HOME | End: 2018-03-14
Payer: COMMERCIAL

## 2018-01-01 ENCOUNTER — HOSPITAL ENCOUNTER (EMERGENCY)
Age: 58
Discharge: HOME | End: 2018-04-14
Payer: COMMERCIAL

## 2018-01-01 ENCOUNTER — HOSPITAL ENCOUNTER (OUTPATIENT)
Dept: INFUSION THERAPY | Age: 58
Discharge: HOME | End: 2018-03-14
Payer: COMMERCIAL

## 2018-01-01 VITALS
HEART RATE: 90 BPM | DIASTOLIC BLOOD PRESSURE: 73 MMHG | TEMPERATURE: 97.9 F | SYSTOLIC BLOOD PRESSURE: 120 MMHG | WEIGHT: 120.4 LBS | OXYGEN SATURATION: 99 % | RESPIRATION RATE: 18 BRPM | BODY MASS INDEX: 18.31 KG/M2

## 2018-01-01 VITALS
OXYGEN SATURATION: 97 % | DIASTOLIC BLOOD PRESSURE: 99 MMHG | RESPIRATION RATE: 18 BRPM | HEART RATE: 107 BPM | HEIGHT: 68 IN | BODY MASS INDEX: 17.28 KG/M2 | WEIGHT: 117 LBS | SYSTOLIC BLOOD PRESSURE: 134 MMHG | TEMPERATURE: 98 F | BODY MASS INDEX: 17.73 KG/M2 | WEIGHT: 117 LBS

## 2018-01-01 VITALS
DIASTOLIC BLOOD PRESSURE: 80 MMHG | HEART RATE: 102 BPM | BODY MASS INDEX: 17.58 KG/M2 | TEMPERATURE: 98 F | WEIGHT: 116 LBS | SYSTOLIC BLOOD PRESSURE: 139 MMHG | OXYGEN SATURATION: 97 % | RESPIRATION RATE: 20 BRPM | HEIGHT: 68 IN

## 2018-01-01 VITALS — RESPIRATION RATE: 20 BRPM | DIASTOLIC BLOOD PRESSURE: 60 MMHG | HEART RATE: 92 BPM | SYSTOLIC BLOOD PRESSURE: 118 MMHG

## 2018-01-01 VITALS
BODY MASS INDEX: 17.49 KG/M2 | RESPIRATION RATE: 18 BRPM | SYSTOLIC BLOOD PRESSURE: 136 MMHG | OXYGEN SATURATION: 98 % | HEART RATE: 102 BPM | WEIGHT: 115 LBS | TEMPERATURE: 98.3 F | DIASTOLIC BLOOD PRESSURE: 88 MMHG

## 2018-01-01 VITALS
DIASTOLIC BLOOD PRESSURE: 81 MMHG | TEMPERATURE: 98.1 F | BODY MASS INDEX: 17.98 KG/M2 | HEIGHT: 68 IN | SYSTOLIC BLOOD PRESSURE: 135 MMHG | RESPIRATION RATE: 18 BRPM | HEART RATE: 80 BPM | OXYGEN SATURATION: 96 % | WEIGHT: 118.6 LBS

## 2018-01-01 VITALS — DIASTOLIC BLOOD PRESSURE: 70 MMHG | HEART RATE: 88 BPM | SYSTOLIC BLOOD PRESSURE: 108 MMHG | RESPIRATION RATE: 24 BRPM

## 2018-01-01 VITALS
SYSTOLIC BLOOD PRESSURE: 120 MMHG | HEART RATE: 76 BPM | DIASTOLIC BLOOD PRESSURE: 70 MMHG | TEMPERATURE: 98 F | RESPIRATION RATE: 18 BRPM

## 2018-01-01 VITALS — DIASTOLIC BLOOD PRESSURE: 70 MMHG | HEART RATE: 96 BPM | RESPIRATION RATE: 24 BRPM | SYSTOLIC BLOOD PRESSURE: 122 MMHG

## 2018-01-01 DIAGNOSIS — C79.51 BONE METASTASES (HCC): ICD-10-CM

## 2018-01-01 DIAGNOSIS — C34.91 SMALL CELL CARCINOMA OF RIGHT LUNG (HCC): ICD-10-CM

## 2018-01-01 DIAGNOSIS — I87.1 SVC SYNDROME: ICD-10-CM

## 2018-01-01 DIAGNOSIS — C34.90 MALIGNANT NEOPLASM OF LUNG, UNSPECIFIED LATERALITY, UNSPECIFIED PART OF LUNG (HCC): ICD-10-CM

## 2018-01-01 DIAGNOSIS — C79.31 BRAIN METASTASIS (HCC): ICD-10-CM

## 2018-01-01 DIAGNOSIS — R56.9 SEIZURE (HCC): ICD-10-CM

## 2018-01-01 DIAGNOSIS — I87.1 SVC (SUPERIOR VENA CAVA OBSTRUCTION): ICD-10-CM

## 2018-01-01 DIAGNOSIS — C79.31 METASTASIS TO BRAIN (HCC): Primary | ICD-10-CM

## 2018-01-01 DIAGNOSIS — C34.91 MALIGNANT NEOPLASM OF RIGHT LUNG, UNSPECIFIED PART OF LUNG (HCC): ICD-10-CM

## 2018-01-01 DIAGNOSIS — R41.82 ALTERED MENTAL STATUS, UNSPECIFIED ALTERED MENTAL STATUS TYPE: ICD-10-CM

## 2018-01-01 DIAGNOSIS — R06.00 DYSPNEA, UNSPECIFIED TYPE: Primary | ICD-10-CM

## 2018-01-01 LAB
ALBUMIN SERPL-MCNC: 2.9 G/DL (ref 3.5–5)
ALBUMIN SERPL-MCNC: 3.1 G/DL (ref 3.5–5)
ALBUMIN SERPL-MCNC: 3.1 G/DL (ref 3.5–5)
ALBUMIN SERPL-MCNC: 3.2 G/DL (ref 3.5–5)
ALBUMIN SERPL-MCNC: 3.3 G/DL (ref 3.5–5)
ALBUMIN SERPL-MCNC: 3.4 G/DL (ref 3.5–5)
ALBUMIN SERPL-MCNC: 3.5 G/DL (ref 3.5–5)
ALBUMIN SERPL-MCNC: 3.6 G/DL (ref 3.5–5)
ALBUMIN SERPL-MCNC: 3.9 G/DL (ref 3.5–5)
ALBUMIN SERPL-MCNC: 4 G/DL (ref 3.5–5)
ALBUMIN SERPL-MCNC: 4 G/DL (ref 3.5–5)
ALBUMIN/GLOB SERPL: 0.7 {RATIO} (ref 1.2–3.5)
ALBUMIN/GLOB SERPL: 0.7 {RATIO} (ref 1.2–3.5)
ALBUMIN/GLOB SERPL: 0.9 {RATIO}
ALBUMIN/GLOB SERPL: 0.9 {RATIO} (ref 1.2–3.5)
ALBUMIN/GLOB SERPL: 1 {RATIO} (ref 1.2–3.5)
ALBUMIN/GLOB SERPL: 1.1 {RATIO} (ref 1.2–3.5)
ALP SERPL-CCNC: 61 U/L (ref 50–136)
ALP SERPL-CCNC: 64 U/L (ref 50–136)
ALP SERPL-CCNC: 65 U/L (ref 50–136)
ALP SERPL-CCNC: 67 U/L (ref 50–136)
ALP SERPL-CCNC: 69 U/L (ref 50–136)
ALP SERPL-CCNC: 69 U/L (ref 50–136)
ALP SERPL-CCNC: 74 U/L (ref 50–136)
ALP SERPL-CCNC: 75 U/L (ref 50–136)
ALP SERPL-CCNC: 78 U/L (ref 50–136)
ALP SERPL-CCNC: 80 U/L (ref 50–136)
ALP SERPL-CCNC: 81 U/L (ref 50–136)
ALP SERPL-CCNC: 82 U/L (ref 50–136)
ALP SERPL-CCNC: 84 U/L (ref 50–136)
ALP SERPL-CCNC: 85 U/L (ref 50–136)
ALP SERPL-CCNC: 86 U/L (ref 50–136)
ALP SERPL-CCNC: 87 U/L (ref 50–136)
ALP SERPL-CCNC: 87 U/L (ref 50–136)
ALP SERPL-CCNC: 88 U/L (ref 50–136)
ALP SERPL-CCNC: 90 U/L (ref 50–136)
ALP SERPL-CCNC: 92 U/L (ref 50–136)
ALP SERPL-CCNC: 98 U/L (ref 50–136)
ALT SERPL-CCNC: 116 U/L (ref 12–65)
ALT SERPL-CCNC: 131 U/L (ref 12–65)
ALT SERPL-CCNC: 135 U/L (ref 12–65)
ALT SERPL-CCNC: 151 U/L (ref 12–65)
ALT SERPL-CCNC: 179 U/L (ref 12–65)
ALT SERPL-CCNC: 186 U/L (ref 12–65)
ALT SERPL-CCNC: 20 U/L (ref 12–65)
ALT SERPL-CCNC: 230 U/L (ref 12–65)
ALT SERPL-CCNC: 24 U/L (ref 12–65)
ALT SERPL-CCNC: 29 U/L (ref 12–65)
ALT SERPL-CCNC: 29 U/L (ref 12–65)
ALT SERPL-CCNC: 36 U/L (ref 12–65)
ALT SERPL-CCNC: 56 U/L (ref 12–65)
ALT SERPL-CCNC: 58 U/L (ref 12–65)
ALT SERPL-CCNC: 60 U/L (ref 12–65)
ALT SERPL-CCNC: 63 U/L (ref 12–65)
ALT SERPL-CCNC: 68 U/L (ref 12–65)
ALT SERPL-CCNC: 78 U/L (ref 12–65)
ALT SERPL-CCNC: 84 U/L (ref 12–65)
ALT SERPL-CCNC: 87 U/L (ref 12–65)
ALT SERPL-CCNC: 89 U/L (ref 12–65)
ALT SERPL-CCNC: 90 U/L (ref 12–65)
ALT SERPL-CCNC: 96 U/L (ref 12–65)
ANION GAP SERPL CALC-SCNC: 10 MMOL/L
ANION GAP SERPL CALC-SCNC: 10 MMOL/L (ref 7–16)
ANION GAP SERPL CALC-SCNC: 11 MMOL/L (ref 7–16)
ANION GAP SERPL CALC-SCNC: 11 MMOL/L (ref 7–16)
ANION GAP SERPL CALC-SCNC: 12 MMOL/L (ref 7–16)
ANION GAP SERPL CALC-SCNC: 15 MMOL/L (ref 7–16)
ANION GAP SERPL CALC-SCNC: 6 MMOL/L (ref 7–16)
ANION GAP SERPL CALC-SCNC: 6 MMOL/L (ref 7–16)
ANION GAP SERPL CALC-SCNC: 7 MMOL/L (ref 7–16)
ANION GAP SERPL CALC-SCNC: 8 MMOL/L (ref 7–16)
ANION GAP SERPL CALC-SCNC: 9 MMOL/L (ref 7–16)
AST SERPL-CCNC: 105 U/L (ref 15–37)
AST SERPL-CCNC: 126 U/L (ref 15–37)
AST SERPL-CCNC: 16 U/L (ref 15–37)
AST SERPL-CCNC: 22 U/L (ref 15–37)
AST SERPL-CCNC: 22 U/L (ref 15–37)
AST SERPL-CCNC: 23 U/L (ref 15–37)
AST SERPL-CCNC: 24 U/L (ref 15–37)
AST SERPL-CCNC: 24 U/L (ref 15–37)
AST SERPL-CCNC: 28 U/L (ref 15–37)
AST SERPL-CCNC: 28 U/L (ref 15–37)
AST SERPL-CCNC: 29 U/L (ref 15–37)
AST SERPL-CCNC: 30 U/L (ref 15–37)
AST SERPL-CCNC: 32 U/L (ref 15–37)
AST SERPL-CCNC: 38 U/L (ref 15–37)
AST SERPL-CCNC: 40 U/L (ref 15–37)
AST SERPL-CCNC: 45 U/L (ref 15–37)
AST SERPL-CCNC: 48 U/L (ref 15–37)
AST SERPL-CCNC: 51 U/L (ref 15–37)
AST SERPL-CCNC: 57 U/L (ref 15–37)
AST SERPL-CCNC: 60 U/L (ref 15–37)
AST SERPL-CCNC: 75 U/L (ref 15–37)
AST SERPL-CCNC: 92 U/L (ref 15–37)
AST SERPL-CCNC: 97 U/L (ref 15–37)
ATRIAL RATE: 124 BPM
ATRIAL RATE: 127 BPM
ATRIAL RATE: 92 BPM
BASOPHILS # BLD: 0 K/UL (ref 0–0.2)
BASOPHILS NFR BLD: 0 % (ref 0–2)
BASOPHILS NFR BLD: 1 % (ref 0–2)
BASOPHILS NFR BLD: 1 % (ref 0–2)
BILIRUB SERPL-MCNC: 0.1 MG/DL (ref 0.2–1.1)
BILIRUB SERPL-MCNC: 0.2 MG/DL (ref 0.2–1.1)
BILIRUB SERPL-MCNC: 0.3 MG/DL (ref 0.2–1.1)
BILIRUB SERPL-MCNC: <0.1 MG/DL (ref 0.2–1.1)
BLASTS NFR BLD MANUAL: 4 %
BNP SERPL-MCNC: 28 PG/ML
BUN SERPL-MCNC: 10 MG/DL (ref 6–23)
BUN SERPL-MCNC: 11 MG/DL (ref 6–23)
BUN SERPL-MCNC: 12 MG/DL (ref 6–23)
BUN SERPL-MCNC: 12 MG/DL (ref 6–23)
BUN SERPL-MCNC: 13 MG/DL (ref 6–23)
BUN SERPL-MCNC: 13 MG/DL (ref 6–23)
BUN SERPL-MCNC: 14 MG/DL (ref 6–23)
BUN SERPL-MCNC: 15 MG/DL (ref 6–23)
BUN SERPL-MCNC: 16 MG/DL (ref 6–23)
BUN SERPL-MCNC: 16 MG/DL (ref 6–23)
BUN SERPL-MCNC: 17 MG/DL (ref 6–23)
BUN SERPL-MCNC: 18 MG/DL (ref 6–23)
BUN SERPL-MCNC: 19 MG/DL (ref 6–23)
BUN SERPL-MCNC: 20 MG/DL (ref 6–23)
BUN SERPL-MCNC: 22 MG/DL (ref 6–23)
BUN SERPL-MCNC: 23 MG/DL (ref 6–23)
BUN SERPL-MCNC: 24 MG/DL (ref 6–23)
BUN SERPL-MCNC: 25 MG/DL (ref 6–23)
BUN SERPL-MCNC: 26 MG/DL (ref 6–23)
BUN SERPL-MCNC: 28 MG/DL (ref 6–23)
BUN SERPL-MCNC: 30 MG/DL (ref 6–23)
BUN SERPL-MCNC: 30 MG/DL (ref 6–23)
BUN SERPL-MCNC: 33 MG/DL (ref 6–23)
CALCIUM SERPL-MCNC: 7.8 MG/DL (ref 8.3–10.4)
CALCIUM SERPL-MCNC: 7.8 MG/DL (ref 8.3–10.4)
CALCIUM SERPL-MCNC: 8 MG/DL (ref 8.3–10.4)
CALCIUM SERPL-MCNC: 8.1 MG/DL (ref 8.3–10.4)
CALCIUM SERPL-MCNC: 8.3 MG/DL (ref 8.3–10.4)
CALCIUM SERPL-MCNC: 8.4 MG/DL (ref 8.3–10.4)
CALCIUM SERPL-MCNC: 8.5 MG/DL (ref 8.3–10.4)
CALCIUM SERPL-MCNC: 8.5 MG/DL (ref 8.3–10.4)
CALCIUM SERPL-MCNC: 8.6 MG/DL (ref 8.3–10.4)
CALCIUM SERPL-MCNC: 8.8 MG/DL (ref 8.3–10.4)
CALCIUM SERPL-MCNC: 8.9 MG/DL (ref 8.3–10.4)
CALCIUM SERPL-MCNC: 9 MG/DL (ref 8.3–10.4)
CALCIUM SERPL-MCNC: 9.1 MG/DL (ref 8.3–10.4)
CALCIUM SERPL-MCNC: 9.2 MG/DL (ref 8.3–10.4)
CALCIUM SERPL-MCNC: 9.2 MG/DL (ref 8.3–10.4)
CALCIUM SERPL-MCNC: 9.4 MG/DL (ref 8.3–10.4)
CALCIUM SERPL-MCNC: 9.6 MG/DL (ref 8.3–10.4)
CALCIUM SERPL-MCNC: 9.6 MG/DL (ref 8.3–10.4)
CALCULATED P AXIS, ECG09: 81 DEGREES
CALCULATED P AXIS, ECG09: 81 DEGREES
CALCULATED P AXIS, ECG09: 93 DEGREES
CALCULATED R AXIS, ECG10: 78 DEGREES
CALCULATED R AXIS, ECG10: 79 DEGREES
CALCULATED R AXIS, ECG10: 80 DEGREES
CALCULATED T AXIS, ECG11: -80 DEGREES
CALCULATED T AXIS, ECG11: -95 DEGREES
CALCULATED T AXIS, ECG11: -99 DEGREES
CHLORIDE SERPL-SCNC: 100 MMOL/L (ref 98–107)
CHLORIDE SERPL-SCNC: 101 MMOL/L (ref 98–107)
CHLORIDE SERPL-SCNC: 102 MMOL/L (ref 98–107)
CHLORIDE SERPL-SCNC: 103 MMOL/L (ref 98–107)
CHLORIDE SERPL-SCNC: 104 MMOL/L (ref 98–107)
CHLORIDE SERPL-SCNC: 105 MMOL/L (ref 98–107)
CHLORIDE SERPL-SCNC: 105 MMOL/L (ref 98–107)
CHLORIDE SERPL-SCNC: 106 MMOL/L (ref 98–107)
CHLORIDE SERPL-SCNC: 106 MMOL/L (ref 98–107)
CHLORIDE SERPL-SCNC: 107 MMOL/L (ref 98–107)
CHLORIDE SERPL-SCNC: 107 MMOL/L (ref 98–107)
CHLORIDE SERPL-SCNC: 110 MMOL/L (ref 98–107)
CHLORIDE SERPL-SCNC: 96 MMOL/L (ref 98–107)
CHLORIDE SERPL-SCNC: 98 MMOL/L (ref 98–107)
CO2 SERPL-SCNC: 18 MMOL/L (ref 21–32)
CO2 SERPL-SCNC: 23 MMOL/L (ref 21–32)
CO2 SERPL-SCNC: 25 MMOL/L (ref 21–32)
CO2 SERPL-SCNC: 26 MMOL/L (ref 21–32)
CO2 SERPL-SCNC: 27 MMOL/L (ref 21–32)
CO2 SERPL-SCNC: 28 MMOL/L (ref 21–32)
CO2 SERPL-SCNC: 29 MMOL/L (ref 21–32)
CO2 SERPL-SCNC: 30 MMOL/L (ref 21–32)
CO2 SERPL-SCNC: 30 MMOL/L (ref 21–32)
CO2 SERPL-SCNC: 31 MMOL/L (ref 21–32)
CO2 SERPL-SCNC: 31 MMOL/L (ref 21–32)
CO2 SERPL-SCNC: 32 MMOL/L (ref 21–32)
CO2 SERPL-SCNC: 33 MMOL/L (ref 21–32)
CREAT SERPL-MCNC: 0.68 MG/DL (ref 0.6–1)
CREAT SERPL-MCNC: 0.79 MG/DL (ref 0.6–1)
CREAT SERPL-MCNC: 0.8 MG/DL (ref 0.6–1)
CREAT SERPL-MCNC: 0.81 MG/DL (ref 0.6–1)
CREAT SERPL-MCNC: 0.83 MG/DL (ref 0.6–1)
CREAT SERPL-MCNC: 0.84 MG/DL (ref 0.6–1)
CREAT SERPL-MCNC: 0.84 MG/DL (ref 0.6–1)
CREAT SERPL-MCNC: 0.85 MG/DL (ref 0.6–1)
CREAT SERPL-MCNC: 0.86 MG/DL (ref 0.6–1)
CREAT SERPL-MCNC: 0.86 MG/DL (ref 0.6–1)
CREAT SERPL-MCNC: 0.87 MG/DL (ref 0.6–1)
CREAT SERPL-MCNC: 0.89 MG/DL (ref 0.6–1)
CREAT SERPL-MCNC: 0.91 MG/DL (ref 0.6–1)
CREAT SERPL-MCNC: 0.95 MG/DL (ref 0.6–1)
CREAT SERPL-MCNC: 0.96 MG/DL (ref 0.6–1)
CREAT SERPL-MCNC: 0.96 MG/DL (ref 0.6–1)
CREAT SERPL-MCNC: 0.97 MG/DL (ref 0.6–1)
CREAT SERPL-MCNC: 0.98 MG/DL (ref 0.6–1)
CREAT SERPL-MCNC: 0.99 MG/DL (ref 0.6–1)
CREAT SERPL-MCNC: 1 MG/DL (ref 0.6–1)
CREAT SERPL-MCNC: 1 MG/DL (ref 0.6–1)
CREAT SERPL-MCNC: 1.01 MG/DL (ref 0.6–1)
CREAT SERPL-MCNC: 1.03 MG/DL (ref 0.6–1)
CREAT SERPL-MCNC: 1.1 MG/DL (ref 0.6–1)
CREAT SERPL-MCNC: 1.11 MG/DL (ref 0.6–1)
CREAT SERPL-MCNC: 1.21 MG/DL (ref 0.6–1)
CREAT SERPL-MCNC: 1.31 MG/DL (ref 0.6–1)
DIAGNOSIS, 93000: NORMAL
DIFFERENTIAL METHOD BLD: ABNORMAL
EOSINOPHIL # BLD: 0 K/UL (ref 0–0.8)
EOSINOPHIL # BLD: 0.1 K/UL (ref 0–0.8)
EOSINOPHIL NFR BLD MANUAL: 1 % (ref 1–8)
EOSINOPHIL NFR BLD: 0 % (ref 0.5–7.8)
EOSINOPHIL NFR BLD: 1 % (ref 0.5–7.8)
EOSINOPHIL NFR BLD: 2 % (ref 0.5–7.8)
EOSINOPHIL NFR BLD: 3 % (ref 0.5–7.8)
ERYTHROCYTE [DISTWIDTH] IN BLOOD BY AUTOMATED COUNT: 16.1 % (ref 11.9–14.6)
ERYTHROCYTE [DISTWIDTH] IN BLOOD BY AUTOMATED COUNT: 17 % (ref 11.9–14.6)
ERYTHROCYTE [DISTWIDTH] IN BLOOD BY AUTOMATED COUNT: 17.5 % (ref 11.9–14.6)
ERYTHROCYTE [DISTWIDTH] IN BLOOD BY AUTOMATED COUNT: 18.1 % (ref 11.9–14.6)
ERYTHROCYTE [DISTWIDTH] IN BLOOD BY AUTOMATED COUNT: 18.3 % (ref 11.9–14.6)
ERYTHROCYTE [DISTWIDTH] IN BLOOD BY AUTOMATED COUNT: 18.4 % (ref 11.9–14.6)
ERYTHROCYTE [DISTWIDTH] IN BLOOD BY AUTOMATED COUNT: 18.6 % (ref 11.9–14.6)
ERYTHROCYTE [DISTWIDTH] IN BLOOD BY AUTOMATED COUNT: 18.8 % (ref 11.9–14.6)
ERYTHROCYTE [DISTWIDTH] IN BLOOD BY AUTOMATED COUNT: 19.5 % (ref 11.9–14.6)
ERYTHROCYTE [DISTWIDTH] IN BLOOD BY AUTOMATED COUNT: 19.6 % (ref 11.9–14.6)
ERYTHROCYTE [DISTWIDTH] IN BLOOD BY AUTOMATED COUNT: 19.6 % (ref 11.9–14.6)
ERYTHROCYTE [DISTWIDTH] IN BLOOD BY AUTOMATED COUNT: 20.3 % (ref 11.9–14.6)
ERYTHROCYTE [DISTWIDTH] IN BLOOD BY AUTOMATED COUNT: 20.9 % (ref 11.9–14.6)
ERYTHROCYTE [DISTWIDTH] IN BLOOD BY AUTOMATED COUNT: 22 % (ref 11.9–14.6)
ERYTHROCYTE [DISTWIDTH] IN BLOOD BY AUTOMATED COUNT: 22.4 % (ref 11.9–14.6)
GLOBULIN SER CALC-MCNC: 2.8 G/DL (ref 2.3–3.5)
GLOBULIN SER CALC-MCNC: 2.9 G/DL (ref 2.3–3.5)
GLOBULIN SER CALC-MCNC: 3 G/DL (ref 2.3–3.5)
GLOBULIN SER CALC-MCNC: 3 G/DL (ref 2.3–3.5)
GLOBULIN SER CALC-MCNC: 3.1 G/DL (ref 2.3–3.5)
GLOBULIN SER CALC-MCNC: 3.2 G/DL (ref 2.3–3.5)
GLOBULIN SER CALC-MCNC: 3.2 G/DL (ref 2.3–3.5)
GLOBULIN SER CALC-MCNC: 3.3 G/DL (ref 2.3–3.5)
GLOBULIN SER CALC-MCNC: 3.3 G/DL (ref 2.3–3.5)
GLOBULIN SER CALC-MCNC: 3.4 G/DL (ref 2.3–3.5)
GLOBULIN SER CALC-MCNC: 3.5 G/DL (ref 2.3–3.5)
GLOBULIN SER CALC-MCNC: 3.7 G/DL (ref 2.3–3.5)
GLOBULIN SER CALC-MCNC: 3.8 G/DL (ref 2.3–3.5)
GLOBULIN SER CALC-MCNC: 3.9 G/DL (ref 2.3–3.5)
GLOBULIN SER CALC-MCNC: 4.3 G/DL
GLOBULIN SER CALC-MCNC: 5 G/DL (ref 2.3–3.5)
GLUCOSE SERPL-MCNC: 100 MG/DL (ref 65–100)
GLUCOSE SERPL-MCNC: 106 MG/DL (ref 65–100)
GLUCOSE SERPL-MCNC: 112 MG/DL (ref 65–100)
GLUCOSE SERPL-MCNC: 113 MG/DL (ref 65–100)
GLUCOSE SERPL-MCNC: 117 MG/DL (ref 65–100)
GLUCOSE SERPL-MCNC: 120 MG/DL (ref 65–100)
GLUCOSE SERPL-MCNC: 121 MG/DL (ref 65–100)
GLUCOSE SERPL-MCNC: 122 MG/DL (ref 65–100)
GLUCOSE SERPL-MCNC: 124 MG/DL (ref 65–100)
GLUCOSE SERPL-MCNC: 124 MG/DL (ref 65–100)
GLUCOSE SERPL-MCNC: 125 MG/DL (ref 65–100)
GLUCOSE SERPL-MCNC: 126 MG/DL (ref 65–100)
GLUCOSE SERPL-MCNC: 127 MG/DL (ref 65–100)
GLUCOSE SERPL-MCNC: 128 MG/DL (ref 65–100)
GLUCOSE SERPL-MCNC: 130 MG/DL (ref 65–100)
GLUCOSE SERPL-MCNC: 131 MG/DL (ref 65–100)
GLUCOSE SERPL-MCNC: 134 MG/DL (ref 65–100)
GLUCOSE SERPL-MCNC: 137 MG/DL (ref 65–100)
GLUCOSE SERPL-MCNC: 141 MG/DL (ref 65–100)
GLUCOSE SERPL-MCNC: 144 MG/DL (ref 65–100)
GLUCOSE SERPL-MCNC: 152 MG/DL (ref 65–100)
GLUCOSE SERPL-MCNC: 172 MG/DL (ref 65–100)
GLUCOSE SERPL-MCNC: 174 MG/DL (ref 65–100)
GLUCOSE SERPL-MCNC: 183 MG/DL (ref 65–100)
GLUCOSE SERPL-MCNC: 91 MG/DL (ref 65–100)
HCT VFR BLD AUTO: 28.1 % (ref 35.8–46.3)
HCT VFR BLD AUTO: 28.2 % (ref 35.8–46.3)
HCT VFR BLD AUTO: 28.6 % (ref 35.8–46.3)
HCT VFR BLD AUTO: 29.2 % (ref 35.8–46.3)
HCT VFR BLD AUTO: 29.3 % (ref 35.8–46.3)
HCT VFR BLD AUTO: 29.8 % (ref 35.8–46.3)
HCT VFR BLD AUTO: 30 % (ref 35.8–46.3)
HCT VFR BLD AUTO: 30.7 % (ref 35.8–46.3)
HCT VFR BLD AUTO: 31.2 % (ref 35.8–46.3)
HCT VFR BLD AUTO: 31.7 % (ref 35.8–46.3)
HCT VFR BLD AUTO: 32 % (ref 35.8–46.3)
HCT VFR BLD AUTO: 33.3 % (ref 35.8–46.3)
HCT VFR BLD AUTO: 33.9 % (ref 35.8–46.3)
HCT VFR BLD AUTO: 35 % (ref 35.8–46.3)
HCT VFR BLD AUTO: 35.7 % (ref 35.8–46.3)
HGB BLD-MCNC: 10 G/DL (ref 11.7–15.4)
HGB BLD-MCNC: 10.1 G/DL (ref 11.7–15.4)
HGB BLD-MCNC: 10.2 G/DL (ref 11.7–15.4)
HGB BLD-MCNC: 10.3 G/DL (ref 11.7–15.4)
HGB BLD-MCNC: 11 G/DL (ref 11.7–15.4)
HGB BLD-MCNC: 11.1 G/DL (ref 11.7–15.4)
HGB BLD-MCNC: 11.3 G/DL (ref 11.7–15.4)
HGB BLD-MCNC: 11.3 G/DL (ref 11.7–15.4)
HGB BLD-MCNC: 8.9 G/DL (ref 11.7–15.4)
HGB BLD-MCNC: 9 G/DL (ref 11.7–15.4)
HGB BLD-MCNC: 9 G/DL (ref 11.7–15.4)
HGB BLD-MCNC: 9.2 G/DL (ref 11.7–15.4)
HGB BLD-MCNC: 9.5 G/DL (ref 11.7–15.4)
HGB BLD-MCNC: 9.6 G/DL (ref 11.7–15.4)
HGB BLD-MCNC: 9.7 G/DL (ref 11.7–15.4)
IMM GRANULOCYTES # BLD: 0 K/UL (ref 0–0.5)
IMM GRANULOCYTES # BLD: 0 K/UL (ref 0–0.5)
IMM GRANULOCYTES # BLD: 0.1 K/UL (ref 0–0.5)
IMM GRANULOCYTES # BLD: 0.2 K/UL (ref 0–0.5)
IMM GRANULOCYTES # BLD: 0.5 K/UL (ref 0–0.5)
IMM GRANULOCYTES NFR BLD AUTO: 1 % (ref 0–5)
IMM GRANULOCYTES NFR BLD AUTO: 2 % (ref 0–5)
IMM GRANULOCYTES NFR BLD AUTO: 3 % (ref 0–5)
IMM GRANULOCYTES NFR BLD AUTO: 3 % (ref 0–5)
LACTATE BLD-SCNC: 1.4 MMOL/L (ref 0.5–1.9)
LACTATE BLD-SCNC: 18 MMOL/L (ref 0.5–1.9)
LYMPHOCYTES # BLD: 0.3 K/UL (ref 0.5–4.6)
LYMPHOCYTES # BLD: 0.4 K/UL (ref 0.5–4.6)
LYMPHOCYTES # BLD: 0.8 K/UL (ref 0.5–4.6)
LYMPHOCYTES # BLD: 0.9 K/UL (ref 0.5–4.6)
LYMPHOCYTES # BLD: 1.3 K/UL (ref 0.5–4.6)
LYMPHOCYTES # BLD: 1.3 K/UL (ref 0.5–4.6)
LYMPHOCYTES # BLD: 1.5 K/UL (ref 0.5–4.6)
LYMPHOCYTES # BLD: 1.7 K/UL (ref 0.5–4.6)
LYMPHOCYTES NFR BLD MANUAL: 14 % (ref 16–44)
LYMPHOCYTES NFR BLD MANUAL: 16 % (ref 16–44)
LYMPHOCYTES NFR BLD: 11 % (ref 13–44)
LYMPHOCYTES NFR BLD: 14 % (ref 13–44)
LYMPHOCYTES NFR BLD: 15 % (ref 13–44)
LYMPHOCYTES NFR BLD: 15 % (ref 13–44)
LYMPHOCYTES NFR BLD: 19 % (ref 13–44)
LYMPHOCYTES NFR BLD: 20 % (ref 13–44)
LYMPHOCYTES NFR BLD: 21 % (ref 13–44)
LYMPHOCYTES NFR BLD: 21 % (ref 13–44)
MAGNESIUM SERPL-MCNC: 1.6 MG/DL (ref 1.8–2.4)
MAGNESIUM SERPL-MCNC: 1.8 MG/DL (ref 1.8–2.4)
MAGNESIUM SERPL-MCNC: 1.8 MG/DL (ref 1.8–2.4)
MAGNESIUM SERPL-MCNC: 1.9 MG/DL (ref 1.8–2.4)
MAGNESIUM SERPL-MCNC: 2 MG/DL (ref 1.8–2.4)
MAGNESIUM SERPL-MCNC: 2.1 MG/DL (ref 1.8–2.4)
MAGNESIUM SERPL-MCNC: 2.1 MG/DL (ref 1.8–2.4)
MAGNESIUM SERPL-MCNC: 2.2 MG/DL (ref 1.8–2.4)
MAGNESIUM SERPL-MCNC: 2.4 MG/DL (ref 1.8–2.4)
MCH RBC QN AUTO: 22.7 PG (ref 26.1–32.9)
MCH RBC QN AUTO: 23 PG (ref 26.1–32.9)
MCH RBC QN AUTO: 23 PG (ref 26.1–32.9)
MCH RBC QN AUTO: 23.1 PG (ref 26.1–32.9)
MCH RBC QN AUTO: 23.2 PG (ref 26.1–32.9)
MCH RBC QN AUTO: 23.3 PG (ref 26.1–32.9)
MCH RBC QN AUTO: 23.4 PG (ref 26.1–32.9)
MCH RBC QN AUTO: 23.7 PG (ref 26.1–32.9)
MCH RBC QN AUTO: 23.8 PG (ref 26.1–32.9)
MCH RBC QN AUTO: 23.9 PG (ref 26.1–32.9)
MCH RBC QN AUTO: 24.6 PG (ref 26.1–32.9)
MCHC RBC AUTO-ENTMCNC: 31.5 G/DL (ref 31.4–35)
MCHC RBC AUTO-ENTMCNC: 31.5 G/DL (ref 31.4–35)
MCHC RBC AUTO-ENTMCNC: 31.7 G/DL (ref 31.4–35)
MCHC RBC AUTO-ENTMCNC: 31.7 G/DL (ref 31.4–35)
MCHC RBC AUTO-ENTMCNC: 31.9 G/DL (ref 31.4–35)
MCHC RBC AUTO-ENTMCNC: 31.9 G/DL (ref 31.4–35)
MCHC RBC AUTO-ENTMCNC: 32 G/DL (ref 31.4–35)
MCHC RBC AUTO-ENTMCNC: 32.2 G/DL (ref 31.4–35)
MCHC RBC AUTO-ENTMCNC: 32.3 G/DL (ref 31.4–35)
MCHC RBC AUTO-ENTMCNC: 32.4 G/DL (ref 31.4–35)
MCHC RBC AUTO-ENTMCNC: 32.6 G/DL (ref 31.4–35)
MCHC RBC AUTO-ENTMCNC: 32.6 G/DL (ref 31.4–35)
MCHC RBC AUTO-ENTMCNC: 32.7 G/DL (ref 31.4–35)
MCHC RBC AUTO-ENTMCNC: 32.7 G/DL (ref 31.4–35)
MCHC RBC AUTO-ENTMCNC: 33 G/DL (ref 31.4–35)
MCV RBC AUTO: 70.4 FL (ref 79.6–97.8)
MCV RBC AUTO: 71.1 FL (ref 79.6–97.8)
MCV RBC AUTO: 72.2 FL (ref 79.6–97.8)
MCV RBC AUTO: 72.6 FL (ref 79.6–97.8)
MCV RBC AUTO: 72.6 FL (ref 79.6–97.8)
MCV RBC AUTO: 72.9 FL (ref 79.6–97.8)
MCV RBC AUTO: 73 FL (ref 79.6–97.8)
MCV RBC AUTO: 73 FL (ref 79.6–97.8)
MCV RBC AUTO: 73.2 FL (ref 79.6–97.8)
MCV RBC AUTO: 73.2 FL (ref 79.6–97.8)
MCV RBC AUTO: 73.3 FL (ref 79.6–97.8)
MCV RBC AUTO: 73.5 FL (ref 79.6–97.8)
MCV RBC AUTO: 73.7 FL (ref 79.6–97.8)
MCV RBC AUTO: 73.8 FL (ref 79.6–97.8)
MCV RBC AUTO: 75.9 FL (ref 79.6–97.8)
METAMYELOCYTES NFR BLD MANUAL: 1 %
MM INDURATION POC: 0 MM (ref 0–5)
MM INDURATION POC: 0 MM (ref 0–5)
MONOCYTES # BLD: 0.1 K/UL (ref 0.1–1.3)
MONOCYTES # BLD: 0.2 K/UL (ref 0.1–1.3)
MONOCYTES # BLD: 0.2 K/UL (ref 0.1–1.3)
MONOCYTES # BLD: 0.3 K/UL (ref 0.1–1.3)
MONOCYTES # BLD: 0.4 K/UL (ref 0.1–1.3)
MONOCYTES # BLD: 0.5 K/UL (ref 0.1–1.3)
MONOCYTES # BLD: 0.6 K/UL (ref 0.1–1.3)
MONOCYTES # BLD: 0.7 K/UL (ref 0.1–1.3)
MONOCYTES # BLD: 0.8 K/UL (ref 0.1–1.3)
MONOCYTES # BLD: 1 K/UL (ref 0.1–1.3)
MONOCYTES NFR BLD MANUAL: 7 % (ref 3–9)
MONOCYTES NFR BLD MANUAL: 8 % (ref 3–9)
MONOCYTES NFR BLD: 11 % (ref 4–12)
MONOCYTES NFR BLD: 12 % (ref 4–12)
MONOCYTES NFR BLD: 3 % (ref 4–12)
MONOCYTES NFR BLD: 4 % (ref 4–12)
MONOCYTES NFR BLD: 5 % (ref 4–12)
MONOCYTES NFR BLD: 5 % (ref 4–12)
MONOCYTES NFR BLD: 6 % (ref 4–12)
MONOCYTES NFR BLD: 6 % (ref 4–12)
MONOCYTES NFR BLD: 7 % (ref 4–12)
MONOCYTES NFR BLD: 8 % (ref 4–12)
MONOCYTES NFR BLD: 8 % (ref 4–12)
MONOCYTES NFR BLD: 9 % (ref 4–12)
MONOCYTES NFR BLD: 9 % (ref 4–12)
MYELOCYTES NFR BLD MANUAL: 3 %
NEUTS BAND NFR BLD MANUAL: 10 % (ref 0–10)
NEUTS SEG # BLD: 1.2 K/UL (ref 1.7–8.2)
NEUTS SEG # BLD: 1.2 K/UL (ref 1.7–8.2)
NEUTS SEG # BLD: 13.1 K/UL (ref 1.7–8.2)
NEUTS SEG # BLD: 2.7 K/UL (ref 1.7–8.2)
NEUTS SEG # BLD: 2.9 K/UL (ref 1.7–8.2)
NEUTS SEG # BLD: 3.1 K/UL (ref 1.7–8.2)
NEUTS SEG # BLD: 3.2 K/UL (ref 1.7–8.2)
NEUTS SEG # BLD: 3.4 K/UL (ref 1.7–8.2)
NEUTS SEG # BLD: 4.2 K/UL (ref 1.7–8.2)
NEUTS SEG # BLD: 4.4 K/UL (ref 1.7–8.2)
NEUTS SEG # BLD: 5.5 K/UL (ref 1.7–8.2)
NEUTS SEG # BLD: 6 K/UL (ref 1.7–8.2)
NEUTS SEG # BLD: 7.2 K/UL (ref 1.7–8.2)
NEUTS SEG # BLD: 7.3 K/UL (ref 1.7–8.2)
NEUTS SEG # BLD: 9.8 K/UL (ref 1.7–8.2)
NEUTS SEG NFR BLD MANUAL: 62 % (ref 47–75)
NEUTS SEG NFR BLD MANUAL: 74 % (ref 47–75)
NEUTS SEG NFR BLD: 65 % (ref 43–78)
NEUTS SEG NFR BLD: 66 % (ref 43–78)
NEUTS SEG NFR BLD: 66 % (ref 43–78)
NEUTS SEG NFR BLD: 72 % (ref 43–78)
NEUTS SEG NFR BLD: 72 % (ref 43–78)
NEUTS SEG NFR BLD: 73 % (ref 43–78)
NEUTS SEG NFR BLD: 74 % (ref 43–78)
NEUTS SEG NFR BLD: 76 % (ref 43–78)
NEUTS SEG NFR BLD: 80 % (ref 43–78)
NEUTS SEG NFR BLD: 81 % (ref 43–78)
NEUTS SEG NFR BLD: 81 % (ref 43–78)
NEUTS SEG NFR BLD: 83 % (ref 43–78)
NEUTS SEG NFR BLD: 84 % (ref 43–78)
NRBC # BLD: 0 K/UL (ref 0–0.2)
NRBC # BLD: 0.01 K/UL (ref 0–0.2)
NRBC BLD-RTO: 1 PER 100 WBC
P-R INTERVAL, ECG05: 110 MS
P-R INTERVAL, ECG05: 132 MS
P-R INTERVAL, ECG05: 136 MS
PHENYTOIN FREE SERPL-MCNC: 1.6 UG/ML (ref 1–2)
PHENYTOIN SERPL-MCNC: 16.8 UG/ML (ref 10–20)
PHENYTOIN SERPL-MCNC: 25.9 UG/ML (ref 10–20)
PHENYTOIN SERPL-MCNC: 31 UG/ML (ref 10–20)
PHENYTOIN SERPL-MCNC: 34.3 UG/ML (ref 10–20)
PHENYTOIN SERPL-MCNC: 34.9 UG/ML (ref 10–20)
PHENYTOIN SERPL-MCNC: 38.1 UG/ML (ref 10–20)
PHENYTOIN SERPL-MCNC: 9.1 UG/ML (ref 10–20)
PHOSPHATE SERPL-MCNC: 3.3 MG/DL (ref 2.5–4.5)
PLATELET # BLD AUTO: 144 K/UL (ref 150–450)
PLATELET # BLD AUTO: 156 K/UL (ref 150–450)
PLATELET # BLD AUTO: 174 K/UL (ref 150–450)
PLATELET # BLD AUTO: 204 K/UL (ref 150–450)
PLATELET # BLD AUTO: 212 K/UL (ref 150–450)
PLATELET # BLD AUTO: 219 K/UL (ref 150–450)
PLATELET # BLD AUTO: 233 K/UL (ref 150–450)
PLATELET # BLD AUTO: 247 K/UL (ref 150–450)
PLATELET # BLD AUTO: 256 K/UL (ref 150–450)
PLATELET # BLD AUTO: 256 K/UL (ref 150–450)
PLATELET # BLD AUTO: 261 K/UL (ref 150–450)
PLATELET # BLD AUTO: 291 K/UL (ref 150–450)
PLATELET # BLD AUTO: 292 K/UL (ref 150–450)
PLATELET # BLD AUTO: 55 K/UL (ref 150–450)
PLATELET # BLD AUTO: 91 K/UL (ref 150–450)
PLATELET COMMENTS,PCOM: ABNORMAL
PLATELET COMMENTS,PCOM: ADEQUATE
PMV BLD AUTO: 10 FL (ref 10.8–14.1)
PMV BLD AUTO: 8.4 FL (ref 10.8–14.1)
PMV BLD AUTO: 8.6 FL (ref 10.8–14.1)
PMV BLD AUTO: 8.7 FL (ref 10.8–14.1)
PMV BLD AUTO: 8.8 FL (ref 10.8–14.1)
PMV BLD AUTO: 8.9 FL (ref 10.8–14.1)
PMV BLD AUTO: 9.1 FL (ref 10.8–14.1)
PMV BLD AUTO: 9.1 FL (ref 10.8–14.1)
PMV BLD AUTO: 9.3 FL (ref 10.8–14.1)
PMV BLD AUTO: 9.3 FL (ref 10.8–14.1)
PMV BLD AUTO: 9.4 FL (ref 10.8–14.1)
PMV BLD AUTO: 9.6 FL (ref 10.8–14.1)
PMV BLD AUTO: 9.7 FL (ref 10.8–14.1)
PMV BLD AUTO: ABNORMAL FL (ref 10.8–14.1)
POTASSIUM SERPL-SCNC: 3.1 MMOL/L (ref 3.5–5.1)
POTASSIUM SERPL-SCNC: 3.3 MMOL/L (ref 3.5–5.1)
POTASSIUM SERPL-SCNC: 3.3 MMOL/L (ref 3.5–5.1)
POTASSIUM SERPL-SCNC: 3.5 MMOL/L (ref 3.5–5.1)
POTASSIUM SERPL-SCNC: 3.5 MMOL/L (ref 3.5–5.1)
POTASSIUM SERPL-SCNC: 3.6 MMOL/L (ref 3.5–5.1)
POTASSIUM SERPL-SCNC: 3.7 MMOL/L (ref 3.5–5.1)
POTASSIUM SERPL-SCNC: 3.7 MMOL/L (ref 3.5–5.1)
POTASSIUM SERPL-SCNC: 3.8 MMOL/L (ref 3.5–5.1)
POTASSIUM SERPL-SCNC: 4.1 MMOL/L (ref 3.5–5.1)
POTASSIUM SERPL-SCNC: 4.2 MMOL/L (ref 3.5–5.1)
POTASSIUM SERPL-SCNC: 4.3 MMOL/L (ref 3.5–5.1)
POTASSIUM SERPL-SCNC: 4.4 MMOL/L (ref 3.5–5.1)
POTASSIUM SERPL-SCNC: 4.4 MMOL/L (ref 3.5–5.1)
POTASSIUM SERPL-SCNC: 4.6 MMOL/L (ref 3.5–5.1)
POTASSIUM SERPL-SCNC: 4.7 MMOL/L (ref 3.5–5.1)
POTASSIUM SERPL-SCNC: 4.8 MMOL/L (ref 3.5–5.1)
POTASSIUM SERPL-SCNC: 4.9 MMOL/L (ref 3.5–5.1)
POTASSIUM SERPL-SCNC: 4.9 MMOL/L (ref 3.5–5.1)
POTASSIUM SERPL-SCNC: 8.7 MMOL/L (ref 3.5–5.1)
PPD POC: NEGATIVE NEGATIVE
PPD POC: NORMAL NEGATIVE
PROT SERPL-MCNC: 5.9 G/DL (ref 6.3–8.2)
PROT SERPL-MCNC: 6.2 G/DL (ref 6.3–8.2)
PROT SERPL-MCNC: 6.3 G/DL (ref 6.3–8.2)
PROT SERPL-MCNC: 6.4 G/DL (ref 6.3–8.2)
PROT SERPL-MCNC: 6.4 G/DL (ref 6.3–8.2)
PROT SERPL-MCNC: 6.5 G/DL (ref 6.3–8.2)
PROT SERPL-MCNC: 6.5 G/DL (ref 6.3–8.2)
PROT SERPL-MCNC: 6.7 G/DL (ref 6.3–8.2)
PROT SERPL-MCNC: 6.7 G/DL (ref 6.3–8.2)
PROT SERPL-MCNC: 6.8 G/DL (ref 6.3–8.2)
PROT SERPL-MCNC: 6.8 G/DL (ref 6.3–8.2)
PROT SERPL-MCNC: 6.9 G/DL (ref 6.3–8.2)
PROT SERPL-MCNC: 7.1 G/DL (ref 6.3–8.2)
PROT SERPL-MCNC: 7.1 G/DL (ref 6.3–8.2)
PROT SERPL-MCNC: 7.4 G/DL (ref 6.3–8.2)
PROT SERPL-MCNC: 7.6 G/DL (ref 6.3–8.2)
PROT SERPL-MCNC: 7.7 G/DL (ref 6.3–8.2)
PROT SERPL-MCNC: 8.3 G/DL (ref 6.3–8.2)
PROT SERPL-MCNC: 8.6 G/DL (ref 6.3–8.2)
Q-T INTERVAL, ECG07: 280 MS
Q-T INTERVAL, ECG07: 316 MS
Q-T INTERVAL, ECG07: 328 MS
QRS DURATION, ECG06: 70 MS
QRS DURATION, ECG06: 70 MS
QRS DURATION, ECG06: 76 MS
QTC CALCULATION (BEZET), ECG08: 405 MS
QTC CALCULATION (BEZET), ECG08: 406 MS
QTC CALCULATION (BEZET), ECG08: 453 MS
RBC # BLD AUTO: 3.81 M/UL (ref 4.05–5.25)
RBC # BLD AUTO: 3.85 M/UL (ref 4.05–5.25)
RBC # BLD AUTO: 3.86 M/UL (ref 4.05–5.25)
RBC # BLD AUTO: 3.89 M/UL (ref 4.05–5.25)
RBC # BLD AUTO: 4 M/UL (ref 4.05–5.25)
RBC # BLD AUTO: 4.08 M/UL (ref 4.05–5.25)
RBC # BLD AUTO: 4.21 M/UL (ref 4.05–5.25)
RBC # BLD AUTO: 4.22 M/UL (ref 4.05–5.25)
RBC # BLD AUTO: 4.33 M/UL (ref 4.05–5.25)
RBC # BLD AUTO: 4.34 M/UL (ref 4.05–5.25)
RBC # BLD AUTO: 4.43 M/UL (ref 4.05–5.25)
RBC # BLD AUTO: 4.61 M/UL (ref 4.05–5.25)
RBC # BLD AUTO: 4.67 M/UL (ref 4.05–5.25)
RBC # BLD AUTO: 4.82 M/UL (ref 4.05–5.25)
RBC # BLD AUTO: 4.87 M/UL (ref 4.05–5.25)
RBC MORPH BLD: ABNORMAL
SODIUM SERPL-SCNC: 135 MMOL/L (ref 136–145)
SODIUM SERPL-SCNC: 135 MMOL/L (ref 136–145)
SODIUM SERPL-SCNC: 136 MMOL/L (ref 136–145)
SODIUM SERPL-SCNC: 137 MMOL/L (ref 136–145)
SODIUM SERPL-SCNC: 138 MMOL/L (ref 136–145)
SODIUM SERPL-SCNC: 139 MMOL/L (ref 136–145)
SODIUM SERPL-SCNC: 140 MMOL/L (ref 136–145)
SODIUM SERPL-SCNC: 141 MMOL/L (ref 136–145)
SODIUM SERPL-SCNC: 141 MMOL/L (ref 136–145)
SODIUM SERPL-SCNC: 144 MMOL/L (ref 136–145)
SODIUM SERPL-SCNC: 146 MMOL/L (ref 136–145)
TROPONIN I BLD-MCNC: 0 NG/ML (ref 0.02–0.05)
TROPONIN I SERPL-MCNC: <0.02 NG/ML (ref 0.02–0.05)
TROPONIN I SERPL-MCNC: <0.02 NG/ML (ref 0.02–0.05)
TSH SERPL DL<=0.005 MIU/L-ACNC: 0.46 UIU/ML (ref 0.36–3.74)
TSH SERPL DL<=0.005 MIU/L-ACNC: 1.34 UIU/ML (ref 0.36–3.74)
VENTRICULAR RATE, ECG03: 124 BPM
VENTRICULAR RATE, ECG03: 127 BPM
VENTRICULAR RATE, ECG03: 92 BPM
WBC # BLD AUTO: 1.6 K/UL (ref 4.3–11.1)
WBC # BLD AUTO: 1.8 K/UL (ref 4.3–11.1)
WBC # BLD AUTO: 12.1 K/UL (ref 4.3–11.1)
WBC # BLD AUTO: 15.6 K/UL (ref 4.3–11.1)
WBC # BLD AUTO: 4.1 K/UL (ref 4.3–11.1)
WBC # BLD AUTO: 4.3 K/UL (ref 4.3–11.1)
WBC # BLD AUTO: 4.4 K/UL (ref 4.3–11.1)
WBC # BLD AUTO: 4.4 K/UL (ref 4.3–11.1)
WBC # BLD AUTO: 4.7 K/UL (ref 4.3–11.1)
WBC # BLD AUTO: 5.4 K/UL (ref 4.3–11.1)
WBC # BLD AUTO: 5.5 K/UL (ref 4.3–11.1)
WBC # BLD AUTO: 6.8 K/UL (ref 4.3–11.1)
WBC # BLD AUTO: 8.1 K/UL (ref 4.3–11.1)
WBC # BLD AUTO: 8.5 K/UL (ref 4.3–11.1)
WBC # BLD AUTO: 9.4 K/UL (ref 4.3–11.1)
WBC MORPH BLD: ABNORMAL

## 2018-01-01 PROCEDURE — 74011250636 HC RX REV CODE- 250/636: Performed by: HOSPITALIST

## 2018-01-01 PROCEDURE — 80053 COMPREHEN METABOLIC PANEL: CPT | Performed by: NURSE PRACTITIONER

## 2018-01-01 PROCEDURE — 74011250637 HC RX REV CODE- 250/637: Performed by: INTERNAL MEDICINE

## 2018-01-01 PROCEDURE — 36591 DRAW BLOOD OFF VENOUS DEVICE: CPT

## 2018-01-01 PROCEDURE — 80053 COMPREHEN METABOLIC PANEL: CPT | Performed by: EMERGENCY MEDICINE

## 2018-01-01 PROCEDURE — 74011250636 HC RX REV CODE- 250/636: Performed by: INTERNAL MEDICINE

## 2018-01-01 PROCEDURE — 74011000250 HC RX REV CODE- 250: Performed by: HOSPITALIST

## 2018-01-01 PROCEDURE — 84484 ASSAY OF TROPONIN QUANT: CPT | Performed by: INTERNAL MEDICINE

## 2018-01-01 PROCEDURE — 85025 COMPLETE CBC W/AUTO DIFF WBC: CPT | Performed by: INTERNAL MEDICINE

## 2018-01-01 PROCEDURE — 83605 ASSAY OF LACTIC ACID: CPT

## 2018-01-01 PROCEDURE — 97110 THERAPEUTIC EXERCISES: CPT

## 2018-01-01 PROCEDURE — 94640 AIRWAY INHALATION TREATMENT: CPT

## 2018-01-01 PROCEDURE — C9113 INJ PANTOPRAZOLE SODIUM, VIA: HCPCS | Performed by: INTERNAL MEDICINE

## 2018-01-01 PROCEDURE — 74011250636 HC RX REV CODE- 250/636: Performed by: EMERGENCY MEDICINE

## 2018-01-01 PROCEDURE — 74011000258 HC RX REV CODE- 258: Performed by: INTERNAL MEDICINE

## 2018-01-01 PROCEDURE — 94760 N-INVAS EAR/PLS OXIMETRY 1: CPT

## 2018-01-01 PROCEDURE — 85025 COMPLETE CBC W/AUTO DIFF WBC: CPT | Performed by: NURSE PRACTITIONER

## 2018-01-01 PROCEDURE — 80053 COMPREHEN METABOLIC PANEL: CPT | Performed by: INTERNAL MEDICINE

## 2018-01-01 PROCEDURE — 77030034850

## 2018-01-01 PROCEDURE — 74011000258 HC RX REV CODE- 258: Performed by: HOSPITALIST

## 2018-01-01 PROCEDURE — 83735 ASSAY OF MAGNESIUM: CPT | Performed by: NURSE PRACTITIONER

## 2018-01-01 PROCEDURE — 96374 THER/PROPH/DIAG INJ IV PUSH: CPT | Performed by: EMERGENCY MEDICINE

## 2018-01-01 PROCEDURE — 92507 TX SP LANG VOICE COMM INDIV: CPT

## 2018-01-01 PROCEDURE — HOSPICE MEDICATION HC HH HOSPICE MEDICATION

## 2018-01-01 PROCEDURE — 92610 EVALUATE SWALLOWING FUNCTION: CPT

## 2018-01-01 PROCEDURE — 84132 ASSAY OF SERUM POTASSIUM: CPT | Performed by: INTERNAL MEDICINE

## 2018-01-01 PROCEDURE — 80185 ASSAY OF PHENYTOIN TOTAL: CPT | Performed by: INTERNAL MEDICINE

## 2018-01-01 PROCEDURE — 65270000029 HC RM PRIVATE

## 2018-01-01 PROCEDURE — 83735 ASSAY OF MAGNESIUM: CPT | Performed by: INTERNAL MEDICINE

## 2018-01-01 PROCEDURE — 99231 SBSQ HOSP IP/OBS SF/LOW 25: CPT | Performed by: INTERNAL MEDICINE

## 2018-01-01 PROCEDURE — 74011250636 HC RX REV CODE- 250/636: Performed by: NURSE PRACTITIONER

## 2018-01-01 PROCEDURE — G0299 HHS/HOSPICE OF RN EA 15 MIN: HCPCS

## 2018-01-01 PROCEDURE — 74011250637 HC RX REV CODE- 250/637: Performed by: NURSE PRACTITIONER

## 2018-01-01 PROCEDURE — 65620000000 HC RM CCU GENERAL

## 2018-01-01 PROCEDURE — 80185 ASSAY OF PHENYTOIN TOTAL: CPT | Performed by: NURSE PRACTITIONER

## 2018-01-01 PROCEDURE — 97530 THERAPEUTIC ACTIVITIES: CPT

## 2018-01-01 PROCEDURE — 99233 SBSQ HOSP IP/OBS HIGH 50: CPT | Performed by: INTERNAL MEDICINE

## 2018-01-01 PROCEDURE — 99285 EMERGENCY DEPT VISIT HI MDM: CPT | Performed by: EMERGENCY MEDICINE

## 2018-01-01 PROCEDURE — 99232 SBSQ HOSP IP/OBS MODERATE 35: CPT | Performed by: INTERNAL MEDICINE

## 2018-01-01 PROCEDURE — A9577 INJ MULTIHANCE: HCPCS | Performed by: INTERNAL MEDICINE

## 2018-01-01 PROCEDURE — G0155 HHCP-SVS OF CSW,EA 15 MIN: HCPCS

## 2018-01-01 PROCEDURE — 83880 ASSAY OF NATRIURETIC PEPTIDE: CPT

## 2018-01-01 PROCEDURE — 84443 ASSAY THYROID STIM HORMONE: CPT

## 2018-01-01 PROCEDURE — 96417 CHEMO IV INFUS EACH ADDL SEQ: CPT

## 2018-01-01 PROCEDURE — 0651 HSPC ROUTINE HOME CARE

## 2018-01-01 PROCEDURE — 74018 RADEX ABDOMEN 1 VIEW: CPT

## 2018-01-01 PROCEDURE — 77030003560 HC NDL HUBR BARD -A

## 2018-01-01 PROCEDURE — 77030032490 HC SLV COMPR SCD KNE COVD -B

## 2018-01-01 PROCEDURE — 95816 EEG AWAKE AND DROWSY: CPT | Performed by: NURSE PRACTITIONER

## 2018-01-01 PROCEDURE — 97116 GAIT TRAINING THERAPY: CPT

## 2018-01-01 PROCEDURE — 74011250636 HC RX REV CODE- 250/636

## 2018-01-01 PROCEDURE — 97164 PT RE-EVAL EST PLAN CARE: CPT

## 2018-01-01 PROCEDURE — 77030008771 HC TU NG SALEM SUMP -A

## 2018-01-01 PROCEDURE — 71045 X-RAY EXAM CHEST 1 VIEW: CPT

## 2018-01-01 PROCEDURE — 85025 COMPLETE CBC W/AUTO DIFF WBC: CPT

## 2018-01-01 PROCEDURE — 80053 COMPREHEN METABOLIC PANEL: CPT

## 2018-01-01 PROCEDURE — 83735 ASSAY OF MAGNESIUM: CPT

## 2018-01-01 PROCEDURE — 74011000302 HC RX REV CODE- 302: Performed by: NURSE PRACTITIONER

## 2018-01-01 PROCEDURE — 80185 ASSAY OF PHENYTOIN TOTAL: CPT | Performed by: HOSPITALIST

## 2018-01-01 PROCEDURE — 80048 BASIC METABOLIC PNL TOTAL CA: CPT | Performed by: HOSPITALIST

## 2018-01-01 PROCEDURE — 96413 CHEMO IV INFUSION 1 HR: CPT

## 2018-01-01 PROCEDURE — 80048 BASIC METABOLIC PNL TOTAL CA: CPT | Performed by: NURSE PRACTITIONER

## 2018-01-01 PROCEDURE — 74011636637 HC RX REV CODE- 636/637: Performed by: HOSPITALIST

## 2018-01-01 PROCEDURE — 97165 OT EVAL LOW COMPLEX 30 MIN: CPT

## 2018-01-01 PROCEDURE — 99255 IP/OBS CONSLTJ NEW/EST HI 80: CPT | Performed by: INTERNAL MEDICINE

## 2018-01-01 PROCEDURE — 0656 HSPC GENERAL INPATIENT

## 2018-01-01 PROCEDURE — 96374 THER/PROPH/DIAG INJ IV PUSH: CPT

## 2018-01-01 PROCEDURE — 74011000250 HC RX REV CODE- 250: Performed by: INTERNAL MEDICINE

## 2018-01-01 PROCEDURE — 36415 COLL VENOUS BLD VENIPUNCTURE: CPT | Performed by: NURSE PRACTITIONER

## 2018-01-01 PROCEDURE — 80048 BASIC METABOLIC PNL TOTAL CA: CPT | Performed by: INTERNAL MEDICINE

## 2018-01-01 PROCEDURE — 96125 COGNITIVE TEST BY HC PRO: CPT

## 2018-01-01 PROCEDURE — 74011250637 HC RX REV CODE- 250/637: Performed by: HOSPITALIST

## 2018-01-01 PROCEDURE — 99285 EMERGENCY DEPT VISIT HI MDM: CPT

## 2018-01-01 PROCEDURE — 74011636320 HC RX REV CODE- 636/320: Performed by: INTERNAL MEDICINE

## 2018-01-01 PROCEDURE — A4216 STERILE WATER/SALINE, 10 ML: HCPCS

## 2018-01-01 PROCEDURE — 99239 HOSP IP/OBS DSCHRG MGMT >30: CPT | Performed by: INTERNAL MEDICINE

## 2018-01-01 PROCEDURE — 93005 ELECTROCARDIOGRAM TRACING: CPT | Performed by: EMERGENCY MEDICINE

## 2018-01-01 PROCEDURE — 74011250636 HC RX REV CODE- 250/636: Performed by: FAMILY MEDICINE

## 2018-01-01 PROCEDURE — 96372 THER/PROPH/DIAG INJ SC/IM: CPT

## 2018-01-01 PROCEDURE — 99211 OFF/OP EST MAY X REQ PHY/QHP: CPT

## 2018-01-01 PROCEDURE — 96375 TX/PRO/DX INJ NEW DRUG ADDON: CPT

## 2018-01-01 PROCEDURE — 77030013140 HC MSK NEB VYRM -A

## 2018-01-01 PROCEDURE — 96361 HYDRATE IV INFUSION ADD-ON: CPT | Performed by: EMERGENCY MEDICINE

## 2018-01-01 PROCEDURE — 86580 TB INTRADERMAL TEST: CPT | Performed by: NURSE PRACTITIONER

## 2018-01-01 PROCEDURE — 95816 EEG AWAKE AND DROWSY: CPT | Performed by: INTERNAL MEDICINE

## 2018-01-01 PROCEDURE — 70450 CT HEAD/BRAIN W/O DYE: CPT

## 2018-01-01 PROCEDURE — 84484 ASSAY OF TROPONIN QUANT: CPT

## 2018-01-01 PROCEDURE — HOSPICE MEDICATION 0636 HC HH HOSPICE MEDICATION

## 2018-01-01 PROCEDURE — 85025 COMPLETE CBC W/AUTO DIFF WBC: CPT | Performed by: EMERGENCY MEDICINE

## 2018-01-01 PROCEDURE — 97162 PT EVAL MOD COMPLEX 30 MIN: CPT

## 2018-01-01 PROCEDURE — 70553 MRI BRAIN STEM W/O & W/DYE: CPT

## 2018-01-01 PROCEDURE — 74011000250 HC RX REV CODE- 250

## 2018-01-01 PROCEDURE — 84100 ASSAY OF PHOSPHORUS: CPT | Performed by: INTERNAL MEDICINE

## 2018-01-01 PROCEDURE — 92523 SPEECH SOUND LANG COMPREHEN: CPT

## 2018-01-01 PROCEDURE — 97535 SELF CARE MNGMENT TRAINING: CPT

## 2018-01-01 PROCEDURE — 93005 ELECTROCARDIOGRAM TRACING: CPT | Performed by: INTERNAL MEDICINE

## 2018-01-01 PROCEDURE — 84443 ASSAY THYROID STIM HORMONE: CPT | Performed by: INTERNAL MEDICINE

## 2018-01-01 PROCEDURE — 93306 TTE W/DOPPLER COMPLETE: CPT

## 2018-01-01 PROCEDURE — 92526 ORAL FUNCTION THERAPY: CPT

## 2018-01-01 PROCEDURE — 93005 ELECTROCARDIOGRAM TRACING: CPT

## 2018-01-01 PROCEDURE — 96361 HYDRATE IV INFUSION ADD-ON: CPT

## 2018-01-01 PROCEDURE — 3336500001 HSPC ELECTION

## 2018-01-01 PROCEDURE — 71260 CT THORAX DX C+: CPT

## 2018-01-01 RX ORDER — LORAZEPAM 2 MG/ML
2 INJECTION INTRAMUSCULAR
Status: DISCONTINUED | OUTPATIENT
Start: 2018-01-01 | End: 2018-01-01 | Stop reason: HOSPADM

## 2018-01-01 RX ORDER — PANTOPRAZOLE SODIUM 40 MG/1
40 TABLET, DELAYED RELEASE ORAL
Status: DISCONTINUED | OUTPATIENT
Start: 2018-01-01 | End: 2018-01-01 | Stop reason: HOSPADM

## 2018-01-01 RX ORDER — ONDANSETRON 2 MG/ML
4 INJECTION INTRAMUSCULAR; INTRAVENOUS
Status: DISCONTINUED | OUTPATIENT
Start: 2018-01-01 | End: 2018-01-01 | Stop reason: HOSPADM

## 2018-01-01 RX ORDER — PHENOBARBITAL SODIUM 130 MG/ML
30 INJECTION INTRAMUSCULAR 2 TIMES DAILY
Status: DISCONTINUED | OUTPATIENT
Start: 2018-01-01 | End: 2018-01-01 | Stop reason: SDUPTHER

## 2018-01-01 RX ORDER — ONDANSETRON 2 MG/ML
8 INJECTION INTRAMUSCULAR; INTRAVENOUS ONCE
Status: COMPLETED | OUTPATIENT
Start: 2018-01-01 | End: 2018-01-01

## 2018-01-01 RX ORDER — LEVETIRACETAM 1000 MG/1
1000 TABLET ORAL EVERY 12 HOURS
Qty: 60 TAB | Refills: 0 | Status: SHIPPED | OUTPATIENT
Start: 2018-01-01 | End: 2018-01-01

## 2018-01-01 RX ORDER — DIPHENHYDRAMINE HYDROCHLORIDE 50 MG/ML
50 INJECTION, SOLUTION INTRAMUSCULAR; INTRAVENOUS AS NEEDED
Status: ACTIVE | OUTPATIENT
Start: 2018-01-01 | End: 2018-01-01

## 2018-01-01 RX ORDER — LEVALBUTEROL INHALATION SOLUTION 0.63 MG/3ML
0.63 SOLUTION RESPIRATORY (INHALATION)
Status: DISCONTINUED | OUTPATIENT
Start: 2018-01-01 | End: 2018-01-01 | Stop reason: HOSPADM

## 2018-01-01 RX ORDER — SODIUM CHLORIDE 0.9 % (FLUSH) 0.9 %
5-10 SYRINGE (ML) INJECTION EVERY 8 HOURS
Status: DISCONTINUED | OUTPATIENT
Start: 2018-01-01 | End: 2018-01-01 | Stop reason: HOSPADM

## 2018-01-01 RX ORDER — LEVALBUTEROL INHALATION SOLUTION 0.63 MG/3ML
0.63 SOLUTION RESPIRATORY (INHALATION)
Status: DISCONTINUED | OUTPATIENT
Start: 2018-01-01 | End: 2018-01-01

## 2018-01-01 RX ORDER — ACETAMINOPHEN 325 MG/1
650 TABLET ORAL
Status: DISCONTINUED | OUTPATIENT
Start: 2018-01-01 | End: 2018-01-01 | Stop reason: HOSPADM

## 2018-01-01 RX ORDER — DEXAMETHASONE SODIUM PHOSPHATE 4 MG/ML
4 INJECTION, SOLUTION INTRA-ARTICULAR; INTRALESIONAL; INTRAMUSCULAR; INTRAVENOUS; SOFT TISSUE EVERY 6 HOURS
Status: DISCONTINUED | OUTPATIENT
Start: 2018-01-01 | End: 2018-01-01 | Stop reason: HOSPADM

## 2018-01-01 RX ORDER — HEPARIN 100 UNIT/ML
300-500 SYRINGE INTRAVENOUS AS NEEDED
Status: ACTIVE | OUTPATIENT
Start: 2018-01-01 | End: 2018-01-01

## 2018-01-01 RX ORDER — LEVETIRACETAM 500 MG/1
1000 TABLET ORAL EVERY 12 HOURS
Status: DISCONTINUED | OUTPATIENT
Start: 2018-01-01 | End: 2018-01-01 | Stop reason: HOSPADM

## 2018-01-01 RX ORDER — PHENOBARBITAL SODIUM 65 MG/ML
30 INJECTION INTRAMUSCULAR 2 TIMES DAILY
Status: DISCONTINUED | OUTPATIENT
Start: 2018-01-01 | End: 2018-01-01 | Stop reason: HOSPADM

## 2018-01-01 RX ORDER — PANTOPRAZOLE SODIUM 40 MG/1
40 TABLET, DELAYED RELEASE ORAL
Qty: 30 TAB | Refills: 0 | Status: SHIPPED | OUTPATIENT
Start: 2018-01-01 | End: 2018-01-01

## 2018-01-01 RX ORDER — ACETAMINOPHEN 325 MG/1
650 TABLET ORAL ONCE
Status: COMPLETED | OUTPATIENT
Start: 2018-01-01 | End: 2018-01-01

## 2018-01-01 RX ORDER — PHENOBARBITAL 30 MG/1
30 TABLET ORAL 2 TIMES DAILY
Qty: 60 TAB | Refills: 0 | Status: SHIPPED | OUTPATIENT
Start: 2018-01-01 | End: 2018-01-01

## 2018-01-01 RX ORDER — DIPHENHYDRAMINE HYDROCHLORIDE 50 MG/ML
12.5 INJECTION, SOLUTION INTRAMUSCULAR; INTRAVENOUS
Status: DISCONTINUED | OUTPATIENT
Start: 2018-01-01 | End: 2018-01-01 | Stop reason: HOSPADM

## 2018-01-01 RX ORDER — PHENYTOIN 125 MG/5ML
100 SUSPENSION ORAL EVERY 8 HOURS
Status: DISCONTINUED | OUTPATIENT
Start: 2018-01-01 | End: 2018-01-01 | Stop reason: SDUPTHER

## 2018-01-01 RX ORDER — NEBULIZER AND COMPRESSOR
1 EACH MISCELLANEOUS
Qty: 1 EACH | Refills: 0 | Status: SHIPPED | OUTPATIENT
Start: 2018-01-01 | End: 2018-01-01

## 2018-01-01 RX ORDER — SODIUM POLYSTYRENE SULFONATE 15 G/60ML
30 SUSPENSION ORAL; RECTAL
Status: DISCONTINUED | OUTPATIENT
Start: 2018-01-01 | End: 2018-01-01

## 2018-01-01 RX ORDER — PHENYTOIN 125 MG/5ML
100 SUSPENSION ORAL EVERY 8 HOURS
Status: DISCONTINUED | OUTPATIENT
Start: 2018-01-01 | End: 2018-01-01

## 2018-01-01 RX ORDER — PHENOBARBITAL SODIUM 65 MG/ML
90 INJECTION INTRAMUSCULAR ONCE
Status: COMPLETED | OUTPATIENT
Start: 2018-01-01 | End: 2018-01-01

## 2018-01-01 RX ORDER — POTASSIUM CHLORIDE 14.9 MG/ML
20 INJECTION INTRAVENOUS ONCE
Status: COMPLETED | OUTPATIENT
Start: 2018-01-01 | End: 2018-01-01

## 2018-01-01 RX ORDER — HEPARIN 100 UNIT/ML
300-500 SYRINGE INTRAVENOUS AS NEEDED
Status: DISPENSED | OUTPATIENT
Start: 2018-01-01 | End: 2018-01-01

## 2018-01-01 RX ORDER — PHENOBARBITAL SODIUM 65 MG/ML
90 INJECTION INTRAMUSCULAR ONCE
Status: DISCONTINUED | OUTPATIENT
Start: 2018-01-01 | End: 2018-01-01

## 2018-01-01 RX ORDER — POTASSIUM CHLORIDE 20 MEQ/1
40 TABLET, EXTENDED RELEASE ORAL
Status: COMPLETED | OUTPATIENT
Start: 2018-01-01 | End: 2018-01-01

## 2018-01-01 RX ORDER — SODIUM POLYSTYRENE SULFONATE 15 G/60ML
45 SUSPENSION ORAL; RECTAL
Status: COMPLETED | OUTPATIENT
Start: 2018-01-01 | End: 2018-01-01

## 2018-01-01 RX ORDER — ACETAMINOPHEN 650 MG/1
650 SUPPOSITORY RECTAL
Status: DISCONTINUED | OUTPATIENT
Start: 2018-01-01 | End: 2018-01-01 | Stop reason: HOSPADM

## 2018-01-01 RX ORDER — ALBUTEROL SULFATE 90 UG/1
2 AEROSOL, METERED RESPIRATORY (INHALATION)
Qty: 1 INHALER | Refills: 0 | Status: SHIPPED | OUTPATIENT
Start: 2018-01-01 | End: 2018-01-01

## 2018-01-01 RX ORDER — PANTOPRAZOLE SODIUM 40 MG/1
40 TABLET, DELAYED RELEASE ORAL
Status: DISCONTINUED | OUTPATIENT
Start: 2018-01-01 | End: 2018-01-01

## 2018-01-01 RX ORDER — NALOXONE HYDROCHLORIDE 0.4 MG/ML
0.4 INJECTION, SOLUTION INTRAMUSCULAR; INTRAVENOUS; SUBCUTANEOUS AS NEEDED
Status: DISCONTINUED | OUTPATIENT
Start: 2018-01-01 | End: 2018-01-01 | Stop reason: HOSPADM

## 2018-01-01 RX ORDER — SODIUM CHLORIDE 0.9 % (FLUSH) 0.9 %
5-10 SYRINGE (ML) INJECTION AS NEEDED
Status: DISCONTINUED | OUTPATIENT
Start: 2018-01-01 | End: 2018-01-01 | Stop reason: SDUPTHER

## 2018-01-01 RX ORDER — HYDROCORTISONE SODIUM SUCCINATE 100 MG/2ML
100 INJECTION, POWDER, FOR SOLUTION INTRAMUSCULAR; INTRAVENOUS AS NEEDED
Status: ACTIVE | OUTPATIENT
Start: 2018-01-01 | End: 2018-01-01

## 2018-01-01 RX ORDER — SODIUM CHLORIDE, SODIUM LACTATE, POTASSIUM CHLORIDE, CALCIUM CHLORIDE 600; 310; 30; 20 MG/100ML; MG/100ML; MG/100ML; MG/100ML
100 INJECTION, SOLUTION INTRAVENOUS CONTINUOUS
Status: DISPENSED | OUTPATIENT
Start: 2018-01-01 | End: 2018-01-01

## 2018-01-01 RX ORDER — POTASSIUM CHLORIDE 29.8 MG/ML
40 INJECTION INTRAVENOUS ONCE
Status: DISCONTINUED | OUTPATIENT
Start: 2018-01-01 | End: 2018-01-01 | Stop reason: SDUPTHER

## 2018-01-01 RX ORDER — ALBUTEROL SULFATE 0.83 MG/ML
5 SOLUTION RESPIRATORY (INHALATION) ONCE
Status: COMPLETED | OUTPATIENT
Start: 2018-01-01 | End: 2018-01-01

## 2018-01-01 RX ORDER — ALBUTEROL SULFATE 0.83 MG/ML
SOLUTION RESPIRATORY (INHALATION)
Status: DISCONTINUED
Start: 2018-01-01 | End: 2018-01-01

## 2018-01-01 RX ORDER — MAGNESIUM SULFATE HEPTAHYDRATE 40 MG/ML
2 INJECTION, SOLUTION INTRAVENOUS ONCE
Status: COMPLETED | OUTPATIENT
Start: 2018-01-01 | End: 2018-01-01

## 2018-01-01 RX ORDER — ALBUTEROL SULFATE 0.83 MG/ML
2.5 SOLUTION RESPIRATORY (INHALATION)
Qty: 24 EACH | Refills: 0 | Status: SHIPPED | OUTPATIENT
Start: 2018-01-01 | End: 2018-01-01

## 2018-01-01 RX ORDER — SODIUM POLYSTYRENE SULFONATE 15 G/60ML
1 SUSPENSION ORAL; RECTAL
Status: DISCONTINUED | OUTPATIENT
Start: 2018-01-01 | End: 2018-01-01 | Stop reason: SDUPTHER

## 2018-01-01 RX ORDER — LANOLIN ALCOHOL/MO/W.PET/CERES
400 CREAM (GRAM) TOPICAL
Status: DISCONTINUED | OUTPATIENT
Start: 2018-01-01 | End: 2018-01-01 | Stop reason: HOSPADM

## 2018-01-01 RX ORDER — SODIUM CHLORIDE 0.9 % (FLUSH) 0.9 %
5-10 SYRINGE (ML) INJECTION AS NEEDED
Status: DISCONTINUED | OUTPATIENT
Start: 2018-01-01 | End: 2018-01-01 | Stop reason: HOSPADM

## 2018-01-01 RX ORDER — POTASSIUM CHLORIDE 29.8 MG/ML
40 INJECTION INTRAVENOUS ONCE
Status: COMPLETED | OUTPATIENT
Start: 2018-01-01 | End: 2018-01-01

## 2018-01-01 RX ORDER — SODIUM CHLORIDE 0.9 % (FLUSH) 0.9 %
10 SYRINGE (ML) INJECTION AS NEEDED
Status: ACTIVE | OUTPATIENT
Start: 2018-01-01 | End: 2018-01-01

## 2018-01-01 RX ORDER — ALBUTEROL SULFATE 0.83 MG/ML
2.5 SOLUTION RESPIRATORY (INHALATION)
Status: COMPLETED | OUTPATIENT
Start: 2018-01-01 | End: 2018-01-01

## 2018-01-01 RX ORDER — SODIUM CHLORIDE 0.9 % (FLUSH) 0.9 %
5-10 SYRINGE (ML) INJECTION EVERY 8 HOURS
Status: DISCONTINUED | OUTPATIENT
Start: 2018-01-01 | End: 2018-01-01 | Stop reason: SDUPTHER

## 2018-01-01 RX ORDER — PREDNISONE 10 MG/1
TABLET ORAL
Qty: 48 TAB | Refills: 0 | Status: SHIPPED | OUTPATIENT
Start: 2018-01-01 | End: 2018-01-01 | Stop reason: ALTCHOICE

## 2018-01-01 RX ORDER — DEXTROSE 50 % IN WATER (D50W) INTRAVENOUS SYRINGE
25 ONCE
Status: COMPLETED | OUTPATIENT
Start: 2018-01-01 | End: 2018-01-01

## 2018-01-01 RX ORDER — DEXAMETHASONE SODIUM PHOSPHATE 100 MG/10ML
10 INJECTION INTRAMUSCULAR; INTRAVENOUS
Status: COMPLETED | OUTPATIENT
Start: 2018-01-01 | End: 2018-01-01

## 2018-01-01 RX ORDER — DEXAMETHASONE 4 MG/1
4 TABLET ORAL EVERY 6 HOURS
Qty: 120 TAB | Refills: 0 | Status: SHIPPED | OUTPATIENT
Start: 2018-01-01 | End: 2018-01-01 | Stop reason: SDUPTHER

## 2018-01-01 RX ORDER — PHENOBARBITAL SODIUM 130 MG/ML
90 INJECTION INTRAMUSCULAR ONCE
Status: DISCONTINUED | OUTPATIENT
Start: 2018-01-01 | End: 2018-01-01 | Stop reason: SDUPTHER

## 2018-01-01 RX ORDER — IPRATROPIUM BROMIDE AND ALBUTEROL SULFATE 2.5; .5 MG/3ML; MG/3ML
3 SOLUTION RESPIRATORY (INHALATION)
Status: COMPLETED | OUTPATIENT
Start: 2018-01-01 | End: 2018-01-01

## 2018-01-01 RX ORDER — SODIUM CHLORIDE 0.9 % (FLUSH) 0.9 %
10 SYRINGE (ML) INJECTION
Status: COMPLETED | OUTPATIENT
Start: 2018-01-01 | End: 2018-01-01

## 2018-01-01 RX ORDER — CALCIUM CARBONATE 200(500)MG
200 TABLET,CHEWABLE ORAL
Status: DISCONTINUED | OUTPATIENT
Start: 2018-01-01 | End: 2018-01-01 | Stop reason: HOSPADM

## 2018-01-01 RX ORDER — FOSPHENYTOIN SODIUM 50 MG/ML
100 INJECTION, SOLUTION INTRAMUSCULAR; INTRAVENOUS EVERY 8 HOURS
Status: DISCONTINUED | OUTPATIENT
Start: 2018-01-01 | End: 2018-01-01

## 2018-01-01 RX ORDER — MIRTAZAPINE 15 MG/1
15 TABLET, FILM COATED ORAL
Status: DISCONTINUED | OUTPATIENT
Start: 2018-01-01 | End: 2018-01-01 | Stop reason: HOSPADM

## 2018-01-01 RX ORDER — HEPARIN 100 UNIT/ML
500 SYRINGE INTRAVENOUS ONCE
Status: COMPLETED | OUTPATIENT
Start: 2018-01-01 | End: 2018-01-01

## 2018-01-01 RX ADMIN — Medication 400 MG: at 16:48

## 2018-01-01 RX ADMIN — SODIUM CHLORIDE 1000 ML: 900 INJECTION, SOLUTION INTRAVENOUS at 21:20

## 2018-01-01 RX ADMIN — LEVETIRACETAM 1000 MG: 500 TABLET ORAL at 21:52

## 2018-01-01 RX ADMIN — SODIUM CHLORIDE, SODIUM LACTATE, POTASSIUM CHLORIDE, AND CALCIUM CHLORIDE 100 ML/HR: 600; 310; 30; 20 INJECTION, SOLUTION INTRAVENOUS at 22:09

## 2018-01-01 RX ADMIN — PANTOPRAZOLE SODIUM 40 MG: 40 TABLET, DELAYED RELEASE ORAL at 08:51

## 2018-01-01 RX ADMIN — DEXAMETHASONE SODIUM PHOSPHATE 4 MG: 4 INJECTION, SOLUTION INTRAMUSCULAR; INTRAVENOUS at 12:36

## 2018-01-01 RX ADMIN — LEVETIRACETAM 1000 MG: 100 INJECTION, SOLUTION INTRAVENOUS at 21:50

## 2018-01-01 RX ADMIN — DEXAMETHASONE SODIUM PHOSPHATE 4 MG: 4 INJECTION, SOLUTION INTRAMUSCULAR; INTRAVENOUS at 05:19

## 2018-01-01 RX ADMIN — LEVALBUTEROL HYDROCHLORIDE 0.63 MG: 0.63 SOLUTION RESPIRATORY (INHALATION) at 14:14

## 2018-01-01 RX ADMIN — Medication 10 ML: at 22:09

## 2018-01-01 RX ADMIN — Medication 400 MG: at 17:29

## 2018-01-01 RX ADMIN — Medication 10 ML: at 05:27

## 2018-01-01 RX ADMIN — SODIUM CHLORIDE, PRESERVATIVE FREE 500 UNITS: 5 INJECTION INTRAVENOUS at 16:56

## 2018-01-01 RX ADMIN — Medication 400 MG: at 11:33

## 2018-01-01 RX ADMIN — MIRTAZAPINE 15 MG: 15 TABLET, FILM COATED ORAL at 21:53

## 2018-01-01 RX ADMIN — SODIUM CHLORIDE 1000 ML: 900 INJECTION, SOLUTION INTRAVENOUS at 08:31

## 2018-01-01 RX ADMIN — LEVETIRACETAM 1000 MG: 500 TABLET ORAL at 20:56

## 2018-01-01 RX ADMIN — SODIUM CHLORIDE, PRESERVATIVE FREE 300 UNITS: 5 INJECTION INTRAVENOUS at 16:22

## 2018-01-01 RX ADMIN — DEXAMETHASONE SODIUM PHOSPHATE 4 MG: 4 INJECTION, SOLUTION INTRAMUSCULAR; INTRAVENOUS at 11:36

## 2018-01-01 RX ADMIN — Medication 10 ML: at 05:39

## 2018-01-01 RX ADMIN — PHENOBARBITAL SODIUM 30 MG: 65 INJECTION INTRAMUSCULAR; INTRAVENOUS at 09:45

## 2018-01-01 RX ADMIN — LEVALBUTEROL HYDROCHLORIDE 0.63 MG: 0.63 SOLUTION RESPIRATORY (INHALATION) at 20:40

## 2018-01-01 RX ADMIN — PHENOBARBITAL SODIUM 30 MG: 65 INJECTION INTRAMUSCULAR; INTRAVENOUS at 07:25

## 2018-01-01 RX ADMIN — DEXAMETHASONE SODIUM PHOSPHATE 4 MG: 4 INJECTION, SOLUTION INTRAMUSCULAR; INTRAVENOUS at 00:25

## 2018-01-01 RX ADMIN — OXYCODONE HYDROCHLORIDE 5 MG: 5 TABLET ORAL at 13:35

## 2018-01-01 RX ADMIN — Medication 400 MG: at 07:49

## 2018-01-01 RX ADMIN — LEVETIRACETAM 1000 MG: 500 TABLET ORAL at 07:23

## 2018-01-01 RX ADMIN — Medication 400 MG: at 07:56

## 2018-01-01 RX ADMIN — DEXAMETHASONE SODIUM PHOSPHATE 4 MG: 4 INJECTION, SOLUTION INTRAMUSCULAR; INTRAVENOUS at 05:20

## 2018-01-01 RX ADMIN — PANTOPRAZOLE SODIUM 40 MG: 40 TABLET, DELAYED RELEASE ORAL at 07:46

## 2018-01-01 RX ADMIN — Medication 400 MG: at 17:11

## 2018-01-01 RX ADMIN — LEVETIRACETAM 1000 MG: 100 INJECTION, SOLUTION INTRAVENOUS at 10:55

## 2018-01-01 RX ADMIN — METHYLPREDNISOLONE SODIUM SUCCINATE 125 MG: 125 INJECTION, POWDER, FOR SOLUTION INTRAMUSCULAR; INTRAVENOUS at 04:10

## 2018-01-01 RX ADMIN — DEXAMETHASONE SODIUM PHOSPHATE 4 MG: 4 INJECTION, SOLUTION INTRAMUSCULAR; INTRAVENOUS at 17:07

## 2018-01-01 RX ADMIN — IOPAMIDOL 100 ML: 755 INJECTION, SOLUTION INTRAVENOUS at 14:16

## 2018-01-01 RX ADMIN — LEVALBUTEROL HYDROCHLORIDE 0.63 MG: 0.63 SOLUTION RESPIRATORY (INHALATION) at 14:02

## 2018-01-01 RX ADMIN — DEXAMETHASONE SODIUM PHOSPHATE 4 MG: 4 INJECTION, SOLUTION INTRAMUSCULAR; INTRAVENOUS at 01:00

## 2018-01-01 RX ADMIN — Medication 10 ML: at 21:05

## 2018-01-01 RX ADMIN — DEXAMETHASONE SODIUM PHOSPHATE 4 MG: 4 INJECTION, SOLUTION INTRAMUSCULAR; INTRAVENOUS at 05:56

## 2018-01-01 RX ADMIN — DEXAMETHASONE SODIUM PHOSPHATE 4 MG: 4 INJECTION, SOLUTION INTRAMUSCULAR; INTRAVENOUS at 17:49

## 2018-01-01 RX ADMIN — MIRTAZAPINE 15 MG: 15 TABLET, FILM COATED ORAL at 21:29

## 2018-01-01 RX ADMIN — DEXAMETHASONE SODIUM PHOSPHATE 4 MG: 4 INJECTION, SOLUTION INTRAMUSCULAR; INTRAVENOUS at 23:31

## 2018-01-01 RX ADMIN — LEVALBUTEROL HYDROCHLORIDE 0.63 MG: 0.63 SOLUTION RESPIRATORY (INHALATION) at 08:43

## 2018-01-01 RX ADMIN — DEXAMETHASONE SODIUM PHOSPHATE 4 MG: 4 INJECTION, SOLUTION INTRAMUSCULAR; INTRAVENOUS at 12:50

## 2018-01-01 RX ADMIN — Medication 10 ML: at 21:50

## 2018-01-01 RX ADMIN — Medication 400 MG: at 12:49

## 2018-01-01 RX ADMIN — DEXAMETHASONE SODIUM PHOSPHATE 4 MG: 4 INJECTION, SOLUTION INTRAMUSCULAR; INTRAVENOUS at 05:01

## 2018-01-01 RX ADMIN — POTASSIUM CHLORIDE 20 MEQ: 200 INJECTION, SOLUTION INTRAVENOUS at 07:44

## 2018-01-01 RX ADMIN — LEVETIRACETAM 1000 MG: 500 TABLET ORAL at 08:59

## 2018-01-01 RX ADMIN — Medication 400 MG: at 11:26

## 2018-01-01 RX ADMIN — PHENOBARBITAL SODIUM 30 MG: 65 INJECTION INTRAMUSCULAR; INTRAVENOUS at 20:56

## 2018-01-01 RX ADMIN — Medication 10 ML: at 16:56

## 2018-01-01 RX ADMIN — DEXAMETHASONE SODIUM PHOSPHATE 4 MG: 4 INJECTION, SOLUTION INTRAMUSCULAR; INTRAVENOUS at 18:16

## 2018-01-01 RX ADMIN — POTASSIUM CHLORIDE 40 MEQ: 400 INJECTION, SOLUTION INTRAVENOUS at 05:52

## 2018-01-01 RX ADMIN — LEVALBUTEROL HYDROCHLORIDE 0.63 MG: 0.63 SOLUTION RESPIRATORY (INHALATION) at 02:30

## 2018-01-01 RX ADMIN — Medication 10 ML: at 05:20

## 2018-01-01 RX ADMIN — DEXAMETHASONE SODIUM PHOSPHATE 4 MG: 4 INJECTION, SOLUTION INTRAMUSCULAR; INTRAVENOUS at 00:46

## 2018-01-01 RX ADMIN — PHENOBARBITAL SODIUM 30 MG: 65 INJECTION INTRAMUSCULAR; INTRAVENOUS at 08:59

## 2018-01-01 RX ADMIN — LEVALBUTEROL HYDROCHLORIDE 0.63 MG: 0.63 SOLUTION RESPIRATORY (INHALATION) at 20:03

## 2018-01-01 RX ADMIN — Medication 10 ML: at 07:21

## 2018-01-01 RX ADMIN — Medication 400 MG: at 12:55

## 2018-01-01 RX ADMIN — Medication 10 ML: at 21:35

## 2018-01-01 RX ADMIN — SODIUM CHLORIDE 100 ML: 900 INJECTION, SOLUTION INTRAVENOUS at 14:16

## 2018-01-01 RX ADMIN — SODIUM CHLORIDE 500 ML: 900 INJECTION, SOLUTION INTRAVENOUS at 14:25

## 2018-01-01 RX ADMIN — DEXAMETHASONE SODIUM PHOSPHATE 4 MG: 4 INJECTION, SOLUTION INTRAMUSCULAR; INTRAVENOUS at 11:33

## 2018-01-01 RX ADMIN — Medication 10 ML: at 12:29

## 2018-01-01 RX ADMIN — LEVALBUTEROL HYDROCHLORIDE 0.63 MG: 0.63 SOLUTION RESPIRATORY (INHALATION) at 01:29

## 2018-01-01 RX ADMIN — CALCIUM CARBONATE (ANTACID) CHEW TAB 500 MG 200 MG: 500 CHEW TAB at 13:58

## 2018-01-01 RX ADMIN — Medication 10 ML: at 14:23

## 2018-01-01 RX ADMIN — Medication 10 ML: at 11:55

## 2018-01-01 RX ADMIN — LEVALBUTEROL HYDROCHLORIDE 0.63 MG: 0.63 SOLUTION RESPIRATORY (INHALATION) at 08:08

## 2018-01-01 RX ADMIN — PHENOBARBITAL SODIUM 30 MG: 65 INJECTION INTRAMUSCULAR; INTRAVENOUS at 20:51

## 2018-01-01 RX ADMIN — DEXAMETHASONE SODIUM PHOSPHATE 4 MG: 4 INJECTION, SOLUTION INTRAMUSCULAR; INTRAVENOUS at 05:32

## 2018-01-01 RX ADMIN — PHENOBARBITAL SODIUM 30 MG: 65 INJECTION INTRAMUSCULAR; INTRAVENOUS at 21:02

## 2018-01-01 RX ADMIN — Medication 400 MG: at 07:12

## 2018-01-01 RX ADMIN — LEVETIRACETAM 1000 MG: 500 TABLET ORAL at 08:22

## 2018-01-01 RX ADMIN — DEXAMETHASONE SODIUM PHOSPHATE 4 MG: 4 INJECTION, SOLUTION INTRAMUSCULAR; INTRAVENOUS at 12:08

## 2018-01-01 RX ADMIN — Medication 400 MG: at 17:51

## 2018-01-01 RX ADMIN — LEVALBUTEROL HYDROCHLORIDE 0.63 MG: 0.63 SOLUTION RESPIRATORY (INHALATION) at 08:25

## 2018-01-01 RX ADMIN — Medication 400 MG: at 17:07

## 2018-01-01 RX ADMIN — LEVALBUTEROL HYDROCHLORIDE 0.63 MG: 0.63 SOLUTION RESPIRATORY (INHALATION) at 07:10

## 2018-01-01 RX ADMIN — PHENOBARBITAL SODIUM 30 MG: 65 INJECTION INTRAMUSCULAR; INTRAVENOUS at 08:20

## 2018-01-01 RX ADMIN — DEXAMETHASONE SODIUM PHOSPHATE 4 MG: 4 INJECTION, SOLUTION INTRAMUSCULAR; INTRAVENOUS at 04:58

## 2018-01-01 RX ADMIN — Medication 10 ML: at 14:16

## 2018-01-01 RX ADMIN — PANTOPRAZOLE SODIUM 40 MG: 40 TABLET, DELAYED RELEASE ORAL at 07:33

## 2018-01-01 RX ADMIN — DEXAMETHASONE SODIUM PHOSPHATE 4 MG: 4 INJECTION, SOLUTION INTRAMUSCULAR; INTRAVENOUS at 12:55

## 2018-01-01 RX ADMIN — Medication 400 MG: at 07:33

## 2018-01-01 RX ADMIN — LEVETIRACETAM 1000 MG: 100 INJECTION, SOLUTION INTRAVENOUS at 22:34

## 2018-01-01 RX ADMIN — DEXAMETHASONE SODIUM PHOSPHATE 4 MG: 4 INJECTION, SOLUTION INTRAMUSCULAR; INTRAVENOUS at 16:55

## 2018-01-01 RX ADMIN — Medication 10 ML: at 21:42

## 2018-01-01 RX ADMIN — SODIUM CHLORIDE 40 MG: 9 INJECTION INTRAMUSCULAR; INTRAVENOUS; SUBCUTANEOUS at 13:02

## 2018-01-01 RX ADMIN — DEXAMETHASONE SODIUM PHOSPHATE 4 MG: 4 INJECTION, SOLUTION INTRAMUSCULAR; INTRAVENOUS at 17:29

## 2018-01-01 RX ADMIN — MIRTAZAPINE 15 MG: 15 TABLET, FILM COATED ORAL at 21:23

## 2018-01-01 RX ADMIN — DEXAMETHASONE SODIUM PHOSPHATE 4 MG: 4 INJECTION, SOLUTION INTRAMUSCULAR; INTRAVENOUS at 13:30

## 2018-01-01 RX ADMIN — DEXAMETHASONE SODIUM PHOSPHATE 4 MG: 4 INJECTION, SOLUTION INTRAMUSCULAR; INTRAVENOUS at 17:43

## 2018-01-01 RX ADMIN — DEXTROSE MONOHYDRATE 25 G: 25 INJECTION, SOLUTION INTRAVENOUS at 00:07

## 2018-01-01 RX ADMIN — Medication 10 ML: at 17:29

## 2018-01-01 RX ADMIN — Medication 10 ML: at 21:41

## 2018-01-01 RX ADMIN — Medication 400 MG: at 16:23

## 2018-01-01 RX ADMIN — LEVALBUTEROL HYDROCHLORIDE 0.63 MG: 0.63 SOLUTION RESPIRATORY (INHALATION) at 01:17

## 2018-01-01 RX ADMIN — Medication 400 MG: at 11:18

## 2018-01-01 RX ADMIN — PHENOBARBITAL SODIUM 30 MG: 65 INJECTION INTRAMUSCULAR; INTRAVENOUS at 21:54

## 2018-01-01 RX ADMIN — LEVETIRACETAM 1000 MG: 500 TABLET ORAL at 08:51

## 2018-01-01 RX ADMIN — FOSPHENYTOIN SODIUM 100 MG PE: 50 INJECTION, SOLUTION INTRAMUSCULAR; INTRAVENOUS at 05:52

## 2018-01-01 RX ADMIN — PHENOBARBITAL SODIUM 30 MG: 65 INJECTION INTRAMUSCULAR; INTRAVENOUS at 20:34

## 2018-01-01 RX ADMIN — LEVALBUTEROL HYDROCHLORIDE 0.63 MG: 0.63 SOLUTION RESPIRATORY (INHALATION) at 01:04

## 2018-01-01 RX ADMIN — LEVALBUTEROL HYDROCHLORIDE 0.63 MG: 0.63 SOLUTION RESPIRATORY (INHALATION) at 14:25

## 2018-01-01 RX ADMIN — LEVALBUTEROL HYDROCHLORIDE 0.63 MG: 0.63 SOLUTION RESPIRATORY (INHALATION) at 04:03

## 2018-01-01 RX ADMIN — Medication 400 MG: at 17:04

## 2018-01-01 RX ADMIN — Medication 10 ML: at 20:57

## 2018-01-01 RX ADMIN — MIRTAZAPINE 15 MG: 15 TABLET, FILM COATED ORAL at 21:03

## 2018-01-01 RX ADMIN — MIRTAZAPINE 15 MG: 15 TABLET, FILM COATED ORAL at 21:52

## 2018-01-01 RX ADMIN — Medication 10 ML: at 05:28

## 2018-01-01 RX ADMIN — DEXAMETHASONE SODIUM PHOSPHATE 4 MG: 4 INJECTION, SOLUTION INTRAMUSCULAR; INTRAVENOUS at 12:18

## 2018-01-01 RX ADMIN — LEVALBUTEROL HYDROCHLORIDE 0.63 MG: 0.63 SOLUTION RESPIRATORY (INHALATION) at 22:42

## 2018-01-01 RX ADMIN — Medication 10 ML: at 08:20

## 2018-01-01 RX ADMIN — PANTOPRAZOLE SODIUM 40 MG: 40 TABLET, DELAYED RELEASE ORAL at 07:23

## 2018-01-01 RX ADMIN — SODIUM CHLORIDE, SODIUM LACTATE, POTASSIUM CHLORIDE, AND CALCIUM CHLORIDE 100 ML/HR: 600; 310; 30; 20 INJECTION, SOLUTION INTRAVENOUS at 15:52

## 2018-01-01 RX ADMIN — DEXAMETHASONE SODIUM PHOSPHATE 4 MG: 4 INJECTION, SOLUTION INTRAMUSCULAR; INTRAVENOUS at 17:12

## 2018-01-01 RX ADMIN — LEVALBUTEROL HYDROCHLORIDE 0.63 MG: 0.63 SOLUTION RESPIRATORY (INHALATION) at 21:11

## 2018-01-01 RX ADMIN — ALBUTEROL SULFATE 2.5 MG: 2.5 SOLUTION RESPIRATORY (INHALATION) at 06:08

## 2018-01-01 RX ADMIN — SODIUM CHLORIDE, SODIUM LACTATE, POTASSIUM CHLORIDE, AND CALCIUM CHLORIDE 100 ML/HR: 600; 310; 30; 20 INJECTION, SOLUTION INTRAVENOUS at 02:42

## 2018-01-01 RX ADMIN — Medication 10 ML: at 07:56

## 2018-01-01 RX ADMIN — Medication 10 ML: at 21:23

## 2018-01-01 RX ADMIN — POTASSIUM CHLORIDE 40 MEQ: 20 TABLET, EXTENDED RELEASE ORAL at 14:25

## 2018-01-01 RX ADMIN — LEVETIRACETAM 1000 MG: 500 TABLET ORAL at 21:44

## 2018-01-01 RX ADMIN — Medication 10 ML: at 05:13

## 2018-01-01 RX ADMIN — PHENOBARBITAL SODIUM 30 MG: 65 INJECTION INTRAMUSCULAR; INTRAVENOUS at 21:29

## 2018-01-01 RX ADMIN — Medication 400 MG: at 13:00

## 2018-01-01 RX ADMIN — DEXAMETHASONE SODIUM PHOSPHATE 4 MG: 4 INJECTION, SOLUTION INTRAMUSCULAR; INTRAVENOUS at 23:28

## 2018-01-01 RX ADMIN — GADOBENATE DIMEGLUMINE 10 ML: 529 INJECTION, SOLUTION INTRAVENOUS at 17:18

## 2018-01-01 RX ADMIN — LEVALBUTEROL HYDROCHLORIDE 0.63 MG: 0.63 SOLUTION RESPIRATORY (INHALATION) at 02:59

## 2018-01-01 RX ADMIN — TUBERCULIN PURIFIED PROTEIN DERIVATIVE 5 UNITS: 5 INJECTION, SOLUTION INTRADERMAL at 14:00

## 2018-01-01 RX ADMIN — Medication 400 MG: at 13:58

## 2018-01-01 RX ADMIN — SODIUM CHLORIDE 500 ML: 900 INJECTION, SOLUTION INTRAVENOUS at 13:18

## 2018-01-01 RX ADMIN — SODIUM POLYSTYRENE SULFONATE 45 G: 15 SUSPENSION ORAL; RECTAL at 01:18

## 2018-01-01 RX ADMIN — Medication 400 MG: at 17:46

## 2018-01-01 RX ADMIN — MIRTAZAPINE 15 MG: 15 TABLET, FILM COATED ORAL at 20:34

## 2018-01-01 RX ADMIN — Medication 400 MG: at 12:29

## 2018-01-01 RX ADMIN — MAGNESIUM SULFATE HEPTAHYDRATE 2 G: 40 INJECTION, SOLUTION INTRAVENOUS at 11:53

## 2018-01-01 RX ADMIN — Medication 10 ML: at 00:48

## 2018-01-01 RX ADMIN — DEXAMETHASONE SODIUM PHOSPHATE 4 MG: 4 INJECTION, SOLUTION INTRAMUSCULAR; INTRAVENOUS at 05:37

## 2018-01-01 RX ADMIN — LEVALBUTEROL HYDROCHLORIDE 0.63 MG: 0.63 SOLUTION RESPIRATORY (INHALATION) at 20:29

## 2018-01-01 RX ADMIN — PANTOPRAZOLE SODIUM 40 MG: 40 TABLET, DELAYED RELEASE ORAL at 07:11

## 2018-01-01 RX ADMIN — Medication 10 ML: at 23:32

## 2018-01-01 RX ADMIN — Medication 10 ML: at 12:19

## 2018-01-01 RX ADMIN — ACETAMINOPHEN 650 MG: 325 TABLET ORAL at 07:54

## 2018-01-01 RX ADMIN — DEXAMETHASONE SODIUM PHOSPHATE 4 MG: 4 INJECTION, SOLUTION INTRAMUSCULAR; INTRAVENOUS at 05:12

## 2018-01-01 RX ADMIN — PANTOPRAZOLE SODIUM 40 MG: 40 TABLET, DELAYED RELEASE ORAL at 07:49

## 2018-01-01 RX ADMIN — MIRTAZAPINE 15 MG: 15 TABLET, FILM COATED ORAL at 21:41

## 2018-01-01 RX ADMIN — LEVETIRACETAM 1000 MG: 100 INJECTION, SOLUTION INTRAVENOUS at 21:43

## 2018-01-01 RX ADMIN — PANTOPRAZOLE SODIUM 40 MG: 40 TABLET, DELAYED RELEASE ORAL at 08:30

## 2018-01-01 RX ADMIN — PANTOPRAZOLE SODIUM 40 MG: 40 TABLET, DELAYED RELEASE ORAL at 07:24

## 2018-01-01 RX ADMIN — LEVETIRACETAM 1000 MG: 500 TABLET ORAL at 08:20

## 2018-01-01 RX ADMIN — LEVALBUTEROL HYDROCHLORIDE 0.63 MG: 0.63 SOLUTION RESPIRATORY (INHALATION) at 02:20

## 2018-01-01 RX ADMIN — Medication 10 ML: at 05:32

## 2018-01-01 RX ADMIN — Medication 10 ML: at 14:30

## 2018-01-01 RX ADMIN — ONDANSETRON 8 MG: 2 INJECTION INTRAMUSCULAR; INTRAVENOUS at 14:21

## 2018-01-01 RX ADMIN — SODIUM CHLORIDE, SODIUM LACTATE, POTASSIUM CHLORIDE, AND CALCIUM CHLORIDE 100 ML/HR: 600; 310; 30; 20 INJECTION, SOLUTION INTRAVENOUS at 12:13

## 2018-01-01 RX ADMIN — LEVALBUTEROL HYDROCHLORIDE 0.63 MG: 0.63 SOLUTION RESPIRATORY (INHALATION) at 07:16

## 2018-01-01 RX ADMIN — SODIUM CHLORIDE 55 MG: 900 INJECTION, SOLUTION INTRAVENOUS at 14:20

## 2018-01-01 RX ADMIN — LEVETIRACETAM 1000 MG: 500 TABLET ORAL at 21:29

## 2018-01-01 RX ADMIN — PHENOBARBITAL SODIUM 30 MG: 65 INJECTION INTRAMUSCULAR; INTRAVENOUS at 21:36

## 2018-01-01 RX ADMIN — SODIUM CHLORIDE 1000 ML: 900 INJECTION, SOLUTION INTRAVENOUS at 08:34

## 2018-01-01 RX ADMIN — LEVETIRACETAM 1000 MG: 500 TABLET ORAL at 07:56

## 2018-01-01 RX ADMIN — LEVETIRACETAM 1000 MG: 500 TABLET ORAL at 08:30

## 2018-01-01 RX ADMIN — PHENOBARBITAL SODIUM 30 MG: 65 INJECTION INTRAMUSCULAR; INTRAVENOUS at 08:41

## 2018-01-01 RX ADMIN — LEVETIRACETAM 1000 MG: 500 TABLET ORAL at 21:02

## 2018-01-01 RX ADMIN — PHENOBARBITAL SODIUM 30 MG: 65 INJECTION INTRAMUSCULAR; INTRAVENOUS at 21:03

## 2018-01-01 RX ADMIN — SODIUM CHLORIDE 158 MG: 900 INJECTION, SOLUTION INTRAVENOUS at 14:53

## 2018-01-01 RX ADMIN — DEXAMETHASONE SODIUM PHOSPHATE 4 MG: 4 INJECTION, SOLUTION INTRAMUSCULAR; INTRAVENOUS at 23:38

## 2018-01-01 RX ADMIN — DEXAMETHASONE SODIUM PHOSPHATE 4 MG: 4 INJECTION, SOLUTION INTRAMUSCULAR; INTRAVENOUS at 00:19

## 2018-01-01 RX ADMIN — LEVALBUTEROL HYDROCHLORIDE 0.63 MG: 0.63 SOLUTION RESPIRATORY (INHALATION) at 13:26

## 2018-01-01 RX ADMIN — SODIUM CHLORIDE, SODIUM LACTATE, POTASSIUM CHLORIDE, AND CALCIUM CHLORIDE 100 ML/HR: 600; 310; 30; 20 INJECTION, SOLUTION INTRAVENOUS at 00:48

## 2018-01-01 RX ADMIN — Medication 10 ML: at 15:52

## 2018-01-01 RX ADMIN — LEVETIRACETAM 1000 MG: 100 INJECTION, SOLUTION INTRAVENOUS at 09:16

## 2018-01-01 RX ADMIN — MIRTAZAPINE 15 MG: 15 TABLET, FILM COATED ORAL at 21:40

## 2018-01-01 RX ADMIN — LEVETIRACETAM 1000 MG: 100 INJECTION, SOLUTION INTRAVENOUS at 21:35

## 2018-01-01 RX ADMIN — SODIUM CHLORIDE 1500 MG: 900 INJECTION, SOLUTION INTRAVENOUS at 12:13

## 2018-01-01 RX ADMIN — LEVETIRACETAM 1000 MG: 100 INJECTION, SOLUTION INTRAVENOUS at 21:42

## 2018-01-01 RX ADMIN — Medication 10 ML: at 05:56

## 2018-01-01 RX ADMIN — DEXAMETHASONE SODIUM PHOSPHATE 4 MG: 4 INJECTION, SOLUTION INTRAMUSCULAR; INTRAVENOUS at 00:11

## 2018-01-01 RX ADMIN — DEXAMETHASONE SODIUM PHOSPHATE 4 MG: 4 INJECTION, SOLUTION INTRAMUSCULAR; INTRAVENOUS at 11:20

## 2018-01-01 RX ADMIN — PHENOBARBITAL SODIUM 30 MG: 65 INJECTION INTRAMUSCULAR; INTRAVENOUS at 07:56

## 2018-01-01 RX ADMIN — PHENOBARBITAL SODIUM 30 MG: 65 INJECTION INTRAMUSCULAR; INTRAVENOUS at 08:29

## 2018-01-01 RX ADMIN — LEVETIRACETAM 1000 MG: 500 TABLET ORAL at 21:53

## 2018-01-01 RX ADMIN — Medication 10 ML: at 13:30

## 2018-01-01 RX ADMIN — ONDANSETRON 8 MG: 2 INJECTION INTRAMUSCULAR; INTRAVENOUS at 14:52

## 2018-01-01 RX ADMIN — LEVALBUTEROL HYDROCHLORIDE 0.63 MG: 0.63 SOLUTION RESPIRATORY (INHALATION) at 14:00

## 2018-01-01 RX ADMIN — DEXAMETHASONE SODIUM PHOSPHATE 4 MG: 4 INJECTION, SOLUTION INTRAMUSCULAR; INTRAVENOUS at 05:28

## 2018-01-01 RX ADMIN — ACETAMINOPHEN 650 MG: 325 TABLET, FILM COATED ORAL at 13:16

## 2018-01-01 RX ADMIN — Medication 10 ML: at 06:04

## 2018-01-01 RX ADMIN — DEXAMETHASONE SODIUM PHOSPHATE 4 MG: 4 INJECTION, SOLUTION INTRAMUSCULAR; INTRAVENOUS at 05:39

## 2018-01-01 RX ADMIN — DEXAMETHASONE SODIUM PHOSPHATE 4 MG: 4 INJECTION, SOLUTION INTRAMUSCULAR; INTRAVENOUS at 17:51

## 2018-01-01 RX ADMIN — LEVETIRACETAM 1000 MG: 100 INJECTION, SOLUTION INTRAVENOUS at 10:50

## 2018-01-01 RX ADMIN — Medication 10 ML: at 14:25

## 2018-01-01 RX ADMIN — DEXAMETHASONE SODIUM PHOSPHATE 4 MG: 4 INJECTION, SOLUTION INTRAMUSCULAR; INTRAVENOUS at 01:14

## 2018-01-01 RX ADMIN — Medication 10 ML: at 05:25

## 2018-01-01 RX ADMIN — DEXAMETHASONE SODIUM PHOSPHATE 4 MG: 4 INJECTION, SOLUTION INTRAMUSCULAR; INTRAVENOUS at 18:56

## 2018-01-01 RX ADMIN — Medication 400 MG: at 07:46

## 2018-01-01 RX ADMIN — LEVETIRACETAM 1000 MG: 100 INJECTION, SOLUTION INTRAVENOUS at 22:09

## 2018-01-01 RX ADMIN — Medication 10 ML: at 05:02

## 2018-01-01 RX ADMIN — Medication 10 ML: at 22:35

## 2018-01-01 RX ADMIN — PHENOBARBITAL SODIUM 30 MG: 65 INJECTION INTRAMUSCULAR; INTRAVENOUS at 21:23

## 2018-01-01 RX ADMIN — LEVALBUTEROL HYDROCHLORIDE 0.63 MG: 0.63 SOLUTION RESPIRATORY (INHALATION) at 20:25

## 2018-01-01 RX ADMIN — Medication 400 MG: at 07:07

## 2018-01-01 RX ADMIN — PANTOPRAZOLE SODIUM 40 MG: 40 TABLET, DELAYED RELEASE ORAL at 07:52

## 2018-01-01 RX ADMIN — Medication 400 MG: at 08:51

## 2018-01-01 RX ADMIN — Medication 10 ML: at 17:12

## 2018-01-01 RX ADMIN — LEVALBUTEROL HYDROCHLORIDE 0.63 MG: 0.63 SOLUTION RESPIRATORY (INHALATION) at 08:45

## 2018-01-01 RX ADMIN — PHENOBARBITAL SODIUM 30 MG: 65 INJECTION INTRAMUSCULAR; INTRAVENOUS at 08:52

## 2018-01-01 RX ADMIN — PHENOBARBITAL SODIUM 90 MG: 65 INJECTION INTRAMUSCULAR; INTRAVENOUS at 10:45

## 2018-01-01 RX ADMIN — DEXAMETHASONE SODIUM PHOSPHATE 4 MG: 4 INJECTION, SOLUTION INTRAMUSCULAR; INTRAVENOUS at 16:59

## 2018-01-01 RX ADMIN — PHENOBARBITAL SODIUM 30 MG: 65 INJECTION INTRAMUSCULAR; INTRAVENOUS at 07:23

## 2018-01-01 RX ADMIN — Medication 10 ML: at 16:22

## 2018-01-01 RX ADMIN — DEXAMETHASONE SODIUM PHOSPHATE 4 MG: 4 INJECTION, SOLUTION INTRAMUSCULAR; INTRAVENOUS at 06:03

## 2018-01-01 RX ADMIN — LEVETIRACETAM 1000 MG: 500 TABLET ORAL at 07:11

## 2018-01-01 RX ADMIN — DEXAMETHASONE SODIUM PHOSPHATE 4 MG: 4 INJECTION, SOLUTION INTRAMUSCULAR; INTRAVENOUS at 05:26

## 2018-01-01 RX ADMIN — PHENOBARBITAL SODIUM 30 MG: 65 INJECTION INTRAMUSCULAR; INTRAVENOUS at 08:27

## 2018-01-01 RX ADMIN — Medication 10 ML: at 14:54

## 2018-01-01 RX ADMIN — ALBUTEROL SULFATE 2.5 MG: 0.83 SOLUTION RESPIRATORY (INHALATION) at 13:50

## 2018-01-01 RX ADMIN — Medication 400 MG: at 13:30

## 2018-01-01 RX ADMIN — Medication 10 ML: at 21:58

## 2018-01-01 RX ADMIN — Medication 10 ML: at 06:00

## 2018-01-01 RX ADMIN — Medication 400 MG: at 08:06

## 2018-01-01 RX ADMIN — DEXAMETHASONE SODIUM PHOSPHATE 4 MG: 4 INJECTION, SOLUTION INTRAMUSCULAR; INTRAVENOUS at 12:29

## 2018-01-01 RX ADMIN — Medication 400 MG: at 07:54

## 2018-01-01 RX ADMIN — DEXAMETHASONE SODIUM PHOSPHATE 4 MG: 4 INJECTION, SOLUTION INTRAMUSCULAR; INTRAVENOUS at 00:08

## 2018-01-01 RX ADMIN — Medication 10 ML: at 21:03

## 2018-01-01 RX ADMIN — DEXAMETHASONE SODIUM PHOSPHATE 4 MG: 4 INJECTION, SOLUTION INTRAMUSCULAR; INTRAVENOUS at 13:58

## 2018-01-01 RX ADMIN — PHENOBARBITAL SODIUM 30 MG: 65 INJECTION INTRAMUSCULAR; INTRAVENOUS at 21:44

## 2018-01-01 RX ADMIN — DEXAMETHASONE SODIUM PHOSPHATE 4 MG: 4 INJECTION, SOLUTION INTRAMUSCULAR; INTRAVENOUS at 23:26

## 2018-01-01 RX ADMIN — SODIUM CHLORIDE 40 MG: 9 INJECTION INTRAMUSCULAR; INTRAVENOUS; SUBCUTANEOUS at 07:30

## 2018-01-01 RX ADMIN — DEXAMETHASONE SODIUM PHOSPHATE 4 MG: 4 INJECTION, SOLUTION INTRAMUSCULAR; INTRAVENOUS at 17:46

## 2018-01-01 RX ADMIN — LEVETIRACETAM 1000 MG: 500 TABLET ORAL at 20:50

## 2018-01-01 RX ADMIN — ACETAMINOPHEN 650 MG: 325 TABLET ORAL at 16:58

## 2018-01-01 RX ADMIN — PHENOBARBITAL SODIUM 30 MG: 65 INJECTION INTRAMUSCULAR; INTRAVENOUS at 21:41

## 2018-01-01 RX ADMIN — SODIUM CHLORIDE 165 MG: 900 INJECTION, SOLUTION INTRAVENOUS at 16:33

## 2018-01-01 RX ADMIN — Medication 10 ML: at 17:18

## 2018-01-01 RX ADMIN — ACETAMINOPHEN 650 MG: 325 TABLET ORAL at 17:37

## 2018-01-01 RX ADMIN — Medication 10 ML: at 05:47

## 2018-01-01 RX ADMIN — PHENOBARBITAL SODIUM 30 MG: 65 INJECTION INTRAMUSCULAR; INTRAVENOUS at 07:12

## 2018-01-01 RX ADMIN — SODIUM CHLORIDE 500 MG: 900 INJECTION, SOLUTION INTRAVENOUS at 00:55

## 2018-01-01 RX ADMIN — LEVALBUTEROL HYDROCHLORIDE 0.63 MG: 0.63 SOLUTION RESPIRATORY (INHALATION) at 20:46

## 2018-01-01 RX ADMIN — SODIUM CHLORIDE 40 MG: 9 INJECTION INTRAMUSCULAR; INTRAVENOUS; SUBCUTANEOUS at 08:37

## 2018-01-01 RX ADMIN — LEVETIRACETAM 1000 MG: 500 TABLET ORAL at 21:03

## 2018-01-01 RX ADMIN — DEXAMETHASONE SODIUM PHOSPHATE 4 MG: 4 INJECTION, SOLUTION INTRAMUSCULAR; INTRAVENOUS at 17:04

## 2018-01-01 RX ADMIN — LEVALBUTEROL HYDROCHLORIDE 0.63 MG: 0.63 SOLUTION RESPIRATORY (INHALATION) at 07:55

## 2018-01-01 RX ADMIN — DEXAMETHASONE SODIUM PHOSPHATE 4 MG: 4 INJECTION, SOLUTION INTRAMUSCULAR; INTRAVENOUS at 19:17

## 2018-01-01 RX ADMIN — LEVALBUTEROL HYDROCHLORIDE 0.63 MG: 0.63 SOLUTION RESPIRATORY (INHALATION) at 02:11

## 2018-01-01 RX ADMIN — DEXAMETHASONE SODIUM PHOSPHATE 4 MG: 4 INJECTION, SOLUTION INTRAMUSCULAR; INTRAVENOUS at 23:29

## 2018-01-01 RX ADMIN — SODIUM CHLORIDE 1500 MG PE: 900 INJECTION, SOLUTION INTRAVENOUS at 22:54

## 2018-01-01 RX ADMIN — PHENOBARBITAL SODIUM 30 MG: 65 INJECTION INTRAMUSCULAR; INTRAVENOUS at 21:35

## 2018-01-01 RX ADMIN — Medication 10 ML: at 14:00

## 2018-01-01 RX ADMIN — SODIUM CHLORIDE 40 MG: 9 INJECTION INTRAMUSCULAR; INTRAVENOUS; SUBCUTANEOUS at 08:27

## 2018-01-01 RX ADMIN — MIRTAZAPINE 15 MG: 15 TABLET, FILM COATED ORAL at 21:35

## 2018-01-01 RX ADMIN — OXYCODONE HYDROCHLORIDE 5 MG: 5 TABLET ORAL at 16:42

## 2018-01-01 RX ADMIN — PANTOPRAZOLE SODIUM 40 MG: 40 TABLET, DELAYED RELEASE ORAL at 07:08

## 2018-01-01 RX ADMIN — Medication 10 ML: at 23:39

## 2018-01-01 RX ADMIN — DENOSUMAB 120 MG: 120 INJECTION SUBCUTANEOUS at 14:20

## 2018-01-01 RX ADMIN — SODIUM CHLORIDE 165 MG: 900 INJECTION, SOLUTION INTRAVENOUS at 15:25

## 2018-01-01 RX ADMIN — LEVETIRACETAM 1000 MG: 100 INJECTION, SOLUTION INTRAVENOUS at 10:03

## 2018-01-01 RX ADMIN — Medication 400 MG: at 08:30

## 2018-01-01 RX ADMIN — LEVETIRACETAM 1000 MG: 500 TABLET ORAL at 09:47

## 2018-01-01 RX ADMIN — LEVETIRACETAM 1000 MG: 500 TABLET ORAL at 21:41

## 2018-01-01 RX ADMIN — Medication 10 ML: at 08:41

## 2018-01-01 RX ADMIN — Medication 10 ML: at 13:58

## 2018-01-01 RX ADMIN — DEXAMETHASONE SODIUM PHOSPHATE 4 MG: 4 INJECTION, SOLUTION INTRAMUSCULAR; INTRAVENOUS at 23:22

## 2018-01-01 RX ADMIN — Medication 400 MG: at 12:45

## 2018-01-01 RX ADMIN — DEXAMETHASONE SODIUM PHOSPHATE 4 MG: 4 INJECTION, SOLUTION INTRAMUSCULAR; INTRAVENOUS at 13:00

## 2018-01-01 RX ADMIN — LEVETIRACETAM 1000 MG: 100 INJECTION, SOLUTION INTRAVENOUS at 11:55

## 2018-01-01 RX ADMIN — PHENYTOIN 100 MG: 125 SUSPENSION ORAL at 22:55

## 2018-01-01 RX ADMIN — Medication 10 ML: at 09:00

## 2018-01-01 RX ADMIN — DEXAMETHASONE SODIUM PHOSPHATE 4 MG: 4 INJECTION, SOLUTION INTRAMUSCULAR; INTRAVENOUS at 05:21

## 2018-01-01 RX ADMIN — Medication 400 MG: at 17:43

## 2018-01-01 RX ADMIN — Medication 400 MG: at 12:36

## 2018-01-01 RX ADMIN — PHENOBARBITAL SODIUM 30 MG: 65 INJECTION INTRAMUSCULAR; INTRAVENOUS at 07:07

## 2018-01-01 RX ADMIN — PANTOPRAZOLE SODIUM 40 MG: 40 TABLET, DELAYED RELEASE ORAL at 07:55

## 2018-01-01 RX ADMIN — SODIUM CHLORIDE 40 MG: 9 INJECTION INTRAMUSCULAR; INTRAVENOUS; SUBCUTANEOUS at 07:44

## 2018-01-01 RX ADMIN — LEVALBUTEROL HYDROCHLORIDE 0.63 MG: 0.63 SOLUTION RESPIRATORY (INHALATION) at 14:13

## 2018-01-01 RX ADMIN — OXYCODONE HYDROCHLORIDE 10 MG: 5 TABLET ORAL at 14:32

## 2018-01-01 RX ADMIN — Medication 10 ML: at 14:56

## 2018-01-01 RX ADMIN — Medication 10 ML: at 21:29

## 2018-01-01 RX ADMIN — FOSPHENYTOIN SODIUM 100 MG PE: 50 INJECTION, SOLUTION INTRAMUSCULAR; INTRAVENOUS at 14:59

## 2018-01-01 RX ADMIN — LEVALBUTEROL HYDROCHLORIDE 0.63 MG: 0.63 SOLUTION RESPIRATORY (INHALATION) at 19:11

## 2018-01-01 RX ADMIN — PHENOBARBITAL SODIUM 30 MG: 65 INJECTION INTRAMUSCULAR; INTRAVENOUS at 08:37

## 2018-01-01 RX ADMIN — DEXAMETHASONE SODIUM PHOSPHATE 4 MG: 4 INJECTION, SOLUTION INTRAMUSCULAR; INTRAVENOUS at 16:30

## 2018-01-01 RX ADMIN — ACETAMINOPHEN 650 MG: 325 TABLET ORAL at 14:52

## 2018-01-01 RX ADMIN — Medication 400 MG: at 16:20

## 2018-01-01 RX ADMIN — LEVETIRACETAM 1000 MG: 500 TABLET ORAL at 21:23

## 2018-01-01 RX ADMIN — DEXAMETHASONE SODIUM PHOSPHATE 4 MG: 4 INJECTION, SOLUTION INTRAMUSCULAR; INTRAVENOUS at 05:25

## 2018-01-01 RX ADMIN — LEVETIRACETAM 1000 MG: 500 TABLET ORAL at 09:19

## 2018-01-01 RX ADMIN — DEXAMETHASONE SODIUM PHOSPHATE 4 MG: 4 INJECTION, SOLUTION INTRAMUSCULAR; INTRAVENOUS at 06:26

## 2018-01-01 RX ADMIN — LEVETIRACETAM 1000 MG: 500 TABLET ORAL at 07:24

## 2018-01-01 RX ADMIN — DEXAMETHASONE SODIUM PHOSPHATE 4 MG: 4 INJECTION, SOLUTION INTRAMUSCULAR; INTRAVENOUS at 11:05

## 2018-01-01 RX ADMIN — LEVETIRACETAM 1000 MG: 100 INJECTION, SOLUTION INTRAVENOUS at 10:48

## 2018-01-01 RX ADMIN — SODIUM CHLORIDE 40 MG: 9 INJECTION INTRAMUSCULAR; INTRAVENOUS; SUBCUTANEOUS at 08:06

## 2018-01-01 RX ADMIN — ACETAMINOPHEN 650 MG: 325 TABLET ORAL at 18:31

## 2018-01-01 RX ADMIN — Medication 10 ML: at 13:15

## 2018-01-01 RX ADMIN — DEXAMETHASONE SODIUM PHOSPHATE 4 MG: 4 INJECTION, SOLUTION INTRAMUSCULAR; INTRAVENOUS at 11:18

## 2018-01-01 RX ADMIN — DEXAMETHASONE SODIUM PHOSPHATE 4 MG: 4 INJECTION, SOLUTION INTRAMUSCULAR; INTRAVENOUS at 11:26

## 2018-01-01 RX ADMIN — LEVETIRACETAM 1000 MG: 500 TABLET ORAL at 21:36

## 2018-01-01 RX ADMIN — LEVETIRACETAM 1000 MG: 500 TABLET ORAL at 07:07

## 2018-01-01 RX ADMIN — LORAZEPAM 2 MG: 2 INJECTION, SOLUTION INTRAMUSCULAR; INTRAVENOUS at 00:58

## 2018-01-01 RX ADMIN — INSULIN HUMAN 10 UNITS: 100 INJECTION, SOLUTION PARENTERAL at 00:06

## 2018-01-01 RX ADMIN — Medication 400 MG: at 16:29

## 2018-01-01 RX ADMIN — CALCIUM GLUCONATE 2 G: 94 INJECTION, SOLUTION INTRAVENOUS at 01:17

## 2018-01-01 RX ADMIN — DEXAMETHASONE SODIUM PHOSPHATE 4 MG: 4 INJECTION, SOLUTION INTRAMUSCULAR; INTRAVENOUS at 16:48

## 2018-01-01 RX ADMIN — MIRTAZAPINE 15 MG: 15 TABLET, FILM COATED ORAL at 21:36

## 2018-01-01 RX ADMIN — Medication 400 MG: at 07:24

## 2018-01-01 RX ADMIN — IPRATROPIUM BROMIDE AND ALBUTEROL SULFATE 3 ML: .5; 3 SOLUTION RESPIRATORY (INHALATION) at 03:44

## 2018-01-01 RX ADMIN — Medication 400 MG: at 09:00

## 2018-01-01 RX ADMIN — DEXAMETHASONE SODIUM PHOSPHATE 4 MG: 4 INJECTION, SOLUTION INTRAMUSCULAR; INTRAVENOUS at 00:15

## 2018-01-01 RX ADMIN — MIRTAZAPINE 15 MG: 15 TABLET, FILM COATED ORAL at 21:44

## 2018-01-01 RX ADMIN — DEXAMETHASONE SODIUM PHOSPHATE 4 MG: 4 INJECTION, SOLUTION INTRAMUSCULAR; INTRAVENOUS at 17:19

## 2018-01-01 RX ADMIN — Medication 10 ML: at 12:50

## 2018-01-01 RX ADMIN — ACETAMINOPHEN 650 MG: 325 TABLET ORAL at 17:42

## 2018-01-01 RX ADMIN — Medication 10 ML: at 17:19

## 2018-01-01 RX ADMIN — Medication 400 MG: at 07:23

## 2018-01-01 RX ADMIN — DEXAMETHASONE SODIUM PHOSPHATE 4 MG: 4 INJECTION, SOLUTION INTRAMUSCULAR; INTRAVENOUS at 10:45

## 2018-01-01 RX ADMIN — Medication 400 MG: at 11:20

## 2018-01-01 RX ADMIN — Medication 10 ML: at 21:52

## 2018-01-01 RX ADMIN — Medication 10 ML: at 20:51

## 2018-01-01 RX ADMIN — PHENOBARBITAL SODIUM 30 MG: 65 INJECTION INTRAMUSCULAR; INTRAVENOUS at 08:05

## 2018-01-01 RX ADMIN — Medication 10 ML: at 21:45

## 2018-01-01 RX ADMIN — SODIUM CHLORIDE 500 ML: 900 INJECTION, SOLUTION INTRAVENOUS at 14:52

## 2018-01-01 RX ADMIN — PANTOPRAZOLE SODIUM 40 MG: 40 TABLET, DELAYED RELEASE ORAL at 07:57

## 2018-01-01 RX ADMIN — MIRTAZAPINE 15 MG: 15 TABLET, FILM COATED ORAL at 20:56

## 2018-01-01 RX ADMIN — ONDANSETRON 8 MG: 2 INJECTION INTRAMUSCULAR; INTRAVENOUS at 13:16

## 2018-01-01 RX ADMIN — PHENOBARBITAL SODIUM 30 MG: 65 INJECTION INTRAMUSCULAR; INTRAVENOUS at 21:52

## 2018-01-01 RX ADMIN — Medication 10 ML: at 13:53

## 2018-01-01 RX ADMIN — DEXAMETHASONE SODIUM PHOSPHATE 4 MG: 4 INJECTION, SOLUTION INTRAMUSCULAR; INTRAVENOUS at 12:06

## 2018-01-01 RX ADMIN — DEXAMETHASONE SODIUM PHOSPHATE 4 MG: 4 INJECTION, SOLUTION INTRAMUSCULAR; INTRAVENOUS at 11:55

## 2018-01-01 RX ADMIN — ALBUTEROL SULFATE 5 MG: 2.5 SOLUTION RESPIRATORY (INHALATION) at 01:10

## 2018-01-01 RX ADMIN — DEXAMETHASONE SODIUM PHOSPHATE 10 MG: 10 INJECTION INTRAMUSCULAR; INTRAVENOUS at 21:42

## 2018-01-01 RX ADMIN — Medication 400 MG: at 11:06

## 2018-01-01 RX ADMIN — DEXAMETHASONE SODIUM PHOSPHATE 4 MG: 4 INJECTION, SOLUTION INTRAMUSCULAR; INTRAVENOUS at 16:23

## 2018-01-01 RX ADMIN — LEVALBUTEROL HYDROCHLORIDE 0.63 MG: 0.63 SOLUTION RESPIRATORY (INHALATION) at 07:43

## 2018-01-01 RX ADMIN — SODIUM CHLORIDE 55 MG: 900 INJECTION, SOLUTION INTRAVENOUS at 15:22

## 2018-01-01 RX ADMIN — Medication 10 ML: at 05:26

## 2018-01-01 RX ADMIN — DEXAMETHASONE SODIUM PHOSPHATE 4 MG: 4 INJECTION, SOLUTION INTRAMUSCULAR; INTRAVENOUS at 18:13

## 2018-01-01 RX ADMIN — MIRTAZAPINE 15 MG: 15 TABLET, FILM COATED ORAL at 20:50

## 2018-01-01 RX ADMIN — Medication 10 ML: at 05:22

## 2018-01-01 RX ADMIN — Medication 10 ML: at 12:47

## 2018-01-01 RX ADMIN — SODIUM CHLORIDE, PRESERVATIVE FREE 500 UNITS: 5 INJECTION INTRAVENOUS at 14:31

## 2018-01-01 RX ADMIN — PANTOPRAZOLE SODIUM 40 MG: 40 TABLET, DELAYED RELEASE ORAL at 08:59

## 2018-01-01 RX ADMIN — LEVALBUTEROL HYDROCHLORIDE 0.63 MG: 0.63 SOLUTION RESPIRATORY (INHALATION) at 10:40

## 2018-01-01 RX ADMIN — LEVETIRACETAM 1000 MG: 500 TABLET ORAL at 21:40

## 2018-01-01 RX ADMIN — DEXAMETHASONE SODIUM PHOSPHATE 4 MG: 4 INJECTION, SOLUTION INTRAMUSCULAR; INTRAVENOUS at 05:47

## 2018-01-01 RX ADMIN — DEXAMETHASONE SODIUM PHOSPHATE 4 MG: 4 INJECTION, SOLUTION INTRAMUSCULAR; INTRAVENOUS at 23:49

## 2018-01-01 RX ADMIN — DEXAMETHASONE SODIUM PHOSPHATE 4 MG: 4 INJECTION, SOLUTION INTRAMUSCULAR; INTRAVENOUS at 05:17

## 2018-01-01 RX ADMIN — DEXAMETHASONE SODIUM PHOSPHATE 4 MG: 4 INJECTION, SOLUTION INTRAMUSCULAR; INTRAVENOUS at 00:24

## 2018-01-01 RX ADMIN — DEXAMETHASONE SODIUM PHOSPHATE 4 MG: 4 INJECTION, SOLUTION INTRAMUSCULAR; INTRAVENOUS at 12:45

## 2018-01-24 NOTE — PROGRESS NOTES
Problem: Chemotherapy Treatment  Goal: *Chemotherapy regimen followed  Outcome: Progressing Towards Goal  Verbalizes/demonstrates understanding of purpose/procedure/potential side effects of xgeva.

## 2018-01-24 NOTE — PROGRESS NOTES
Pt arrived ambulatory today at 1350, to receive Xgeva. Pt tolerated without difficulty. Patient discharged via ambulatory accompanied by self. Instructed to notify physician of any problems, questions or concerns. Allowed opportunity for patient/family to ask questions. Verbalized understanding. Next appointment is Feb 7 at 21 [phone] with Troy Bowman46.

## 2018-02-07 NOTE — PROGRESS NOTES
Pt here for FUP post RT to the lung and for SVC which ended12/27/17. Pt stated her breathing is good, she denies pain, and she feels that her energy is good. The 1/30/18 CT Chest indicated a treatment response.

## 2018-02-07 NOTE — CONSULTS
Patient: Tennis Kanner MRN: [id number]  SSN: [id number]    YOB: 1960  Age: 63 y.o. Sex: female      Other Providers:  Maude Luu MD, Mat Anne MD    CHIEF COMPLAINT: Facial swelling. 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 PREVIOUS TREATMENT:  1) 4/22/17:  Cisplatin/Etoposide. 2)Radiation therapy of Lung. DOSE:  4500 cGy in 15 fractions. Treatment dates: 12/5/2017 - 12/27/2017. HISTORY OF PRESENT ILLNESS:  Tennis Kanner is a 63 y.o. female initially seen by Dr. Demarcus Parrish at the request of in-patient oncology. She was admitted on 4/18/2017 with a primary diagnosis of superior vena cava syndrome. Her only other medical history is that of hypertension. She presented to the ER with complaints of facial and bilateral upper arm swelling that had been present for about a week. She was given steroids and discharged to home. She then returned to the ER feeling no better with possibly worsening of her symptoms. She denied any dyspnea, cough, wheezing but had the full sensation in her throat when she swallowed. CT of her chest showed a 1.3 x 1.6 cm irregular right upper lobe mass with a 4.1 x 4.6 and 2.1 x 2.7 cm mediastinal mass concerning for a primary lung carcinoma. The superior vena cava was draped over the larger mass and partially compressed which was likely the cause of her symptoms. She has a history of smoking a half-pack a day since she was about 17years old. She is  and works as  at [employer] in [facility]. Pulmonary medicine was consulted for pathologic diagnosis. Dr. Esteban Braun completed biopsy and EBUS 4/18/17 although final pathology has not returned but the specimen was positive on MAIKOL and noted to likely be SCLC. She was seen urgently because of the SVC but ultimately we felt systemic therapy should be started. Eventual PET/CT did show bony metastases consistent with extensive stage.   MRI brain was negative. She did have mid back pain and had a fracture of T5. She initially had initial response but her follow-up CT 11/15/2017 showed disease progression with further SVC encasement and again facial swelling. INTERVAL HISTORY: Ms Tito Quintanilla returns today 6 weeks after completing radiation of the lung for symptomatic SVC. She tolerated radiation well. Swelling of face/neck have resolved. She denies cough or dyspnea. Her energy is good. Follow up Ct shows response to treatment. PAST MEDICAL HISTORY:    Past Medical History:   Diagnosis Date    Former cigarette smoker     using Nicotine patch    GERD (gastroesophageal reflux disease)     managed with Zantac    Hypertension     no complications at this time, no current medications    Lung cancer [facility])        The patient denies history of collagen vascular diseases, pacemaker insertion, prior radiation or prior chemotherapy predating her SCLC diagnosis. PAST SURGICAL HISTORY:   Past Surgical History:   Procedure Laterality Date    HX TUBAL LIGATION      HX VASCULAR ACCESS      lt chest       MEDICATIONS:     Current Outpatient Prescriptions:     mirtazapine (REMERON) 7.5 mg tablet, Take 1 Tab by mouth nightly., Disp: 30 Tab, Rfl: 0    ondansetron hcl (ZOFRAN) 8 mg tablet, Take 1 Tab by mouth every eight (8) hours as needed for Nausea., Disp: 90 Tab, Rfl: 1    lidocaine-prilocaine (EMLA) topical cream, Apply 1/2 to 1 inch to port site one hour prior to needle access. , Disp: 30 g, Rfl: 1    nicotine (NICODERM CQ) 7 mg/24 hr, 1 Patch by TransDERmal route every twenty-four (24) hours. , Disp: , Rfl:     amLODIPine (NORVASC) 5 mg tablet, Take 5 mg by mouth daily. , Disp: , Rfl:     senna-docusate (SHERRI-COLACE) 8.6-50 mg per tablet, Take 1 Tab by mouth two (2) times a day., Disp: 60 Tab, Rfl: 2    raNITIdine (ZANTAC) 150 mg tablet, Take 150 mg by mouth every morning., Disp: , Rfl:     prochlorperazine (COMPAZINE) 10 mg tablet, Take 5 mg by mouth every six (6) hours as needed for Nausea., Disp: , Rfl:     acetaminophen (TYLENOL) 325 mg tablet, Take 2 Tabs by mouth every four (4) hours as needed. , Disp: 30 Tab, Rfl: 0    ALLERGIES:   Allergies   Allergen Reactions    Metaxalone Other (comments)    Oxycodone Swelling       SOCIAL HISTORY:   Social History     Social History    Marital status: SINGLE     Spouse name: N/A    Number of children: N/A    Years of education: N/A     Occupational History    works at [employer] Topics    Smoking status: Former Smoker     Packs/day: 0.50     Years: 39.00     Quit date: 4/18/2017    Smokeless tobacco: Never Used    Alcohol use No    Drug use: No    Sexual activity: Not on file     Other Topics Concern    Not on file     Social History Narrative    . 1 daughter and 1 son. Works as  at [employer] in [employer]. FAMILY HISTORY:   Family History   Problem Relation Age of Onset   Alcides Kee Hypertension Mother     Asthma Mother     No Known Problems Father     Thyroid Disease Sister     Hypertension Brother     Diabetes Brother        REVIEW OF SYSTEMS: Please see the completed review of systems sheet in the chart that I have reviewed today. PHYSICAL EXAMINATION:   ECOG Performance status  0  VITAL SIGNS:   Visit Vitals    /88    Pulse (!) 102    Temp 98.3 °F (36.8 °C)    Resp 18    Wt 115 lb (52.2 kg)    SpO2 98%    BMI 17.49 kg/m2        GENERAL: The patient is well-developed, ambulatory, alert and in no acute distress. HEENT: Head is normocephalic. Previous facial swelling resolved. NECK: Neck is supple with no masses. CARDIOVASCULAR: Heart is regular rate and rhythm. RESPIRATORY: Lungs are clear to auscultation There is normal respiratory effort. LYMPHATIC: There is no cervical lymphadenopathy bilaterally. Palpable left supraclavicular nodule. MUSCULOSKELETAL: Extremities reveal no cyanosis, clubbing or edema.   is 5+/5.   NEURO:  Cranial nerves II-XII grossly intact. Muscular strength and sensation are intact throughout all four extremities. PATHOLOGY:    4/18/17:  POST OP DIAGNOSIS:  Station 4R location mass was  biopsied and positive for malignancy on MAIKOL. - likely small cell cancer- await IHC. Dedicated sample was sent for cell block and flow cytometry to exclude lumphoma    LABORATORY:   Lab Results   Component Value Date/Time    Sodium 140 02/07/2018 09:36 AM    Potassium 4.1 02/07/2018 09:36 AM    Chloride 106 02/07/2018 09:36 AM    CO2 27 02/07/2018 09:36 AM    Anion gap 7 02/07/2018 09:36 AM    Glucose 91 02/07/2018 09:36 AM    BUN 15 02/07/2018 09:36 AM    Creatinine 1.00 02/07/2018 09:36 AM    GFR est AA >60 02/07/2018 09:36 AM    GFR est non-AA >60 02/07/2018 09:36 AM    Calcium 9.2 02/07/2018 09:36 AM    Magnesium 2.0 02/07/2018 09:36 AM    Phosphorus 3.3 02/07/2018 09:36 AM    Albumin 4.0 02/07/2018 09:36 AM    Protein, total 7.7 02/07/2018 09:36 AM    Globulin 3.7 (H) 02/07/2018 09:36 AM    A-G Ratio 1.1 (L) 02/07/2018 09:36 AM    AST (SGOT) 22 02/07/2018 09:36 AM    ALT (SGPT) 29 02/07/2018 09:36 AM     Lab Results   Component Value Date/Time    WBC 4.4 02/07/2018 09:36 AM    HGB 11.3 (L) 02/07/2018 09:36 AM    HCT 35.0 (L) 02/07/2018 09:36 AM    PLATELET 292 16/64/3903 09:36 AM       RADIOLOGY:      Result Date: 11/15/2017  CT CHEST ABDOMEN AND PELVIS WITH CONTRAST HISTORY: facial neck swelling/Lung cancer, 57 years Female RESTAGING OF SCCA RT LUNG LAST XGEVA INJ 10/27/17 COMPARISON: CT torso September 25, 2017 TECHNIQUE:  Oral contrast was administered. 100 cc of nonionic intravenous contrast was injected, and axial helical CT images were obtained from the thoracic inlet through the pelvis. Coronal reformatted images were obtained at the scanner console and made available for review.   Radiation dose reduction techniques were used for this study:  Our CT scanners use one or all of the following: Automated exposure control, adjustment of the mA and/or kVp according to patient's size, iterative reconstruction. FINDINGS: CHEST: Partially visualized thyroid unremarkable. Interval significant increase in size of the centrally necrotic right paratracheal mainnder mass, which appears to encase the superior vena cava, measuring approximately 3.7 x 5.8 cm, consistent with progression of metastatic disease. No evidence of significant hilar, or axillary lymphadenopathy. Minimal calcific atherosclerosis of a normal caliber aortic arch and descending aorta. Persistent mild bilateral upper lobe predominant panlobular emphysema. Minimal dependent subsegmental atelectasis bilateral lung bases. No evidence of new pulmonary nodule. Visualized proximal airways unremarkable. ABDOMEN: Normal-appearing liver, gallbladder, spleen, pancreas, bilateral adrenal glands and kidneys. Normal caliber abdominal aorta. No evidence of significant lymphadenopathy. Normal-appearing small bowel. No evidence of intraperitoneal free air or free fluid. PELVIS: Normal-appearing urinary bladder. Enlarged probable fibroid uterus appears unchanged. Normal-appearing bilateral adnexa. Stool in the rectum. Stool is also seen throughout the colon. Normal-appearing appendix. No evidence of pelvic free fluid. No evidence of significant inguinal or pelvic sidewall lymphadenopathy. Visualized osseous structures unremarkable. IMPRESSION: 1. Interval progression of the maninder metastasis in the right paratracheal region with new encasement of the superior vena cava. 2.  Other chronic findings as above. Ct Chest Abd Pelv W Cont  Result Date: 9/25/2017    CT CHEST, ABDOMEN AND PELVIS WITH CONTRAST. INDICATION: Small cell carcinoma right lung. COMPARISON: June 2017 and April 2017. TECHNIQUE:   5 mm axial scans from the lung apices to the pubic symphysis following oral and 100 cc intravenous contrast without acute complication.  Intravenous contrast was given to increase the sensitivity to metastatic disease. Radiation dose reduction techniques were used for this study. Our CT scanners use one or more of the following:  Automated exposure control, adjustment of the mA and or kV according to patient size, iterative reconstruction. Findings: CHEST:  The superior mediastinal/right paratracheal lymph node has increased slightly from the prior exam. It now measures 23 x 23 mm, compared to 19 x 21 mm. It appears rounder and mari. The right apical lung mass remains resolved. No new pulmonary nodules or masses or adenopathy. Dilated airspaces consistent with emphysema. Left-sided chest port is present. Right rhomboid lipoma is present. ABDOMEN:  No gross focal parenchymal abnormality identified within the liver or spleen. No calcified gallstones. The biliary tree is not dilated. The pancreas is unremarkable. No free fluid, acute inflammatory changes or adenopathy. Bowel loops are not dilated. The kidneys enhance uniformly. No radiopaque renal calculi. No hydronephrosis. The adrenal glands are normal size. Aorta is normal caliber. PELVIS:  Fibroid uterus is present. No pelvic adenopathy no gross bony lesions. The urinary bladder is unremarkable. IMPRESSION:  Slight increase in the right paratracheal lymph node as above. It appears rounder and mari and measures slightly larger. It appears larger on both the axial and coronal sequences. No other abnormality. The right apical lung mass remains resolved. CT THORAX WITH CONTRAST  1/30/2018     HISTORY: restaging, 63 years Female follow-up small cell lung cancer, radiation  treatment in December, status post chemotherapy last Thursday     COMPARISON: CT torso November 15, 2017, whole body PET/CT November 30, 2017    FINDINGS:  Partially visualized thyroid unremarkable.   There has been significant interval  decrease in size of the right paratracheal soft tissue mass now measuring  approximately 2.2 x 3.2 cm, consistent with treatment response. Also decreased  right hilar lymphadenopathy now measuring up to 10 mm in short axis, consistent  with treatment response. No evidence of significant axillary lymphadenopathy. Mild calcific atherosclerosis of a normal caliber aortic arch and descending  aorta. No evidence of proximal pulmonary embolus. Mild biapical nodular  scarring unchanged. Persistent mild bilateral upper lobe predominant panlobular  emphysema. Minimal dependent subsegmental atelectasis bilateral lung bases. No  evidence of pleural effusion or air space consolidation. No evidence of new  pulmonary nodule. Visualized proximal airways unremarkable. Left chest wall  neal catheter appears to remain in anatomic position.     Visualized upper abdominal viscera including the adrenal glands are otherwise  unremarkable. Visualized osseous structures unremarkable.     IMPRESSION:      1. Findings consistent with interval treatment response, with significantly  decreased mediastinal and right hilar lymphadenopathy. 2.  Other chronic findings as above. IMPRESSION:  Reina Isidro is a 63 y.o. female with what was originally felt to be limited but ultimately determined to be extensive stage SCLC. While she is symptomatic with an SVC syndrome, this was not compromising her airway. She started with systemic chemotherapy and actually had a nice initial response. However, she then had progression in her disease in the mediastinum causing recurrence of her facial swelling. She was treated with radiation therapy of the lung on 12/5/2017 - 12/27/2017. Ms Lucille Padgett is now 6 weeks after completing radiation therapy. She is doing well. Swelling of the face and neck have resolved. CT, 1/30/2018 shows response to treatment. She is scheduled with Dr. Rubia Kamara immediately after our appointment today.     PLAN:    1) She is scheduled with Dr. Rubia Kamara immediately after our visit today  2) I will see her again 3 months  3) I spent 25 minutes in the care of Ms Abelardo Johns today, over 75% of which was in direct counseling regarding her follow up in regards to radiation and discussion of her recent CT. All questions were answered to the best of my ability. Charles Tai NP   February 7, 2018       Portions of this note were copied from prior encounters and reviewed for accuracy, currency, and represent documentation and tasks completed during this encounter. I verify and attest these portions to be unchanged from prior visits.

## 2018-02-26 NOTE — TELEPHONE ENCOUNTER
SW received request for medical update for pt's insurance and pt had requested SW confirm with pt prior to faxing it back. Hillary Schuler completed forms. SW noted pt's signature is missing. SW called pt and offered to follow up with her at her appt to complete and review the forms prior to faxing them and pt verbalized agreement. SW intends to follow up with pt at her next appt.

## 2018-02-28 NOTE — PROGRESS NOTES
Arrived to the [facility]. D1C1 Nivolumab completed.  Patient tolerated without problems  Any issues or concerns during appointment: no  Patient aware of next infusion appointment on 3/14/18 at 1500  Discharged ambulatory

## 2018-03-15 NOTE — PROGRESS NOTES
Arrived to the [facility]. Nivolumab/yervoy completed. Patient tolerated well. Pt instructed on first dose of yervoy and provided with handout. Any issues or concerns during appointment: Pt states having increased confusion. Monae Hector, with SW states patient had trouble writing her name/phone number. Anand Espino RN navigator made aware. Pt to have MRI per Dr. Alejandro Gomez. Patient aware of next infusion appointment on April 5th at 1115. Discharged ambulatory.

## 2018-03-15 NOTE — TELEPHONE ENCOUNTER
SW received call from pt asking for clarification about healthcare power of  paperwork. SW explained HCPOA to pt satisfaction and offered to complete it with pt during her infusion. Pt verbalized agreement. SW arranged to follow up with pt in person. No other needs. FELTON then received call from pt's daughter. SW verified she is listed listed on HIPAA contact list. Daughter requested additional clarification about HCPOA. SW answered daughter's questions to satisfaction and clarified that she does not need to be present for pt to complete paperwork. She verbalized understanding. No other needs at this time. FELTON intends to follow up with pt.

## 2018-03-15 NOTE — PROGRESS NOTES
SW [facility] with pt [facility] Pt verbalized concerns about her new confusion with writing letters and numbers. As SW completed paperwork with pt, pt made several spelling mistakes and noted her own difficulty. SW reported pt's confusion to infusion RN Zak Lacey and pt's RN [id number] who will relay this to pt's MD. Cy Founds [facility] and provide pt with original and copies. No other needs identified. SW encouraged pt to call should any needs arise. Pt verbalized understanding.

## 2018-03-16 PROBLEM — C79.31 BRAIN METASTASIS (HCC): Status: ACTIVE | Noted: 2018-01-01

## 2018-03-16 PROBLEM — R56.9 SEIZURE (HCC): Status: ACTIVE | Noted: 2018-01-01

## 2018-03-16 PROBLEM — C34.90 LUNG CANCER (HCC): Status: ACTIVE | Noted: 2018-01-01

## 2018-03-16 PROBLEM — G93.40 ACUTE ENCEPHALOPATHY: Status: ACTIVE | Noted: 2018-01-01

## 2018-03-16 NOTE — IP AVS SNAPSHOT
[facility] 
 
 
 [address] [address] [address] [facility] 
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 
[phone] Zak Vickers MD   [address] [place] [address] 
[phone] Your Scheduled Appointments 2018 10:45 AM EDT  
LAB with Marisol 58  
[facility] OUTREACH INSURANCE [facility]) [place]en [facility] [address] [place] [address]  
[phone] 2018 11:15 AM EDT Follow Up with Lacie Griffin MD  
[facility] Hematology and Oncology [facility]) C/ Raul Brewster [address] [place] [address]  
[phone]   9:45 AM EDT  
LAB with Marisol 58  
[facility] OUTREACH INSURANCE [facility]) Eileen [address] [facility]  
[phone]  10:15 AM EDT Follow Up with Lacie Griffin MD  
[facility] Hematology and Oncology [facility]) C/ Raul Brewster [address] [facility] [address]  
[phone]  11:15 AM EDT Infusion with [facility]  
ST. [address] [facility] ([facility]) [address] Shari Siegel [phone] [facility] [address]  
[phone] [address] Thursday May 10, 2018  1:30 PM EDT  
[facility] OFFICE VISIT with Blayne Wasserman NP  
[address] [facility] [address] [facility] [address] [facility] [address]  
[phone] Discharge Orders None A check brenda indicates which time of day the medication should be taken. My Medications START taking these medications Instructions Each Dose to Equal  
 Morning Noon Evening Bedtime  
 dexamethasone 4 mg tablet Commonly known as:  DECADRON Notes to Patient:  As directed Take 1 Tab by mouth every six (6) hours. 4 mg  
    
   
   
   
  
 levETIRAcetam 1,000 mg tablet Take 1 Tab by mouth every twelve (12) hours. 1000 mg  
    
  
   
   
   
  
  
 pantoprazole 40 mg tablet Commonly known as:  PROTONIX Start taking on:  4/6/2018 Take 1 Tab by mouth Daily (before breakfast). 40 mg PHENobarbital 30 mg tablet Commonly known as:  LUMINAL Take 1 Tab by mouth two (2) times a day. Max Daily Amount: 60 mg.  
 30 mg CONTINUE taking these medications Instructions Each Dose to Equal  
 Morning Noon Evening Bedtime  
 acetaminophen 325 mg tablet Commonly known as:  TYLENOL Notes to Patient:  Take on as needed schedule Take 2 Tabs by mouth every four (4) hours as needed. 650 mg  
    
   
   
   
  
 amLODIPine 5 mg tablet Commonly known as:  Gaby Percy Take 5 mg by mouth daily. 5 mg  
    
  
   
   
   
  
 lidocaine-prilocaine topical cream  
Commonly known as:  EMLA Apply 1/2 to 1 inch to port site one hour prior to needle access. mirtazapine 15 mg tablet Commonly known as:  Carloz Jeremy Take 1 Tab by mouth nightly.   
 15 mg  
    
   
   
   
  
  
 NICODERM CQ 7 mg/24 hr  
 Generic drug:  nicotine 1 Patch by TransDERmal route every twenty-four (24) hours. 1 Patch  
    
  
   
   
   
  
 ondansetron hcl 8 mg tablet Commonly known as:  Boni Mighty Notes to Patient:  Take on as needed schedule Take 1 Tab by mouth every eight (8) hours as needed for Nausea. 8 mg  
    
   
   
   
  
 prochlorperazine 10 mg tablet Commonly known as:  COMPAZINE Notes to Patient:  Take on as needed schedule Take 5 mg by mouth every six (6) hours as needed for Nausea. 5 mg  
    
   
   
   
  
 senna-docusate 8.6-50 mg per tablet Commonly known as:  SHERRI-COLACE Take 1 Tab by mouth two (2) times a day. 1 Tab ZANTAC 150 mg tablet Generic drug:  raNITIdine Take 150 mg by mouth every morning. 150 mg Where to Get Your Medications These medications were sent to [address] [place] [address] Elena العراقي  Kristy Steven  AT [address] [address] Phone:  [phone]  
  dexamethasone 4 mg tablet  
 levETIRAcetam 1,000 mg tablet  
 pantoprazole 40 mg tablet Information on where to get these meds will be given to you by the nurse or doctor. ! Ask your nurse or doctor about these medications PHENobarbital 30 mg tablet Discharge Instructions DISCHARGE SUMMARY from Nurse PATIENT INSTRUCTIONS: 
 
After general anesthesia or intravenous sedation, for 24 hours or while taking prescription Narcotics: · Limit your activities · Do not drive and operate hazardous machinery · Do not make important personal or business decisions · Do  not drink alcoholic beverages · If you have not urinated within 8 hours after discharge, please contact your surgeon on call. Report the following to your surgeon: 
· Excessive pain, swelling, redness or odor of or around the surgical area · Temperature over 100.5 · Nausea and vomiting lasting longer than 4 hours or if unable to take medications · Any signs of decreased circulation or nerve impairment to extremity: change in color, persistent  numbness, tingling, coldness or increase pain · Any questions What to do at Home: 
Recommended activity: Activity as tolerated, per MD instructions If you experience any of the following symptoms fever > 100.5, nausea, vomiting, pain, chest pain and/or shortness of breath please follow up with MD. 
 
*  Please give a list of your current medications to your Primary Care Provider. *  Please update this list whenever your medications are discontinued, doses are 
    changed, or new medications (including over-the-counter products) are added. *  Please carry medication information at all times in case of emergency situations. These are general instructions for a healthy lifestyle: No smoking/ No tobacco products/ Avoid exposure to second hand smoke Surgeon General's Warning:  Quitting smoking now greatly reduces serious risk to your health. Obesity, smoking, and sedentary lifestyle greatly increases your risk for illness A healthy diet, regular physical exercise & weight monitoring are important for maintaining a healthy lifestyle You may be retaining fluid if you have a history of heart failure or if you experience any of the following symptoms:  Weight gain of 3 pounds or more overnight or 5 pounds in a week, increased swelling in our hands or feet or shortness of breath while lying flat in bed. Please call your doctor as soon as you notice any of these symptoms; do not wait until your next office visit. Recognize signs and symptoms of STROKE: 
 
F-face looks uneven A-arms unable to move or move unevenly S-speech slurred or non-existent T-time-call 911 as soon as signs and symptoms begin-DO NOT go Back to bed or wait to see if you get better-TIME IS BRAIN. Warning Signs of HEART ATTACK Call 911 if you have these symptoms: 
? Chest discomfort. Most heart attacks involve discomfort in the center of the chest that lasts more than a few minutes, or that goes away and comes back. It can feel like uncomfortable pressure, squeezing, fullness, or pain. ? Discomfort in other areas of the upper body. Symptoms can include pain or discomfort in one or both arms, the back, neck, jaw, or stomach. ? Shortness of breath with or without chest discomfort. ? Other signs may include breaking out in a cold sweat, nausea, or lightheadedness. Don't wait more than five minutes to call [address]! Fast action can save your life. Calling 911 is almost always the fastest way to get lifesaving treatment. Emergency Medical Services staff can begin treatment when they arrive  up to an hour sooner than if someone gets to the hospital by car. The discharge information has been reviewed with the patient. The patient verbalized understanding. Discharge medications reviewed with the patient and appropriate educational materials and side effects teaching were provided. ___________________________________________________________________________________________________________________________________ Epilepsy: Care Instructions Your Care Instructions Epilepsy is a common condition that causes repeated seizures. The seizures are caused by bursts of electrical activity in the brain that aren't normal. Seizures may cause problems with muscle control, movement, speech, vision, or awareness. They can be scary. Epilepsy affects each person differently. Some people have only a few seizures. Others get them more often. If you know what triggers a seizure, you may be able to avoid having one. You can take medicines to control and reduce seizures. You and your doctor will need to find the right combination, schedule, and dose of medicine. This may take time and careful changes. Seizures may get worse and happen more often over time. Follow-up care is a key part of your treatment and safety. Be sure to make and go to all appointments, and call your doctor if you are having problems. It's also a good idea to know your test results and keep a list of the medicines you take. How can you care for yourself at home? · Be safe with medicines. Take your medicines exactly as prescribed. Call your doctor if you think you are having a problem with your medicine. · Make a treatment plan with your doctor. Be sure to follow your plan. · Try to identify and avoid things that may make you more likely to have a seizure. These may include: ¨ Not getting enough sleep. ¨ Using drugs or alcohol. ¨ Being emotionally stressed. ¨ Skipping meals. · Keep a record of any seizures you have. Note the date, time of day, and any details about the seizure that you can remember. Your doctor can use this information to plan or adjust your medicine or other treatment. · Be sure that any doctor treating you for another condition knows that you have epilepsy. Each doctor should know what medicines you are taking, if any. · Wear a medical ID bracelet. You can buy this at most [employer]. If you have a seizure that leaves you unconscious or unable to speak for yourself, this bracelet will let those who are treating you know that you have epilepsy. · Talk to your doctor about whether it is safe for you to do certain activities, such as drive or swim. When should you call for help? Call 911 anytime you think you may need emergency care. For example, call if: 
? · A seizure does not stop as it normally does. ? · You have new symptoms such as: 
¨ Numbness, tingling, or weakness on one side of your body or face. ¨ Vision changes. ¨ Trouble speaking or thinking clearly. ?Call your doctor now or seek immediate medical care if: 
? · You have a fever. ? · You have a severe headache. ? Watch closely for changes in your health, and be sure to contact your doctor if: 
? · The normal pattern or features of your seizures change. Where can you learn more? Go to http://neville-joaquin.info/. Johnnie Lopez in the search box to learn more about \"Epilepsy: Care Instructions. \" Current as of: October 14, 2016 Content Version: 11.4 © 7727-4597 Mixamo. Care instructions adapted under license by Salsify (which disclaims liability or warranty for this information). If you have questions about a medical condition or this instruction, always ask your healthcare professional. [address] any warranty or liability for your use of this information. Seizure: Care Instructions Your Care Instructions Seizures are caused by abnormal patterns of electrical signals in the brain. They are different for each person. Seizures can affect movement, speech, vision, or awareness. Some people have only slight shaking of a hand and do not pass out. Other people may pass out and have violent shaking of the whole body. Some people appear to stare into space. They are awake, but they can't respond normally. Later, they may not remember what happened. You may need tests to identify the type and cause of the seizures. A seizure may occur only once, or you may have them more than one time. Taking medicines as directed and following up with your doctor may help keep you from having more seizures. The doctor has checked you carefully, but problems can develop later. If you notice any problems or new symptoms, get medical treatment right away. Follow-up care is a key part of your treatment and safety. Be sure to make and go to all appointments, and call your doctor if you are having problems. It's also a good idea to know your test results and keep a list of the medicines you take. How can you care for yourself at home? · Be safe with medicines. Take your medicines exactly as prescribed. Call your doctor if you think you are having a problem with your medicine. · Do not do any activity that could be dangerous to you or others until your doctor says it is safe to do so. For example, do not drive a car, operate machinery, swim, or climb ladders. · Be sure that anyone treating you for any health problem knows that you have had a seizure and what medicines you are taking for it. · Identify and avoid things that may make you more likely to have a seizure. These may include lack of sleep, alcohol or drug use, stress, or not eating. · Make sure you go to your follow-up appointment. When should you call for help? Call 911 anytime you think you may need emergency care. For example, call if: 
? · You have another seizure. ? · You have more than one seizure in 24 hours. ? · You have new symptoms, such as trouble walking, speaking, or thinking clearly. ?Call your doctor now or seek immediate medical care if: 
? · You are not acting normally. ? Watch closely for changes in your health, and be sure to contact your doctor if you have any problems. Where can you learn more? Go to http://neville-joaquin.info/. Enter D369 in the search box to learn more about \"Seizure: Care Instructions. \" Current as of: October 14, 2016 Content Version: 11.4 © 4444-4480 TopDown Conservation. Care instructions adapted under license by SmartShoot (which disclaims liability or warranty for this information). If you have questions about a medical condition or this instruction, always ask your healthcare professional. [address] [address] any warranty or liability for your use of this information. Wizzard Softwarehart Announcement  We are excited to announce that we are making your provider's discharge notes available to you in MobileAds. You will see these notes when they are completed and signed by the physician that discharged you from your recent hospital stay. If you have any questions or concerns about any information you see in MobileAds, please call the Health Information Department where you were seen or reach out to your Primary Care Provider for more information about your plan of care. Introducing [facility]! [facility] introduces MobileAds patient portal. Now you can access parts of your medical record, email your doctor's office, and request medication refills online. 1. In your internet browser, go to [email or web address] 2. Click on the First Time User? Click Here link in the Sign In box. You will see the New Member Sign Up page. 3. Enter your MobileAds Access Code exactly as it appears below. You will not need to use this code after youve completed the sign-up process. If you do not sign up before the expiration date, you must request a new code. · MobileAds Access Code: [email or web address] Expires: 4/22/2018 11:15 AM 
 
4. Enter the last four digits of your Social Security Number (xxxx) and Date of Birth (mm/dd/yyyy) as indicated and click Submit. You will be taken to the next sign-up page. 5. Create a MobileAds ID. This will be your MobileAds login ID and cannot be changed, so think of one that is secure and easy to remember. 6. Create a MobileAds password. You can change your password at any time. 7. Enter your Password Reset Question and Answer. This can be used at a later time if you forget your password. 8. Enter your e-mail address. You will receive e-mail notification when new information is available in [address] 9. Click Sign Up. You can now view and download portions of your medical record. 10. Click the Download Summary menu link to download a portable copy of your medical information. If you have questions, please visit the Frequently Asked Questions section of the MyChart website. Remember, Webvanta is NOT to be used for urgent needs. For medical emergencies, dial 911. Now available from your iPhone and Android! Introducing Aj Iglesias As a [facility] patient, I wanted to make you aware of our electronic visit tool called Aj Iglesias. Imaging Advantage allows you to connect within minutes with a medical provider 24 hours a day, seven days a week via a mobile device or tablet or logging into a secure website from your computer. You can access Aj Iglesias from anywhere in the [place]. A virtual visit might be right for you when you have a simple condition and feel like you just dont want to get out of bed, or cant get away from work for an appointment, when your regular [facility] provider is not available (evenings, weekends or holidays), or when youre out of town and need minor care. Electronic visits cost only $49 and if the [employer] provider determines a prescription is needed to treat your condition, one can be electronically transmitted to a nearby pharmacy*. Please take a moment to enroll today if you have not already done so. The enrollment process is free and takes just a few minutes. To enroll, please download the Imaging Advantage edilson to your tablet or phone, or visit [email or web address] to enroll on your computer. And, as an [address] [place] [address] patient with a [employer] account, the results of your visits will be scanned into your electronic medical record and your primary care provider will be able to view the scanned results. We urge you to continue to see your regular [facility] provider for your ongoing medical care.   And while your primary care provider may not be the one available when you seek a Aj Iglesias virtual visit, the peace of mind you get from getting a real diagnosis real time can be priceless. For more information on Aj Dillonel, view our Frequently Asked Questions (FAQs) at [email or web address]. Sincerely, 
 
Laura Garcia MD 
Chief Medical Officer Miguelina Mathis *:  certain medications cannot be prescribed via Aj Iglesias Providers Seen During Your Hospitalization Provider Specialty Primary office phone Pop Little MD Emergency Medicine [phone] Doc Bah MD Internal Medicine [phone] Andrew Espinosa MD Oncology [phone] Immunizations Administered for This Admission Name Date Influenza Vaccine (Quad) PF  Deferred () TB Skin Test (PPD) Intradermal  Deferred (),  Deferred (), 3/20/2018 Your Primary Care Physician (PCP) Primary Care Physician Office Phone Office Fax [facility] [phone] You are allergic to the following Allergen Reactions Metaxalone Other (comments) Oxycodone Swelling Recent Documentation Height Weight Breastfeeding? BMI OB Status Smoking Status 1.727 m 53.8 kg No 18.03 kg/m2 Postmenopausal Former Smoker Emergency Contacts Name Discharge Info Relation Home Work Mobile Robyn Montalvo DISCHARGE CAREGIVER [3] Sister [23] [phone] Iwona Montalvo DISCHARGE CAREGIVER [3] Daughter [21] [phone] Patient Belongings The following personal items are in your possession at time of discharge: 
  Dental Appliances: At bedside, Partials  Visual Aid: None      Home Medications: None   Jewelry: None  Clothing: At bedside, Belt, Footwear, Pants, Shirt, Undergarments    Other Valuables: None  Personal Items Sent to Safe: none Please provide this summary of care documentation to your next provider.  
  
  
 
  
Signatures-by signing, you are acknowledging that this After Visit Summary has been reviewed with you and you have received a copy. Patient Signature:  ____________________________________________________________ Date:  ____________________________________________________________  
  
Francis Shackle Provider Signature:  ____________________________________________________________ Date:  ____________________________________________________________

## 2018-03-17 PROBLEM — R94.31 ABNORMAL EKG: Status: ACTIVE | Noted: 2018-01-01

## 2018-03-17 PROBLEM — E87.5 HYPERKALEMIA: Status: ACTIVE | Noted: 2018-01-01

## 2018-03-17 NOTE — PROGRESS NOTES
TRANSFER - IN REPORT:    Verbal report received from Josue Bowen RN(name) on Tierra Mora  being received from ER(unit) for routine progression of care      Report consisted of patients Situation, Background, Assessment and   Recommendations(SBAR). Information from the following report(s) ED Summary, MAR, Recent Results and Cardiac Rhythm ST was reviewed with the receiving nurse. Opportunity for questions and clarification was provided. Assessment completed upon patients arrival to unit and care assumed.

## 2018-03-17 NOTE — ED NOTES
MD paged for potassium of 8.7. Dr. Conner Ramírez answered page and stated he would address it. No other orders received.

## 2018-03-17 NOTE — PROGRESS NOTES
Family eager to speak with Oncology regarding pt care and status, since they see Dr. Maria Teresa Lobato outpatient. Attempted to contact Oncology NP on 5th floor and was told she was busy in rounds and would not answer phone. Will try again soon.

## 2018-03-17 NOTE — PROGRESS NOTES
Hospitalist Progress Note    3/17/2018  Admit Date: 3/16/2018  9:00 PM   NAME: Jose C Macias   :  1960   MRN:  [id number]   Attending: Vanita Costa MD  PCP:  Katina Heard MD    SUBJECTIVE:   Pt admitted with new finding of Brain mets from lung cancer, Ct +ve for vasogenic edema. Started on Keppra and admitted to ICU.    3/17: Remains unresponsive, only responds to deep sternal rub. Maintaining airway, Labs are normal. Also EKG has TWI in Inferolateral leads remains tachycardic. Nursing notes and chart reviewed. Review of Systems negative with exception of pertinent positives noted above. PHYSICAL EXAM     Visit Vitals    /82    Pulse (!) 109    Temp 99.6 °F (37.6 °C)    Resp 21    Ht 5' 8\" (1.727 m)    Wt 49.1 kg (108 lb 3.9 oz)    SpO2 97%    Breastfeeding No    BMI 16.46 kg/m2      Temp (24hrs), Av.8 °F (37.1 °C), Min:97.8 °F (36.6 °C), Max:99.6 °F (37.6 °C)    Oxygen Therapy  O2 Sat (%): 97 % (18 1602)  Pulse via Oximetry: 109 beats per minute (18 1602)  O2 Device: Nasal cannula (18 1201)  O2 Flow Rate (L/min): 2 l/min (18 1201)    Intake/Output Summary (Last 24 hours) at 18 1755  Last data filed at 18 1755   Gross per 24 hour   Intake             1376 ml   Output             2160 ml   Net             -784 ml       General: Obtunded  Head:  AT/NC  Lungs:  CTABL. Heart:  RRR, no murmur, rub, or gallop, tachy  Abdomen: Soft, non-distended, non-tender, +bs  Extremities: No cyanosis or clubbing. Neurologic:  Unresponsive, pupils round and equal, no nystagmus. Skin:  No Rash    LABS AND STUDIES:  Personally reviewed all labs, meds, and studies for past 24hrs.       ASSESSMENT      Active Hospital Problems    Diagnosis Date Noted    Hyperkalemia 2018    Abnormal EKG 2018    Seizure ([address] 2018    Lung cancer ([facility] 2018    Brain metastasis ([facility] 2018    Acute encephalopathy 2018       PLAN:    · Will load with Keppra 1.5gm now and increase dose to 1g bid onwards. · Get STAT EEG, Concern for Status epilepticus, spoke with eeg tech and left voicemail for Dr Johanna Bone  · Seizure precautions  · Start decadron 4mg IV q6h for vasogenic edema  · Add Protonix for Ppx  · Consult cards. STAT repeat EKG showing TWI as well and she is tachycardic, ? ACS. · C/w IVF. · NPO for now, has NGT, will start tube feeds soon    Dispo: monitor closely In ICU  High risk pt    Critical care time spent was 40 mins    Addendum: Initial EEG was cancelled by Oncology, I d/w Dr Uriel Albarran regarding the need for him and he agrees that EEG is required. Ordered EEG again and informed tech. Onc will take over as primary, we will follow as consult.      DVT ppx:  SCDS      Signed By: Sorin Schroeder MD     March 17, 2018

## 2018-03-17 NOTE — PROGRESS NOTES
Pt's sister to bedside and will stay the night. Recliner in room for sisiter. Sister updated on pt's seizure here in CCU. Sister states pt had one seizure earlier on the way to the hospital, stopped and called for EMS. Admission history completed with sister. Sister states pt does not want the flu shot.

## 2018-03-17 NOTE — PROGRESS NOTES
Bedside report received from [facility]. Pt sleeping quietly on her right side. Pt's brother at bedside. HR =109. EEG tech on her way to do bedside EEG.

## 2018-03-17 NOTE — ED NOTES
TRANSFER - OUT REPORT:    Verbal report given to gianna rn(name) on Jayashree Query  being transferred to ccu(unit) for routine progression of care       Report consisted of patients Situation, Background, Assessment and   Recommendations(SBAR). Information from the following report(s) SBAR was reviewed with the receiving nurse. Lines:   Venous Access Device port Upper chest (subclavicular area), left (Active)       Venous Access Device Port 05/03/17 Upper chest (subclavicular area), left (Active)        Opportunity for questions and clarification was provided.       Patient transported with:   Registered Nurse

## 2018-03-17 NOTE — PROGRESS NOTES
NGT placed in left nare. Placement verified with 30 ml air bolus per auscultation. KUB ordered for positive placement prior to giving any medications. Radiology notified of x-ray needed stat. While placing NGT with Joshua Costa, noted that pt's gold tooth to left upper front, was loose, thought pt had a partial and removed left upper teeth. Then noted that pt did had dentures to right upper and removed those. Pt also had what appeared to be a partial to left lower mouth. Removed this partial. All 3 pieces placed in denture cup.

## 2018-03-17 NOTE — PROGRESS NOTES
Pt received from ER per stretcher with nurse, oxygen and monitor. Pt is unresponsive to verbal stimuli. Pt transferred to 3308. Pt has 6 pair of pants on and underwear and 2 shirts. All clothing removed. Pt given CHG bath. New gown on pt. Pt placed on monitor. SCD's placed on pt. Pt has nasal trumpet to right nare. IV site to left AC bleeding site d/c'd. Pressure held for 2 min as pt bleeds easily. New IV obtained to left upper arm. Garcia catheter placed per orders.

## 2018-03-17 NOTE — PROGRESS NOTES
Pt transported to MRI with RN and tech on monitor. Returned to room, trip uneventful. Incontinence care done and pt repositioned. Brother and sister-in-law at bedside.

## 2018-03-17 NOTE — H&P
HOSPITALIST H&P/CONSULT  NAME:  Blaise Burger   Age:  63 y.o.  :   1960   MRN:   [id number]  PCP: Emily Wilson MD  Consulting MD:  Treatment Team: Attending Provider: Margot Hanley MD  HPI:   Patient is 63years old female with pmhx of extensive stage small cell lung cancer with brain metastasis, GERD, HTN, previous tobacco abuse presented in view of new onset seizure like activity. As per the pt's sister, pt was in her usual health until yesterday, today she appeared more confused, lethargic and somnolent. While she was driving the pt home, pt started appearing confused and then had generalized body shakes. EMS was summoned. Pt received 2 doses of ativan PTA. In ER, pt is post ictal, sleeping, difficult to arouse. Pt is DNR. In ER, CT head showed cerebral metastatic disease with left parietal convexity meningioma, CXR was unremarkable. CT chest on 18 showed interval treatment response with significantly decreased mediastinal and right hilar lymphadenopathy. Complete ROS could not be obtained as pt is somnolent and difficult to arouse. Past Medical History:   Diagnosis Date    Former cigarette smoker     using Nicotine patch    GERD (gastroesophageal reflux disease)     managed with Zantac    Hypertension     no complications at this time, no current medications    Lung cancer [facility])       Past Surgical History:   Procedure Laterality Date    HX TUBAL LIGATION      HX VASCULAR ACCESS      lt chest      Prior to Admission Medications   Prescriptions Last Dose Informant Patient Reported? Taking?   acetaminophen (TYLENOL) 325 mg tablet   No No   Sig: Take 2 Tabs by mouth every four (4) hours as needed. amLODIPine (NORVASC) 5 mg tablet   Yes No   Sig: Take 5 mg by mouth daily. lidocaine-prilocaine (EMLA) topical cream   No No   Sig: Apply 1/2 to 1 inch to port site one hour prior to needle access. mirtazapine (REMERON) 15 mg tablet   No No   Sig: Take 1 Tab by mouth nightly. nicotine (NICODERM CQ) 7 mg/24 hr   Yes No   Si Patch by TransDERmal route every twenty-four (24) hours. ondansetron hcl (ZOFRAN) 8 mg tablet   No No   Sig: Take 1 Tab by mouth every eight (8) hours as needed for Nausea. prochlorperazine (COMPAZINE) 10 mg tablet   Yes No   Sig: Take 5 mg by mouth every six (6) hours as needed for Nausea. raNITIdine (ZANTAC) 150 mg tablet   Yes No   Sig: Take 150 mg by mouth every morning. senna-docusate (SHERRI-COLACE) 8.6-50 mg per tablet   No No   Sig: Take 1 Tab by mouth two (2) times a day. Facility-Administered Medications: None     Allergies   Allergen Reactions    Metaxalone Other (comments)    Oxycodone Swelling      Social History   Substance Use Topics    Smoking status: Former Smoker     Packs/day: 0.50     Years: 39.00     Quit date: 2017    Smokeless tobacco: Never Used    Alcohol use No      Family History   Problem Relation Age of Onset    Hypertension Mother     Asthma Mother     No Known Problems Father     Thyroid Disease Sister     Hypertension Brother     Diabetes Brother       Objective:     Visit Vitals    /81 (BP 1 Location: Left arm, BP Patient Position: Supine; Head of bed elevated (Comment degrees))    Pulse (!) 107    Temp 97.8 °F (36.6 °C)    Resp 20    Ht 5' 8\" (1.727 m)    Wt 54.4 kg (120 lb)    SpO2 100%    BMI 18.25 kg/m2      Temp (24hrs), Av.8 °F (36.6 °C), Min:97.8 °F (36.6 °C), Max:97.8 °F (36.6 °C)    Oxygen Therapy  O2 Sat (%): 100 % (18)  Pulse via Oximetry: 108 beats per minute (18)  O2 Device: Nasal cannula (18)  O2 Flow Rate (L/min): 4 l/min (18)  Physical Exam:  General:    Non verbal, post ictal, difficult to arouse    Head:   Normocephalic, without obvious abnormality, atraumatic. Nose:  Nares normal. No drainage or sinus tenderness. Lungs:   Clear to auscultation bilaterally. No Wheezing or Rhonchi. No rales.   Heart:   Regular rate and rhythm,  no murmur, rub or gallop. Abdomen:   Soft, non-tender. Not distended. Bowel sounds normal.   Extremities: No cyanosis. No edema. No clubbing  Skin:     Texture, turgor normal. No rashes or lesions. Not Jaundiced  Neurologic: Withdrawing to painful stimuli, rest of neuro exam could not be completed  Psych:             Could not be assessed  Lymphatics:     No LAD  Courtney cath palpated in left upper anterior chest wall     Data Review:   Recent Results (from the past 24 hour(s))   EKG, 12 LEAD, INITIAL    Collection Time: 03/16/18  9:09 PM   Result Value Ref Range    Ventricular Rate 127 BPM    Atrial Rate 127 BPM    P-R Interval 136 ms    QRS Duration 70 ms    Q-T Interval 280 ms    QTC Calculation (Bezet) 406 ms    Calculated P Axis 93 degrees    Calculated R Axis 80 degrees    Calculated T Axis -99 degrees    Diagnosis       !! AGE AND GENDER SPECIFIC ECG ANALYSIS !! Sinus tachycardia  Possible Anterior infarct , age undetermined  ST & T wave abnormality, consider inferolateral ischemia  Abnormal ECG  No previous ECGs available     CBC WITH AUTOMATED DIFF    Collection Time: 03/16/18 10:14 PM   Result Value Ref Range    WBC 8.5 4.3 - 11.1 K/uL    RBC 4.61 4.05 - 5.25 M/uL    HGB 11.0 (L) 11.7 - 15.4 g/dL    HCT 33.3 (L) 35.8 - 46.3 %    MCV 72.2 (L) 79.6 - 97.8 FL    MCH 23.9 (L) 26.1 - 32.9 PG    MCHC 33.0 31.4 - 35.0 g/dL    RDW 18.3 (H) 11.9 - 14.6 %    PLATELET 983 244 - 323 K/uL    MPV 8.9 (L) 10.8 - 14.1 FL    DF AUTOMATED      NEUTROPHILS 83 (H) 43 - 78 %    LYMPHOCYTES 11 (L) 13 - 44 %    MONOCYTES 4 4.0 - 12.0 %    EOSINOPHILS 1 0.5 - 7.8 %    BASOPHILS 0 0.0 - 2.0 %    IMMATURE GRANULOCYTES 1 0.0 - 5.0 %    ABS. NEUTROPHILS 7.2 1.7 - 8.2 K/UL    ABS. LYMPHOCYTES 0.9 0.5 - 4.6 K/UL    ABS. MONOCYTES 0.3 0.1 - 1.3 K/UL    ABS. EOSINOPHILS 0.1 0.0 - 0.8 K/UL    ABS. BASOPHILS 0.0 0.0 - 0.2 K/UL    ABS. IMM.  GRANS. 0.0 0.0 - 0.5 K/UL   METABOLIC PANEL, COMPREHENSIVE    Collection Time: 03/16/18 10:14 PM   Result Value Ref Range    Sodium 139 136 - 145 mmol/L    Potassium 8.7 (HH) 3.5 - 5.1 mmol/L    Chloride 106 98 - 107 mmol/L    CO2 18 (L) 21 - 32 mmol/L    Anion gap 15 7 - 16 mmol/L    Glucose 174 (H) 65 - 100 mg/dL    BUN 12 6 - 23 MG/DL    Creatinine 1.31 (H) 0.6 - 1.0 MG/DL    GFR est AA 54 (L) >60 ml/min/1.73m2    GFR est non-AA 44 (L) >60 ml/min/1.73m2    Calcium 9.1 8.3 - 10.4 MG/DL    Bilirubin, total 0.3 0.2 - 1.1 MG/DL    ALT (SGPT) 36 12 - 65 U/L    AST (SGOT) 97 (H) 15 - 37 U/L    Alk. phosphatase 90 50 - 136 U/L    Protein, total 8.6 (H) 6.3 - 8.2 g/dL    Albumin 3.6 3.5 - 5.0 g/dL    Globulin 5.0 (H) 2.3 - 3.5 g/dL    A-G Ratio 0.7 (L) 1.2 - 3.5     POC LACTIC ACID    Collection Time: 03/16/18 11:42 PM   Result Value Ref Range    Lactic Acid (POC) 18.0 (H) 0.5 - 1.9 mmol/L     Imaging /Procedures /Studies   All imaging data personally reviewed by me. EXAM:  XR CHEST PORT     INDICATION:  AMS     COMPARISON:  4/18/2017     FINDINGS: A portable AP radiograph of the chest was obtained at 2124 hours. The  patient is on a cardiac monitor. The lungs are clear. The cardiac and  mediastinal contours and pulmonary vascularity are normal.  The bones and soft  tissues are grossly within normal limits.      IMPRESSION  IMPRESSION: No acute cardiopulmonary disease. CT head w/o contrast:  IMPRESSION:     Cerebral metastatic disease.     Possible left parietal convexity meningioma. Assessment and Plan: Active Hospital Problems    Diagnosis Date Noted    Seizure [facility]) 03/16/2018     Priority: 1 - One    Acute encephalopathy 03/16/2018     Priority: 2 - Two    Hyperkalemia 03/17/2018     Priority: 3 - Three    [facility] ([facility] 03/16/2018     Priority: 4 - Four    [facility] 03/16/2018       PLAN  ·  Admit to ICU in view of new onset seizure d/o due to brain metastasis due to extensive stage SCLC. · Pt is somnolent and in post ictal state.   · Continue close monitoring for deterioration in mental status  · Start empiric IV keppra q12h  · CT head shows brain mets with vasogenic edema, will start steroids. · Oncology to be consulted in AM.  · Insert khanna and NG tube. · Hyperkalemia of 8.7, will order for 1 time D50 with IV regular insulin 10 units with kayexalate enema, along with an hour long albuterol nebs. EKG with no signs of tall t waves or inversion. Given one dose of calcium gluconate. Repeat BMP at 0200 hrs. · ANGEL with creatinine of 1.31, baseline is normal. Continue IV fluids for now. · Home medications will be started once pt is more alert and awake  · NPO for now  · Palliative care consult to discuss goals of care  · Prn ativan for agitation and seizure.     Code Status: DNR  Critical care time 65 minutes  Anticipated discharge: >2 MN    Signed By: Love Adkins MD     March 17, 2018

## 2018-03-17 NOTE — PROGRESS NOTES
Oncology consult called, spoke with Cristal Bennett NP. Dr. Lyndsay Adan will round on patient today.

## 2018-03-17 NOTE — PROGRESS NOTES
Bedside report received from [facility]. Pt resting quietly in bed, sister awake at bedside. No seizure activity noted since 0058. HR remains 120's, other VSS.

## 2018-03-17 NOTE — CONSULTS
[facility] Cardiology Consult                Date of  Admission: 3/16/2018  9:00 PM     Primary Care Physician: Dr Tomas Juarez  Primary Cardiologist: None  Referring Physician: Dr Milly Leventhal Physician: Dr Lizet Resendez    CC/Reason for consult: abnormal EKG      Mary Schilling is a 63 y.o. female admitted for Acute encephalopathy  Brain metastasis ([address]  Seizure ([address]  Lung cancer ([address] She has h/o metastatic small cell lung ca on palliative chemo and XRT. She was admitted with new seizure w head CT showing brain mets and vasogenic edema. She had been confused and lethargic, wakes now but still very lethargic and not able to give much history. She is DNR. Family at bedside provides most history. No CAD, CHF, or arrhythmia. No recent No CP, SOB, dizziness, palpitations, LE edema. Troponin negative, EKG on admission showed ST w rate 127 w nonspecific ST changes, today EKG shows ST w rate 127 w inferior and anterolateral t wave inversion. /76. K 8.7 today 3.5, lactic acid 18. Pt sedated, unable to obtain any further HPI due to AMS. Onc and palliative care consulted.        Patient Active Problem List   Diagnosis Code    Mediastinal mass J98.59    SVC (superior vena cava obstruction) I87.1    Essential hypertension I10    SVC syndrome I87.1    Smoker F17.200    Small cell carcinoma of right lung (HCC) C34.91    Bone metastases ([address] C79.51    Seizure ([address] R56.9    Lung cancer ([address] C34.90    Brain metastasis ([address] C79.31    Acute encephalopathy G93.40    Hyperkalemia E87.5    Abnormal EKG R94.31       Past Medical History:   Diagnosis Date    Former cigarette smoker     using Nicotine patch    GERD (gastroesophageal reflux disease)     managed with Zantac    Hypertension     no complications at this time, no current medications    Lung cancer [facility])       Past Surgical History:   Procedure Laterality Date    HX TUBAL LIGATION      HX VASCULAR ACCESS      lt chest     Allergies   Allergen Reactions    Metaxalone Other (comments)    Oxycodone Swelling      Family History   Problem Relation Age of Onset    Hypertension Mother    [employer] Asthma Mother     No Known Problems Father     Thyroid Disease Sister     Hypertension Brother     Diabetes Brother       Social History   Substance Use Topics    Smoking status: Former Smoker     Packs/day: 0.50     Years: 39.00     Quit date: 4/18/2017    Smokeless tobacco: Never Used    Alcohol use No        Current Facility-Administered Medications   Medication Dose Route Frequency    sodium chloride (NS) flush 5-10 mL  5-10 mL IntraVENous Q8H    sodium chloride (NS) flush 5-10 mL  5-10 mL IntraVENous PRN    acetaminophen (TYLENOL) suppository 650 mg  650 mg Rectal Q6H PRN    naloxone (NARCAN) injection 0.4 mg  0.4 mg IntraVENous PRN    ondansetron (ZOFRAN) injection 4 mg  4 mg IntraVENous Q4H PRN    LORazepam (ATIVAN) injection 2 mg  2 mg IntraVENous Q6H PRN    LORazepam (ATIVAN) injection 2 mg  2 mg IntraVENous Q1H PRN    influenza vaccine 2017-18 (3 yrs+)(PF) (FLUZONE QUAD/FLUARIX QUAD) injection 0.5 mL  0.5 mL IntraMUSCular PRIOR TO DISCHARGE    levalbuterol (XOPENEX) nebulizer soln 0.63 mg/3 mL  0.63 mg Nebulization Q6H RT    dexamethasone (DECADRON) 4 mg/mL injection 4 mg  4 mg IntraVENous Q6H    [START ON 3/18/2018] levETIRAcetam (KEPPRA) 1,000 mg in 0.9% sodium chloride 100 mL IVPB  1,000 mg IntraVENous Q12H    levETIRAcetam (KEPPRA) 1,500 mg in 0.9% sodium chloride 100 mL IVPB  1,500 mg IntraVENous ONCE    pantoprazole (PROTONIX) 40 mg in sodium chloride 10 mL injection  40 mg IntraVENous ACB    lactated Ringers infusion  100 mL/hr IntraVENous CONTINUOUS       Review of Symptoms:  Unable to obtain due to AMS.         Physical Exam  Vitals:    03/17/18 1102 03/17/18 1114 03/17/18 1131 03/17/18 1201   BP: 125/71  126/70 125/76   Pulse: (!) 120 (!) 124 (!) 118 (!) 123   Resp: 20  23 16   Temp:       SpO2: 94%  96% 96%   Weight:       Height: Physical Exam:  General: Thin  [place] female appears her stated age, lethargic, wakes but does not provide history, 4L O2 by NC  HEENT: pupils equal and round, no abnormalities noted, OG in place   Neck: supple, no JVD, no carotid bruits  Heart: S1S2 with RRR without murmurs or gallops  Lungs: Clear throughout auscultation bilaterally without adventitious sounds  Abd: soft, nontender, nondistended, with good bowel sounds  Ext: warm, no edema  Skin: warm and dry  Psychiatric: Lethargic   Neurologic: Increased muscle tone           Labs:   Recent Labs      03/17/18   0325  03/16/18   2214  03/14/18   1425   NA  144  139  141   K  3.5  8.7*  3.5   BUN  10  12  11   CREA  1.11*  1.31*  0.98   GLU  172*  174*  112*   WBC   --   8.5  4.4   HGB   --   11.0*  10.0*   HCT   --   33.3*  30.7*   PLT   --   256  261        Assessment/Plan:     Assessment:   Seizure (HCC) (3/16/2018)- Brain mets with edema, IV keppra, decadron, ativan, onc and palliative care consults pending. Lung cancer w brain mets- per onc. Hyperkalemia (3/17/2018)- Resolved. Abnormal EKG (3/17/2018)- Pt with no h/o cardiac disease, no anginal symptoms, negative troponin. Will check echo. Will add IV lopressor for ST. Thank you very much for this referral. We appreciate the opportunity to participate in this patient's care. We will follow along with above stated plan.     Hugo Huggins PA-C  Consulting MD: Rahel Wang

## 2018-03-17 NOTE — PROGRESS NOTES
Pt having seizure, shaking all over, eyes wide open and looking straight ahead at first then deviated to the right. Pt bit down, may have bit her lip or tongue. Pt had some drooling from her mouth. Jerking movements slowed after 30 seconds. Pt then became still with eyes closed and limp. After 15 min, decorticate movement of arms noted for 10 seconds. Pt had keppra infusing in progress at start of seizure and was given ativan 2 mg iv around then end of her seizure. MD made aware and new order given for ativan for seizures.

## 2018-03-17 NOTE — PROGRESS NOTES
Pt more alert, will open eyes to voice and focus but will not track. Make slight adjustments in position in bed. Sister remains at bedside.

## 2018-03-17 NOTE — ED TRIAGE NOTES
First witness seizure by sister. EMS reports decorticate posturing.   Ativan 2 mg and Narcan 2 mg IV given prior to arrival.

## 2018-03-17 NOTE — ED PROVIDER NOTES
HPI Comments: 81-year-old [facility] American female with history of lung cancer currently receiving chemotherapy and radiation reportedly became confused around 8:00 tonight while driving a friend to work. She had disorganized incoherent speech. Approximately 30 minutes later she developed shaking of her head and  Became unresponsive. EMS treated her with Narcan and Ativan. The shaking has resolved. Patient is not able to provide history at this time. History is obtained from family. The patient's sister states she has no history of seizures. No history of alcohol or drug use. Patient is believed to be DNR. Patient is a 63 y.o. female presenting with seizures. The history is provided by the patient. Seizure             Past Medical History:   Diagnosis Date    Former cigarette smoker     using Nicotine patch    GERD (gastroesophageal reflux disease)     managed with Zantac    Hypertension     no complications at this time, no current medications    Lung cancer [facility])        Past Surgical History:   Procedure Laterality Date    HX TUBAL LIGATION      HX VASCULAR ACCESS      lt chest         Family History:   Problem Relation Age of Onset    Hypertension Mother     Asthma Mother     No Known Problems Father     Thyroid Disease Sister     Hypertension Brother     Diabetes Brother        Social History     Social History    Marital status: SINGLE     Spouse name: N/A    Number of children: N/A    Years of education: N/A     Occupational History    works at [employer] [address] History Main Topics    Smoking status: Former Smoker     Packs/day: 0.50     Years: 39.00     Quit date: 4/18/2017    Smokeless tobacco: Never Used    Alcohol use No    Drug use: No    Sexual activity: Not on file     Other Topics Concern    Not on file     Social History Narrative    . 1 daughter and 1 son. Works as  at [employer] in [facility].           ALLERGIES: Metaxalone and Oxycodone    Review of Systems   Unable to perform ROS: Mental status change       Vitals:    03/16/18 2053   BP: 149/81   Pulse: (!) 125   Resp: 24   Temp: 97.8 °F (36.6 °C)   SpO2: 100%   Weight: 54.4 kg (120 lb)   Height: 5' 8\" (1.727 m)            Physical Exam   Constitutional: She appears well-developed and well-nourished. No distress. HENT:   Head: Normocephalic and atraumatic. Mouth/Throat: Oropharynx is clear and moist.   Eyes:   Rightward gaze. Pupils are equal and reactive. Neck:   No JVD or stridor. Cardiovascular: Regular rhythm. Tachycardia present. Pulmonary/Chest: Effort normal. She has no wheezes. She has no rales. Abdominal: Soft. She exhibits no distension and no mass. There is no tenderness. Musculoskeletal: She exhibits no edema. Neurological:   Somnolent. She does localize to painful stimuli with her left arm. She is noted to have minimal movement to her right arm and legs. Does not respond verbally. Does not Follow commands. Skin: Skin is warm and dry. Nursing note and vitals reviewed. MDM  Number of Diagnoses or Management Options  Altered mental status, unspecified altered mental status type:   Metastasis to brain [facility]):   Diagnosis management comments: Emergent head CT shows suspected metastatic disease involving the left side of the brain with edema. Chest x-ray no acute abnormality. EKG sinus tachycardia. Lab work unremarkable. Patient treated with IV fluid bolus and IV Decadron. Hospitalist will be consulted for admission.        Amount and/or Complexity of Data Reviewed  Clinical lab tests: ordered and reviewed  Tests in the radiology section of CPT®: ordered and reviewed  Tests in the medicine section of CPT®: ordered and reviewed  Discuss the patient with other providers: yes  Independent visualization of images, tracings, or specimens: yes    Risk of Complications, Morbidity, and/or Mortality  Presenting problems: high  Diagnostic procedures: high  Management options: high          ED Course       Procedures

## 2018-03-18 PROBLEM — G40.901 EPILEPSY WITH STATUS EPILEPTICUS (HCC): Status: ACTIVE | Noted: 2018-01-01

## 2018-03-18 NOTE — PROGRESS NOTES
Bedside report received from [facility] Pt resting in bed, turning about. Sister to stay until 2200. Will use bed alarm after family leaves.

## 2018-03-18 NOTE — CONSULTS
[address] [facility] Hematology & Oncology        Inpatient Hematology / Oncology Consult Note    Reason for Consult:  Acute encephalopathy  Brain metastasis ([facility]  Seizure ([facility]  Lung cancer [facility])  Referring Physician:  Carola Padilla MD    History of Present Illness:  Ms. Anu Ponce is a 63 y.o. female admitted on 3/16/2018 with a primary diagnosis of lung cancer, acute encephalopathy, brain metastasis, and fever. Ms. Anu Ponce is a well known patient of Dr. Celina Valentin. She was just seen in the clinic on 3/14/18. While planning for Nivolumab she had rapid growth of b/l cervical LAD. He discussed  the aggressive pattern of her disease. Restaging CT and MRI of brain were ordered. The plan was to start nivolumab/ipilimimab on 4/4/18. He discussed the need for her to inform family of her aggressive disease and to designate POA. Unfortunately, yesterday she was confused, lethargic and somnolent. Patient's sister was driving her home when she became more confused and start having what her sister described as generalized body shakes. 911 was called. She received 2 doses of ativan PTA to ED. In ED she was post ictal and difficult to arouse. CT head showed cerebral metastatic disease with left parietal convexity meningioma. CXR was unremarkable. CT chest on 1/30/18 showed interval treatment response with significantly decreased mediastinal and right hilar lymphadenopathy. Additional Oncology History Copied from Chart:  63 y.o.  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 Avtar Sneed and definitive chemorad being planned, however she also reported a severe upper back pain that resolved after chemo, raised concern and PET showed avid lesions of T5 and left sacrum and suspicious right inguinal LN, I reviewed PET personally and discussed with pt this made it extensive stage which would not be considered curable, I informed Dr. Vaishnavi Starr regarding the change and hold XRT, continue systemic palliative chemotherapy, arrange dental clearance for Xgeva, removed 4 bottom teeth and healing, proceed to cycle 2, mid back pain worse again and I reviewed the CT and discussed the apparent fracture line of T5 is likely the source of pain rather than cancer progression after the dramatic initial response, scribe pain med, consult IR for kyphoplasty and done on 6/12/17, deferred cycle 3 by a week for neutropenia, CT after cycle 3 showed dramatic [place], I reviewed CT personally and discussed with pt, saw dentist on 8/16/17 and recovered well, started xgeva, restaging CT after cycle 6 showed stable disease, I reviewed CT personally, the only site of a mediastinal LN appeared mari and we discussed option of PET/biopsy to further eval, next line chemo vs XRT, vs monitor and she desired to repeat CT in 2-3 months and RTC, continue xgeva.     She developed facial swelling and CT 11/15/17 showed disease progression with SVC encasement, I reviewed CT personally and discussed with pt to pursue XRT, also discussed potentially deliver definitive XRT given the local recurrence was rapid and if being the only site of recurrence, arrange PET to confirm, see rad onc and follow in 1-2 weeks, if definitive XRT is to be given, will add chemo for sensitization. Continue xgeva.  Dr. Vaishnavi Starr saw her and called me to discuss and rec pursue 4500 cGy for two weeks without chemo, started 12/5/17, completed and facial swelling resolved, odynophagia much better, add remeron for anorexia and insomnia, repeat CT chest showed good [place], I reviewed CT personally and discussed with pt the options of monitoring vs pursuing second line Nivolumab, given her recurrence was rapid and always poses urgency with significant SVC syndrome, she would like to pursue, Continue Xgeva.       Review of Systems:  Unable to assess due to patient's condition      Allergies   Allergen Reactions    Metaxalone Other (comments)    Oxycodone Swelling     Past Medical History:   Diagnosis Date    Former cigarette smoker     using Nicotine patch    GERD (gastroesophageal reflux disease)     managed with Zantac    Hypertension     no complications at this time, no current medications    Lung cancer [facility])      Past Surgical History:   Procedure Laterality Date    HX TUBAL LIGATION      HX VASCULAR ACCESS      lt chest     Family History   Problem Relation Age of Onset    Hypertension Mother     Asthma Mother     No Known Problems Father     Thyroid Disease Sister     Hypertension Brother     Diabetes Brother      Social History     Social History    Marital status: SINGLE     Spouse name: N/A    Number of children: N/A    Years of education: N/A     Occupational History    works at [employer] Top[employer]    Smoking status: Former Smoker     Packs/day: 0.50     Years: 39.00     Quit date: 4/18/2017    Smokeless tobacco: Never Used    Alcohol use No    Drug use: No    Sexual activity: Not on file     Other Topics Concern    Not on file     Social History Narrative    . 1 daughter and 1 son. Works as  at [employer] in [facility].       Current Facility-Administered Medications   Medication Dose Route Frequency Provider Last Rate Last Dose    sodium chloride (NS) flush 5-10 mL  5-10 mL IntraVENous Q8H Astrid Skaggs MD   10 mL at 03/17/18 1552    sodium chloride (NS) flush 5-10 mL  5-10 mL IntraVENous PRN Astrid Skaggs MD        acetaminophen (TYLENOL) suppository 650 mg  650 mg Rectal Q6H PRN Astrid Skaggs MD        naloxone [facility]) injection 0.4 mg  0.4 mg IntraVENous PRN Astrid Skaggs MD        ondansetron [facility]) injection 4 mg  4 mg IntraVENous Q4H PRN Astrid Skaggs MD        LORazepam (ATIVAN) injection 2 mg  2 mg IntraVENous Q6H PRN Astrid Skaggs MD   2 mg at 18 0058    LORazepam (ATIVAN) injection 2 mg  2 mg IntraVENous Q1H PRN Astrid Skaggs MD        influenza vaccine - (3 yrs+)(PF) (FLUZONE QUAD/FLUARIX QUAD) injection 0.5 mL  0.5 mL IntraMUSCular PRIOR TO DISCHARGE Astrid Skaggs MD        levalbuterol [facility]) nebulizer soln 0.63 mg/3 mL  0.63 mg Nebulization Q6H RT Astrid Skaggs MD   Stopped at 18 1400    dexamethasone (DECADRON) 4 mg/mL injection 4 mg  4 mg IntraVENous Q6H Shani Herrera MD   4 mg at 18 1917    [START ON 3/18/2018] levETIRAcetam (KEPPRA) 1,000 mg in 0.9% sodium chloride 100 mL IVPB  1,000 mg IntraVENous Q12H Shani Herrera MD        pantoprazole (PROTONIX) 40 mg in sodium chloride 10 mL injection  40 mg IntraVENous ACB Shani Herrera MD   40 mg at 18 1302    lactated Ringers infusion  100 mL/hr IntraVENous CONTINUOUS Astrid Skaggs  mL/hr at 18 1552 100 mL/hr at 18 1552       OBJECTIVE:  Patient Vitals for the past 8 hrs:   BP Temp Pulse Resp SpO2   18 1952 127/76 99.1 °F (37.3 °C) (!) 108 23 96 %   18 1900 - - (!) 109 19 100 %   18 1632 130/80 98.9 °F (37.2 °C) (!) 108 20 98 %   18 1602 135/82 - (!) 109 21 97 %   18 1531 125/77 - (!) 109 25 97 %   18 1432 131/78 - (!) 112 23 96 %   18 1402 127/78 - (!) 109 22 97 %   18 1331 129/67 - (!) 113 25 96 %   18 1302 123/63 - (!) 114 27 96 %   18 1232 132/63 - (!) 112 22 97 %     Temp (24hrs), Av.8 °F (37.1 °C), Min:97.8 °F (36.6 °C), Max:99.6 °F (37.6 °C)         Physical Exam:  Constitutional: Well developed, well nourished female in no acute distress, unresponsive laying in the hospital bed. HEENT: Normocephalic and atraumatic. Oropharynx is clear, mucous membranes are moist.  Pupils are equal, round, and reactive to light. Extraocular muscles are intact. Sclerae anicteric. Neck supple without JVD. No thyromegaly present. Lymph node   No palpable submandibular, cervical, supraclavicular, axillary or inguinal lymph nodes. Skin Warm and dry. No bruising and no rash noted. No erythema. No pallor. Respiratory Lungs are clear to auscultation bilaterally without wheezes, rales or rhonchi, normal air exchange without accessory muscle use. CVS Normal rate, regular rhythm and normal S1 and S2. No murmurs, gallops, or rubs. Abdomen Soft, nontender and nondistended, normoactive bowel sounds. No palpable mass. No hepatosplenomegaly. Neuro Unresponsive. Unable to assess. MSK Not assessed. Psych Unresponsive. Unable to assess. Labs:    Recent Results (from the past 24 hour(s))   EKG, 12 LEAD, INITIAL    Collection Time: 03/16/18  9:09 PM   Result Value Ref Range    Ventricular Rate 127 BPM    Atrial Rate 127 BPM    P-R Interval 136 ms    QRS Duration 70 ms    Q-T Interval 280 ms    QTC Calculation (Bezet) 406 ms    Calculated P Axis 93 degrees    Calculated R Axis 80 degrees    Calculated T Axis -99 degrees    Diagnosis       !! AGE AND GENDER SPECIFIC ECG ANALYSIS !! Sinus tachycardia  ST & T wave abnormality, consider inferolateral ischemia  Abnormal ECG  No previous ECGs available  Confirmed by Valeri Horn MD (), HEVER WHITE ([id number]) on 3/17/2018 6:40:50 PM     CBC WITH AUTOMATED DIFF    Collection Time: 03/16/18 10:14 PM   Result Value Ref Range    WBC 8.5 4.3 - 11.1 K/uL    RBC 4.61 4.05 - 5.25 M/uL    HGB 11.0 (L) 11.7 - 15.4 g/dL    HCT 33.3 (L) 35.8 - 46.3 %    MCV 72.2 (L) 79.6 - 97.8 FL    MCH 23.9 (L) 26.1 - 32.9 PG    MCHC 33.0 31.4 - 35.0 g/dL    RDW 18.3 (H) 11.9 - 14.6 %    PLATELET 106 402 - 848 K/uL    MPV 8.9 (L) 10.8 - 14.1 FL    DF AUTOMATED      NEUTROPHILS 83 (H) 43 - 78 %    LYMPHOCYTES 11 (L) 13 - 44 %    MONOCYTES 4 4.0 - 12.0 %    EOSINOPHILS 1 0.5 - 7.8 %    BASOPHILS 0 0.0 - 2.0 %    IMMATURE GRANULOCYTES 1 0.0 - 5.0 %    ABS. NEUTROPHILS 7.2 1.7 - 8.2 K/UL    ABS.  LYMPHOCYTES 0.9 0.5 - 4.6 K/UL    ABS. MONOCYTES 0.3 0.1 - 1.3 K/UL    ABS. EOSINOPHILS 0.1 0.0 - 0.8 K/UL    ABS. BASOPHILS 0.0 0.0 - 0.2 K/UL    ABS. IMM. GRANS. 0.0 0.0 - 0.5 K/UL   METABOLIC PANEL, COMPREHENSIVE    Collection Time: 03/16/18 10:14 PM   Result Value Ref Range    Sodium 139 136 - 145 mmol/L    Potassium 8.7 (HH) 3.5 - 5.1 mmol/L    Chloride 106 98 - 107 mmol/L    CO2 18 (L) 21 - 32 mmol/L    Anion gap 15 7 - 16 mmol/L    Glucose 174 (H) 65 - 100 mg/dL    BUN 12 6 - 23 MG/DL    Creatinine 1.31 (H) 0.6 - 1.0 MG/DL    GFR est AA 54 (L) >60 ml/min/1.73m2    GFR est non-AA 44 (L) >60 ml/min/1.73m2    Calcium 9.1 8.3 - 10.4 MG/DL    Bilirubin, total 0.3 0.2 - 1.1 MG/DL    ALT (SGPT) 36 12 - 65 U/L    AST (SGOT) 97 (H) 15 - 37 U/L    Alk.  phosphatase 90 50 - 136 U/L    Protein, total 8.6 (H) 6.3 - 8.2 g/dL    Albumin 3.6 3.5 - 5.0 g/dL    Globulin 5.0 (H) 2.3 - 3.5 g/dL    A-G Ratio 0.7 (L) 1.2 - 3.5     POC LACTIC ACID    Collection Time: 03/16/18 11:42 PM   Result Value Ref Range    Lactic Acid (POC) 18.0 (H) 0.5 - 1.9 mmol/L   METABOLIC PANEL, BASIC    Collection Time: 03/17/18  3:25 AM   Result Value Ref Range    Sodium 144 136 - 145 mmol/L    Potassium 3.5 3.5 - 5.1 mmol/L    Chloride 110 (H) 98 - 107 mmol/L    CO2 25 21 - 32 mmol/L    Anion gap 9 7 - 16 mmol/L    Glucose 172 (H) 65 - 100 mg/dL    BUN 10 6 - 23 MG/DL    Creatinine 1.11 (H) 0.6 - 1.0 MG/DL    GFR est AA >60 >60 ml/min/1.73m2    GFR est non-AA 54 (L) >60 ml/min/1.73m2    Calcium 9.6 8.3 - 10.4 MG/DL   TROPONIN I    Collection Time: 03/17/18  3:25 AM   Result Value Ref Range    Troponin-I, Qt. <0.02 (L) 0.02 - 0.05 NG/ML   EKG, 12 LEAD, SUBSEQUENT    Collection Time: 03/17/18 11:15 AM   Result Value Ref Range    Ventricular Rate 124 BPM    Atrial Rate 124 BPM    P-R Interval 132 ms    QRS Duration 76 ms    Q-T Interval 316 ms    QTC Calculation (Bezet) 453 ms    Calculated P Axis 81 degrees    Calculated R Axis 78 degrees    Calculated T Axis -95 degrees Diagnosis       Sinus tachycardia  T wave abnormality, consider inferior ischemia  T wave abnormality, consider anterolateral ischemia  Abnormal ECG  When compared with ECG of 16-MAR-2018 21:09,  No significant change was found  Confirmed by Miley Mcdermott MD (), HEVER WHITE ([id number]) on 3/17/2018 6:53:06 PM         Imaging:  MRI BRAIN W WO CONT [[id number]] Updated: 03/17/18 1723      Order Status: No result      XR ABD (KUB) [[id number]] Collected: 03/17/18 0115     Order Status: Completed Updated: 03/17/18 0117     Narrative:       Abdomen x-ray single view. HISTORY: Nasogastric tube placement. COMPARISON: None. FINDINGS: Nasogastric tube its tip in the gastric body. Bowel gas pattern is  nonspecific.       Impression:       IMPRESSION:    Nasogastric tube its tip in the gastric body.     XR CHEST PORT [[id number]] Collected: 03/16/18 2138     Order Status: Completed Updated: 03/16/18 2146     Narrative:       EXAM:  XR CHEST PORT    INDICATION:  AMS    COMPARISON:  4/18/2017    FINDINGS: A portable AP radiograph of the chest was obtained at 2124 hours. The  patient is on a cardiac monitor.  The lungs are clear.  The cardiac and  mediastinal contours and pulmonary vascularity are normal.  The bones and soft  tissues are grossly within normal limits.        Impression:       IMPRESSION: No acute cardiopulmonary disease.         CT HEAD WITHOUT CONTRAST [[id number]] Collected: 03/16/18 2130     Order Status: Completed Updated: 03/16/18 2134     Narrative:       CT HEAD WITHOUT CONTRAST     HISTORY:  Seizure. COMPARISON: None. TECHNIQUE: Axial imaging was performed without intravenous contrast utilizing  5mm slice thickness. Sagittal and coronal reformats were performed. Radiation  dose reduction techniques were used for this study. Our CT scanner uses one or  all of the following:    Automated exposure control, adjustment of the MAS or KUB according to patient's  size and iterative reconstruction.     FINDINGS:        *BRAIN:      -  There are no early signs of territorial or lacunar infarction by CT.     -  1.3 x 1.6 cm left parietal mass with surrounding vasogenic edema. 0.6 x  0.9 cm mass in left frontal lobe with vasogenic edema. Partly calcified mass in  left superior parafalcine region measuring 2.9 x 2.6 cm. Consider the  possibility of meningioma.     -  No gross white matter abnormality by CT.    *VISUALIZED PARANASAL SINUSES: Well aerated. *MASTOIDS:  Clear. *CALVARIUM AND SCALP: Unremarkable.         Impression:       IMPRESSION:    Cerebral metastatic disease. Possible left parietal convexity meningioma. Date of Dictation: 3/16/2018 9:30 PM       XR CHEST PA LAT [[id number]]      Order Status: Canceled          ASSESSMENT:  Problem List  Date Reviewed: 3/14/2018          Codes Class Noted    Hyperkalemia ICD-10-CM: E87.5  ICD-9-CM: 276.7  3/17/2018        Abnormal EKG ICD-10-CM: R94.31  ICD-9-CM: 794.31  3/17/2018        * (Principal)Seizure ([facility] ICD-10-CM: R56.9  ICD-9-CM: 780.39  3/16/2018        Lung cancer ([facility] ICD-10-CM: C34.90  ICD-9-CM: 162.9  3/16/2018        Brain metastasis ([facility] ICD-10-CM: C79.31  ICD-9-CM: 198.3  3/16/2018        Acute encephalopathy ICD-10-CM: G93.40  ICD-9-CM: 348.30  3/16/2018        Bone metastases ([facility] ICD-10-CM: C79.51  ICD-9-CM: 198.5  8/30/2017        Small cell carcinoma of right lung (HCC) ICD-10-CM: C34.91  ICD-9-CM: 162.9  4/21/2017        Smoker (Chronic) ICD-10-CM: L09.192  ICD-9-CM: 305.1  Unknown        Mediastinal mass ICD-10-CM: J98.59  ICD-9-CM: 786.6  4/18/2017        SVC (superior vena cava obstruction) ICD-10-CM: I87.1  ICD-9-CM: 459.2  4/18/2017        Essential hypertension ICD-10-CM: I10  ICD-9-CM: 401.9  4/18/2017        SVC syndrome ICD-10-CM: I87.1  ICD-9-CM: 459.2  4/18/2017                RECOMMENDATIONS:  SCLC extensive stage - Was scheduled to start nivolumab/ipilimimab on 4/4/18. Consult Radiation Oncology.   Discussed end of life decisions. Brain Metastases - Stat brain MRI. OK to discontinue EEG. Continue Decadron. Seizures  - Continue seizure precautions, Keppra, Ativan. Lab studies and imaging studies were personally reviewed. Pertinent old records were reviewed from EMR. Case discussed with Dr. Gwendolyn Chao. Thank you for allowing us to participate in the care of Ms. Jairo Pike. Jil Glez NP   [facility] On[facility]  [address] [address]  Office : [phone]  Fax : [phone]   Patient seen, examed and discussed with NP, agree with above history/assessment/plan. 63 y. o.female consulted for seizure and brain masses. She has known stage IV SCLC with SVC syndrome that had been treated with cis/etop, XRT, recurred and just started ipilimimab/novolumab, abmitted with new onset of seizure and received high dose decadron and keppra, no more clonic seizures but still not conscious, I discussed with family members, including sister who is her POA, regarding options of brain XRT vs hospice, family somehow felt differently about her wishes but agreed to consult rad onc while they continue to discuss, appreciate hospitalist input for seizure control and consult neurology if no progress. Raven Palomino M.D.   [facility]  [address] [place] [address]  Office : [phone]  Fax : [phone]

## 2018-03-18 NOTE — PROGRESS NOTES
3/18/2018 9:41 AM    Admit Date: 3/16/2018    Admit Diagnosis: Acute encephalopathy;Brain metastasis ([address] Seizure (HCC);L*      Subjective:   Alert but confused- denies cp or sob      Objective:      Visit Vitals    /62    Pulse 95    Temp 98.9 °F (37.2 °C)    Resp 15    Ht 5' 8\" (1.727 m)    Wt 49.1 kg (108 lb 3.9 oz)    SpO2 (!) 86%    Breastfeeding No    BMI 16.46 kg/m2       Physical Exam:  [place] Console, Well Nourished, No Acute Distress, Alert & Oriented x 3, appropriate mood. Neck- supple, no JVD  CV- regular rate and rhythm no MRG  Lung- clear bilaterally  Abd- soft, nontender, nondistended  Ext- no edema bilaterally. Skin- warm and dry        Data Review:   Recent Labs      03/18/18   0326   03/16/18   2214   NA  146*   < >  139   K  3.1*   < >  8.7*   BUN  12   < >  12   CREA  1.01*   < >  1.31*   WBC   --    --   8.5   HGB   --    --   11.0*   HCT   --    --   33.3*   PLT   --    --   256    < > = values in this interval not displayed.        Assessment/Plan:     Principal Problem:    Epilepsy with status epilepticus ([address] (3/18/2018)    Active Problems:    SVC syndrome (4/18/2017)      Small cell carcinoma of right lung ([address] (4/21/2017)- per primary      Bone metastases ([address] (8/30/2017)      Seizure ([address] (3/16/2018)      Brain metastasis ([address] (3/16/2018)      Acute encephalopathy (3/16/2018) still confused but more alert      Hyperkalemia (3/17/2018)      Abnormal EKG (3/17/2018)- nl lvef- no further intervention needed

## 2018-03-18 NOTE — PROGRESS NOTES
Bedside report received from Andres Sarkar. Pt sleeping in bed on right side. VSS on monitor with HR down to 93, NAD. KCL replacement infusing.

## 2018-03-18 NOTE — PROGRESS NOTES
SPEECH PATHOLOGY NOTE:  Consult received and appreciated. Chart reviewed and discussed with French Daniels RN. Patient willing to accept PO trials. Mild delay in oral prep time due to lack of dentition. Otherwise, no overt signs or symptoms of aspiration with any consistency assessed. Swallows of all textures clear to cervical auscultation with adequate laryngeal excursion and clear voicing after swallow. No oral residue. Recommend initiate mechanical soft diet and thin liquids until dentures can be placed. Meds whole with water or in applesauce as tolerated. Patient must be alert and fully upright for all PO. Results/recommendations discussed with French Daniels RN. Full report to follow.   Carlie Chan MA, CCC-SLP

## 2018-03-18 NOTE — PROGRESS NOTES
Speech therapist at bedside for swallow eval. Pt repositioned and sat up in bed.  Much more alert, still confused but following more commands and working well with therapist.

## 2018-03-18 NOTE — PROGRESS NOTES
Dr Joni Tubbs called with new orders for tele neuro consult for statis epilepticus and to given midnight dose of keppra now. Pharmacy made aware of change in keppra order. Dr Joni Tubbs made aware that Dr Aylin Cortez was called with these neuro results from Dr Kristine Espino. CC RN Maria Luisa Stephens to 7th floor for tele neuro cart.

## 2018-03-18 NOTE — PROGRESS NOTES
Hospitalist Progress Note    3/18/2018  Admit Date: 3/16/2018  9:00 PM   NAME: Blaise Burger   :  1960   MRN:  [id number]   Attending: Luis Hines MD  PCP:  Emily Wilson MD    SUBJECTIVE:   Pt admitted with new finding of Brain mets from lung cancer, CT +ve for vasogenic edema. Started on Keppra and decadron, Found to be in Status Epilepticus on repeat EEG. Seen by Cardiology for abnormal EKG and also seen by teleNeuro. Oncology os primary and hospitalist consulting for Seizures/Medical management. 3/18: More alert and awake, remains confused, Says \"Yes\" to all questions, unable to answer questions appropriately, has some purposeful movements. Maintaining airway, Labs improved. I was called around 9pm last night by Dr Kristine Espino who read the EEG and confirmed ongoing Left sided seizures. Started on Fosphenytoin by teleNeuro overnight. Nursing notes and chart reviewed. Review of Systems negative with exception of pertinent positives noted above. PHYSICAL EXAM     Visit Vitals    /64 (BP 1 Location: Right arm, BP Patient Position: At rest;Hip tilt, right)    Pulse 91    Temp 98.9 °F (37.2 °C)    Resp 18    Ht 5' 8\" (1.727 m)    Wt 49.1 kg (108 lb 3.9 oz)    SpO2 93%    Breastfeeding No    BMI 16.46 kg/m2      Temp (24hrs), Av.9 °F (37.2 °C), Min:98.3 °F (36.8 °C), Max:99.6 °F (37.6 °C)    Oxygen Therapy  O2 Sat (%): 93 % (18 0732)  Pulse via Oximetry: 93 beats per minute (18 0732)  O2 Device: Room air (keeps taking off nasal cannula) (18 0732)  O2 Flow Rate (L/min): 2 l/min (18 0403)    Intake/Output Summary (Last 24 hours) at 18 0742  Last data filed at 18 0657   Gross per 24 hour   Intake             1630 ml   Output             1760 ml   Net             -130 ml       General: Awake, unable to follow commands, mumbles  Head:  AT/NC  Lungs:  CTABL.    Heart:  RRR, no murmur, rub, or gallop  Abdomen: Soft, non-distended, non-tender, +bs  Extremities: No cyanosis or clubbing. Neurologic:  Not following commands, pupils round and equal, no nystagmus. Skin:  No Rash    LABS AND STUDIES:  Personally reviewed all labs, meds, and studies for past 24hrs. ASSESSMENT      Active Hospital Problems    Diagnosis Date Noted    Epilepsy with status epilepticus ([facility] 03/18/2018    Hyperkalemia 03/17/2018    Abnormal EKG 03/17/2018    [facility]/16/2018    Brain metastasis ([facility] 03/16/2018    Acute encephalopathy 03/16/2018    [facility] 08/30/2017    Small cell carcinoma of right lung ([facility] 04/21/2017    SVC syndrome 04/18/2017       PLAN:    · c/w Keppra 1gm q12h and fosphenytoin per tele neuro recs. Phenytoin level tonight. · Continous 24hr EEG recommended by teleNeuro if she doesn't improve. Seizure precautions  · c/w decadron 4mg IV q6h for vasogenic edema, MRI brain pending read. · c/w Protonix for Ppx  · Consulted cards. Appreciate input. · C/w IVF. · NPO for now, has pulled out NGT, get SLP eval.    Dispo: monitor closely In ICU. Will recommend either daily teleNeuro consult vs transfer to [facility] for 24hr EEG and Seizures treatment under Neurology service over there. Defer to Oncology. High risk pt.     DVT ppx:  SCDS      Signed By: Zen Myles MD     March 18, 2018

## 2018-03-18 NOTE — PROGRESS NOTES
Called Radiology to inquire about unread MRI from yesterday at 1723. Was told MRI would not be read until after 1300 today by a neuro radiologist. Dr. Brigette Foote here on unit and notified.

## 2018-03-18 NOTE — PROGRESS NOTES
Tele neurology consult performed at bedside.  Recommendations were called to Dr Jorge Velasquez per DR Boston Howard, neurologist.

## 2018-03-18 NOTE — PROGRESS NOTES
Pt more alert, recognizes Dr. Edge Rebekah at bedside, still unable to fully verbalize appropriate words but attempting to communicate. Also recognizes sister and several other family members.

## 2018-03-18 NOTE — PROGRESS NOTES
Pt pulled out NGT that was taped and Tegaderm secured and oxygen. Pt has strong cough. Will leave tube out for now.

## 2018-03-18 NOTE — PROGRESS NOTES
Dr Jacque Richardson called with EEG results, states that there is still seizure activity coming from the left side of the brain. No recommendations made. Dr Ashley Bah called with this message from Dr Jacque Richardson. reviewed pt's keppra order. No new orders given at this point.

## 2018-03-18 NOTE — PROGRESS NOTES
Dr Adwoa Rodriguez updated on pt's potassium level and that pt pulled out NGT earlier and is not willing to swallow po. New order received for KCL replacement and to change dilantin to IV form. Pharmacy aware of change and reified dosage.

## 2018-03-18 NOTE — PROGRESS NOTES
Pt restless in bed, turning side to side, taking oxygen on and off. Still not talking in sentences, just one word or a grunt to questions.

## 2018-03-18 NOTE — PROCEDURES
[facility]  EEG    Desmond Caldwell  MR#: [id number]  : 1960  ACCOUNT #: [de-identified]   DATE OF SERVICE: 2018    This is a 21-channel EEG performed on this patient who has had seizure activity and is now in a controlled environment with concern of underlying subclinical seizures. The background rhythm clearly shows lateralizing deficits. The right hemisphere appears to be more appropriate and organized but slow at 6-7 Hz activity. The left hemisphere is extremely disorganized. It is showing significantly slower activity and throughout the majority of this EEG, there was recurrent episodes that were very rhythmic and paroxysmal and bursting, but it did not cross to the right hemisphere. This did appear to be seizure activity. In between these episodes that were very frequent, it was disorganized. There was background slowing noted in this hemisphere. Photic stimulation was performed and did not elicit any change in the background rhythm. Hyperventilation could not be performed. The patient did not enter into stage I or stage II sleep. IMPRESSION:    1. This EEG is consistent with a severe encephalopathic process that may be of a toxic metabolic or degenerative etiology. 2.  It does show left hemispheric seizure activity that continues to be recurrent. It is rhythmic in nature and shows significant left hemispheric slowing. Radiographic imaging is strongly recommended. Clinical correlation is advised. The ordering physician was contacted.       DO JOE Srinivasan  D: 2018 21:03     T: 2018 23:17  JOB #: [id number]

## 2018-03-18 NOTE — PROGRESS NOTES
Pt repositioned and sat up in bed, ate 80% of dinner meal tray. Fed by sister, took encouragement but no difficulties noted.

## 2018-03-18 NOTE — PROGRESS NOTES
Problem: Dysphagia (Adult)  Goal: *Acute Goals and Plan of Care (Insert Text)  STG: Patient will swallow mechanical soft diet and thin liquids without overt signs or symptoms of aspiration 100% of the time. STG: Patient will participate in cognitive/linguistic assessment as medically indicated. LTG: Patient will consume least restrictive diet without respiratory compromise by discharge. Speech language pathology: bedside swallow note: Initial Assessment    NAME/AGE/GENDER: Que Ivory is a 63 y.o. female  DATE: 3/18/2018  PRIMARY DIAGNOSIS: Acute encephalopathy  Brain metastasis ([facility]  Seizure ([facility]  Lung cancer ([facility]       ICD-10: Treatment Diagnosis: Oropharyngeal dysphagia (R13.12)  INTERDISCIPLINARY COLLABORATION: Registered Nurse  PRECAUTIONS/ALLERGIES: Metaxalone and Oxycodone ASSESSMENT:Based on the objective data described below, Ms. Ruby Bradford presents with mild delay in oral prep time due to lack of dentition. Otherwise, no overt signs or symptoms of aspiration with any consistency assessed. Swallows of all textures clear to cervical auscultation with adequate laryngeal excursion and clear voicing after swallow. No oral residue. Recommend initiate mechanical soft diet and thin liquids until dentures can be placed. Meds whole with water or in applesauce as tolerated. Patient must be alert and fully upright for all PO. Patient stating \"yes\" to all questions. Results/recommendations discussed with Ivone Pearson RN. Patient will benefit from skilled intervention to address the below impairments. ?????? ? ? This section established at most recent assessment??????????  PROBLEM LIST (Impairments causing functional limitations):  1. Dysphagia  2. Cognition  REHABILITATION POTENTIAL FOR STATED GOALS: Fair  PLAN OF CARE:   Patient will benefit from skilled intervention to address the following impairments.   RECOMMENDATIONS AND PLANNED INTERVENTIONS (Benefits and precautions of therapy have been discussed with the patient.):  · PO:  Mechanical soft  · Liquids:  regular thin  MEDICATIONS:  · Whole with water or in applesauce as tolerated  COMPENSATORY STRATEGIES/MODIFICATIONS INCLUDING:  · Upright for all PO  · Patient must be fully alert for PO  OTHER RECOMMENDATIONS (including follow up treatment recommendations): · Family training/education  · Patient education  · Cognitive/linguistic assessment  RECOMMENDED DIET MODIFICATIONS DISCUSSED WITH:  · Nursing  · Family  · Patient  FREQUENCY/DURATION: Continue to follow patient 3 times a week for duration of hospital stay to address above goals. RECOMMENDED REHABILITATION/EQUIPMENT: (at time of discharge pending progress): Due to the probability of continued deficits (see above) this patient will likely need continued skilled speech therapy after discharge. SUBJECTIVE:   Patient pleasantly confused but cooperative. Eyes intermittently closed throughout session, but she would open them to command. Brother present and supportive. History of Present Injury/Illness: Ms. Ruby Bradford  has a past medical history of Former cigarette smoker; GERD (gastroesophageal reflux disease); Hypertension; and Lung cancer ([facility] She also has no past medical history of Adverse effect of anesthesia; Difficult intubation; Malignant hyperthermia due to anesthesia; Nausea & vomiting; or Pseudocholinesterase deficiency. She also  has a past surgical history that includes hx tubal ligation and hx vascular access. Present Symptoms: pulled out NGT   Pain Intensity 1: 0  Pain Intervention(s) 1: Emotional support, Family Support, Repositioned  Current Medications:   No current facility-administered medications on file prior to encounter. Current Outpatient Prescriptions on File Prior to Encounter   Medication Sig Dispense Refill    mirtazapine (REMERON) 15 mg tablet Take 1 Tab by mouth nightly. 30 Tab 1    ondansetron hcl (ZOFRAN) 8 mg tablet Take 1 Tab by mouth every eight (8) hours as needed for Nausea. 90 Tab 1    lidocaine-prilocaine (EMLA) topical cream Apply 1/2 to 1 inch to port site one hour prior to needle access. 30 g 1    nicotine (NICODERM CQ) 7 mg/24 hr 1 Patch by TransDERmal route every twenty-four (24) hours.  amLODIPine (NORVASC) 5 mg tablet Take 5 mg by mouth daily.  senna-docusate (SHERRI-COLACE) 8.6-50 mg per tablet Take 1 Tab by mouth two (2) times a day. 60 Tab 2    raNITIdine (ZANTAC) 150 mg tablet Take 150 mg by mouth every morning.  prochlorperazine (COMPAZINE) 10 mg tablet Take 5 mg by mouth every six (6) hours as needed for Nausea.  acetaminophen (TYLENOL) 325 mg tablet Take 2 Tabs by mouth every four (4) hours as needed. 30 Tab 0     Current Dietary Status:  NPO      Social History/Home Situation:    Home Environment: Private residence  # Steps to Enter: 2  One/Two Story Residence: One story  Living Alone: No  Support Systems: Family member(s)  Patient Expects to be Discharged to[de-identified] Private residence  Current DME Used/Available at Home: None  OBJECTIVE:   Respiratory Status:  Room air  2 l/min    Cognitive and Communication Status:  Neurologic State: Eyes open to voice; Confused  Orientation Level: Oriented to person  Cognition: Follows commands;Poor safety awareness        Safety/Judgement: Decreased awareness of environment;Decreased awareness of need for assistance    BEDSIDE SWALLOW EVALUATION  Oral Assessment:  Oral Assessment  Labial: No impairment  Dentition: Edentulous; Limited;Natural  Oral Hygiene: adequate  Lingual: Incoordinated  Velum: No impairment  P.O. Trials:  Patient Position: upright in bed    The patient was given the following:   Consistency Presented: Thin liquid; Solid;Puree;Pudding;Mixed consistency  How Presented: SLP-fed/presented;Cup/sip;Cup/gulp; Spoon;Straw;Successive swallows    ORAL PHASE:  Bolus Acceptance: No impairment  Bolus Formation/Control: Impaired  Propulsion: Delayed (# of seconds)  Type of Impairment: Delayed;Mastication  Oral Residue: None    PHARYNGEAL PHASE:  Initiation of Swallow: No impairment  Laryngeal Elevation: Functional  Aspiration Signs/Symptoms: None  Vocal Quality: No impairment           Pharyngeal Phase Characteristics: No impairment, issues, or problems     OTHER OBSERVATIONS:  Rate/bite size: WNL   Endurance:  Questionable      Tool Used: Dysphagia Outcome and Severity Scale (RAYMON)    Score Comments   Normal Diet  [] 7 With no strategies or extra time needed   Functional Swallow  [] 6 May have mild oral or pharyngeal delay       Mild Dysphagia    [x] 5 Which may require one diet consistency restricted (those who demonstrate penetration which is entirely cleared on MBS would be included)   Mild-Moderate Dysphagia  [] 4 With 1-2 diet consistencies restricted       Moderate Dysphagia  [] 3 With 2 or more diet consistencies restricted       Moderately Severe Dysphagia  [] 2 With partial PO strategies (trials with ST only)       Severe Dysphagia  [] 1 With inability to tolerate any PO safely          Score:  Initial: 5 Most Recent: X (Date: -- )   Interpretation of Tool: The Dysphagia Outcome and Severity Scale (RAYMON) is a simple, easy-to-use, 7-point scale developed to systematically rate the functional severity of dysphagia based on objective assessment and make recommendations for diet level, independence level, and type of nutrition. Score 7 6 5 4 3 2 1   Modifier CH CI CJ CK CL CM CN   ?  Swallowing:     - CURRENT STATUS: CJ - 20%-39% impaired, limited or restricted    - GOAL STATUS:  CH - 0% impaired, limited or restricted    - D/C STATUS:  ---------------To be determined---------------  Payor: ABSOLUTE TOTAL CARE / [place] [employer] TOTAL [place] / Product Type: Managed Care Medicaid /     TREATMENT:    (In addition to Assessment/Re-Assessment sessions the following treatments were rendered)  Assessment/Reassessment only, no treatment provided today  MODALITIES: ORAL MOTOR  EXERCISES:                                                                                                                                                                      LARYNGEAL / PHARYNGEAL EXERCISES:                                                                                                                                     __________________________________________________________________________________________________  Safety:   After treatment position/precautions:  · RN notified  · Family at bedside  · Upright in Bed  Treatment Assessment:    Progression/Medical Necessity:   · Patient is expected to demonstrate progress in swallow timeliness, swallow function, diet tolerance and swallow safety to work toward diet advancement. Compliance with Program/Exercises: Will assess as treatment progresses. Reason for Continuation of Services/Other Comments:  · Patient continues to require skilled intervention due to modified diet recommended. Recommendations/Intent for next treatment session: \"Treatment next visit will focus on assessment of solids with dentures in place for potential upgrades\".     Total Treatment Duration:  Time In: 1120  Time Out: 5 [place], [place], CCC-SLP

## 2018-03-18 NOTE — PROGRESS NOTES
[facility]: In Patient Hematology / Oncology Progress Note    Admission Date: 3/16/2018  9:00 PM    SUBJECTIVE:  More alert and responsive    Allergies   Allergen Reactions    Metaxalone Other (comments)    Oxycodone Swelling       OBJECTIVE:  Patient Vitals for the past 8 hrs:   BP Temp Pulse Resp SpO2   18 1231 109/70 98.3 °F (36.8 °C) 98 (!) 32 94 %   18 1202 117/79 - (!) 107 - -   18 1133 (!) 89/69 - (!) 103 (!) 40 91 %   18 1101 98/65 - 100 (!) 37 92 %   18 1031 103/65 - 91 - 92 %   18 0932 112/62 - 95 15 (!) 86 %   18 0901 95/53 - 99 19 94 %   18 0831 94/56 - 93 17 96 %   18 0802 (!) 88/55 - 97 20 91 %   18 0743 - - - - 95 %   18 0732 100/64 98.9 °F (37.2 °C) 91 18 93 %   18 0701 93/55 - 97 12 97 %   18 0632 104/56 - 91 17 95 %   18 0602 100/61 - 92 15 95 %   18 0532 100/61 - (!) 102 (!) 31 95 %     Temp (24hrs), Av.7 °F (37.1 °C), Min:98.3 °F (36.8 °C), Max:99.1 °F (37.3 °C)         Physical Exam:  Constitutional: Well developed, well nourished female in no acute distress, responsive but no oriented, NAD   HEENT: Normocephalic and atraumatic. Oropharynx is clear, mucous membranes are moist.  Pupils are equal, round, and reactive to light. Extraocular muscles are intact. Sclerae anicteric. Neck supple without JVD. No thyromegaly present. Lymph node No palpable submandibular, cervical, supraclavicular, axillary or inguinal lymph nodes. Skin Warm and dry. No bruising and no rash noted. No erythema. No pallor. Respiratory Lungs are clear to auscultation bilaterally without wheezes, rales or rhonchi, normal air exchange without accessory muscle use. CVS Normal rate, regular rhythm and normal S1 and S2. No murmurs, gallops, or rubs. Abdomen Soft, nontender and nondistended, normoactive bowel sounds. No palpable mass. No hepatosplenomegaly. Neuro Unresponsive. Unable to assess.    MSK Not assessed. Psych NAD          Labs:  Recent Labs      03/16/18   2214   WBC  8.5   RBC  4.61   HGB  11.0*   HCT  33.3*   MCV  72.2*   MCH  23.9*   MCHC  33.0   RDW  18.3*   PLT  256   GRANS  83*   LYMPH  11*   MONOS  4   EOS  1   BASOS  0   IG  1   DF  AUTOMATED   ANEU  7.2   ABL  0.9   ABM  0.3   LESLIE  0.1   ABB  0.0   AIG  0.0    Recent Labs      03/18/18   0326  03/17/18   0325  03/16/18   2214   NA  146*  144  139   K  3.1*  3.5  8.7*   CL  107  110*  106   CO2  28  25  18*   AGAP  11  9  15   GLU  183*  172*  174*   BUN  12  10  12   CREA  1.01*  1.11*  1.31*   GFRAA  >60  >60  54*   GFRNA  >60  54*  44*   CA  8.4  9.6  9.1   SGOT   --    --   97*   AP   --    --   90   TP   --    --   8.6*   ALB   --    --   3.6   GLOB   --    --   5.0*   AGRAT   --    --   0.7*   MG  1.8   --    --        ASSESSMENT:    Principal Problem:    Epilepsy with status epilepticus ([facility] (3/18/2018)    Active Problems:    SVC syndrome (4/18/2017)      Small cell carcinoma of right lung ([facility] (4/21/2017)      Bone metastases ([facility]) (8/30/2017)      Seizure ([facility] (3/16/2018)      [facility] (3/16/2018)      Acute encephalopathy (3/16/2018)      Hyperkalemia (3/17/2018)      Abnormal EKG (3/17/2018)        PLAN:  Patient seen, examed and discussed with NP, agree with above history/assessment/plan. 63 y. o.female consulted for seizure and brain masses.  She has known stage IV SCLC with SVC syndrome that had been treated with cis/etop, XRT, recurred and just started ipilimimab/novolumab, abmitted with new onset of seizure and received high dose decadron and keppra, no more clonic seizures but still not conscious, I discussed with family members, including sister who is her POA, regarding options of brain XRT vs hospice, family somehow felt differently about her wishes but agreed to consult rad onc while they continue to discuss, appreciate hospitalist input for seizure control, adding fosphenytoin noted for continuous left parietal seizure, clinically improved, responsive but not oriented, continue above care.       Franky Hernandez M.D.   [address]  Office : [phone]  Fax : [phone]

## 2018-03-18 NOTE — PROGRESS NOTES
New orders noted from Dr Sherly Baugh for dilantin and cerebyx. Both medications given as ordered. Pt did experience sudden onset of itching all over with infusion of cerebyx. Pt sat up in bed, on side of bed, opened her eyes several times. Lotion to skin. Bed linens changed and gown. CHG bath done. Itching stopped when cerebyx infusion completed. Pt repositioned in bed, bed alarm on. Pt's brother remains at bedside. Pt reached for pillow case that I was holding and used it to spit out thick brown sputum. Wiped her mouth herself and handed me the linen. Pt did not speak, only grunted or said one word, \"oh\" or \"oh my\".

## 2018-03-18 NOTE — PROGRESS NOTES
Pt confused and moving around in bed more, quite restless. Upon entering room she was pulling out accessed port. Quickly grabbed and capped needle, put in sharps container. Port re accessed with 0.75 inch needle. Flushes well and good blood return. Dressed with CL dressing kit and IVF infusing.

## 2018-03-19 NOTE — INTERDISCIPLINARY ROUNDS
Interdisciplinary team rounds were held 3/19/2018 with the following team members:Physician, respiratory therapist, coordinator and the patient. Plan of care discussed. See clinical pathway and/or care plan for interventions and desired outcomes.

## 2018-03-19 NOTE — PROGRESS NOTES
Lab called with phenytoin level 34.3, pharmacy aware. Will hold cerebyx dose for 0600 and recheck level again prior to next dose.

## 2018-03-19 NOTE — PROGRESS NOTES
Problem: Dysphagia (Adult)  Goal: *Acute Goals and Plan of Care (Insert Text)  STG: Patient will swallow mechanical soft diet and thin liquids without overt signs or symptoms of aspiration 100% of the time.- MET 03/19/18  STG: Patient will participate in cognitive/linguistic assessment as medically indicated. LTG: Patient will consume least restrictive diet without respiratory compromise by discharge. MET 03/19/18    Speech language pathology: bedside swallow note: Daily Note    NAME/AGE/GENDER: Malini Hoffman is a 63 y.o. female  DATE: 3/19/2018  PRIMARY DIAGNOSIS: Acute encephalopathy  Brain metastasis ([facility]  Seizure ([facility] [address]  Lung cancer ([facility] [address]       ICD-10: Treatment Diagnosis: Oropharyngeal dysphagia (R13.12)  INTERDISCIPLINARY COLLABORATION: Registered Nurse  PRECAUTIONS/ALLERGIES: Metaxalone and Oxycodone ASSESSMENT:Patient seen for diet tolerance and po trials with solids this AM. Multiple family members at bedside. Sister reports dentures were taken home yesterday as they broke during seizure- prior to admission. Patient alert, more verbal today. Perseverating on \"yeah\" and \"ok then\", but also some spontaneous utterances including \"I got that\". Followed 1-step functional commands with good accuracy. Patient presented with thin liquid via serial straw sips, mixed consistencies, and solids. Timely swallow initiation and single swallows palpated with thin liquids. Vocal quality remained clear. Mildly prolonged mastication of solid consistencies due to missing dentition, requiring multiple sips of thin liquid to aid in mastication. Adequate oral clearing following mixed and solid trials. No overt signs or symptoms of airway compromise. Discussed diet options with patient's family- specifically mechanical vs. Regular diet. While patient would likely be able to tolerate regular diet, concern for fatigue and difficulty mastication due to missing dentition.  Family in agreement with concerns and requesting to continue to mechanical soft diet. Recommend mechanical soft/ thin liquids. Medications whole with liquid wash. Dysphagia goals are met. Will follow up at next session for language evaluation. Patient will benefit from skilled intervention to address the below impairments. ?????? ? ? This section established at most recent assessment??????????  PROBLEM LIST (Impairments causing functional limitations):  1. Dysphagia  2. Cognition  REHABILITATION POTENTIAL FOR STATED GOALS: Fair  PLAN OF CARE:   Patient will benefit from skilled intervention to address the following impairments. RECOMMENDATIONS AND PLANNED INTERVENTIONS (Benefits and precautions of therapy have been discussed with the patient.):  · PO:  Mechanical soft  · Liquids:  regular thin  MEDICATIONS:  · Whole with water or in applesauce as tolerated  COMPENSATORY STRATEGIES/MODIFICATIONS INCLUDING:  · Upright for all PO  · Patient must be fully alert for PO  OTHER RECOMMENDATIONS (including follow up treatment recommendations): · Family training/education  · Patient education  · Cognitive/linguistic assessment  RECOMMENDED DIET MODIFICATIONS DISCUSSED WITH:  · Nursing  · Family  · Patient  FREQUENCY/DURATION: Continue to follow patient 3 times a week for duration of hospital stay to address above goals. RECOMMENDED REHABILITATION/EQUIPMENT: (at time of discharge pending progress): Due to the probability of continued deficits (see above) this patient will likely need continued skilled speech therapy after discharge. SUBJECTIVE:   Cooperative. Increased vocalizations. Unable to perform confrontational naming, but spontaneous utterances noted. History of Present Injury/Illness: Ms. Cynthia Garvey  has a past medical history of Former cigarette smoker; GERD (gastroesophageal reflux disease); Hypertension; and Lung cancer ([facility] [address] She also has no past medical history of Adverse effect of anesthesia; Difficult intubation; Malignant hyperthermia due to anesthesia;  Nausea & vomiting; or Pseudocholinesterase deficiency. She also  has a past surgical history that includes hx tubal ligation and hx vascular access. Present Symptoms: pulled out NGT   Pain Intensity 1: 0  Pain Intervention(s) 1: Emotional support, Family Support, Repositioned  Current Medications:   No current facility-administered medications on file prior to encounter. Current Outpatient Prescriptions on File Prior to Encounter   Medication Sig Dispense Refill    mirtazapine (REMERON) 15 mg tablet Take 1 Tab by mouth nightly. 30 Tab 1    ondansetron hcl (ZOFRAN) 8 mg tablet Take 1 Tab by mouth every eight (8) hours as needed for Nausea. 90 Tab 1    lidocaine-prilocaine (EMLA) topical cream Apply 1/2 to 1 inch to port site one hour prior to needle access. 30 g 1    nicotine (NICODERM CQ) 7 mg/24 hr 1 Patch by TransDERmal route every twenty-four (24) hours.  amLODIPine (NORVASC) 5 mg tablet Take 5 mg by mouth daily.  senna-docusate (SHERRI-COLACE) 8.6-50 mg per tablet Take 1 Tab by mouth two (2) times a day. 60 Tab 2    raNITIdine (ZANTAC) 150 mg tablet Take 150 mg by mouth every morning.  prochlorperazine (COMPAZINE) 10 mg tablet Take 5 mg by mouth every six (6) hours as needed for Nausea.  acetaminophen (TYLENOL) 325 mg tablet Take 2 Tabs by mouth every four (4) hours as needed. 30 Tab 0     Current Dietary Status:  Mechanical soft/thin liquids      Social History/Home Situation:    Home Environment: Private residence  # Steps to Enter: 2  One/Two Story Residence: One story  Living Alone: No  Support Systems: Family member(s)  Patient Expects to be Discharged to[de-identified] Private residence  Current DME Used/Available at Home: None  OBJECTIVE:   Respiratory Status:  Room air       Cognitive and Communication Status:  Neurologic State: Alert  Orientation Level: Unable to verbalize;Oriented to person  Cognition: Decreased attention/concentration; Follows commands  Perception: Appears intact  Perseveration: Perseverates during conversation  Safety/Judgement: Fall prevention;Decreased awareness of need for assistance    BEDSIDE SWALLOW EVALUATION  Oral Assessment:  Oral Assessment  Labial: No impairment  Dentition: Edentulous; Natural;Limited  Oral Hygiene: Adequate  Lingual: No impairment  Velum: No impairment  P.O. Trials:  Patient Position: Upright in bed    The patient was given the following:   Consistency Presented: Thin liquid;Mixed consistency; Solid  How Presented: Self-fed/presented;Cup/sip;Spoon;Straw;Successive swallows    ORAL PHASE:  Bolus Acceptance: No impairment  Bolus Formation/Control: Impaired  Propulsion: No impairment  Type of Impairment: Mastication  Oral Residue: 10-50% of bolus    PHARYNGEAL PHASE:  Initiation of Swallow: No impairment  Laryngeal Elevation: Functional     Vocal Quality: No impairment                OTHER OBSERVATIONS:  Rate/bite size: WNL   Endurance:  Questionable      Tool Used: Dysphagia Outcome and Severity Scale (RAYMON)    Score Comments   Normal Diet  [] 7 With no strategies or extra time needed   Functional Swallow  [] 6 May have mild oral or pharyngeal delay       Mild Dysphagia    [x] 5 Which may require one diet consistency restricted (those who demonstrate penetration which is entirely cleared on MBS would be included)   Mild-Moderate Dysphagia  [] 4 With 1-2 diet consistencies restricted       Moderate Dysphagia  [] 3 With 2 or more diet consistencies restricted       Moderately Severe Dysphagia  [] 2 With partial PO strategies (trials with ST only)       Severe Dysphagia  [] 1 With inability to tolerate any PO safely          Score:  Initial: 5 Most Recent: X (Date: -- )   Interpretation of Tool: The Dysphagia Outcome and Severity Scale (RAYMON) is a simple, easy-to-use, 7-point scale developed to systematically rate the functional severity of dysphagia based on objective assessment and make recommendations for diet level, independence level, and type of nutrition. Score 7 6 5 4 3 2 1   Modifier CH CI CJ CK CL CM CN   ? Swallowing:     - CURRENT STATUS: CI - 1%-19% impaired, limited or restricted    - GOAL STATUS:  CH - 0% impaired, limited or restricted    - D/C STATUS:  CI - 1%-19% impaired, limited or restricted  Payor: [employer]OL[employer] C[employer] / [place] [employer] / Product Type: Managed Care Medicaid /     TREATMENT:    (In addition to Assessment/Re-Assessment sessions the following treatments were rendered)  Assessment/Reassessment only, no treatment provided today  MODALITIES:                                                                    ORAL MOTOR  EXERCISES:                                                                                                                                                                      LARYNGEAL / PHARYNGEAL EXERCISES:                                                                                                                                     __________________________________________________________________________________________________  Safety:   After treatment position/precautions:  · RN notified  · Family at bedside  · Upright in Bed  Treatment Assessment:    Progression/Medical Necessity:   · Patient is expected to demonstrate progress in swallow timeliness, swallow function, diet tolerance and swallow safety to work toward diet advancement. Compliance with Program/Exercises: Will assess as treatment progresses. Reason for Continuation of Services/Other Comments:  · Patient continues to require skilled intervention due to modified diet recommended. Recommendations/Intent for next treatment session: \"Treatment next visit will focus on language assessment. Dysphagia goals are met\".     Total Treatment Duration:  Time In: 1008  Time Out: 1020    RUBY Sr, CCC-SLP, CBIS

## 2018-03-19 NOTE — PROGRESS NOTES
Family at bedside. Patient woke up from a nap and began crying because she knows she is in the hospital. Several moments of clarity where she is able to express herself. She said [facility] brought me back. \"

## 2018-03-19 NOTE — PROGRESS NOTES
Due to continued high levels of phenytoin, pharmacy advised to hold cerebryx until morning labs are drawn and 0400 phenytoin level has been obtained.

## 2018-03-19 NOTE — PROGRESS NOTES
Pt trying to get out of bed. Assisted pt back in bed, placed new tinoco needle to port and pt became very upset, yelling out, not wanting port accessed. Pt inconsolable for 10 min. Pt given constant reassurance til she started to calm. Pt's sister called and will come in to see pt. Stayed with pt til she stopped crying. Will remain with pt til sister arrives. Pt turning about in bed pulling on khanna. Reminded pt that it is in her bladder and not to pull on it. Pt cooperative at present.

## 2018-03-19 NOTE — PROGRESS NOTES
Phenytoin level drawn per orders. Pt resting in bed. Sister getting ready to leave, bed alarm is on.

## 2018-03-19 NOTE — PROGRESS NOTES
Bedside, Verbal and Written shift change report given to Selena Wilson RN (oncoming nurse) by Nuha Espitia RN (offgoing nurse). Report included the following information SBAR, Kardex, ED Summary, Intake/Output, MAR, Recent Results and Cardiac Rhythm NSR. Patient resting comfortably in bed. VSS. NAD.

## 2018-03-19 NOTE — PROGRESS NOTES
Pt sat up in and pulled out tinoco needle from port. Pt oriented to place and time. Mitts placed on hands. Bed alarm remains on.

## 2018-03-19 NOTE — PROGRESS NOTES
Chart reviewed for consult for d/c POC s/p admission epilepsy with status epilepticus. Has dx of small cell lung ca with brain mets. Pt currently sleeping and family sleeping as well at bedside. Will attempt to see at later time. CM to follow.

## 2018-03-19 NOTE — PROGRESS NOTES
Pt restless, pulled off sat probe, BP cuff and was working on left upper arm iv site. Iv site to left upper arm removed, painful when flushed. Pt was asked not to take off equipment. Encouraged pt to try to sleep. Pt's verbal response is \"ok\" to all questions.

## 2018-03-19 NOTE — PROGRESS NOTES
Pt continues to be restless, monitoring equipment seems to be bothering pt, she is constantly trying to pick off sat probe, pulls off leads and has pulled off khanna stat lock 2 times. Pt oriented to place numerous times, pt states \"ok or oh my\".

## 2018-03-19 NOTE — PROGRESS NOTES
Bedside, Verbal and Written shift change report given to [address], RN (oncoming nurse) by [facility], RN (offgoing nurse). Report included the following information SBAR, Kardex, Intake/Output, MAR, Recent Results and Cardiac Rhythm NSR/ST.

## 2018-03-19 NOTE — PROGRESS NOTES
Dee pharmacist called with new orders to hold pm cerebyx dose, get am level before giving am dose of cerebyx. Pt given CHG bath as she was restless. Warm blanket placed over pt after bath. Lotion to dry skin.

## 2018-03-19 NOTE — PROGRESS NOTES
Bed alarm going off. Pt sitting up on side of bed getting ready to get up. Reoriented pt to place and time of day. Repositioned back in bed. Bed alarm on. Pt encouraged to sleep.

## 2018-03-19 NOTE — PROGRESS NOTES
Hospitalist Progress Note    3/19/2018  Admit Date: 3/16/2018  9:00 PM   NAME: Krysta Devine   :  1960   MRN:  [id number]   Attending: Gloria Huffman MD  PCP:  Juan Carlos Perez MD    SUBJECTIVE:   Pt admitted with new finding of Brain mets from lung cancer, CT +ve for vasogenic edema. Started on Keppra and decadron, Found to be in Status Epilepticus on repeat EEG. Seen by Cardiology for abnormal EKG and also seen by teleNeuro. Oncology os primary and hospitalist consulting for Seizures/Medical management. 3/19: More alert and awake, remains confused, Says \"Yes\" to all questions, unable to answer questions appropriately, has some purposeful movements. Maintaining airway, Labs improved. Follows some commands, had feeds and was able to swallow per sister. Nursing notes and chart reviewed. Review of Systems: Unable to review 2/2 AMS. PHYSICAL EXAM     Visit Vitals    /75 (BP 1 Location: Right arm, BP Patient Position: At rest)    Pulse 95    Temp 98.9 °F (37.2 °C)    Resp 16    Ht 5' 8\" (1.727 m)    Wt 53 kg (116 lb 13.5 oz)    SpO2 93%    Breastfeeding No    BMI 17.77 kg/m2      Temp (24hrs), Av.6 °F (37 °C), Min:98.3 °F (36.8 °C), Max:99 °F (37.2 °C)    Oxygen Therapy  O2 Sat (%): 93 % (18 08)  Pulse via Oximetry: 92 beats per minute (18 08)  O2 Device: Room air (18 08)  O2 Flow Rate (L/min): 2 l/min (18 0403)    Intake/Output Summary (Last 24 hours) at 18 0932  Last data filed at 18 0537   Gross per 24 hour   Intake             2205 ml   Output             4125 ml   Net            -1920 ml       General: Awake, unable to follow commands, mumbles  Head:  AT/NC  Lungs:  CTABL. Heart:  RRR, no murmur, rub, or gallop  Abdomen: Soft, non-distended, non-tender, +bs  Extremities: No cyanosis or clubbing. Neurologic:  Not following commands, pupils round and equal, no nystagmus.   Skin:  No Rash    LABS AND STUDIES:  Personally reviewed all labs, meds, and studies for past 24hrs. ASSESSMENT      Active Hospital Problems    Diagnosis Date Noted    Ep[facility] with [facility] 03/18/2018    Hyperkalemia 03/17/2018    Abnormal EKG 03/17/2018    Seizure ([facility] 03/16/2018    Brain metastasis ([facility] 03/16/2018    Acute encephalopathy 03/16/2018    Bone metastases ([facility] 08/30/2017    Small cell carcinoma of right lung ([facility] 04/21/2017    SVC syndrome 04/18/2017       PLAN:    · c/w Keppra 1gm q12h and fosphenytoin per tele neuro recs. Phenytoin level in am.   · Continous 24hr EEG recommended by teleNeuro if she doesn't improve. Seizure precautions  · c/w decadron 4mg IV q6h for vasogenic edema, MRI brain reviewed, Radiation Rx planned. · c/w Protonix for Ppx  · Appreciate Cards input, signed off. · DC IVF. · Diet per SLP eval.    Will signoff for now, c/w anti seizure meds and may switch to PO. Consult tele Neuro for any dose adjustments.  Oncology remains primary team.    Signed By: Grace Gutierrez MD     March 19, 2018

## 2018-03-19 NOTE — PROGRESS NOTES
Pt's sister at bedside. Pt very calm. Dr Mratinez Chang notified of potassium of 3.3, new order received for replacement.

## 2018-03-19 NOTE — PROGRESS NOTES
[facility]        Inpatient Hematology / Oncology Progress Note      Admission Date: 3/16/2018  9:00 PM  Reason for Admission/Hospital Course: Acute encephalopathy  Brain metastasis (HCC)  Seizure ([facility]  Lung cancer ([facility]      24 Hour Events:  Afebrile, VSS  On Dex, Keppra, and Fosphenytoin  Answers \"OK\" to all questions    ROS:  Unable to assess    10 point review of systems is otherwise negative with the exception of the elements mentioned above in the HPI. Allergies   Allergen Reactions    Metaxalone Other (comments)    Oxycodone Swelling       OBJECTIVE:  Patient Vitals for the past 8 hrs:   BP Temp Pulse Resp SpO2 Weight   18 1302 127/74 - 97 20 95 % -   18 1201 135/83 97.8 °F (36.6 °C) 94 21 96 % -   18 1132 - - - - 94 % -   18 1119 - - 87 29 95 % -   18 1101 135/81 - 89 20 93 % -   18 1004 - - 93 19 94 % -   18 1003 127/70 - 100 23 92 % -   18 0902 127/80 - - - - -   18 0901 - - (!) 102 26 96 % -   18 0803 133/75 98.9 °F (37.2 °C) 95 16 93 % -   18 0717 - - - - 98 % -   18 0716 122/81 - 84 - - -   18 0701 122/81 - 84 19 91 % -   18 0614 - - - - - 116 lb 13.5 oz (53 kg)   18 0603 120/66 - 87 (!) 35 93 % -     Temp (24hrs), Av.5 °F (36.9 °C), Min:97.8 °F (36.6 °C), Max:99 °F (37.2 °C)     07 -  1900  In: 560 [P.O.:360; I.V.:200]  Out: 625 [Urine:625]    Physical Exam:  Constitutional: Well developed, well nourished female in no acute distress, sitting comfortably in the hospital bed. HEENT: Normocephalic and atraumatic. Oropharynx is clear, mucous membranes are moist.  Pupils are equal, round, and reactive to light. Extraocular muscles are intact. Sclerae anicteric. Neck supple without JVD. No thyromegaly present. Skin Warm and dry. No bruising and no rash noted. No erythema. No pallor.     Respiratory Lungs are clear to auscultation bilaterally without wheezes, rales or rhonchi, normal air exchange without accessory muscle use. CVS Normal rate, regular rhythm and normal S1 and S2. No murmurs, gallops, or rubs. Abdomen Soft, nontender and nondistended, normoactive bowel sounds. No palpable mass. No hepatosplenomegaly. Neuro Responds with \"OK\" to every question   MSK Normal range of motion in general.  No edema and no tenderness. Psych Alert. Labs:    Recent Labs      03/16/18   2214   WBC  8.5   RBC  4.61   HGB  11.0*   HCT  33.3*   MCV  72.2*   MCH  23.9*   MCHC  33.0   RDW  18.3*   PLT  256   GRANS  83*   LYMPH  11*   MONOS  4   EOS  1   BASOS  0   IG  1   DF  AUTOMATED   ANEU  7.2   ABL  0.9   ABM  0.3   LESLIE  0.1   ABB  0.0   AIG  0.0      Recent Labs      03/19/18   1207  03/19/18   0402  03/18/18   0326  03/17/18   0325  03/16/18   2214   NA   --   141  146*  144  139   K  3.3*  3.3*  3.1*  3.5  8.7*   CL   --   105  107  110*  106   CO2   --   28  28  25  18*   AGAP   --   8  11  9  15   GLU   --   144*  183*  172*  174*   BUN   --   13  12  10  12   CREA   --   0.80  1.01*  1.11*  1.31*   GFRAA   --   >60  >60  >60  54*   GFRNA   --   >60  >60  54*  44*   CA   --   8.4  8.4  9.6  9.1   SGOT   --    --    --    --   97*   AP   --    --    --    --   90   TP   --    --    --    --   8.6*   ALB   --    --    --    --   3.6   GLOB   --    --    --    --   5.0*   AGRAT   --    --    --    --   0.7*   MG   --   1.9  1.8   --    --          Imaging:  MRI BRAIN W WO CONT [[id number]] Collected: 03/18/18 1307      Order Status: Completed Updated: 03/18/18 1319     Narrative:       MRI of the Brain with contrast: 3/18/2018  History: Lung cancer  Sequences: Axial pre and post contrast T1, FLAIR, T2, diffusion, coronal post  contrast T1 and sagittal precontrast T1-weighted images of the brain. Comparison: MRI the brain 4/21/2017  20 cc of IV MultiHance   was injected to aid in the detection of intracranial  pathology.   Findings:    Supratentorial enhancing metastatic lesions are now demonstrated. These lesions  all display hemosiderin and restricted diffusion. There is a 5 mm lesion in the  right temporal white matter, 1 cm lesion right frontal white matter, left  posterior parasagittal convexity lesion measuring 14 mm x 27 x 31 mm, 14 mm left  occipital white matter lesion, 9 mm left frontal lesion, and 2 left parietal  enhancing lesions measuring respectively 7 mm and 24 mm. There is some mild mass  upon the left ventricular atrium. The pituitary and sinuses are unremarkable. 7th and 8th nerves and orbits are unremarkable. There is some fluid in the right  mastoid air cells.       Impression:       IMPRESSION: Interval supratentorial metastatic lesions as above. DC4  The significant findings in this report have been referred to the Imaging  Navigator in order to communicate to the referring provider or his/her designee  as outlined in Section II.C.2.a.ii-iii of the ACR  Practice Guideline for Communication of Diagnostic Imaging Findings.           XR ABD (KUB) [[id number]] Collected: 03/17/18 0115     Order Status: Completed Updated: 03/17/18 0117     Narrative:       Abdomen x-ray single view. HISTORY: Nasogastric tube placement. COMPARISON: None. FINDINGS: Nasogastric tube its tip in the gastric body. Bowel gas pattern is  nonspecific.       Impression:       IMPRESSION:    Nasogastric tube its tip in the gastric body.     XR CHEST PORT [[id number]] Collected: 03/16/18 2138     Order Status: Completed Updated: 03/16/18 2146     Narrative:       EXAM:  XR CHEST PORT    INDICATION:  AMS    COMPARISON:  4/18/2017    FINDINGS: A portable AP radiograph of the chest was obtained at 2124 hours.  The  patient is on a cardiac monitor.  The lungs are clear.  The cardiac and  mediastinal contours and pulmonary vascularity are normal.  The bones and soft  tissues are grossly within normal limits.        Impression:       IMPRESSION: No acute cardiopulmonary disease.         CT HEAD WITHOUT CONTRAST [[id number]] Collected: 03/16/18 2130     Order Status: Completed Updated: 03/16/18 2134     Narrative:       CT HEAD WITHOUT CONTRAST     HISTORY:  Seizure. COMPARISON: None. TECHNIQUE: Axial imaging was performed without intravenous contrast utilizing  5mm slice thickness. Sagittal and coronal reformats were performed. Radiation  dose reduction techniques were used for this study. Our CT scanner uses one or  all of the following:    Automated exposure control, adjustment of the MAS or KUB according to patient's  size and iterative reconstruction. FINDINGS:        *BRAIN:      -  There are no early signs of territorial or lacunar infarction by CT.     -  1.3 x 1.6 cm left parietal mass with surrounding vasogenic edema. 0.6 x  0.9 cm mass in left frontal lobe with vasogenic edema. Partly calcified mass in  left superior parafalcine region measuring 2.9 x 2.6 cm. Consider the  possibility of meningioma.     -  No gross white matter abnormality by CT.    *VISUALIZED PARANASAL SINUSES: Well aerated. *MASTOIDS:  Clear. *CALVARIUM AND SCALP: Unremarkable.         Impression:       IMPRESSION:    Cerebral metastatic disease. Possible left parietal convexity meningioma.     Date of Dictation: 3/16/2018 9:30 PM         ASSESSMENT:    Problem List  Date Reviewed: 3/14/2018          Codes Class Noted    * (Principal)Epilepsy with status epilepticus ([facility] ICD-10-CM: G40.901  ICD-9-CM: 345.3  3/18/2018        Hyperkalemia ICD-10-CM: E87.5  ICD-9-CM: 276.7  3/17/2018        Abnormal EKG ICD-10-CM: R94.31  ICD-9-CM: 794.31  3/17/2018        Seizure ([facility] ICD-10-CM: R56.9  ICD-9-CM: 780.39  3/16/2018        Lung cancer ([facility] ICD-10-CM: C34.90  ICD-9-CM: 162.9  3/16/2018        Brain metastasis ([facility] ICD-10-CM: C79.31  ICD-9-CM: 198.3  3/16/2018        Acute encephalopathy ICD-10-CM: G93.40  ICD-9-CM: 348.30  3/16/2018        Bone metastases ([address] ICD-10-CM: C79.51  ICD-9-CM: 198.5  8/30/2017        Small cell carcinoma of right lung (HCC) ICD-10-CM: C34.91  ICD-9-CM: 162.9  4/21/2017        Smoker (Chronic) ICD-10-CM: S87.389  ICD-9-CM: 305.1  Unknown        Mediastinal mass ICD-10-CM: J98.59  ICD-9-CM: 786.6  4/18/2017        SVC (superior vena cava obstruction) ICD-10-CM: I87.1  ICD-9-CM: 459.2  4/18/2017        Essential hypertension ICD-10-CM: I10  ICD-9-CM: 401.9  4/18/2017        SVC syndrome ICD-10-CM: I87.1  ICD-9-CM: 459.2  4/18/2017            Ms. Carley Penn is a 63 y.o. female admitted on 3/16/2018 with a primary diagnosis of lung cancer, acute encephalopathy, brain metastasis, and fever. Ms. Carley Penn is a well known patient of Dr. Kristine Dsouza. She was just seen in the clinic on 3/14/18. While planning for Nivolumab she had rapid growth of b/l cervical LAD. He discussed  the aggressive pattern of her disease. Restaging CT and MRI of brain were ordered. The plan was to start nivolumab/ipilimimab on 4/4/18. He discussed the need for her to inform family of her aggressive disease and to designate POA. Unfortunately, yesterday she was confused, lethargic and somnolent. Patient's sister was driving her home when she became more confused and start having what her sister described as generalized body shakes. 911 was called. She received 2 doses of ativan PTA to ED. In ED she was post ictal and difficult to arouse. CT head showed cerebral metastatic disease with left parietal convexity meningioma. CXR was unremarkable. CT chest on 1/30/18 showed interval treatment response with significantly decreased mediastinal and right hilar lymphadenopathy. PLAN:  Stage IV SCLC with brain mets  - s/p C1 nivolumab/ipilimumab on 3/15. C2 due 4/4  3/19 Rad Onc consult pending    Seizures  3/19 CT head revealed cerebral metastatic disease with L parietal convxity meningioma.   Per teleneuro, pt has ongoing L-sided seizures and was started on Fosphenytoin yesterday - currently being held for high level phenytoin. Also on Dex and Keppra. Dr. Renteria Sep to check with Dr. Celina Valentin to see if pt needs to be transferred to [facility] to neuro service vs. daily teleneuro eval here. Pt alert - responds with \"OK\" to every question. SLP following. Hypokalemia  3/19 Replete K+    Continue home meds  Moni SOPs  SCDs for DVT prophylaxis            Sophia Turpin NP   3 [facility]  [address] [place] [address]  Office : [phone]  Fax : [phone]     Attending Addendum:  I personally evaluated the patient with Sophia Turpin NP, and agree with the assessment, findings and plan as documented. Sleeping at first when examined, when awake answers questions with \"ok. \"  Noted to be restless last night. RN at bedside noted MS improvement today. Will discuss with Dr Celina Valentin. If no improvement in MS, may need to be transferred to [facility] neuro service. C/w supportive care.           Merlin Vicente MD  3 [facility]  [address] [place] [address]  Office : [phone]  Fax : [phone]

## 2018-03-19 NOTE — PROGRESS NOTES
LEAPFROG PROTOCOL NOTE    [place]  3/19/2018    The patient is currently in the critical care setting managed by Dr. Kely Welch with Seizure/ brain mets. The patient's chart is reviewed and the patient is discussed with the staff. Patient is currently hemodynamically stable. Visit Vitals    /80    Pulse (!) 102    Temp 98.9 °F (37.2 °C)    Resp 26    Ht 5' 8\" (1.727 m)    Wt 116 lb 13.5 oz (53 kg)    SpO2 96%    Breastfeeding No    BMI 17.77 kg/m2           Patient has no needs identified for Intensivist management in the critical care setting at this time. Please notify us if can be of assistance. No charge billed to the patient. Thank you.     Alejandro Ray MD

## 2018-03-20 NOTE — PROGRESS NOTES
Dual skin assessment performed with myself and Vijay Villatoro RN. No bruising, redness, swelling noted. Unremarkable assessment.

## 2018-03-20 NOTE — PROGRESS NOTES
STG: Patient will answer basic yes/no comprehension questions with 75% accuracy given max cues. STG: Patient will participate in automatic speech tasks with 75% accuracy given max cues  STG: Patient will follow 1 step verbal command with visual cues with 60% accuracy with max cues. LTG: Patient will increase neuro-linguistic abilities to increase safety and awareness of deficits. Speech language pathology: Speech-language and cognitive note: Initial Assessment    NAME/AGE/GENDER: Connor Corbin is a 63 y.o. female  DATE: 3/20/2018  PRIMARY DIAGNOSIS: Acute encephalopathy  Brain metastasis ([facility]  Seizure ([facility]  Lung cancer ([facility]       ICD-10: Treatment Diagnosis: R.41.841 Cognitive-Communication Deficit    INTERDISCIPLINARY COLLABORATION: Registered NurseASSESSMENT:Based on the objective data described below, Ms. Alessia Lyons presents with severe expressive and receptive language impairment. Family at bedside, reports patient has had some spontaneous speech at the sentence level. Fully verbal, communication at the conversational level prior to this admission. Patient perseverative on \"Alright, Bill\" and \"Oh Lord\" during session. Automatic speech task (counting) attempted with visual and verbal cues, but continued to perseverate \"Alright Bill\". Unable to re-direct patient despite max attempts. Family is unaware of a friend/family member named Honey Plaza, and assume patient is attempting to state her own name with phonemic errors. One-step verbal and visual commands also attempted with no response from patient. Attempted basic yes/no question with head nod for attempt, but again no response. Patient's facial expressions appear to demonstrate understanding of verbal information; however, no command following to indicate comprehension. Speech to follow for trial of language treatment for 3 session to assess for patient's ability to benefit from skilled therapy services.    Patient will benefit from skilled intervention to address the below impairments. ?????? ? ? This section established at most recent assessment??????????  PROBLEM LIST (Impairments causing functional limitations):  1. Expressive Language  2. Receptive language  REHABILITATION POTENTIAL FOR STATED GOALS: Guarded  PLAN OF CARE:   Patient will benefit from skilled intervention to address the following impairments. INTERVENTIONS PLANNED: (Benefits and precautions of therapy have been discussed with the patient.)  1. Cognitive-linguistic deficits  FREQUENCY/DURATION: Continue to follow patient 3 session trial to assess patient' ability to benefit from skilled therapy services to address above goals. RECOMMENDED REHABILITATION/EQUIPMENT: (at time of discharge pending progress): Due to the probability of continued deficits (see above) this patient will not likely need continued skilled speech therapy after discharge. SUBJECTIVE:   Perseverative in session. No spontaneous speech observed today, but is reported by family member. History of Present Injury/Illness: Ms. Kitty Mckay  has a past medical history of Former cigarette smoker; GERD (gastroesophageal reflux disease); Hypertension; and Lung cancer ([facility] She also has no past medical history of Adverse effect of anesthesia; Difficult intubation; Malignant hyperthermia due to anesthesia; Nausea & vomiting; or Pseudocholinesterase deficiency. .  She also  has a past surgical history that includes hx tubal ligation and hx vascular access. Present Symptoms: Expressive/receptive language defiicts     Pain Intensity 1: 0  Pain Intervention(s) 1: Emotional support, Family Support, Repositioned  Current Medications:   No current facility-administered medications on file prior to encounter. Current Outpatient Prescriptions on File Prior to Encounter   Medication Sig Dispense Refill    mirtazapine (REMERON) 15 mg tablet Take 1 Tab by mouth nightly.  30 Tab 1    ondansetron hcl (ZOFRAN) 8 mg tablet Take 1 Tab by mouth every eight (8) hours as needed for Nausea. 90 Tab 1    lidocaine-prilocaine (EMLA) topical cream Apply 1/2 to 1 inch to port site one hour prior to needle access. 30 g 1    nicotine (NICODERM CQ) 7 mg/24 hr 1 Patch by TransDERmal route every twenty-four (24) hours.  amLODIPine (NORVASC) 5 mg tablet Take 5 mg by mouth daily.  senna-docusate (SHERRI-COLACE) 8.6-50 mg per tablet Take 1 Tab by mouth two (2) times a day. 60 Tab 2    raNITIdine (ZANTAC) 150 mg tablet Take 150 mg by mouth every morning.  prochlorperazine (COMPAZINE) 10 mg tablet Take 5 mg by mouth every six (6) hours as needed for Nausea.  acetaminophen (TYLENOL) 325 mg tablet Take 2 Tabs by mouth every four (4) hours as needed. 30 Tab 0     Current Dietary Status:  Mechanical soft/thin liquids      Social History/Home Situation:    Home Environment: Private residence  # Steps to Enter: 2  One/Two Story Residence: One story  Living Alone: No  Support Systems: Family member(s)  Patient Expects to be Discharged to[de-identified] Private residence  Current DME Used/Available at Home: None  Work/Activity History:   OBJECTIVE:   Oral Motor Structure/Speech:  Oral-Motor Structure/Motor Speech  Labial: No impairment  Dentition: Edentulous, Natural, Limited  Oral Hygiene: Adequate  Lingual: No impairment  Velum: No impairment    SPEECH-LANGUAGE COGNITIVE EVALUATION    Mental Status:  Neurologic State: Alert  Orientation Level: Oriented to person; Unable to verbalize  Cognition: Decreased command following;Poor safety awareness  Perception: Appears intact     Safety/Judgement: Fall prevention;Decreased awareness of need for assistance    Motor Speech:        Auditory Comprehension:   Auditory Comprehension  Auditory Impairment: Yes  Response to Basic Yes/No Questions (%): 0 %  One-Step Basic Commands (%): 0 %  Interfering Components: Attention - sustained    Reading Comprehension:        Verbal Expression:   Verbal Expression  Automatic Speech Task: Impaired (comment)  Automatic speech task cueing type: Verbal;Sentence completion cue;Phonemic;Repetition  Automatic speech task cueing amount: Maximum  Repetition: Impaired  Word Repetition (%): 0 %  Sentence Repetition (%): 0 %  Repetition cueing type: Verbal;Tactile;Visual  Naming: Impaired  Confrontation (%): 0 %  Naming cueing amount: Mod-max  Sentence Completion: Impaired  Word level (%): 0 %  Sentence completion cueing type: Repetition    Written Expression:        Neuro-Linguistics:                         Pragmatics:          Assessment/Reassessment only, no treatment provided today    Tool Used: Functional [place] Measure (FIMTM)   Score Comments   Eating       Comprehension       Expression   2     Social Interaction       Problem Solving       Memory          Score:  Initial: 1 Most Recent: X (Date: -- )   Interpretation of Tool: Provides a uniform system of measurement for disability based on the International Classification of Impairment, Disabilities and Handicaps; measures the level of a patient's disability and indicates how much assistance is required for the individual to carry out activities of daily living. Score 7 6 5 4 3 2 1   Modifier CH CI CJ CK CL CM CN   ? Spoken Expression:    [id number] - CURRENT STATUS: CM - 80%-99% impaired, limited or restricted    - GOAL STATUS:  CL - 60%-79% impaired, limited or restricted    - D/C STATUS:  ---------------To be determined---------------  Payor: ABSOLUTE TOTAL CARE / [place] [employer] / Product Type: Managed Care Medicaid /   __________________________________________________________________________________________________  Safety:   After treatment position/precautions:  · Up in chair.   Progression/Medical Necessity:   · Patient is expected to demonstrate progress in expressive communication and receptive ability to decrease assistance required communication, increase independence with activities of daily living and increase communication with family/caregivers. Compliance with Program/Exercises: Will assess as treatment progresses. Reason for Continuation of Services/Other Comments:  · Patient continues to require skilled intervention due to language deficits. Recommendations/Intent for next treatment session: \"Treatment next visit will focus on language tasks\".     Total Treatment Duration:  Time In: 1136  Time Out: 1153    RUBY Sr, CCC-SLP, CBIS

## 2018-03-20 NOTE — PROGRESS NOTES
END OF SHIFT NOTE:    Intake/Output  03/19 1901 - 03/20 0700  In: 220 [P.O.:120; I.V.:100]  Out: 1200 [Urine:1200]   Voiding: YES  Catheter: NO  Drain:              Stool:  0 occurrences. Stool Assessment  Stool Color: Tan (Comment) (03/18/18 1715)  Stool Appearance: Soft (03/18/18 2001)  Stool Amount: Smear (03/18/18 1715)  Stool Source/Status: Rectum (03/18/18 1715)    Emesis:  0 occurrences. VITAL SIGNS  Patient Vitals for the past 12 hrs:   Temp Pulse Resp BP SpO2   03/20/18 0422 98 °F (36.7 °C) 83 14 (!) 141/96 95 %   03/20/18 0211 - - - - 98 %   03/20/18 0137 98.4 °F (36.9 °C) 87 18 (!) 142/93 98 %   03/19/18 2328 98.6 °F (37 °C) 97 20 116/73 91 %   03/19/18 2307 - 85 19 116/73 96 %   03/19/18 2202 - 91 - 140/89 93 %   03/19/18 2101 - 96 20 140/84 93 %   03/19/18 2006 - 91 19 140/86 100 %   03/19/18 2003 - - - - 98 %   03/19/18 1927 98.4 °F (36.9 °C) 97 21 138/86 93 %   03/19/18 1901 - 92 29 138/86 93 %       Pain Assessment  Pain 1  Pain Scale 1: Adult Nonverbal Pain Scale (03/20/18 0123)  Pain Intensity 1: 3 (03/20/18 0123)  Patient Stated Pain Goal: Unable to verbalize/indicate pain (03/20/18 0123)  Pain Reassessment 1: Yes (03/19/18 2328)  Pain Intervention(s) 1: Emotional support; Family Support;Repositioned (03/17/18 1201)    Ambulating  No    Additional Information: Patient is confused and has expressive asphasia. She pulled out her khanna and port access. As of right now she has no drains or anything attached. Shift report given to oncoming nurse at the bedside.     Keyon Reich

## 2018-03-20 NOTE — PROGRESS NOTES
Problem: Mobility Impaired (Adult and Pediatric)  Goal: *Acute Goals and Plan of Care (Insert Text)  STG:  (1.)Ms. Doyle Fraser will move from supine to sit and sit to supine , scoot up and down and roll side to side with SUPERVISION within 3 treatment day(s). (2.)Ms. Doyle Fraser will transfer from bed to chair and chair to bed with MINIMAL ASSIST using the least restrictive device within 3 treatment day(s). (3.)Ms. Doyle Fraser will ambulate with MINIMAL ASSIST for 30 feet with the least restrictive device within 3 treatment day(s). LTG:  (1.)Ms. Doyle Fraser will move from supine to sit and sit to supine , scoot up and down and roll side to side in bed with MODIFIED INDEPENDENCE within 7 treatment day(s). (2.)Ms. Doyle Fraser will transfer from bed to chair and chair to bed with CONTACT GUARD ASSIST using the least restrictive device within 7 treatment day(s). (3.)Ms. Doyle Fraser will ambulate with CONTACT GUARD ASSIST for 150 feet with the least restrictive device within 7 treatment day(s). ________________________________________________________________________________________________      PHYSICAL THERAPY: Initial Assessment, Treatment Day: Day of Assessment, AM 3/20/2018  INPATIENT: Hospital Day: 5  Payor: ABSOLUTE TOTAL CARE / [place] / Product Type: Managed Care Medicaid /      NAME/AGE/GENDER: Chiquis Peña is a 63 y.o. female   PRIMARY DIAGNOSIS: Acute encephalopathy  Brain metastasis ([address]  Seizure ([address]  Lung cancer ([address] Epilepsy with status epilepticus ([address] Epilepsy with status epilepticus ([address]        ICD-10: Treatment Diagnosis:   · Generalized Muscle Weakness (M62.81)  · Difficulty in walking, Not elsewhere classified (R26.2)   Precaution/Allergies:  Metaxalone and Oxycodone      ASSESSMENT:     Ms. Doyle Fraser is a pleasantly confused 63 y.o. Female with expressive aphasia per chart and primary diagnosis of Acute encephalopathy, seizures, and lung cancer with brain metastasis.  Pt is supine in bed upon contact with sister-in-law at bedside. Pt is alert, responds to name, and follows simple commands ~50% of the time, but unable to establish orientation due to expressive aphasia. Sister-in-law provided subjective history, but is not sure how accurate it is stating to check with [facility] pt POA and sister. Family states pt lives with and takes care of her mother, who requires a lot of assistance with care (pt's son is now with pt's mother). Family states pt lives in one story house with 3 steps to enter with no rail, and a tub/shower combo. Family states that prior to Friday, pt was independent with ADL's and ambulation. Pt transferred supine to sitting EOB with SBA and verbal cuing for sequencing and focus on task. Pt performed STS with Chapito and RW, but immediately sat down, indicating a need to go to the bathroom immediately. Pt ambulated with RW 15ft into bathroom to commode with Min-ModA, demonstrating poor dynamic balance and unsteady gait and required assistance to prevent LOB. Pt successfully voided, but required assistance with toileting hygiene. Pt ambulated 15ft to bedside chair with HHAx1 and Chapito to prevent LOB with unsteady gait. Pt left upright in chair with all needs met and within reach with sister-in-law at bedside and nursing notified. Educated family to call for assistance before leaving pt due to pt's impaired cognition and impulsiveness. Pt will benefit from skilled therapy for duration of hospital stay to address functional mobility and activity tolerance. Pt may benefit from rehab upon discharge from acute care. This section established at most recent assessment   PROBLEM LIST (Impairments causing functional limitations):  1. Decreased Strength  2. Decreased ADL/Functional Activities  3. Decreased Transfer Abilities  4. Decreased Ambulation Ability/Technique  5. Decreased Balance  6. Decreased Activity Tolerance  7. Decreased Pacing Skills  8.  Decreased [place] with Home Exercise Program  9. Decreased Cognition   INTERVENTIONS PLANNED: (Benefits and precautions of physical therapy have been discussed with the patient.)  1. Balance Exercise  2. Bed Mobility  3. Cold  4. Family Education  5. Gait Training  6. Home Exercise Program (HEP)  7. Manual Therapy  8. Neuromuscular Re-education/Strengthening  9. Range of Motion (ROM)  10. Therapeutic Activites  11. Therapeutic Exercise/Strengthening  12. Transfer Training     TREATMENT PLAN: Frequency/Duration: 3 times a week for duration of hospital stay  Rehabilitation Potential For Stated Goals: [facility] Reyes REHABILITATION/EQUIPMENT: (at time of discharge pending progress): Due to the probability of continued deficits (see above) this patient will likely need continued skilled physical therapy after discharge. Equipment:    to be determined              HISTORY:   History of Present Injury/Illness (Reason for Referral):  Patient is 63years old female with pmhx of extensive stage small cell lung cancer with brain metastasis, GERD, HTN, previous tobacco abuse presented in view of new onset seizure like activity. As per the pt's sister, pt was in her usual health until yesterday, today she appeared more confused, lethargic and somnolent. While she was driving the pt home, pt started appearing confused and then had generalized body shakes. EMS was summoned. Pt received 2 doses of ativan PTA. In ER, pt is post ictal, sleeping, difficult to arouse. Pt is DNR. In ER, CT head showed cerebral metastatic disease with left parietal convexity meningioma, CXR was unremarkable. CT chest on 1/30/18 showed interval treatment response with significantly decreased mediastinal and right hilar lymphadenopathy. Past Medical History/Comorbidities:   Ms. Krystyna Lagos  has a past medical history of Former cigarette smoker; GERD (gastroesophageal reflux disease); Hypertension; and Lung cancer [address] [address]  She also has no past medical history of Adverse effect of anesthesia; Difficult intubation; Malignant hyperthermia due to anesthesia; Nausea & vomiting; or Pseudocholinesterase deficiency. Ms. Doyle Fraser  has a past surgical history that includes hx tubal ligation and hx vascular access. Social History/Living Environment:   Home Environment: Private residence  # Steps to Enter: 3  Rails to Enter: No  One/Two Story Residence: One story  Living Alone: No  Support Systems: Parent, Family member(s) (takes care of mother whom she lives with)  Patient Expects to be Discharged to[de-identified] Unknown  Current DME Used/Available at Home: None  Tub or Shower Type: Tub/Shower combination  Prior Level of Function/Work/Activity:  Taking care of mother, independent, no AD, intact cognition and communication     Number of Personal Factors/Comorbidities that affect the Plan of Care: 3+: HIGH COMPLEXITY   EXAMINATION:   Most Recent Physical Functioning:   Gross Assessment:  AROM: Generally decreased, functional  Strength: Generally decreased, functional  Coordination: Generally decreased, functional               Posture:     Balance:  Sitting: Intact; Without support  Standing: With support; Impaired  Standing - Static: Fair  Standing - Dynamic : Poor Bed Mobility:  Rolling: Stand-by assistance  Supine to Sit: Stand-by assistance  Scooting: Stand-by assistance  Wheelchair Mobility:     Transfers:  Sit to Stand: Minimum assistance  Stand to Sit: Minimum assistance  Gait:     Base of Support: Narrowed  Speed/Aissatou: Fluctuations  Gait Abnormalities: Decreased step clearance;Trunk sway increased; Ataxic  Distance (ft): 15 Feet (ft) (x2)  Assistive Device: Other (comment); Walker, rolling (HHA x 1)  Ambulation - Level of Assistance: Minimal assistance; Moderate assistance      Body Structures Involved:  1. Heart  2. Lungs  3. Bones  4. Joints  5. Muscles  6. Ligaments Body Functions Affected:  1. Mental  2. Voice and Speech  3. Cardio  4. Respiratory  5. Neuromusculoskeletal  6.  Movement Related Activities and Participation Affected:  1. Learning and Applying Knowledge  2. General Tasks and Demands  3. Communication  4. Mobility  5. Self Care  6. Domestic Life  7. Interpersonal Interactions and Relationships  8. Community, Social and [place]   Number of elements that affect the Plan of Care: 4+: HIGH COMPLEXITY   CLINICAL PRESENTATION:   Presentation: Evolving clinical presentation with changing clinical characteristics: MODERATE COMPLEXITY   CLINICAL DECISION MAKIN [facility] Mobility Inpatient Short Form  How much difficulty does the patient currently have. .. Unable A Lot A Little None   1. Turning over in bed (including adjusting bedclothes, sheets and blankets)? [] 1   [] 2   [] 3   [x] 4   2. Sitting down on and standing up from a chair with arms ( e.g., wheelchair, bedside commode, etc.)   [] 1   [] 2   [x] 3   [] 4   3. Moving from lying on back to sitting on the side of the bed? [] 1   [] 2   [] 3   [x] 4   How much help from another person does the patient currently need. .. Total A Lot A Little None   4. Moving to and from a bed to a chair (including a wheelchair)? [] 1   [] 2   [x] 3   [] 4   5. Need to walk in hospital room? [] 1   [x] 2   [] 3   [] 4   6. Climbing 3-5 steps with a railing? [x] 1   [] 2   [] 3   [] 4   © , Trustees of [address] [place] [address], under license to [employer]. All rights reserved      Score:  Initial: 17 Most Recent: X (Date: -- )    Interpretation of Tool:  Represents activities that are increasingly more difficult (i.e. Bed mobility, Transfers, Gait). Score 24 23 22-20 19-15 14-10 9-7 6     Modifier CH CI CJ CK CL CM CN      ?  Mobility - Walking and Moving Around:     - CURRENT STATUS: CK - 40%-59% impaired, limited or restricted    - GOAL STATUS: CJ - 20%-39% impaired, limited or restricted    - D/C STATUS:  ---------------To be determined---------------  Payor: ABSOLUTE TOTAL CARE / [place] / Product Type: Managed Care Medicaid /      Medical Necessity:     · Patient is expected to demonstrate progress in strength, range of motion, balance and coordination to decrease assistance required with transfers, ambulation, and functional mobility. Reason for Services/Other Comments:  · Patient continues to require skilled intervention due to decreased cognition, activity tolerance, and functional mobility. Use of outcome tool(s) and clinical judgement create a POC that gives a: Questionable prediction of patient's progress: MODERATE COMPLEXITY            TREATMENT:   (In addition to Assessment/Re-Assessment sessions the following treatments were rendered)   Pre-treatment Symptoms/Complaints:  Nonverbal, repeats \"Thank you\" often  Pain: Initial: 0/10 visual     Post Session:  0/10 visual   In addition to evaluation:  Therapeutic Activity: (    8 minutes): Therapeutic activities including Bed transfers, Chair transfers, Toilet transfers, Ambulation on level ground and constant verbal and tactile cues for safety of transfers and mobility and awareness of enviornment to improve mobility, strength, balance and coordination. Required moderate   to promote dynamic balance in standing and promote coordination of bilateral, upper extremity(s), lower extremity(s). Braces/Orthotics/Lines/Etc:   · O2 Device: Room air  Treatment/Session Assessment:    · Response to Treatment:  Ambulated 15ftx2 with Min-ModA and HHA x1   · Interdisciplinary Collaboration:   o Physical Therapist  o Registered Nurse  o SPT  · After treatment position/precautions:   o Up in chair  o Bed/Chair-wheels locked  o Bed in low position  o Call light within reach  o RN notified  o Family at bedside   · Compliance with Program/Exercises: Will assess as treatment progresses. · Recommendations/Intent for next treatment session: \"Next visit will focus on reduction in assistance provided\".   Total Treatment Duration:  PT Patient Time In/Time Out  Time In: 1033  Time Out: [address]

## 2018-03-20 NOTE — PROGRESS NOTES
[facility] CRITICAL CARE OUTREACH NURSE PROGRESS REPORT      SUBJECTIVE: Called to assess patient secondary to Critical care Outreach protocol. MEWS Score: 0 (03/20/18 0422)  Vitals:    03/20/18 0211 03/20/18 0422 03/20/18 0704 03/20/18 0808   BP:  (!) 141/96 134/88    Pulse:  83 76    Resp:  14 15    Temp:  98 °F (36.7 °C) 97.6 °F (36.4 °C)    SpO2: 98% 95% 97% 97%   Weight:       Height:           LAB DATA:    Recent Labs      03/20/18   0552  03/19/18   1207  03/19/18   0402  03/18/18   0326   NA  139   --   141  146*   K  3.8  3.3*  3.3*  3.1*   CL  104   --   105  107   CO2  23   --   28  28   AGAP  12   --   8  11   GLU  127*   --   144*  183*   BUN  16   --   13  12   CREA  0.83   --   0.80  1.01*   GFRAA  >60   --   >60  >60   GFRNA  >60   --   >60  >60   CA  8.4   --   8.4  8.4   MG   --    --   1.9  1.8        No results for input(s): WBC, HGB, HCT, PLT, HGBEXT, HCTEXT, PLTEXT in the last 72 hours. OBJECTIVE: On arrival to room, I found patient to be in bed resting quietly. Pain Assessment  Pain Intensity 1: 3 (03/20/18 0123)     Pain Intervention(s) 1: Emotional support, Family Support, Repositioned  Patient Stated Pain Goal: Unable to verbalize/indicate pain      ASSESSMENT:  Pt awake, oriented to self only. Pt answers with \"OK\" to all other orientation questions. Pt able to tell me that she is not in pain upon assessment. Follows some commands. PERRLA, 3 mm. Spo2 96% on RA. HR 90. Lung sounds CTA.      PLAN:  Will continue to follow up per  outreach protocol

## 2018-03-20 NOTE — PROGRESS NOTES
Verbal bedside report received from [address] [place] [address],[address], RN. Shift assessment completed. Patient opens eyes to voice, tries to same her name but just says \"okay\" when asked other questions. Follows commands. NSR on monitor, vital signs stable. On room air. Clear yellow urine draining via Garcia.     Lines:  L chest port    Drips:  none    Drains:  Garcia

## 2018-03-20 NOTE — PROGRESS NOTES
SPEECH PATHOLOGY NOTE:    Attempted to see patient for speech-language assessment. Currently working with other therapies. Will re-attempt at later time/date as patient is available and as schedule permits.      RUBY Saha, CCC-SLP, CBIS

## 2018-03-20 NOTE — PROGRESS NOTES
CM met with pt and pt's sister in law, Sendy, in pt's room. Sendy stated that CM should call Darya Parker (pts sister) for information, as she makes the medical decisions. Pt did not engage with CM in any capacity. CM spoke with [address] [place] [address], [id number]. Prior to admission, pt was living with her mother, Davey Millard. There are 2 stairs at entry. No issues getting medications via insurance provider. No medical equipment in the home. CM will follow with PT with recommendations for DME. Plan is to return home with her mother. However, [address] [place] [address] is not opposed to [facility] or [facility] if indicated. PPD ordered. CM will continue to follow for DC needs. Care Management Interventions  PCP Verified by CM:  Yes  Transition of Care Consult (CM Consult): Discharge Planning  Physical Therapy Consult: Yes  Occupational Therapy Consult: Yes  Current Support Network: Relative's Home  Confirm Follow Up Transport: Family  Plan discussed with Pt/Family/Caregiver: Yes  Freedom of Choice Offered: Yes  Discharge Location  Discharge Placement: Unable to determine at this time

## 2018-03-20 NOTE — PROGRESS NOTES
Pt resting in bed, brother and sister-in-law at bedside. Oriented to person; unable to fully assess orientation due to aphasia. VSS on RA. No complaints of pain this shift. No signs of seizure activity noted. Pt's port was reaccessed this morning, after pt pulled it out last night. Pt also pulled out her Garcia last night; this has not been replaced, and pt is currently able to tell staff when she needs to use the bathroom. Urine output >600 mL. The nurse for the tele neuro consult called, saying that the consult would be this afternoon and that she would call back a few minutes before they were ready to perform the consult. Still awaiting their call back. Pt has no complaints at this time. Shift report given to oncoming nurse at the bedside.

## 2018-03-20 NOTE — PROGRESS NOTES
TRANSFER - OUT REPORT:    Verbal report given to Soni Cano RN(name) on Esvin Garvin  being transferred to 503(unit) for routine progression of care       Report consisted of patients Situation, Background, Assessment and   Recommendations(SBAR). Information from the following report(s) SBAR, Kardex, ED Summary, Procedure Summary, Intake/Output, MAR, Recent Results, Med Rec Status and Cardiac Rhythm NSR was reviewed with the receiving nurse. Opportunity for questions and clarification was provided.       Patient transported with:   Registered Nurse

## 2018-03-20 NOTE — PROGRESS NOTES
TRANSFER - IN REPORT:    Verbal report received from SHANEL Borges(name) on Jennifer Semen  being received from ED(unit) for routine progression of care      Report consisted of patients Situation, Background, Assessment and   Recommendations(SBAR). Information from the following report(s) SBAR, Kardex, ED Summary, Intake/Output, MAR, Accordion and Recent Results was reviewed with the receiving nurse. Opportunity for questions and clarification was provided. Assessment completed upon patients arrival to unit and care assumed.

## 2018-03-20 NOTE — PROGRESS NOTES
[facility] Hematology & Oncology        Inpatient Hematology / Oncology Progress Note      Admission Date: 3/16/2018  9:00 PM  Reason for Admission/Hospital Course: Acute encephalopathy  Brain metastasis (HCC)  Seizure ([facility]  Lung cancer ([facility]      24 Hour Events:  Afebrile, VSS  On Dex, Keppra, and Fosphenytoin  Rad Onc Consult pending  Follow-up teleneuro eval pending  More verbal today  Pulled out all lines last night  Sister-in-law at bedside    ROS:  Unable to assess    10 point review of systems is otherwise negative with the exception of the elements mentioned above in the HPI. Allergies   Allergen Reactions    Metaxalone Other (comments)    Oxycodone Swelling       OBJECTIVE:  Patient Vitals for the past 8 hrs:   BP Temp Pulse Resp SpO2   18 0808 - - - - 97 %   18 0704 134/88 97.6 °F (36.4 °C) 76 15 97 %   18 0422 (!) 141/96 98 °F (36.7 °C) 83 14 95 %   18 0211 - - - - 98 %     Temp (24hrs), Av.1 °F (36.7 °C), Min:97.6 °F (36.4 °C), Max:98.6 °F (37 °C)     0701 -  1900  In: 120 [P.O.:120]  Out: -     Physical Exam:  Constitutional: Well developed, well nourished female in no acute distress, sitting comfortably in the bedside chair. HEENT: Normocephalic and atraumatic. Oropharynx is clear, mucous membranes are moist.  Pupils are equal, round, and reactive to light. Extraocular muscles are intact. Sclerae anicteric. Neck supple without JVD. No thyromegaly present. Skin Warm and dry. No bruising and no rash noted. No erythema. No pallor. Respiratory Lungs are clear to auscultation bilaterally without wheezes, rales or rhonchi, normal air exchange without accessory muscle use. CVS Normal rate, regular rhythm and normal S1 and S2. No murmurs, gallops, or rubs. Abdomen Soft, nontender and nondistended, normoactive bowel sounds. No palpable mass. No hepatosplenomegaly.    Neuro Responds with \"OK\", \"No\", \"Oh wow\", or \"Thank you\" to all questions. MSK Normal range of motion in general.  No edema and no tenderness. Psych Alert. Tearful on Dr. Jernigan Credit exam       Labs:    No results for input(s): WBC, RBC, HGB, HCT, MCV, MCH, MCHC, RDW, PLT, GRANS, LYMPH, MONOS, EOS, BASOS, IG, DF, ANEU, ABL, ABM, LESLIE, ABB, AIG, HGBEXT, HCTEXT, PLTEXT, HGBEXT, HCTEXT, PLTEXT in the last 72 hours. No lab exists for component: MPV     Recent Labs      03/20/18   0552  03/19/18   1207  03/19/18   0402  03/18/18   0326   NA  139   --   141  146*   K  3.8  3.3*  3.3*  3.1*   CL  104   --   105  107   CO2  23   --   28  28   AGAP  12   --   8  11   GLU  127*   --   144*  183*   BUN  16   --   13  12   CREA  0.83   --   0.80  1.01*   GFRAA  >60   --   >60  >60   GFRNA  >60   --   >60  >60   CA  8.4   --   8.4  8.4   MG   --    --   1.9  1.8         Imaging:  MRI BRAIN W WO CONT [[id number]] Collected: 03/18/18 1307      Order Status: Completed Updated: 03/18/18 1319     Narrative:       MRI of the Brain with contrast: 3/18/2018  History: Lung cancer  Sequences: Axial pre and post contrast T1, FLAIR, T2, diffusion, coronal post  contrast T1 and sagittal precontrast T1-weighted images of the brain. Comparison: MRI the brain 4/21/2017  20 cc of IV MultiHance   was injected to aid in the detection of intracranial  pathology. Findings:    Supratentorial enhancing metastatic lesions are now demonstrated. These lesions  all display hemosiderin and restricted diffusion. There is a 5 mm lesion in the  right temporal white matter, 1 cm lesion right frontal white matter, left  posterior parasagittal convexity lesion measuring 14 mm x 27 x 31 mm, 14 mm left  occipital white matter lesion, 9 mm left frontal lesion, and 2 left parietal  enhancing lesions measuring respectively 7 mm and 24 mm. There is some mild mass  upon the left ventricular atrium. The pituitary and sinuses are unremarkable. 7th and 8th nerves and orbits are unremarkable.  There is some fluid in the right  mastoid air cells.       Impression:       IMPRESSION: Interval supratentorial metastatic lesions as above. DC4  The significant findings in this report have been referred to the Imaging  Navigator in order to communicate to the referring provider or his/her designee  as outlined in Section II.C.2.a.ii-iii of the ACR  Practice Guideline for Communication of Diagnostic Imaging Findings.           XR ABD (KUB) [[id number]] Collected: 03/17/18 0115     Order Status: Completed Updated: 03/17/18 0117     Narrative:       Abdomen x-ray single view. HISTORY: Nasogastric tube placement. COMPARISON: None. FINDINGS: Nasogastric tube its tip in the gastric body. Bowel gas pattern is  nonspecific.       Impression:       IMPRESSION:    Nasogastric tube its tip in the gastric body.     XR CHEST PORT [[id number]] Collected: 03/16/18 2138     Order Status: Completed Updated: 03/16/18 2146     Narrative:       EXAM:  XR CHEST PORT    INDICATION:  AMS    COMPARISON:  4/18/2017    FINDINGS: A portable AP radiograph of the chest was obtained at 2124 hours. The  patient is on a cardiac monitor.  The lungs are clear.  The cardiac and  mediastinal contours and pulmonary vascularity are normal.  The bones and soft  tissues are grossly within normal limits.        Impression:       IMPRESSION: No acute cardiopulmonary disease.         CT HEAD WITHOUT CONTRAST [[id number]] Collected: 03/16/18 2130     Order Status: Completed Updated: 03/16/18 2134     Narrative:       CT HEAD WITHOUT CONTRAST     HISTORY:  Seizure. COMPARISON: None. TECHNIQUE: Axial imaging was performed without intravenous contrast utilizing  5mm slice thickness. Sagittal and coronal reformats were performed. Radiation  dose reduction techniques were used for this study. Our CT scanner uses one or  all of the following:    Automated exposure control, adjustment of the MAS or KUB according to patient's  size and iterative reconstruction.     FINDINGS:        *BRAIN:      -  There are no early signs of territorial or lacunar infarction by CT.     -  1.3 x 1.6 cm left parietal mass with surrounding vasogenic edema. 0.6 x  0.9 cm mass in left frontal lobe with vasogenic edema. Partly calcified mass in  left superior parafalcine region measuring 2.9 x 2.6 cm. Consider the  possibility of meningioma.     -  No gross white matter abnormality by CT.    *VISUALIZED PARANASAL SINUSES: Well aerated. *MASTOIDS:  Clear. *CALVARIUM AND SCALP: Unremarkable.         Impression:       IMPRESSION:    Cerebral metastatic disease. Possible left parietal convexity meningioma.     Date of Dictation: 3/16/2018 9:30 PM         ASSESSMENT:    Problem List  Date Reviewed: 3/14/2018          Codes Class Noted    * (Principal)Epilepsy with status epilepticus ([facility] ICD-10-CM: G40.901  ICD-9-CM: 345.3  3/18/2018        Hyperkalemia ICD-10-CM: E87.5  ICD-9-CM: 276.7  3/17/2018        Abnormal EKG ICD-10-CM: R94.31  ICD-9-CM: 794.31  3/17/2018        [facility] ([facility] ICD-10-CM: R56.9  ICD-9-CM: 780.39  3/16/2018        Lung cancer ([facility] ICD-10-CM: C34.90  ICD-9-CM: 162.9  3/16/2018        Brain metastasis (HCC) ICD-10-CM: C79.31  ICD-9-CM: 198.3  3/16/2018        Acute encephalopathy ICD-10-CM: G93.40  ICD-9-CM: 348.30  3/16/2018        Bone metastases ([facility] ICD-10-CM: C79.51  ICD-9-CM: 198.5  8/30/2017        Small cell carcinoma of right lung (HCC) ICD-10-CM: C34.91  ICD-9-CM: 162.9  4/21/2017        Smoker (Chronic) ICD-10-CM: B03.320  ICD-9-CM: 305.1  Unknown        Mediastinal mass ICD-10-CM: J98.59  ICD-9-CM: 786.6  4/18/2017        SVC (superior vena cava obstruction) ICD-10-CM: I87.1  ICD-9-CM: 459.2  4/18/2017        Essential hypertension ICD-10-CM: I10  ICD-9-CM: 401.9  4/18/2017        SVC syndrome ICD-10-CM: I87.1  ICD-9-CM: 459.2  4/18/2017            Ms. Shamika Ruiz is a 63 y.o. female admitted on 3/16/2018 with a primary diagnosis of lung cancer, acute encephalopathy, brain metastasis, and fever. Ms. Radha Guzman is a well known patient of Dr. Patricia Trujillo. She was just seen in the clinic on 3/14/18. While planning for Nivolumab she had rapid growth of b/l cervical LAD. He discussed  the aggressive pattern of her disease. Restaging CT and MRI of brain were ordered. The plan was to start nivolumab/ipilimimab on 4/4/18. He discussed the need for her to inform family of her aggressive disease and to designate POA. Unfortunately, yesterday she was confused, lethargic and somnolent. Patient's sister was driving her home when she became more confused and start having what her sister described as generalized body shakes. 911 was called. She received 2 doses of ativan PTA to ED. In ED she was post ictal and difficult to arouse. CT head showed cerebral metastatic disease with left parietal convexity meningioma. CXR was unremarkable. CT chest on 1/30/18 showed interval treatment response with significantly decreased mediastinal and right hilar lymphadenopathy. PLAN:  Stage IV SCLC with brain mets  - s/p C1 nivolumab/ipilimumab on 3/15. C2 due 4/4  3/20 Rad Onc consult pending - scheduled for tomorrow    Seizures  3/19 CT head revealed cerebral metastatic disease with L parietal convxity meningioma. Per teleneuro, pt has ongoing L-sided seizures and was started on Fosphenytoin yesterday - currently being held for high level phenytoin. Also on Dex and Keppra. Dr. Shiloh Hodge to check with Dr. Patricia Trujillo to see if pt needs to be transferred to [facility] to neuro service vs. daily teleneuro eval here. Pt alert - responds with \"OK\" to every question. SLP following. 3/20  Follow-up teleneuro eval pending. Remains confused, but able to speak more words today. SLP following. Continues on Dex, Keppra, and Fosphenytoin. Phenytoin level pending.     Hypokalemia  3/19 Replete K+  3/20 K+ up to 3.8    Continue home meds  Moni SOPs  SCDs for DVT prophylaxis            Loretta Zamora, YAMILKA   [facility] Hematology & Oncology  [address] [place] [address]  Office : [phone]  Fax : [phone]     Attending Addendum:  I personally evaluated the patient with Hasmukh Nguyen, and agree with the assessment, findings and plan as documented. Altered mentation, she answers most questions with monosyllabic words. She is emotionally labile and cries frequently. She is unable to verbalize her symptoms. She denied having pain. Lungs clear, heart regular and abdomen benign. Tele neuro eval today. Phenytoin level elevated yesterday. Will check prior medication hx to assess if she is off some chronic medications. C/w supportive care. Sister-in-law at bedside updated. XRT consult tomorrow.         Raji Bae MD  [address] [place] [address] Hematology and Oncology  [address] [place] [address]  Office : [phone]  Fax : [phone]

## 2018-03-20 NOTE — PROGRESS NOTES
Went to draw labs from patient and found her sitting up in bed and she had taken her mittens off, pulled her access to her port out, and her khanna out. She also took off her SCDs. Got patient to agree to lay back down in bed. Called NP and Karin Rinaldi, NP said to just leave all the lines out for now since she continuously pulls them out. Told lab to draw a peripheral on her. She is resting in bed currently. Will hold 0600 medications. Will continue to monitor.

## 2018-03-20 NOTE — PROGRESS NOTES
Problem: Falls - Risk of  Goal: *Absence of Falls  Document Ashleigh Fall Risk and appropriate interventions in the flowsheet.    Outcome: Progressing Towards Goal  Fall Risk Interventions:       Mentation Interventions: Adequate sleep, hydration, pain control, Bed/chair exit alarm, Door open when patient unattended, Evaluate medications/consider consulting pharmacy, Increase mobility, More frequent rounding, Reorient patient    Medication Interventions: Bed/chair exit alarm, Evaluate medications/consider consulting pharmacy, Patient to call before getting OOB, Teach patient to arise slowly    Elimination Interventions: Bed/chair exit alarm, Call light in reach, Patient to call for help with toileting needs, Toileting schedule/hourly rounds

## 2018-03-21 NOTE — PROGRESS NOTES
Problem: Falls - Risk of  Goal: *Absence of Falls  Document Ashleigh Fall Risk and appropriate interventions in the flowsheet.    Outcome: Progressing Towards Goal  Fall Risk Interventions:  Mobility Interventions: Bed/chair exit alarm, Communicate number of staff needed for ambulation/transfer, OT consult for ADLs, Patient to call before getting OOB, PT Consult for mobility concerns, PT Consult for assist device competence, Strengthening exercises (ROM-active/passive)    Mentation Interventions: Bed/chair exit alarm, Adequate sleep, hydration, pain control, Door open when patient unattended, Evaluate medications/consider consulting pharmacy, Familiar objects from home, Increase mobility, More frequent rounding, Reorient patient, Toileting rounds, Update white board    Medication Interventions: Bed/chair exit alarm, Evaluate medications/consider consulting pharmacy, Patient to call before getting OOB, Teach patient to arise slowly    Elimination Interventions: Bed/chair exit alarm, Call light in reach, Elevated toilet seat, Patient to call for help with toileting needs, Toilet paper/wipes in reach, Toileting schedule/hourly rounds

## 2018-03-21 NOTE — PROGRESS NOTES
Problem: Mobility Impaired (Adult and Pediatric)  Goal: *Acute Goals and Plan of Care (Insert Text)  STG:  (1.)Ms. Isidoro Carlson will move from supine to sit and sit to supine , scoot up and down and roll side to side with SUPERVISION within 3 treatment day(s). (2.)Ms. Isidoro Carlson will transfer from bed to chair and chair to bed with MINIMAL ASSIST using the least restrictive device within 3 treatment day(s). Goal met 3/21/2018  (3.)Ms. Isidoro Carlson will ambulate with MINIMAL ASSIST for 30 feet with the least restrictive device within 3 treatment day(s). LTG:  (1.)Ms. Isidoro Carlson will move from supine to sit and sit to supine , scoot up and down and roll side to side in bed with MODIFIED INDEPENDENCE within 7 treatment day(s). (2.)Ms. Isidoro Carlson will transfer from bed to chair and chair to bed with CONTACT GUARD ASSIST using the least restrictive device within 7 treatment day(s). (3.)Ms. Isidoro Carlson will ambulate with CONTACT GUARD ASSIST for 150 feet with the least restrictive device within 7 treatment day(s). ________________________________________________________________________________________________      PHYSICAL THERAPY: Daily Note, Treatment Day: 1st, AM 3/21/2018  INPATIENT: Hospital Day: 6  Payor: ABSOLUTE TOTAL CARE / [place] / Product Type: Managed Care Medicaid /      NAME/AGE/GENDER: Malorie Traore is a 63 y.o. female   PRIMARY DIAGNOSIS: Acute encephalopathy  Brain metastasis ([address]  Seizure ([address]  Lung cancer ([address] Epilepsy with status epilepticus ([address] Epilepsy with status epilepticus ([address]        ICD-10: Treatment Diagnosis:   · Generalized Muscle Weakness (M62.81)  · Difficulty in walking, Not elsewhere classified (R26.2)   Precaution/Allergies:  Metaxalone and Oxycodone      ASSESSMENT:     Ms. Isidoro Carlson is supine in bed upon contact. Pt responds to name, and appears agreeable to PT treatment.  Pt still has expressive aphasia, and repeats \"OK\" and \"Thank you\", but was able to say \"no\" when asked if she had pain. Pt transferred supine to sitting EOB with SBA. Pt requires frequent verbal cues to remain on task. Pt performed STS with Jose Juan and verbal and tactile cuing to push from bed. Pt ambulated 5 ft to bedside chair with RW and Jose Juan to prevent LOB with occasional unsteadiness. Pt left sitting up in bedside chair with all needs met and within reach, chair alarm in place, and nursing notified. Pt met bed to chair transfer STG today, and demonstrated improved RW technique and communication. Pt is making slow progress towards all other goals, limited to poor dynamic balance and poor safety awareness. This section established at most recent assessment   PROBLEM LIST (Impairments causing functional limitations):  1. Decreased Strength  2. Decreased ADL/Functional Activities  3. Decreased Transfer Abilities  4. Decreased Ambulation Ability/Technique  5. Decreased Balance  6. Decreased Activity Tolerance  7. Decreased Pacing Skills  8. Decreased Andrew with Home Exercise Program  9. Decreased Cognition   INTERVENTIONS PLANNED: (Benefits and precautions of physical therapy have been discussed with the patient.)  1. Balance Exercise  2. Bed Mobility  3. Cold  4. Family Education  5. Gait Training  6. Home Exercise Program (HEP)  7. Manual Therapy  8. Neuromuscular Re-education/Strengthening  9. Range of Motion (ROM)  10. Therapeutic Activites  11. Therapeutic Exercise/Strengthening  12. Transfer Training     TREATMENT PLAN: Frequency/Duration: 3 times a week for duration of hospital stay  Rehabilitation Potential For Stated Goals: Marcella Jesus REHABILITATION/EQUIPMENT: (at time of discharge pending progress): Due to the probability of continued deficits (see above) this patient will likely need continued skilled physical therapy after discharge.   Equipment:    Walkers, Type: Rolling Walker              HISTORY:   History of Present Injury/Illness (Reason for Referral):  Patient is 63years old female with pmhx of extensive stage small cell lung cancer with brain metastasis, GERD, HTN, previous tobacco abuse presented in view of new onset seizure like activity. As per the pt's sister, pt was in her usual health until yesterday, today she appeared more confused, lethargic and somnolent. While she was driving the pt home, pt started appearing confused and then had generalized body shakes. EMS was summoned. Pt received 2 doses of ativan PTA. In ER, pt is post ictal, sleeping, difficult to arouse. Pt is DNR. In ER, CT head showed cerebral metastatic disease with left parietal convexity meningioma, CXR was unremarkable. CT chest on 1/30/18 showed interval treatment response with significantly decreased mediastinal and right hilar lymphadenopathy. Past Medical History/Comorbidities:   Ms. Cheron Boeck  has a past medical history of Former cigarette smoker; GERD (gastroesophageal reflux disease); Hypertension; and Lung cancer ([facility] [address] She also has no past medical history of Adverse effect of anesthesia; Difficult intubation; Malignant hyperthermia due to anesthesia; Nausea & vomiting; or Pseudocholinesterase deficiency. Ms. Cheron Boeck  has a past surgical history that includes hx tubal ligation and hx vascular access.   Social History/Living Environment:   Home Environment: Private residence  # Steps to Enter: 3  Rails to Enter: No  One/Two Story Residence: One story  Living Alone: No  Support Systems: Parent, Family member(s) (takes care of mother whom she lives with)  Patient Expects to be Discharged to[de-identified] Unknown  Current DME Used/Available at Home: None  Tub or Shower Type: Tub/Shower combination  Prior Level of Function/Work/Activity:  Taking care of mother, independent, no AD, intact cognition and communication     Number of Personal Factors/Comorbidities that affect the Plan of Care: 3+: HIGH COMPLEXITY   EXAMINATION:   Most Recent Physical Functioning:   Gross Assessment:  AROM: Generally decreased, functional  Strength: Generally decreased, functional  Coordination: Generally decreased, functional               Posture:     Balance:  Sitting: Intact; Without support  Standing: Impaired; With support  Standing - Static: Fair  Standing - Dynamic : Poor Bed Mobility:  Rolling: Stand-by assistance  Supine to Sit: Stand-by assistance  Scooting: Stand-by assistance  Wheelchair Mobility:     Transfers:  Sit to Stand: Minimum assistance  Stand to Sit: Minimum assistance  Gait:     Base of Support: Narrowed  Speed/Aissatou: Fluctuations  Gait Abnormalities: Decreased step clearance;Trunk sway increased  Distance (ft): 5 Feet (ft)  Assistive Device: Walker;Gait belt  Ambulation - Level of Assistance: Minimal assistance      Body Structures Involved:  1. Heart  2. Lungs  3. Bones  4. Joints  5. Muscles  6. Ligaments Body Functions Affected:  1. Mental  2. Voice and Speech  3. Cardio  4. Respiratory  5. Neuromusculoskeletal  6. Movement Related Activities and Participation Affected:  1. Learning and Applying Knowledge  2. General Tasks and Demands  3. Communication  4. Mobility  5. Self Care  6. Domestic Life  7. Interpersonal Interactions and Relationships  8. Community, Social and [place]   Number of elements that affect the Plan of Care: 4+: HIGH COMPLEXITY   CLINICAL PRESENTATION:   Presentation: Evolving clinical presentation with changing clinical characteristics: MODERATE COMPLEXITY   CLINICAL DECISION MAKIN [facility] Mobility Inpatient Short Form  How much difficulty does the patient currently have. .. Unable A Lot A Little None   1. Turning over in bed (including adjusting bedclothes, sheets and blankets)? [] 1   [] 2   [] 3   [x] 4   2. Sitting down on and standing up from a chair with arms ( e.g., wheelchair, bedside commode, etc.)   [] 1   [] 2   [x] 3   [] 4   3. Moving from lying on back to sitting on the side of the bed?    [] 1   [] 2   [] 3   [x] 4   How much help from another person does the patient currently need. .. Total A Lot A Little None   4. Moving to and from a bed to a chair (including a wheelchair)? [] 1   [] 2   [x] 3   [] 4   5. Need to walk in hospital room? [] 1   [x] 2   [] 3   [] 4   6. Climbing 3-5 steps with a railing? [x] 1   [] 2   [] 3   [] 4   © 2007, Trustees of [facility] [employer], under license to [employer]. All rights reserved      Score:  Initial: 17 Most Recent: X (Date: -- )    Interpretation of Tool:  Represents activities that are increasingly more difficult (i.e. Bed mobility, Transfers, Gait). Score 24 23 22-20 19-15 14-10 9-7 6     Modifier CH CI CJ CK CL CM CN      ? Mobility - Walking and Moving Around:     - CURRENT STATUS: CK - 40%-59% impaired, limited or restricted    - GOAL STATUS: CJ - 20%-39% impaired, limited or restricted    - D/C STATUS:  ---------------To be determined---------------  Payor: ABSOLUTE TOTAL CARE / [place] / Product Type: Managed Care Medicaid /      Medical Necessity:     · Patient is expected to demonstrate progress in strength, range of motion, balance and coordination to decrease assistance required with transfers, ambulation, and functional mobility. Reason for Services/Other Comments:  · Patient continues to require skilled intervention due to decreased cognition, activity tolerance, and functional mobility. Use of outcome tool(s) and clinical judgement create a POC that gives a: Questionable prediction of patient's progress: MODERATE COMPLEXITY            TREATMENT:   (In addition to Assessment/Re-Assessment sessions the following treatments were rendered)   Pre-treatment Symptoms/Complaints:  Still has expressive aphasia, but was able to say \"no\" when asked about pain  Pain: Initial: 0/10  Pain Intensity 1: 0  Post Session:  0/10   Therapeutic Activity: (    12 minutes):   Therapeutic activities including Bed transfers, Chair transfers, Ambulation on level ground and constant verbal and tactile cues for safety of transfers and mobility and awareness of enviornment to improve mobility, strength, balance and coordination. Required moderate   to promote dynamic balance in standing and promote coordination of bilateral, upper extremity(s), lower extremity(s). Braces/Orthotics/Lines/Etc:   · IV  · O2 Device: Room air  Treatment/Session Assessment:    · Response to Treatment:  Ambulated 5ft with Jose Juan and RW  · Interdisciplinary Collaboration:   o Physical Therapist  o Registered Nurse  o SPT  · After treatment position/precautions:   o Up in chair  o Bed alarm/tab alert on  o Bed/Chair-wheels locked  o Bed in low position  o Call light within reach  o RN notified   · Compliance with Program/Exercises: Will assess as treatment progresses. · Recommendations/Intent for next treatment session: \"Next visit will focus on reduction in assistance provided\".   Total Treatment Duration:  PT Patient Time In/Time Out  Time In: 0949  Time Out: [address] [place]ia

## 2018-03-21 NOTE — PROGRESS NOTES
CM met with pt's brother Twan Ruiz and pt. Family agreeable to STR following DC. Family provided with facilities list-will follow up tomorrow for top 3 selections. referrals will be sent at that time.

## 2018-03-21 NOTE — PROGRESS NOTES
Date of Outreach Update:  Mary Schilling was seen and assessed. Previous Outreach assessment has been reviewed. There have been no significant clinical changes since the completion of the last dated Outreach assessment. Will continue to follow up per outreach protocol.     Signed By:   Amos Blanco RN    March 21, 2018 5:48 PM

## 2018-03-21 NOTE — PROGRESS NOTES
STG: Patient will answer basic yes/no comprehension questions with 75% accuracy given max cues. STG: Patient will participate in automatic speech tasks with 75% accuracy given max cues  STG: Patient will follow 1 step verbal command with visual cues with 60% accuracy with max cues. LTG: Patient will increase neuro-linguistic abilities to increase safety and awareness of deficits. Speech language pathology: Speech-language and cognitive note: Daily Note 2      NAME/AGE/GENDER: Marielle Salcido is a 63 y.o. female  DATE: 3/21/2018  PRIMARY DIAGNOSIS: Acute encephalopathy  Brain metastasis [address] [address]  Seizure ([facility] [address]  Lung cancer ([facility] [address]       ICD-10: Treatment Diagnosis: R.41.841 Cognitive-Communication Deficit    INTERDISCIPLINARY COLLABORATION: Registered NurseASSESSMENT:Patient seen for language treatment. No family at bedside. Patient more alert, attempting to engage with clinician verbally. She followed 1-step commands with clinician model with 100% accuracy. Accuracy declined to 75% with model was not provided. Basic yes/no questions answered with 80%accuracy. With minimal cues for first number, patient counted to 12 independently with 100% accuracy. However, when attempting additional automatic speech tasks, she perseverated on counting despite max cues. Phrases repetition attempted. She was able to repeat first word in 3 word phrase, but then perseverated on word. Unable to re-direct patient despite max cues, requiring rest breaks to re-direct. Phonemic errors noted in repetition. When answering open ended questions about family members, phonemic errors again noted and perseveration on \"Dario\". Patient demonstrating some improvements in spontaneous speech, comprehension, and ability to participate in expressive language tasks. However, severe impairments persist. Speech will continue to follow for trial sessions in attempt to determine patient's ability to benefit from therapy services.  Progress and participation are most limited by perseveration at this time. Patient will benefit from skilled intervention to address the below impairments. ?????? ? ? This section established at most recent assessment??????????  PROBLEM LIST (Impairments causing functional limitations):  1. Expressive Language  2. Receptive language  REHABILITATION POTENTIAL FOR STATED GOALS: Guarded  PLAN OF CARE:   Patient will benefit from skilled intervention to address the following impairments. INTERVENTIONS PLANNED: (Benefits and precautions of therapy have been discussed with the patient.)  1. Cognitive-linguistic deficits  FREQUENCY/DURATION: Continue to follow patient 3 session trial to assess patient' ability to benefit from skilled therapy services to address above goals. RECOMMENDED REHABILITATION/EQUIPMENT: (at time of discharge pending progress): Due to the probability of continued deficits (see above) this patient will not likely need continued skilled speech therapy after discharge. SUBJECTIVE:   \"Alright then\". History of Present Injury/Illness: Ms. Abundio Dueñas  has a past medical history of Former cigarette smoker; GERD (gastroesophageal reflux disease); Hypertension; and Lung cancer ([facility] [address] She also has no past medical history of Adverse effect of anesthesia; Difficult intubation; Malignant hyperthermia due to anesthesia; Nausea & vomiting; or Pseudocholinesterase deficiency. .  She also  has a past surgical history that includes hx tubal ligation and hx vascular access. Present Symptoms: Expressive/receptive language defiicts     Pain Intensity 1: 0  Pain Intervention(s) 1: Emotional support, Family Support, Repositioned  Current Medications:   No current facility-administered medications on file prior to encounter. Current Outpatient Prescriptions on File Prior to Encounter   Medication Sig Dispense Refill    mirtazapine (REMERON) 15 mg tablet Take 1 Tab by mouth nightly.  30 Tab 1    ondansetron hcl (ZOFRAN) 8 mg tablet Take 1 Tab by mouth every eight (8) hours as needed for Nausea. 90 Tab 1    lidocaine-prilocaine (EMLA) topical cream Apply 1/2 to 1 inch to port site one hour prior to needle access. 30 g 1    nicotine (NICODERM CQ) 7 mg/24 hr 1 Patch by TransDERmal route every twenty-four (24) hours.  amLODIPine (NORVASC) 5 mg tablet Take 5 mg by mouth daily.  senna-docusate (SHERRI-COLACE) 8.6-50 mg per tablet Take 1 Tab by mouth two (2) times a day. 60 Tab 2    raNITIdine (ZANTAC) 150 mg tablet Take 150 mg by mouth every morning.  prochlorperazine (COMPAZINE) 10 mg tablet Take 5 mg by mouth every six (6) hours as needed for Nausea.  acetaminophen (TYLENOL) 325 mg tablet Take 2 Tabs by mouth every four (4) hours as needed. 30 Tab 0     Current Dietary Status:  Mechanical soft/thin liquids      Social History/Home Situation:    Home Environment: Private residence  # Steps to Enter: 3  Rails to Enter: No  One/Two Story Residence: One story  Living Alone: No  Support Systems: Parent, Family member(s) (takes care of mother whom she lives with)  Patient Expects to be Discharged to[de-identified] Unknown  Current DME Used/Available at Home: None  Tub or Shower Type: Tub/Shower combination  Work/Activity History:   OBJECTIVE:   Oral Motor Structure/Speech:  Oral-Motor Structure/Motor Speech  Labial: No impairment  Dentition: Edentulous, Natural, Limited  Oral Hygiene: Adequate  Lingual: No impairment  Velum: No impairment    SPEECH-LANGUAGE COGNITIVE EVALUATION    Mental Status:  Neurologic State: Alert  Orientation Level: Unable to verbalize  Cognition: Follows commands; Impulsive  Perception: Appears intact  Perseveration: Perseverates during conversation  Safety/Judgement: Decreased awareness of environment;Decreased insight into deficits; Fall prevention    Motor Speech:        Auditory Comprehension:        Reading Comprehension:        Verbal Expression:   Verbal Expression  Initiation: No impairment  Automatic Speech Task: Impaired (comment)  Automatic speech task cueing type: Verbal;Repetition;Imitation  Automatic speech task cueing amount: Min-mod  Repetition: Impaired  Word Repetition (%): 25 %  Phrase Repetition (%): 0 %  Confrontation (%): 0 %  Speech Characteristics: Jargon;Perseveration; Word retrieval    Written Expression:        Neuro-Linguistics:                         Pragmatics:          Assessment/Reassessment only, no treatment provided today    Tool Used: Functional [place] Measure (FIMTM)   Score Comments   Eating       Comprehension       Expression   2     Social Interaction       Problem Solving       Memory          Score:  Initial: 1 Most Recent: X (Date: -- )   Interpretation of Tool: Provides a uniform system of measurement for disability based on the International Classification of Impairment, Disabilities and Handicaps; measures the level of a patient's disability and indicates how much assistance is required for the individual to carry out activities of daily living. Score 7 6 5 4 3 2 1   Modifier CH CI CJ CK CL CM CN   ? Spoken Expression:    [id number] - CURRENT STATUS: CM - 80%-99% impaired, limited or restricted    - GOAL STATUS:  CL - 60%-79% impaired, limited or restricted    - D/C STATUS:  ---------------To be determined---------------  Payor: ABSOLUTE TOTAL CARE [place] / Product Type: Managed Care Medicaid /   __________________________________________________________________________________________________  Safety:   After treatment position/precautions:  · Up in chair. Progression/Medical Necessity:   · Patient is expected to demonstrate progress in expressive communication and receptive ability to decrease assistance required communication, increase independence with activities of daily living and increase communication with family/caregivers. Compliance with Program/Exercises: Will assess as treatment progresses.    Reason for Continuation of Services/Other Comments:  · Patient continues to require skilled intervention due to language deficits. Recommendations/Intent for next treatment session: \"Treatment next visit will focus on language tasks\".     Total Treatment Duration:  Time In: 1444  Time Out: 1456    RUBY Knight, CCC-SLP, CBIS

## 2018-03-21 NOTE — PROGRESS NOTES
[facility] CRITICAL CARE OUTREACH NURSE PROGRESS REPORT      SUBJECTIVE: Called to assess patient secondary to transfer from ICU. MEWS Score: 1 (03/21/18 0418)  Vitals:    03/21/18 0220 03/21/18 0418 03/21/18 0709 03/21/18 0710   BP:  106/69 134/88    Pulse:  81 87    Resp:  18 18    Temp:  97.4 °F (36.3 °C) 97.8 °F (36.6 °C)    SpO2: 98% 98% 98% 93%   Weight:       Height:              LAB DATA:    Recent Labs      03/21/18 0419 03/20/18   0552  03/19/18   1207  03/19/18   0402   NA  140  139   --   141   K  3.6  3.8  3.3*  3.3*   CL  104  104   --   105   CO2  26  23   --   28   AGAP  10  12   --   8   GLU  125*  127*   --   144*   BUN  20  16   --   13   CREA  0.87  0.83   --   0.80   GFRAA  >60  >60   --   >60   GFRNA  >60  >60   --   >60   CA  8.1*  8.4   --   8.4   MG  1.8   --    --   1.9   ALB  3.2*   --    --    --    TP  6.7   --    --    --    GLOB  3.5   --    --    --    AGRAT  0.9*   --    --    --    ALT  87*   --    --    --         Recent Labs      03/21/18 0419   WBC  5.4   HGB  10.2*   HCT  31.2*   PLT  212          OBJECTIVE: On arrival to room, I found patient to be lying in bed praying out loud, no distress noted. Pain Assessment  Pain Intensity 1: 0 (03/21/18 0006)     Pain Intervention(s) 1: Emotional support, Family Support, Repositioned  Patient Stated Pain Goal: 0                                 ASSESSMENT:  Patient alert to name but unable to state time, place or situation. Will follow some simple commands but continues to repeat prayer. Lungs are clear to auscultation, HR 90, O2sat 97% on RA. Per PCA more alert than yesterday. Teleneuro monitor at bedside. PLAN:  Will continue to follow per outreach program protocol.      Adrienne Gao RN

## 2018-03-21 NOTE — PROGRESS NOTES
SPEECH PATHOLOGY NOTE:    Attempted to see patient of language treatment this PM. Brother at bedside and reports continued increase in spontaneous speech production. However, patient sleeping sounds. Not rousing to verbal stim. Will re-attempt at later time/date as level of alertness improves and as schedule permits.      RUBY Montelongo, CCC-SLP, CBIS

## 2018-03-21 NOTE — PROGRESS NOTES
Problem: Self Care Deficits Care Plan (Adult)  Goal: *Acute Goals and Plan of Care (Insert Text)  1. Patient will complete lower body bathing and dressing with modified independence and adaptive equipment as needed. 2. Patient will complete toileting with modified independence. 3. Patient will tolerate 20 minutes of OT treatment with no rest breaks to increase activity tolerance for ADLs. 4. Patient will complete functional transfers with modified independence and adaptive equipment as needed. Timeframe: 7 visits       OCCUPATIONAL THERAPY: Initial Assessment 3/21/2018  INPATIENT: Hospital Day: 6  Payor: ABSOLUTE TOTAL CARE / [place] / Product Type: Managed Care Medicaid /      NAME/AGE/GENDER: Juan Rodriguez is a 63 y.o. female   PRIMARY DIAGNOSIS:  Acute encephalopathy  Brain metastasis ([facility] [address]  Seizure ([address]  Lung cancer ([address] Epilepsy with status epilepticus ([address] Epilepsy with status epilepticus ([address]        ICD-10: Treatment Diagnosis:    · Other lack of cordination (R27.8)   Precautions/Allergies:     Metaxalone and Oxycodone      ASSESSMENT:     Ms. Radha Guzman is a 63year old female with history of lung cancer and brain mets admitted with seizures. 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 She is typically independent with ADLs. Patient supine in bed upon arrival with brother at bedside. Patient with significant expressive aphasia, although apparently it is improving. Patient pleasant and agreeable to OT evaluation. Perseverates throughout conversation. Able to complete bed mobility with CGA and stand with minimal assistance. Patient presents with poor standing balance and poor insight into deficits. Required moderate assistance for safe transfer to chair. BUE assessment reveals ROM WFL, strength 4/5. Patient had some difficulty following commands for assessment. Patient able to don her socks with setup only.  Unable to state name of socks or body part that socks go on, but able to demonstrate what to do with socks when instructed to. Patient's biggest limiting deficits appear to be balance and cognition/ speech. Patient is functioning far below her baseline and would benefit from continued occupational therapy to increase independence and safety. This section established at most recent assessment   PROBLEM LIST (Impairments causing functional limitations):  1. Decreased ADL/Functional Activities  2. Decreased Transfer Abilities  3. Decreased Ambulation Ability/Technique  4. Decreased Balance  5. Decreased Cognition   INTERVENTIONS PLANNED: (Benefits and precautions of occupational therapy have been discussed with the patient.)  1. Activities of daily living training  2. Adaptive equipment training  3. Cognitive training  4. Group therapy  5. Neuromuscular re-eduation  6. Therapeutic activity  7. Therapeutic exercise     TREATMENT PLAN: Frequency/Duration: Follow patient 3x/ week to address above goals. Rehabilitation Potential For Stated Goals: Fair     RECOMMENDED REHABILITATION/EQUIPMENT: (at time of discharge pending progress): Due to the probability of continued deficits (see above) this patient will likely need continued skilled occupational therapy after discharge. Equipment:    None at this time              OCCUPATIONAL PROFILE AND HISTORY:   History of Present Injury/Illness (Reason for Referral):  Per H&P, \"Patient is 63years old female with pmhx of extensive stage small cell lung cancer with brain metastasis, GERD, HTN, previous tobacco abuse presented in view of new onset seizure like activity. As per the pt's sister, pt was in her usual health until yesterday, today she appeared more confused, lethargic and somnolent. While she was driving the pt home, pt started appearing confused and then had generalized body shakes. EMS was summoned. Pt received 2 doses of ativan PTA. In ER, pt is post ictal, sleeping, difficult to arouse. Pt is DNR.   In ER, CT head showed cerebral metastatic disease with left parietal convexity meningioma, CXR was unremarkable. CT chest on 1/30/18 showed interval treatment response with significantly decreased mediastinal and right hilar lymphadenopathy. \"  Past Medical History/Comorbidities:   Ms. Janet Lawrence  has a past medical history of Former cigarette smoker; GERD (gastroesophageal reflux disease); Hypertension; and Lung cancer ([facility] [address] She also has no past medical history of Adverse effect of anesthesia; Difficult intubation; Malignant hyperthermia due to anesthesia; Nausea & vomiting; or Pseudocholinesterase deficiency. Ms. Janet Lawrence  has a past surgical history that includes hx tubal ligation and hx vascular access. Social History/Living Environment:   Home Environment: Private residence  # Steps to Enter: 3  Rails to Enter: No  One/Two Story Residence: One story  Living Alone: No  Support Systems: Parent, Family member(s) (takes care of mother whom she lives with)  Patient Expects to be Discharged to[de-identified] Unknown  Current DME Used/Available at Home: None  Tub or Shower Type: Tub/Shower combination  Prior Level of Function/Work/Activity:  Patient lives with her mother and is her caregiver. She is typically independent with ADLs. Number of Personal Factors/Comorbidities that affect the Plan of Care: Brief history (0):  LOW COMPLEXITY   ASSESSMENT OF OCCUPATIONAL PERFORMANCE[de-identified]   Activities of Daily Living:           Basic ADLs (From Assessment) Complex ADLs (From Assessment)   Basic ADL  Feeding: Minimum assistance  Oral Facial Hygiene/Grooming: Minimum assistance  Bathing: Moderate assistance  Upper Body Dressing: Minimum assistance  Lower Body Dressing: Moderate assistance  Toileting: Minimum assistance Instrumental ADL  Meal Preparation: Total assistance  Homemaking:  Total assistance  Medication Management: Total assistance  Financial Management: Total assistance   Grooming/Bathing/Dressing Activities of Daily Living     Cognitive Retraining  Safety/Judgement: Decreased awareness of environment;Decreased insight into deficits; Fall prevention                     Lower Body Dressing Assistance  Socks: Supervision/set-up Bed/Mat Mobility  Rolling: Stand-by assistance  Supine to Sit: Stand-by assistance  Sit to Stand: Minimum assistance  Bed to Chair: Moderate assistance  Scooting: Stand-by assistance       Most Recent Physical Functioning:   Gross Assessment:  AROM: Within functional limits (BUE)  Strength: Generally decreased, functional (BUE 4/5)  Coordination: Generally decreased, functional (BUE)               Posture:     Balance:  Sitting: Impaired  Sitting - Static: Good (unsupported)  Sitting - Dynamic: Fair (occasional)  Standing: Impaired  Standing - Static: Poor  Standing - Dynamic : Poor Bed Mobility:  Rolling: Stand-by assistance  Supine to Sit: Stand-by assistance  Scooting: Stand-by assistance  Wheelchair Mobility:     Transfers:  Sit to Stand: Minimum assistance  Stand to Sit: Minimum assistance  Bed to Chair: Moderate assistance                Patient Vitals for the past 6 hrs:   SpO2   03/21/18 1326 99 %       Mental Status  Neurologic State: Alert  Orientation Level: Unable to verbalize  Cognition: Follows commands, Impulsive  Perception: Appears intact  Perseveration: Perseverates during conversation  Safety/Judgement: Decreased awareness of environment, Decreased insight into deficits, Fall prevention                          Physical Skills Involved:  1. Balance  2. Strength  3. Activity Tolerance Cognitive Skills Affected (resulting in the inability to perform in a timely and safe manner):  1. Perception  2. Executive Function  3. Comprehension  4. Expression Psychosocial Skills Affected:  1. Habits/Routines  2.  Environmental Adaptation   Number of elements that affect the Plan of Care: 5+:  HIGH COMPLEXITY   CLINICAL DECISION MAKING:   [employer] AM-PAC 6 Clicks   Daily Activity Inpatient Short Form  How much help from another person does the patient currently need. .. Total A Lot A Little None   1. Putting on and taking off regular lower body clothing? [] 1   [] 2   [x] 3   [] 4   2. Bathing (including washing, rinsing, drying)? [] 1   [] 2   [x] 3   [] 4   3. Toileting, which includes using toilet, bedpan or urinal?   [] 1   [] 2   [x] 3   [] 4   4. Putting on and taking off regular upper body clothing? [] 1   [] 2   [x] 3   [] 4   5. Taking care of personal grooming such as brushing teeth? [] 1   [] 2   [x] 3   [] 4   6. Eating meals? [] 1   [] 2   [x] 3   [] 4   © 2007, Trustees of [facility] [employer], under license to [employer]. All rights reserved      Score:  Initial: 18 Most Recent: X (Date: -- )    Interpretation of Tool:  Represents activities that are increasingly more difficult (i.e. Bed mobility, Transfers, Gait). Score 24 23 22-20 19-15 14-10 9-7 6     Modifier CH CI CJ CK CL CM CN      ? Self Care:     - CURRENT STATUS: CK - 40%-59% impaired, limited or restricted    - GOAL STATUS: CJ - 20%-39% impaired, limited or restricted    - D/C STATUS:  ---------------To be determined---------------  Payor: ABSOLUTE TOTAL CARE / [place] / Product Type: Managed Care Medicaid /      Medical Necessity:     · Patient demonstrates good rehab potential due to higher previous functional level. Reason for Services/Other Comments:  · Patient continues to require present interventions due to patient's inability to care for self at baseline level of function.    Use of outcome tool(s) and clinical judgement create a POC that gives a: MODERATE COMPLEXITY         TREATMENT:   (In addition to Assessment/Re-Assessment sessions the following treatments were rendered)     Pre-treatment Symptoms/Complaints:  None   Pain: Initial:   Pain Intensity 1: 0  Post Session:  None      Assessment/Reassessment only, no treatment provided today    Braces/Orthotics/Lines/Etc:   · IV  · O2 Device: Room air  Treatment/Session Assessment:    · Response to Treatment:  Tolerated well   · Interdisciplinary Collaboration:   o Occupational Therapist  o Speech Therapist  o Registered Nurse  · After treatment position/precautions:   o Up in chair  o Bed alarm/tab alert on  o Bed/Chair-wheels locked  o Call light within reach  o RN notified  o working with speech therapist   · Compliance with Program/Exercises: Will assess as treatment progresses. · Recommendations/Intent for next treatment session: \"Next visit will focus on advancements to more challenging activities and reduction in assistance provided\".   Total Treatment Duration:  OT Patient Time In/Time Out  Time In: 1433  Time Out: [address] OTR/L

## 2018-03-21 NOTE — PROGRESS NOTES
Gina Barnes, EEG called to request to perform EEG in am due to pt being off floor today when she attempted to complete. Provider notified and said it would be okay to hold until tomorrow. Gina Barnes notified.

## 2018-03-21 NOTE — PROGRESS NOTES
Pt here for consult for brain mets. Pt presented from [facility] transport on stretcher. Pt moaning and stating \"oh wow\". Not responding appropriately to questions. MRI brain 3-17-18.     Qiana Godwin RN

## 2018-03-21 NOTE — PROGRESS NOTES
[facility] Hematology & Oncology        Inpatient Hematology / Oncology Progress Note      Admission Date: 3/16/2018  9:00 PM  Reason for Admission/Hospital Course: Acute encephalopathy  Brain metastasis (HCC)  Seizure ([facility] [address]  Lung cancer ([facility] [address]      24 Hour Events:  Afebrile, VSS  On Dex, Keppra, and Phenobarbital  Rad Onc Consult scheduled this AM    ROS:  Unable to assess    10 point review of systems is otherwise negative with the exception of the elements mentioned above in the HPI. Allergies   Allergen Reactions    Metaxalone Other (comments)    Oxycodone Swelling       OBJECTIVE:  Patient Vitals for the past 8 hrs:   BP Temp Pulse Resp SpO2   18 0710 - - - - 93 %   18 0709 134/88 97.8 °F (36.6 °C) 87 18 98 %   18 0418 106/69 97.4 °F (36.3 °C) 81 18 98 %     Temp (24hrs), Av.8 °F (36.6 °C), Min:97.3 °F (36.3 °C), Max:98.2 °F (36.8 °C)     0701 -  1900  In: 340 [P.O.:240; I.V.:100]  Out: -     Physical Exam:  Constitutional: Well developed, well nourished female in no acute distress, sitting comfortably in the bedside chair. HEENT: Normocephalic and atraumatic. Oropharynx is clear, mucous membranes are moist. Extraocular muscles are intact. Sclerae anicteric. Neck supple without JVD. No thyromegaly present. Skin Warm and dry. No bruising and no rash noted. No erythema. No pallor. Respiratory Lungs are clear to auscultation bilaterally without wheezes, rales or rhonchi, normal air exchange without accessory muscle use. CVS Normal rate, regular rhythm and normal S1 and S2. No murmurs, gallops, or rubs. Abdomen Soft, nontender and nondistended, normoactive bowel sounds. No palpable mass. No hepatosplenomegaly. Neuro Responds with \"OK\", \"Hi There\" to all questions. MSK Normal range of motion in general.  No edema and no tenderness. Psych Alert. Calm.        Labs:      Recent Labs      18   0419   WBC  5.4   RBC  4.43   HGB  10.2*   HCT 31.2*   MCV  70.4*   MCH  23.0*   MCHC  32.7   RDW  17.5*   PLT  212   GRANS  81*   LYMPH  15   MONOS  3*   EOS  0*   BASOS  0   IG  1   DF  AUTOMATED   ANEU  4.4   ABL  0.8   ABM  0.2   LESLIE  0.0   ABB  0.0   AIG  0.1        Recent Labs      03/21/18   0419  03/20/18   0552  03/19/18   1207  03/19/18   0402   NA  140  139   --   141   K  3.6  3.8  3.3*  3.3*   CL  104  104   --   105   CO2  26  23   --   28   AGAP  10  12   --   8   GLU  125*  127*   --   144*   BUN  20  16   --   13   CREA  0.87  0.83   --   0.80   GFRAA  >60  >60   --   >60   GFRNA  >60  >60   --   >60   CA  8.1*  8.4   --   8.4   SGOT  92*   --    --    --    AP  86   --    --    --    TP  6.7   --    --    --    ALB  3.2*   --    --    --    GLOB  3.5   --    --    --    AGRAT  0.9*   --    --    --    MG  1.8   --    --   1.9         Imaging:  MRI BRAIN W WO CONT [[id number]] Collected: 03/18/18 1307      Order Status: Completed Updated: 03/18/18 1319     Narrative:       MRI of the Brain with contrast: 3/18/2018  History: Lung cancer  Sequences: Axial pre and post contrast T1, FLAIR, T2, diffusion, coronal post  contrast T1 and sagittal precontrast T1-weighted images of the brain. Comparison: MRI the brain 4/21/2017  20 cc of IV MultiHance   was injected to aid in the detection of intracranial  pathology. Findings:    Supratentorial enhancing metastatic lesions are now demonstrated. These lesions  all display hemosiderin and restricted diffusion. There is a 5 mm lesion in the  right temporal white matter, 1 cm lesion right frontal white matter, left  posterior parasagittal convexity lesion measuring 14 mm x 27 x 31 mm, 14 mm left  occipital white matter lesion, 9 mm left frontal lesion, and 2 left parietal  enhancing lesions measuring respectively 7 mm and 24 mm. There is some mild mass  upon the left ventricular atrium. The pituitary and sinuses are unremarkable. 7th and 8th nerves and orbits are unremarkable.  There is some fluid in the right  mastoid air cells.       Impression:       IMPRESSION: Interval supratentorial metastatic lesions as above. DC4  The significant findings in this report have been referred to the Imaging  Navigator in order to communicate to the referring provider or his/her designee  as outlined in Section II.C.2.a.ii-iii of the ACR  Practice Guideline for Communication of Diagnostic Imaging Findings.           XR ABD (KUB) [[id number]] Collected: 03/17/18 0115     Order Status: Completed Updated: 03/17/18 0117     Narrative:       Abdomen x-ray single view. HISTORY: Nasogastric tube placement. COMPARISON: None. FINDINGS: Nasogastric tube its tip in the gastric body. Bowel gas pattern is  nonspecific.       Impression:       IMPRESSION:    Nasogastric tube its tip in the gastric body.     XR CHEST PORT [[id number]] Collected: 03/16/18 2138     Order Status: Completed Updated: 03/16/18 2146     Narrative:       EXAM:  XR CHEST PORT    INDICATION:  AMS    COMPARISON:  4/18/2017    FINDINGS: A portable AP radiograph of the chest was obtained at 2124 hours. The  patient is on a cardiac monitor.  The lungs are clear.  The cardiac and  mediastinal contours and pulmonary vascularity are normal.  The bones and soft  tissues are grossly within normal limits.        Impression:       IMPRESSION: No acute cardiopulmonary disease.         CT HEAD WITHOUT CONTRAST [[id number]] Collected: 03/16/18 2130     Order Status: Completed Updated: 03/16/18 2134     Narrative:       CT HEAD WITHOUT CONTRAST     HISTORY:  Seizure. COMPARISON: None. TECHNIQUE: Axial imaging was performed without intravenous contrast utilizing  5mm slice thickness. Sagittal and coronal reformats were performed. Radiation  dose reduction techniques were used for this study. Our CT scanner uses one or  all of the following:    Automated exposure control, adjustment of the MAS or KUB according to patient's  size and iterative reconstruction.     FINDINGS:        *BRAIN:      -  There are no early signs of territorial or lacunar infarction by CT.     -  1.3 x 1.6 cm left parietal mass with surrounding vasogenic edema. 0.6 x  0.9 cm mass in left frontal lobe with vasogenic edema. Partly calcified mass in  left superior parafalcine region measuring 2.9 x 2.6 cm. Consider the  possibility of meningioma.     -  No gross white matter abnormality by CT.    *VISUALIZED PARANASAL SINUSES: Well aerated. *MASTOIDS:  Clear. *CALVARIUM AND SCALP: Unremarkable.         Impression:       IMPRESSION:    Cerebral metastatic disease. Possible left parietal convexity meningioma.     Date of Dictation: 3/16/2018 9:30 PM         ASSESSMENT:    Problem List  Date Reviewed: 3/14/2018          Codes Class Noted    * (Principal)Epilepsy with status epilepticus ([facility] ICD-10-CM: G40.901  ICD-9-CM: 345.3  3/18/2018        Hyperkalemia ICD-10-CM: E87.5  ICD-9-CM: 276.7  3/17/2018        Abnormal EKG ICD-10-CM: R94.31  ICD-9-CM: 794.31  3/17/2018        Seizure ([facility] ICD-10-CM: R56.9  ICD-9-CM: 780.39  3/16/2018        Lung cancer ([facility] ICD-10-CM: C34.90  ICD-9-CM: 162.9  3/16/2018        Brain metastasis (HCC) ICD-10-CM: C79.31  ICD-9-CM: 198.3  3/16/2018        Acute encephalopathy ICD-10-CM: G93.40  ICD-9-CM: 348.30  3/16/2018        Bone metastases ([facility] ICD-10-CM: C79.51  ICD-9-CM: 198.5  8/30/2017        Small cell carcinoma of right lung (HCC) ICD-10-CM: C34.91  ICD-9-CM: 162.9  4/21/2017        Smoker (Chronic) ICD-10-CM: S06.019  ICD-9-CM: 305.1  Unknown        Mediastinal mass ICD-10-CM: J98.59  ICD-9-CM: 786.6  4/18/2017        SVC (superior vena cava obstruction) ICD-10-CM: I87.1  ICD-9-CM: 459.2  4/18/2017        Essential hypertension ICD-10-CM: I10  ICD-9-CM: 401.9  4/18/2017        SVC syndrome ICD-10-CM: I87.1  ICD-9-CM: 459.2  4/18/2017            Ms. Anabelle Yarbrough is a 63 y.o. female admitted on 3/16/2018 with a primary diagnosis of lung cancer, acute encephalopathy, brain metastasis, and fever. Ms. Orlando Gerardo is a well known patient of Dr. Lyndsay Adan. She was just seen in the clinic on 3/14/18. While planning for Nivolumab she had rapid growth of b/l cervical LAD. He discussed  the aggressive pattern of her disease. Restaging CT and MRI of brain were ordered. The plan was to start nivolumab/ipilimimab on 18. He discussed the need for her to inform family of her aggressive disease and to designate POA. Unfortunately, yesterday she was confused, lethargic and somnolent. Patient's sister was driving her home when she became more confused and start having what her sister described as generalized body shakes. 911 was called. She received 2 doses of ativan PTA to ED. In ED she was post ictal and difficult to arouse. CT head showed cerebral metastatic disease with left parietal convexity meningioma. CXR was unremarkable. CT chest on 18 showed interval treatment response with significantly decreased mediastinal and right hilar lymphadenopathy. PLAN:  Stage IV SCLC with brain mets  - s/p C1 nivolumab/ipilimumab on 3/15. C2 due 4/4  3/21 Rad Onc consult scheduled for this AM    Seizures  3/19 CT head revealed cerebral metastatic disease with L parietal convxity meningioma. Per teleneuro, pt has ongoing L-sided seizures and was started on Fosphenytoin yesterday - currently being held for high level phenytoin. Also on Dex and Keppra. Dr. Rk Francisco to check with Dr. Lyndsay Adan to see if pt needs to be transferred to [facility] to neuro service vs. daily teleneuro eval here. Pt alert - responds with \"OK\" to every question. SLP following. 3/20  Follow-up teleneuro eval pending. Remains confused, but able to speak more words today. SLP following. Continues on Dex, Keppra, and Fosphenytoin. Phenytoin level pending. 3/21 Per neuro, Dr. Ramsey Monson, hold phenytoin and allow levels to trend down. Repeat EEG. Give loading dose of Phenobarbital 90mg IV x 1.   Then Phenobarbital 30mg BID.    Hypokalemia  3/19 Replete K+  3/20 K+ up to 3.8    Continue home meds  Moni SOPs  SCDs for DVT prophylaxis  Per PT, may benefit from rehab upon discharge. CM consulted. Flory Smith NP   [facility] Hematology & Oncology  [address] [address]  Office : [phone]  Fax : [phone]     Attending Addendum:  I personally evaluated the patient with Flory Smith, and agree with the assessment, findings and plan as documented. Continues with mono-syllabic answers, heart regular, lungs clear, abdomen benign. Appreciate neuro recommendations. XRT consult completed and will wait to see if pt improves cognitively. Will address goals of care with family.             Love Kim MD  [facility] Hematology and Oncology  [address] [place] [address]  Office : [phone]  Fax : [phone]

## 2018-03-21 NOTE — PROGRESS NOTES
Patient: Radha Interiano MRN: [id number]  SSN: [id number]    YOB: 1960  Age: 63 y.o. 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     PREVIOUS TREATMENT:  1) 4/22/17:  Cisplatin/Etoposide. 2)  Radiation therapy of Lung. DOSE:  4500 cGy in 15 fractions. Treatment dates: 12/5/2017 - 12/27/2017.   3) 3/15/18:  Nivolumab with planned addition of Ipilimimab. HISTORY OF PRESENT ILLNESS:  Radha Interiano is a 63 y.o. female who I am seeing at the request of in-patient medicine for reconsultation for brain mets as I know her previously for treatment to her lung. She was originally admitted on 4/18/2017 with a primary diagnosis of superior vena cava syndrome. Her only other medical history is that of hypertension. She presented to the ER with complaints of facial and bilateral upper arm swelling that had been present for about a week. She was given steroids and discharged to home. She then returned to the ER feeling no better with possibly worsening of her symptoms. She denied any dyspnea, cough, wheezing but had the full sensation in her throat when she swallowed. CT of her chest showed a 1.3 x 1.6 cm irregular right upper lobe mass with a 4.1 x 4.6 and 2.1 x 2.7 cm mediastinal mass concerning for a primary lung carcinoma. The superior vena cava was draped over the larger mass and partially compressed which was likely the cause of her symptoms. She has a history of smoking a half-pack a day since she was about 17years old. She is  and works as  at [employer] in [facility]. Pulmonary medicine was consulted for pathologic diagnosis. Dr. Lilian Zaman completed biopsy and EBUS 4/18/17 although final pathology has not returned but the specimen was positive on MAIKOL and noted to likely be SCLC.   She was seen urgently because of the SVC but ultimately we felt systemic therapy should be started. Eventual PET/CT did show bony metastases consistent with extensive stage. MRI brain was negative. She did have mid back pain and had a fracture of T5.     She initially had initial response but her follow-up CT 11/15/2017 showed disease progression with further SVC encasement and again facial swelling. We completed 15 fractions 12/27/2017 and she had quite prompt relief of symptoms and was pleased with her treatment. She had a discussion with her medical oncologist and opted for a second line Nivolumab with the addition of Ipilimimab. Unfortunately she developed acute encephalopathy is noted to have brain metastases. She acutely developed confusion and became lethargic and somnolent. She was on her way home and developed a seizure and EMS was called. She was postictal in the emergency room. Her systemic imaging 1/30/2018 showed interval treatment response despite the intracranial metastatic disease noted on CT. She ws maintained on Dex, Keppra, and Fosphenytoin. Hospice versus radiation was considered and the family wished to have consultation with us before making the decision    PAST MEDICAL HISTORY:    Past Medical History:   Diagnosis Date    Former cigarette smoker     using Nicotine patch    GERD (gastroesophageal reflux disease)     managed with Zantac    Hypertension     no complications at this time, no current medications    Lung cancer [facility])        The patient denies history of collagen vascular diseases, pacemaker insertion, but has had prior radiation and prior chemotherapy. PAST SURGICAL HISTORY:   Past Surgical History:   Procedure Laterality Date    HX TUBAL LIGATION      HX VASCULAR ACCESS      lt chest       MEDICATIONS:   No current facility-administered medications for this encounter. No current outpatient prescriptions on file.     Facility-Administered Medications Ordered in Other Encounters:     mirtazapine (REMERON) tablet 15 mg, 15 mg, Oral, QHS, Lore Constantino NP    PHENobarbital (LUMINAL) injection 30 mg, 30 mg, IntraVENous, BID, Lore Constantino NP    tuberculin injection 5 Units, 5 Units, IntraDERMal, ONCE, Lore Constantino NP, 5 Units at 03/20/18 1400    diphenhydrAMINE (BENADRYL) injection 12.5 mg, 12.5 mg, IntraVENous, Q6H PRN, Nas Billy MD    sodium chloride (NS) flush 5-10 mL, 5-10 mL, IntraVENous, Q8H, Indira Cabrales MD, 10 mL at 03/21/18 0520    sodium chloride (NS) flush 5-10 mL, 5-10 mL, IntraVENous, PRN, Indira Cabrales MD    acetaminophen (TYLENOL) suppository 650 mg, 650 mg, Rectal, Q6H PRN, Indira Cabrales MD    [facility]) injection 0.4 mg, 0.4 mg, IntraVENous, PRN, Indira Cabrales MD    ondansetron [facility]) injection 4 mg, 4 mg, IntraVENous, Q4H PRN, Indira Cabrales MD    LORazepam (ATIVAN) injection 2 mg, 2 mg, IntraVENous, Q6H PRN, Indiar Cabrales MD, 2 mg at 03/17/18 0058    LORazepam (ATIVAN) injection 2 mg, 2 mg, IntraVENous, Q1H PRN, Indira Cabrales MD    influenza vaccine 2017-18 (3 yrs+)(PF) (FLUZONE QUAD/FLUARIX QUAD) injection 0.5 mL, 0.5 mL, IntraMUSCular, PRIOR TO DISCHARGE, Indira Cabrales MD    levalbuterol (XOPENEX) nebulizer soln 0.63 mg/3 mL, 0.63 mg, Nebulization, Q6H RT, Indira Cabrales MD, 0.63 mg at 03/21/18 0710    dexamethasone (DECADRON) 4 mg/mL injection 4 mg, 4 mg, IntraVENous, Q6H, Nas Billy MD, 4 mg at 03/21/18 1045    pantoprazole (PROTONIX) 40 mg in sodium chloride 10 mL injection, 40 mg, IntraVENous, ACB, Abram Randle MD, 40 mg at 03/21/18 0827    levETIRAcetam (KEPPRA) 1,000 mg in 0.9% sodium chloride 100 mL IVPB, 1,000 mg, IntraVENous, Q12H, Nas Billy MD, 1,000 mg at 03/21/18 8346    ALLERGIES:   Allergies   Allergen Reactions    Metaxalone Other (comments)    Oxycodone Swelling       SOCIAL HISTORY:   Social History     Social History    Marital status: SINGLE     Spouse name: N/A    Number of children: N/A    Years of education: N/A Occupational History    works at [employer] Smoking status: Former Smoker     Packs/day: 0.50     Years: 39.00     Quit date: 4/18/2017    Smokeless tobacco: Never Used    Alcohol use No    Drug use: No    Sexual activity: Not on file     Other Topics Concern    Not on file     Social History Narrative    . 1 daughter and 1 son. Works as  at [employer] in [facility]. FAMILY HISTORY:   Family History   Problem Relation Age of Onset    Hypertension Mother     Asthma Mother     No Known Problems Father     Thyroid Disease Sister     Hypertension Brother     Diabetes Brother        REVIEW OF SYSTEMS: Unable to obtain with patient's inability to cooperate. PHYSICAL EXAMINATION:   ECOG Performance status 3  VITAL SIGNS: There were no vitals taken for this visit. Temp (°F) 97.398.2 97.8  97.8 (36.6)         Pulse (Heart Rate) 7693 87  87        Resp Rate 1519 18  18        /69149/93 134/88  134/88        O2 Sat (%) (%) 9398 9398  93        O2 Flow Rate (L/min) (l/min) 0            Weight (kg) 52.2    52.2 kg (115 lb 1.6 oz)         GENERAL: The patient is well-developed, but not able to communicate other than follow simple commands. NEURO:  Muscular strength and sensation are intact throughout all four extremities. She is able to follow simple commands to follow these directions but not able to converse any meaningful cognitive way. She is not able to count of 5, tell me the date, or where she is at the moment.     PATHOLOGY:    See HPI    LABORATORY:   Lab Results   Component Value Date/Time    Sodium 140 03/21/2018 04:19 AM    Potassium 3.6 03/21/2018 04:19 AM    Chloride 104 03/21/2018 04:19 AM    CO2 26 03/21/2018 04:19 AM    Anion gap 10 03/21/2018 04:19 AM    Glucose 125 (H) 03/21/2018 04:19 AM    BUN 20 03/21/2018 04:19 AM    Creatinine 0.87 03/21/2018 04:19 AM    GFR est AA >60 03/21/2018 04:19 AM    GFR est non-AA >60 03/21/2018 04:19 AM    Calcium 8.1 (L) 03/21/2018 04:19 AM    Magnesium 1.8 03/21/2018 04:19 AM    Phosphorus 3.3 02/07/2018 09:36 AM    Albumin 3.2 (L) 03/21/2018 04:19 AM    Protein, total 6.7 03/21/2018 04:19 AM    Globulin 3.5 03/21/2018 04:19 AM    A-G Ratio 0.9 (L) 03/21/2018 04:19 AM    AST (SGOT) 92 (H) 03/21/2018 04:19 AM    ALT (SGPT) 87 (H) 03/21/2018 04:19 AM     Lab Results   Component Value Date/Time    WBC 5.4 03/21/2018 04:19 AM    HGB 10.2 (L) 03/21/2018 04:19 AM    HCT 31.2 (L) 03/21/2018 04:19 AM    PLATELET 007 07/20/7578 04:19 AM       RADIOLOGY:    I have personally reviewed the imaging and agree with the reports below. Mri Brain W Wo Cont    Result Date: 3/19/2018  MRI of the Brain with contrast: 3/18/2018 History: Lung cancer Sequences: Axial pre and post contrast T1, FLAIR, T2, diffusion, coronal post contrast T1 and sagittal precontrast T1-weighted images of the brain. Comparison: MRI the brain 4/21/2017 20 cc of IV MultiHance   was injected to aid in the detection of intracranial pathology. Findings: Supratentorial enhancing metastatic lesions are now demonstrated. These lesions all display hemosiderin and restricted diffusion. There is a 5 mm lesion in the right temporal white matter, 1 cm lesion right frontal white matter, left posterior parasagittal convexity lesion measuring 14 mm x 27 x 31 mm, 14 mm left occipital white matter lesion, 9 mm left frontal lesion, and 2 left parietal enhancing lesions measuring respectively 7 mm and 24 mm. There is some mild mass upon the left ventricular atrium. The pituitary and sinuses are unremarkable. 7th and 8th nerves and orbits are unremarkable. There is some fluid in the right mastoid air cells. IMPRESSION: Interval supratentorial metastatic lesions as above.  DC4 The significant findings in this report have been referred to the Imaging Navigator in order to communicate to the referring provider or his/her designee as outlined in Section II.C.2.a.ii-iii of the ACR Practice Guideline for Communication of Diagnostic Imaging Findings. Ct Head Wo Cont    Result Date: 3/16/2018  CT HEAD WITHOUT CONTRAST HISTORY:  Seizure. COMPARISON: None. TECHNIQUE: Axial imaging was performed without intravenous contrast utilizing 5mm slice thickness. Sagittal and coronal reformats were performed. Radiation dose reduction techniques were used for this study. Our CT scanner uses one or all of the following: Automated exposure control, adjustment of the MAS or KUB according to patient's size and iterative reconstruction. FINDINGS:    *BRAIN:    -  There are no early signs of territorial or lacunar infarction by CT.    -  1.3 x 1.6 cm left parietal mass with surrounding vasogenic edema. 0.6 x 0.9 cm mass in left frontal lobe with vasogenic edema. Partly calcified mass in left superior parafalcine region measuring 2.9 x 2.6 cm. Consider the possibility of meningioma. -  No gross white matter abnormality by CT. *VISUALIZED PARANASAL SINUSES: Well aerated. *MASTOIDS:  Clear. *CALVARIUM AND SCALP: Unremarkable. IMPRESSION: Cerebral metastatic disease. Possible left parietal convexity meningioma. Date of Dictation: 3/16/2018 9:30 PM     Ct Chest W Cont    Result Date: 1/30/2018  CT THORAX WITH CONTRAST HISTORY: restaging, 63 years Female follow-up small cell lung cancer, radiation treatment in December, status post chemotherapy last Thursday COMPARISON: CT torso November 15, 2017, whole body PET/CT November 30, 2017 TECHNIQUE: 80 cc of nonionic contrast injected, and axial helical images from the thoracic inlet through diaphragm were obtained. Radiation dose reduction techniques were used for this study:  Our CT scanners use one or all of the following: Automated exposure control, adjustment of the mA and/or kVp according to patient's size, iterative reconstruction. FINDINGS: Partially visualized thyroid unremarkable.   There has been significant interval decrease in size of the right paratracheal soft tissue mass now measuring approximately 2.2 x 3.2 cm, consistent with treatment response. Also decreased right hilar lymphadenopathy now measuring up to 10 mm in short axis, consistent with treatment response. No evidence of significant axillary lymphadenopathy. Mild calcific atherosclerosis of a normal caliber aortic arch and descending aorta. No evidence of proximal pulmonary embolus. Mild biapical nodular scarring unchanged. Persistent mild bilateral upper lobe predominant panlobular emphysema. Minimal dependent subsegmental atelectasis bilateral lung bases. No evidence of pleural effusion or air space consolidation. No evidence of new pulmonary nodule. Visualized proximal airways unremarkable. Left chest wall neal catheter appears to remain in anatomic position. Visualized upper abdominal viscera including the adrenal glands are otherwise unremarkable. Visualized osseous structures unremarkable. IMPRESSION: 1. Findings consistent with interval treatment response, with significantly decreased mediastinal and right hilar lymphadenopathy. 2.  Other chronic findings as above. IMPRESSION:  Carmeltia Paez is a 63 y.o. female with metastatic small cell lung cancer with interval intracranial progression. We were not able to converse with her directly, but I did contact her sister, Gio Duarte, at [phone], and her brother Kendall Pickard at [phone]. The natural history of brain metastasis was discussed with them. Prognostic factors including stage, performance status, and number of intracranial metastases were discussed. Various treatment options including whole brain radiation therapy, surgery, radiosurgery and supportive care alone were compared and contrasted with regard to outcome and quality of life. The indications, benefits, side effects, and complications of whole brain radiation therapy were reviewed.  In a normal setting we would offer whole brain radiotherapy based on her small cell histology. However, based on her substantially declined performance status and cognitive decline, I don't feel this would be possible at the moment. She would not be able to offer consent nor tolerate immobilization for treatment without sedation. I spoke with her sister who was very much in agreement with deferring to see if she may make some recovery over the weekend before determining if we are able to proceed with radiation or simply proceed with hospice and comfort measures. PLAN:    1) Discussed treatment with external beam radiation reviewing risk, benefits, and side effects from treatment. Patient was not able to converse and therefore understand a decision to either treat or defer. Her sister was able to converse and understood challenge of the situation. 2) Coordinate care with medical oncology. 3) Treatment will be deferred until early next week to see if cognitive improvement occurs, otherwise we will proceed with comfort measures alone. Portions of this note were copied from prior encounters and reviewed for accuracy, currency, and represent documentation and tasks completed during this encounter. I verify and attest these portions to be unchanged from prior visits.     Nicola Gan MD  03/21/18

## 2018-03-21 NOTE — PROGRESS NOTES
END OF SHIFT NOTE:    Intake/Output      Voiding: YES  Catheter: NO  Drain:              Stool:  0 occurrences. Stool Assessment  Stool Color: Tan (Comment) (03/18/18 1715)  Stool Appearance: Soft (03/18/18 2001)  Stool Amount: Smear (03/18/18 1715)  Stool Source/Status: Rectum (03/18/18 1715)    Emesis:  0 occurrences. VITAL SIGNS  Patient Vitals for the past 12 hrs:   Temp Pulse Resp BP SpO2   03/21/18 0418 97.4 °F (36.3 °C) 81 18 106/69 98 %   03/20/18 2354 97.3 °F (36.3 °C) 91 16 (!) 149/93 96 %   03/20/18 2112 - - - - 95 %   03/20/18 1857 98.1 °F (36.7 °C) 88 16 107/68 94 %       Pain Assessment  Pain 1  Pain Scale 1: Visual (03/21/18 0006)  Pain Intensity 1: 0 (03/21/18 0006)  Patient Stated Pain Goal: 0 (03/21/18 0006)  Pain Reassessment 1: Yes (03/19/18 2328)  Pain Intervention(s) 1: Emotional support; Family Support;Repositioned (03/17/18 1201)    Ambulating  Yes    Shift report given to oncoming nurse at the bedside.     Tushar Gunter

## 2018-03-22 NOTE — PROGRESS NOTES
END OF SHIFT NOTE:    Intake/Output  03/21 1901 - 03/22 0700  In: 112 [I.V.:112]  Out: -    Voiding: YES  Catheter: NO  Drain:              Stool:  0 occurrences. Stool Assessment  Stool Color: Tan (Comment) (03/18/18 1715)  Stool Appearance: Soft (03/18/18 2001)  Stool Amount: Smear (03/18/18 1715)  Stool Source/Status: Rectum (03/18/18 1715)    Emesis:  0 occurrences. VITAL SIGNS  Patient Vitals for the past 12 hrs:   Temp Pulse Resp BP SpO2   03/22/18 0327 97.7 °F (36.5 °C) 92 18 111/65 99 %   03/22/18 0104 - - - - 97 %   03/21/18 2344 97.5 °F (36.4 °C) 76 19 114/68 98 %   03/21/18 1940 97.9 °F (36.6 °C) 80 18 131/78 94 %   03/21/18 1911 - - - - 97 %       Pain Assessment  Pain 1  Pain Scale 1: Visual (03/22/18 0208)  Pain Intensity 1: 0 (03/22/18 0208)  Patient Stated Pain Goal: 0 (03/22/18 9809)  Pain Reassessment 1: Yes (03/21/18 1410)  Pain Intervention(s) 1: Emotional support; Family Support;Repositioned (03/17/18 1201)    Ambulating  Yes    Additional Information: Rested well. Uneventful night    Shift report given to oncoming nurse at the bedside.     Elmer Alvares

## 2018-03-22 NOTE — PROGRESS NOTES
Problem: Self Care Deficits Care Plan (Adult)  Goal: *Acute Goals and Plan of Care (Insert Text)  1. Patient will complete lower body bathing and dressing with modified independence and adaptive equipment as needed. 2. Patient will complete toileting with modified independence. 3. Patient will tolerate 20 minutes of OT treatment with no rest breaks to increase activity tolerance for ADLs. 4. Patient will complete functional transfers with modified independence and adaptive equipment as needed. Timeframe: 7 visits       OCCUPATIONAL THERAPY: Daily Note, Treatment Day: 1st and AM 3/22/2018  INPATIENT: Hospital Day: 7  Payor: ABSOLUTE TOTAL CARE / [place] / Product Type: Managed Care Medicaid /      NAME/AGE/GENDER: Domenica Davenport is a 63 y.o. female   PRIMARY DIAGNOSIS:  Acute encephalopathy  Brain metastasis ([address]  Seizure ([address]  Lung cancer ([address] Epilepsy with status epilepticus ([address] Epilepsy with status epilepticus ([address]        ICD-10: Treatment Diagnosis:    · Other lack of cordination (R27.8)   Precautions/Allergies:     Metaxalone and Oxycodone      ASSESSMENT:     Ms. Carley Penn is a 63year old female with history of lung cancer and brain mets admitted with seizures. Pt was sitting in chair upon arrival. Pt completed functional mobility in room and hallway with CGA using hand held assistance. Continue POC. This section established at most recent assessment   PROBLEM LIST (Impairments causing functional limitations):  1. Decreased ADL/Functional Activities  2. Decreased Transfer Abilities  3. Decreased Ambulation Ability/Technique  4. Decreased Balance  5. Decreased Cognition   INTERVENTIONS PLANNED: (Benefits and precautions of occupational therapy have been discussed with the patient.)  1. Activities of daily living training  2. Adaptive equipment training  3. Cognitive training  4. Group therapy  5. Neuromuscular re-eduation  6. Therapeutic activity  7.  Therapeutic exercise TREATMENT PLAN: Frequency/Duration: Follow patient 3x/ week to address above goals. Rehabilitation Potential For Stated Goals: Fair     RECOMMENDED REHABILITATION/EQUIPMENT: (at time of discharge pending progress): Due to the probability of continued deficits (see above) this patient will likely need continued skilled occupational therapy after discharge. Equipment:    None at this time              OCCUPATIONAL PROFILE AND HISTORY:   History of Present Injury/Illness (Reason for Referral):  Per H&P, \"Patient is 63years old female with pmhx of extensive stage small cell lung cancer with brain metastasis, GERD, HTN, previous tobacco abuse presented in view of new onset seizure like activity. As per the pt's sister, pt was in her usual health until yesterday, today she appeared more confused, lethargic and somnolent. While she was driving the pt home, pt started appearing confused and then had generalized body shakes. EMS was summoned. Pt received 2 doses of ativan PTA. In ER, pt is post ictal, sleeping, difficult to arouse. Pt is DNR. In ER, CT head showed cerebral metastatic disease with left parietal convexity meningioma, CXR was unremarkable. CT chest on 1/30/18 showed interval treatment response with significantly decreased mediastinal and right hilar lymphadenopathy. \"  Past Medical History/Comorbidities:   Ms. Mesha Greenwood  has a past medical history of Former cigarette smoker; GERD (gastroesophageal reflux disease); Hypertension; and Lung cancer ([facility] [address] She also has no past medical history of Adverse effect of anesthesia; Difficult intubation; Malignant hyperthermia due to anesthesia; Nausea & vomiting; or Pseudocholinesterase deficiency. Ms. Mesha Greenwood  has a past surgical history that includes hx tubal ligation and hx vascular access.   Social History/Living Environment:   Home Environment: Private residence  # Steps to Enter: 3  Rails to Enter: No  One/Two Story Residence: One story  Living Alone: No  Support Systems: Parent, Family member(s) (takes care of mother whom she lives with)  Patient Expects to be Discharged to[de-identified] Unknown  Current DME Used/Available at Home: None  Tub or Shower Type: Tub/Shower combination  Prior Level of Function/Work/Activity:  Patient lives with her mother and is her caregiver. She is typically independent with ADLs. Number of Personal Factors/Comorbidities that affect the Plan of Care: Brief history (0):  LOW COMPLEXITY   ASSESSMENT OF OCCUPATIONAL PERFORMANCE[de-identified]   Activities of Daily Living:           Basic ADLs (From Assessment) Complex ADLs (From Assessment)   Basic ADL  Feeding: Minimum assistance  Oral Facial Hygiene/Grooming: Minimum assistance  Bathing: Moderate assistance  Upper Body Dressing: Minimum assistance  Lower Body Dressing: Moderate assistance  Toileting: Minimum assistance Instrumental ADL  Meal Preparation: Total assistance  Homemaking: Total assistance  Medication Management: Total assistance  Financial Management: Total assistance   Grooming/Bathing/Dressing Activities of Daily Living     Cognitive Retraining  Safety/Judgement: Fall prevention                       Bed/Mat Mobility  Sit to Stand: Supervision       Most Recent Physical Functioning:   Gross Assessment:                  Posture:     Balance:    Bed Mobility:     Wheelchair Mobility:     Transfers:  Sit to Stand: Supervision                Patient Vitals for the past 6 hrs:   BP SpO2 Pulse   03/22/18 0715 128/86 97 % 83   03/22/18 0826 - 97 % -   03/22/18 1035 (!) 138/93 100 % 81       Mental Status  Neurologic State: Alert  Orientation Level: Oriented to person  Cognition: Follows commands  Perception: Verbal  Perseveration: Verbal cues provided  Safety/Judgement: Fall prevention                          Physical Skills Involved:  1. Balance  2. Strength  3. Activity Tolerance Cognitive Skills Affected (resulting in the inability to perform in a timely and safe manner):  1. Perception  2.  Executive Function  3. Comprehension  4. Expression Psychosocial Skills Affected:  1. Habits/Routines  2. Environmental Adaptation   Number of elements that affect the Plan of Care: 5+:  HIGH COMPLEXITY   CLINICAL DECISION MAKING:   [employer] AM-PAC 6 Clicks   Daily Activity Inpatient Short Form  How much help from another person does the patient currently need. .. Total A Lot A Little None   1. Putting on and taking off regular lower body clothing? [] 1   [] 2   [x] 3   [] 4   2. Bathing (including washing, rinsing, drying)? [] 1   [] 2   [x] 3   [] 4   3. Toileting, which includes using toilet, bedpan or urinal?   [] 1   [] 2   [x] 3   [] 4   4. Putting on and taking off regular upper body clothing? [] 1   [] 2   [x] 3   [] 4   5. Taking care of personal grooming such as brushing teeth? [] 1   [] 2   [x] 3   [] 4   6. Eating meals? [] 1   [] 2   [x] 3   [] 4   © 2007, Trustees of [facility] [employer], under license to [employer]. All rights reserved      Score:  Initial: 18 Most Recent: X (Date: -- )    Interpretation of Tool:  Represents activities that are increasingly more difficult (i.e. Bed mobility, Transfers, Gait). Score 24 23 22-20 19-15 14-10 9-7 6     Modifier CH CI CJ CK CL CM CN      ? Self Care:     - CURRENT STATUS: CK - 40%-59% impaired, limited or restricted    - GOAL STATUS: CJ - 20%-39% impaired, limited or restricted    - D/C STATUS:  ---------------To be determined---------------  Payor: ABSOLUTE TOTAL CARE / [place] / Product Type: Managed Care Medicaid /      Medical Necessity:     · Patient demonstrates good rehab potential due to higher previous functional level. Reason for Services/Other Comments:  · Patient continues to require present interventions due to patient's inability to care for self at baseline level of function.    Use of outcome tool(s) and clinical judgement create a POC that gives a: MODERATE COMPLEXITY         TREATMENT: (In addition to Assessment/Re-Assessment sessions the following treatments were rendered)     Pre-treatment Symptoms/Complaints:  None   Pain: Initial:   Pain Intensity 1: 0  Post Session:  None      Therapeutic Activity: (    10): Therapeutic activities including functional mobility to improve mobility, strength and balance. Required CGA   to promote motor control of bilateral, upper extremity(s), lower extremity(s). Braces/Orthotics/Lines/Etc:   · IV  · O2 Device: Room air  Treatment/Session Assessment:    · Response to Treatment:  Tolerated well   · Interdisciplinary Collaboration:   o Certified Occupational Therapy Assistant  o Registered Nurse  · After treatment position/precautions:   o Up in chair  o Bed alarm/tab alert on  o Bed/Chair-wheels locked  o Call light within reach  o RN notified  o Family at bedside   · Compliance with Program/Exercises: Will assess as treatment progresses. · Recommendations/Intent for next treatment session: \"Next visit will focus on advancements to more challenging activities and reduction in assistance provided\".   Total Treatment Duration:  OT Patient Time In/Time Out  Time In: 1030  Time Out: [address] [facility] [place]

## 2018-03-22 NOTE — PROGRESS NOTES
Problem: Mobility Impaired (Adult and Pediatric)  Goal: *Acute Goals and Plan of Care (Insert Text)  STG:  (1.)Ms. Chel Monroe will move from supine to sit and sit to supine , scoot up and down and roll side to side with SUPERVISION within 3 treatment day(s). Goal met 3/22/2018  (2.)Ms. Chel Monroe will transfer from bed to chair and chair to bed with MINIMAL ASSIST using the least restrictive device within 3 treatment day(s). Goal met 3/21/2018  (3.)Ms. Chel Monroe will ambulate with MINIMAL ASSIST for 30 feet with the least restrictive device within 3 treatment day(s). Goal met 3/22/2018    LTG:  (1.)Ms. Chel Monroe will move from supine to sit and sit to supine , scoot up and down and roll side to side in bed with MODIFIED INDEPENDENCE within 7 treatment day(s). (2.)Ms. Chel Monroe will transfer from bed to chair and chair to bed with CONTACT GUARD ASSIST using the least restrictive device within 7 treatment day(s). (3.)Ms. Chel Monroe will ambulate with CONTACT GUARD ASSIST for 150 feet with the least restrictive device within 7 treatment day(s). ________________________________________________________________________________________________      PHYSICAL THERAPY: Daily Note, Treatment Day: 2nd, AM 3/22/2018  INPATIENT: Hospital Day: 7  Payor: ABSOLUTE TOTAL CARE / [place] / Product Type: Managed Care Medicaid /      NAME/AGE/GENDER: Henny Sparks is a 63 y.o. female   PRIMARY DIAGNOSIS: Acute encephalopathy  Brain metastasis ([address]  Seizure ([address]  Lung cancer ([address] Epilepsy with status epilepticus ([address] Epilepsy with status epilepticus ([address]        ICD-10: Treatment Diagnosis:   · Generalized Muscle Weakness (M62.81)  · Difficulty in walking, Not elsewhere classified (R26.2)   Precaution/Allergies:  Metaxalone and Oxycodone      ASSESSMENT:     Ms. Chel Monroe is sitting in bedside chair with family at in room contact. Pt responds to name, and appears agreeable to PT treatment.  Pt still with expressive aphasia, and occasionally repeats \"OK\" and \"Thank you\", but had increased vocabulary and was able to communicate simple phrases ~70% of the time. Pt is still impulsive performed STS with CGA despite verbal cuing from PT to wait. Pt ambulated 90 ft CGA-Jose Juan without AD in hallway with occasional veering and crossover/tandem like gait, but was able to recover with minimal to no assistance. Pt returned to bed, transferring from sitting EOB to supine with supervision. Pt left in supine with all needs met and within reach, family at bedside, and nursing notified. Pt's balance, and communication has improved since last session. Pt met all STGs today, and is making progress toward long term goals, limited secondary to decreased communication, poor safety awareness, and balance. This section established at most recent assessment   PROBLEM LIST (Impairments causing functional limitations):  1. Decreased Strength  2. Decreased ADL/Functional Activities  3. Decreased Transfer Abilities  4. Decreased Ambulation Ability/Technique  5. Decreased Balance  6. Decreased Activity Tolerance  7. Decreased Pacing Skills  8. Decreased [place] with Home Exercise Program  9. Decreased Cognition   INTERVENTIONS PLANNED: (Benefits and precautions of physical therapy have been discussed with the patient.)  1. Balance Exercise  2. Bed Mobility  3. Cold  4. Family Education  5. Gait Training  6. Home Exercise Program (HEP)  7. Manual Therapy  8. Neuromuscular Re-education/Strengthening  9. Range of Motion (ROM)  10. Therapeutic Activites  11. Therapeutic Exercise/Strengthening  12.  Transfer Training     TREATMENT PLAN: Frequency/Duration: 3 times a week for duration of hospital stay  Rehabilitation Potential For Stated Goals: Shadi Romero REHABILITATION/EQUIPMENT: (at time of discharge pending progress): Due to the probability of continued deficits (see above) this patient will likely need continued skilled physical therapy after discharge. Equipment:    Walkers, Type: Rolling Walker              HISTORY:   History of Present Injury/Illness (Reason for Referral):  Patient is 63years old female with pmhx of extensive stage small cell lung cancer with brain metastasis, GERD, HTN, previous tobacco abuse presented in view of new onset seizure like activity. As per the pt's sister, pt was in her usual health until yesterday, today she appeared more confused, lethargic and somnolent. While she was driving the pt home, pt started appearing confused and then had generalized body shakes. EMS was summoned. Pt received 2 doses of ativan PTA. In ER, pt is post ictal, sleeping, difficult to arouse. Pt is DNR. In ER, CT head showed cerebral metastatic disease with left parietal convexity meningioma, CXR was unremarkable. CT chest on 1/30/18 showed interval treatment response with significantly decreased mediastinal and right hilar lymphadenopathy. Past Medical History/Comorbidities:   Ms. Krystyna Lagos  has a past medical history of Former cigarette smoker; GERD (gastroesophageal reflux disease); Hypertension; and Lung cancer ([facility] [address] She also has no past medical history of Adverse effect of anesthesia; Difficult intubation; Malignant hyperthermia due to anesthesia; Nausea & vomiting; or Pseudocholinesterase deficiency. Ms. Krystyna Lagos  has a past surgical history that includes hx tubal ligation and hx vascular access.   Social History/Living Environment:   Home Environment: Private residence  # Steps to Enter: 3  Rails to Enter: No  One/Two Story Residence: One story  Living Alone: No  Support Systems: Parent, Family member(s) (takes care of mother whom she lives with)  Patient Expects to be Discharged to[de-identified] Unknown  Current DME Used/Available at Home: None  Tub or Shower Type: Tub/Shower combination  Prior Level of Function/Work/Activity:  Taking care of mother, independent, no AD, intact cognition and communication     Number of Personal Factors/Comorbidities that affect the Plan of Care: 3+: HIGH COMPLEXITY   EXAMINATION:   Most Recent Physical Functioning:   Gross Assessment:  AROM: Generally decreased, functional  Strength: Generally decreased, functional  Coordination: Generally decreased, functional               Posture:     Balance:  Sitting: Intact; Without support  Sitting - Static: Good (unsupported)  Sitting - Dynamic: Good (unsupported)  Standing: Impaired  Standing - Static: Fair  Standing - Dynamic : Fair Bed Mobility:  Rolling: Supervision  Supine to Sit: Supervision  Scooting: Supervision  Wheelchair Mobility:     Transfers:  Sit to Stand: Contact guard assistance  Stand to Sit: Contact guard assistance  Gait:     Base of Support: Narrowed  Speed/Aissatou: Fluctuations  Gait Abnormalities: Decreased step clearance; Path deviations (occasional crossover/tandem)  Distance (ft): 90 Feet (ft)  Assistive Device: Other (comment) (No AD)  Ambulation - Level of Assistance: Contact guard assistance;Minimal assistance      Body Structures Involved:  1. Heart  2. Lungs  3. Bones  4. Joints  5. Muscles  6. Ligaments Body Functions Affected:  1. Mental  2. Voice and Speech  3. Cardio  4. Respiratory  5. Neuromusculoskeletal  6. Movement Related Activities and Participation Affected:  1. Learning and Applying Knowledge  2. General Tasks and Demands  3. Communication  4. Mobility  5. Self Care  6. Domestic Life  7. Interpersonal Interactions and Relationships  8. Community, Social and [place]   Number of elements that affect the Plan of Care: 4+: HIGH COMPLEXITY   CLINICAL PRESENTATION:   Presentation: Evolving clinical presentation with changing clinical characteristics: MODERATE COMPLEXITY   CLINICAL DECISION MAKIN [facility] Inpatient Short Form  How much difficulty does the patient currently have. .. Unable A Lot A Little None   1. Turning over in bed (including adjusting bedclothes, sheets and blankets)?    [] 1   [] 2 [] 3   [x] 4   2. Sitting down on and standing up from a chair with arms ( e.g., wheelchair, bedside commode, etc.)   [] 1   [] 2   [] 3   [x] 4   3. Moving from lying on back to sitting on the side of the bed? [] 1   [] 2   [] 3   [x] 4   How much help from another person does the patient currently need. .. Total A Lot A Little None   4. Moving to and from a bed to a chair (including a wheelchair)? [] 1   [] 2   [x] 3   [] 4   5. Need to walk in hospital room? [] 1   [] 2   [x] 3   [] 4   6. Climbing 3-5 steps with a railing? [x] 1   [] 2   [] 3   [] 4   © 2007, Trustees of [facility] [employer], under license to [employer]. All rights reserved      Score:  Initial: 17 Most Recent: 19 (Date: 3/22/2018 )    Interpretation of Tool:  Represents activities that are increasingly more difficult (i.e. Bed mobility, Transfers, Gait). Score 24 23 22-20 19-15 14-10 9-7 6     Modifier CH CI CJ CK CL CM CN      ? Mobility - Walking and Moving Around:     - CURRENT STATUS: CK - 40%-59% impaired, limited or restricted    - GOAL STATUS: CJ - 20%-39% impaired, limited or restricted    - D/C STATUS:  ---------------To be determined---------------  Payor: ABSOLUTE TOTAL CARE / [place] / Product Type: Managed Care Medicaid /      Medical Necessity:     · Patient is expected to demonstrate progress in strength, range of motion, balance and coordination to decrease assistance required with transfers, ambulation, and functional mobility. Reason for Services/Other Comments:  · Patient continues to require skilled intervention due to decreased cognition, activity tolerance, and functional mobility.    Use of outcome tool(s) and clinical judgement create a POC that gives a: Questionable prediction of patient's progress: MODERATE COMPLEXITY            TREATMENT:   (In addition to Assessment/Re-Assessment sessions the following treatments were rendered)   Pre-treatment Symptoms/Complaints: Still has expressive aphasia, but increased vocabulary and able to communicate simple phrases 70% of the time  Pain: Initial: 0/10  Pain Intensity 1: 0  Post Session:  0/10   Therapeutic Activity: (    10 minutes): Therapeutic activities including Bed transfers, Chair transfers, Ambulation on level ground and moderate verbal and tactile cues for safety of transfers and mobility and awareness of enviornment to improve mobility, strength, balance and coordination. Required moderate   to promote dynamic balance in standing and promote coordination of bilateral, upper extremity(s), lower extremity(s). Braces/Orthotics/Lines/Etc:   · IV  · O2 Device: Room air  Treatment/Session Assessment:    · Response to Treatment:  Ambulated 90ft with CGA-Jose Juan and no AD  · Interdisciplinary Collaboration:   o Physical Therapist  o Registered Nurse  o SPT  · After treatment position/precautions:   o Supine in bed  o Bed/Chair-wheels locked  o Bed in low position  o Caregiver at bedside  o Call light within reach  o RN notified  o Family at bedside   · Compliance with Program/Exercises: Will assess as treatment progresses. · Recommendations/Intent for next treatment session: \"Next visit will focus on reduction in assistance provided\".   Total Treatment Duration:  PT Patient Time In/Time Out  Time In: 1121  Time Out: [address] [place]

## 2018-03-22 NOTE — PROGRESS NOTES
[facility] Hematology & Oncology        Inpatient Hematology / Oncology Progress Note      Admission Date: 3/16/2018  9:00 PM  Reason for Admission/Hospital Course: Acute encephalopathy  Brain metastasis (HCC)  Seizure ([address]  Lung cancer (HCC)      24 Hour Events:  Afebrile, VSS  On Dex, Keppra, and Phenobarbital  Able to speak phrases and answer questions appropriately during exam today  Repeat EEG pending  Sister-in-law at bedside    ROS:  Constitutional: Negative for fever, chills. CV: Negative for chest pain, palpitations, edema. Respiratory: Negative for dyspnea, cough, wheezing. GI: Negative for nausea, abdominal pain, diarrhea. 10 point review of systems is otherwise negative with the exception of the elements mentioned above in the HPI. Allergies   Allergen Reactions    Metaxalone Other (comments)    Oxycodone Swelling       OBJECTIVE:  Patient Vitals for the past 8 hrs:   BP Temp Pulse Resp SpO2   18 1035 (!) 138/93 97.5 °F (36.4 °C) 81 19 100 %   18 0826 - - - - 97 %   18 0715 128/86 98.1 °F (36.7 °C) 83 18 97 %     Temp (24hrs), Av.7 °F (36.5 °C), Min:97.5 °F (36.4 °C), Max:98.1 °F (36.7 °C)     0701 -  1900  In: 120 [P.O.:120]  Out: 400 [Urine:400]    Physical Exam:  Constitutional: Well developed, well nourished female in no acute distress, sitting comfortably in the bedside chair. HEENT: Normocephalic and atraumatic. Oropharynx is clear, mucous membranes are moist. Extraocular muscles are intact. Sclerae anicteric. Neck supple without JVD. No thyromegaly present. Skin Warm and dry. No bruising and no rash noted. No erythema. No pallor. Respiratory Lungs are clear to auscultation bilaterally without wheezes, rales or rhonchi, normal air exchange without accessory muscle use. CVS Normal rate, regular rhythm and normal S1 and S2. No murmurs, gallops, or rubs. Abdomen Soft, nontender and nondistended, normoactive bowel sounds.   No palpable mass. No hepatosplenomegaly. Neuro Expressive aphasia - improving. Responds appropriately to questions this AM.   MSK Normal range of motion in general.  No edema and no tenderness. Psych Alert. Calm. Labs:      Recent Labs      03/21/18   0419   WBC  5.4   RBC  4.43   HGB  10.2*   HCT  31.2*   MCV  70.4*   MCH  23.0*   MCHC  32.7   RDW  17.5*   PLT  212   GRANS  81*   LYMPH  15   MONOS  3*   EOS  0*   BASOS  0   IG  1   DF  AUTOMATED   ANEU  4.4   ABL  0.8   ABM  0.2   LESLIE  0.0   ABB  0.0   AIG  0.1        Recent Labs      03/22/18   0331  03/21/18   0419  03/20/18   0552   NA  140  140  139   K  3.7  3.6  3.8   CL  104  104  104   CO2  26  26  23   AGAP  10  10  12   GLU  134*  125*  127*   BUN  16  20  16   CREA  0.79  0.87  0.83   GFRAA  >60  >60  >60   GFRNA  >60  >60  >60   CA  8.1*  8.1*  8.4   SGOT  75*  92*   --    AP  80  86   --    TP  6.4  6.7   --    ALB  3.1*  3.2*   --    GLOB  3.3  3.5   --    AGRAT  0.9*  0.9*   --    MG   --   1.8   --          Imaging:  MRI BRAIN W WO CONT [[id number]] Collected: 03/18/18 1307      Order Status: Completed Updated: 03/18/18 1319     Narrative:       MRI of the Brain with contrast: 3/18/2018  History: Lung cancer  Sequences: Axial pre and post contrast T1, FLAIR, T2, diffusion, coronal post  contrast T1 and sagittal precontrast T1-weighted images of the brain. Comparison: MRI the brain 4/21/2017  20 cc of IV MultiHance   was injected to aid in the detection of intracranial  pathology. Findings:    Supratentorial enhancing metastatic lesions are now demonstrated. These lesions  all display hemosiderin and restricted diffusion. There is a 5 mm lesion in the  right temporal white matter, 1 cm lesion right frontal white matter, left  posterior parasagittal convexity lesion measuring 14 mm x 27 x 31 mm, 14 mm left  occipital white matter lesion, 9 mm left frontal lesion, and 2 left parietal  enhancing lesions measuring respectively 7 mm and 24 mm. There is some mild mass  upon the left ventricular atrium. The pituitary and sinuses are unremarkable. 7th and 8th nerves and orbits are unremarkable. There is some fluid in the right  mastoid air cells.       Impression:       IMPRESSION: Interval supratentorial metastatic lesions as above. DC4  The significant findings in this report have been referred to the Imaging  Navigator in order to communicate to the referring provider or his/her designee  as outlined in Section II.C.2.a.ii-iii of the ACR  Practice Guideline for Communication of Diagnostic Imaging Findings.           XR ABD (KUB) [[id number]] Collected: 03/17/18 0115     Order Status: Completed Updated: 03/17/18 0117     Narrative:       Abdomen x-ray single view. HISTORY: Nasogastric tube placement. COMPARISON: None. FINDINGS: Nasogastric tube its tip in the gastric body. Bowel gas pattern is  nonspecific.       Impression:       IMPRESSION:    Nasogastric tube its tip in the gastric body.     XR CHEST PORT [[id number]] Collected: 03/16/18 2138     Order Status: Completed Updated: 03/16/18 2146     Narrative:       EXAM:  XR CHEST PORT    INDICATION:  AMS    COMPARISON:  4/18/2017    FINDINGS: A portable AP radiograph of the chest was obtained at 2124 hours. The  patient is on a cardiac monitor.  The lungs are clear.  The cardiac and  mediastinal contours and pulmonary vascularity are normal.  The bones and soft  tissues are grossly within normal limits.        Impression:       IMPRESSION: No acute cardiopulmonary disease.         CT HEAD WITHOUT CONTRAST [[id number]] Collected: 03/16/18 2130     Order Status: Completed Updated: 03/16/18 2134     Narrative:       CT HEAD WITHOUT CONTRAST     HISTORY:  Seizure. COMPARISON: None. TECHNIQUE: Axial imaging was performed without intravenous contrast utilizing  5mm slice thickness. Sagittal and coronal reformats were performed. Radiation  dose reduction techniques were used for this study.  Our CT scanner uses one or  all of the following:    Automated exposure control, adjustment of the MAS or KUB according to patient's  size and iterative reconstruction. FINDINGS:        *BRAIN:      -  There are no early signs of territorial or lacunar infarction by CT.     -  1.3 x 1.6 cm left parietal mass with surrounding vasogenic edema. 0.6 x  0.9 cm mass in left frontal lobe with vasogenic edema. Partly calcified mass in  left superior parafalcine region measuring 2.9 x 2.6 cm. Consider the  possibility of meningioma.     -  No gross white matter abnormality by CT.    *VISUALIZED PARANASAL SINUSES: Well aerated. *MASTOIDS:  Clear. *CALVARIUM AND SCALP: Unremarkable.         Impression:       IMPRESSION:    Cerebral metastatic disease. Possible left parietal convexity meningioma.     Date of Dictation: 3/16/2018 9:30 PM         ASSESSMENT:    Problem List  Date Reviewed: 3/14/2018          Codes Class Noted    * (Principal)Epilepsy with status epilepticus ([facility] ICD-10-CM: G40.901  ICD-9-CM: 345.3  3/18/2018        Hyperkalemia ICD-10-CM: E87.5  ICD-9-CM: 276.7  3/17/2018        Abnormal EKG ICD-10-CM: R94.31  ICD-9-CM: 794.31  3/17/2018        Seizure ([facility] ICD-10-CM: R56.9  ICD-9-CM: 780.39  3/16/2018        Lung cancer ([facility] ICD-10-CM: C34.90  ICD-9-CM: 162.9  3/16/2018        Brain metastasis ([facility] ICD-10-CM: C79.31  ICD-9-CM: 198.3  3/16/2018        Acute encephalopathy ICD-10-CM: G93.40  ICD-9-CM: 348.30  3/16/2018        Bone metastases ([facility] ICD-10-CM: C79.51  ICD-9-CM: 198.5  8/30/2017        Small cell carcinoma of right lung (HCC) ICD-10-CM: C34.91  ICD-9-CM: 162.9  4/21/2017        Smoker (Chronic) ICD-10-CM: G57.178  ICD-9-CM: 305.1  Unknown        Mediastinal mass ICD-10-CM: J98.59  ICD-9-CM: 786.6  4/18/2017        SVC (superior vena cava obstruction) ICD-10-CM: I87.1  ICD-9-CM: 459.2  4/18/2017        Essential hypertension ICD-10-CM: I10  ICD-9-CM: 401.9  4/18/2017        SVC syndrome ICD-10-CM: I87.1  ICD-9-CM: 459.2  4/18/2017            Ms. Kitty Mckay is a 63 y.o. female admitted on 3/16/2018 with a primary diagnosis of lung cancer, acute encephalopathy, brain metastasis, and fever. Ms. Kitty Mckay is a well known patient of Dr. Юлия Cardoso. She was just seen in the clinic on 3/14/18. While planning for Nivolumab she had rapid growth of b/l cervical LAD. He discussed  the aggressive pattern of her disease. Restaging CT and MRI of brain were ordered. The plan was to start nivolumab/ipilimimab on 4/4/18. He discussed the need for her to inform family of her aggressive disease and to designate POA. Unfortunately, yesterday she was confused, lethargic and somnolent. Patient's sister was driving her home when she became more confused and start having what her sister described as generalized body shakes. 911 was called. She received 2 doses of ativan PTA to ED. In ED she was post ictal and difficult to arouse. CT head showed cerebral metastatic disease with left parietal convexity meningioma. CXR was unremarkable. CT chest on 1/30/18 showed interval treatment response with significantly decreased mediastinal and right hilar lymphadenopathy. PLAN:  Stage IV SCLC with brain mets  - s/p C1 nivolumab/ipilimumab on 3/15. C2 due 4/4  3/21 Rad Onc consult scheduled for this AM  3/22 Pt met with Dr. Rhea Hernandez yesterday. He spoke with pt's family. Plan is to watch her over the weekend to see how much she improves before making decision in regards to treatment    Seizures  3/19 CT head revealed cerebral metastatic disease with L parietal convxity meningioma. Per teleneuro, pt has ongoing L-sided seizures and was started on Fosphenytoin yesterday - currently being held for high level phenytoin. Also on Dex and Keppra. Dr. Elvira Schaffer to check with Dr. Юлия Cardoso to see if pt needs to be transferred to [address] [place] [address] to neuro service vs. daily teleneuro eval here. Pt alert - responds with \"OK\" to every question.   SLP following. 3/20  Follow-up teleneuro eval pending. Remains confused, but able to speak more words today. SLP following. Continues on Dex, Keppra, and Fosphenytoin. Phenytoin level pending. 3/21 Per neuro, Dr. Keeley Kevin, hold phenytoin and allow levels to trend down. Repeat EEG. Give loading dose of Phenobarbital 90mg IV x 1. Then Phenobarbital 30mg BID.  3/22 Repeat EEG pending. Pt's expressive aphasia is improving daily. Pt was able to answer questions and use phrases appropriately during exam this AM.  Con't Dex, Keppra, and Phenobarbital.    Hypokalemia  3/19 Replete K+  3/20 K+ up to 3.8    Continue home meds  Moni SOPs  SCDs for DVT prophylaxis  Per PT, may benefit from rehab upon discharge. CM working on placement. Jaylin Joe NP   [facility] Hematology & Oncology  [address] [place] [address]  Office : [phone]  Fax : [phone]     Attending Addendum:  I personally evaluated the patient with Jaylin Joe, and agree with the assessment, findings and plan as documented. Appears well, speech and cognition appears better today, answering questions more appropriately, heart regular, lungs clear, abdomen benign. Awaiting repeat EEG. Will observe over the weekend to see if continues to improve significantly before decision is made to proceed with XRT. Pt's family updated at the bedside.           Mahogany Sanchez MD  [facility] Hematology and Oncology  [address] [place] [address]  Office : [phone]  Fax : [phone]

## 2018-03-22 NOTE — PROGRESS NOTES
END OF SHIFT NOTE:    Intake/Output      Voiding: YES  Catheter: NO  Drain:              Stool:  0 occurrences. Stool Assessment  Stool Color: Tan (Comment) (03/18/18 1715)  Stool Appearance: Soft (03/18/18 2001)  Stool Amount: Smear (03/18/18 1715)  Stool Source/Status: Rectum (03/18/18 1715)    Emesis:  0 occurrences. VITAL SIGNS  Patient Vitals for the past 12 hrs:   Temp Pulse Resp BP SpO2   03/22/18 1532 97.9 °F (36.6 °C) 83 18 111/74 94 %   03/22/18 1403 - - - - 98 %   03/22/18 1035 97.5 °F (36.4 °C) 81 19 (!) 138/93 100 %   03/22/18 0826 - - - - 97 %       Pain Assessment  Pain 1  Pain Scale 1: Numeric (0 - 10) (03/22/18 1534)  Pain Intensity 1: 0 (03/22/18 1534)  Patient Stated Pain Goal: 0 (03/22/18 3784)  Pain Reassessment 1: Yes (03/21/18 1410)  Pain Intervention(s) 1: Emotional support; Family Support;Repositioned (03/17/18 1201)    Ambulating  Yes    Additional Information:     Shift report given to oncoming nurse at the bedside.     Althea Wyatt

## 2018-03-22 NOTE — PROGRESS NOTES
CM spoke with Amna Phillips, pt's sister, regarding SNF selections. Request made to send referral to [facility]. Family will continue looking over SNF list and let CM know of any other referrals they would like to be sent.

## 2018-03-22 NOTE — PROGRESS NOTES
STG: Patient will answer basic yes/no comprehension questions with 75% accuracy given max cues. STG: Patient will participate in automatic speech tasks with 75% accuracy given max cues  STG: Patient will follow 1 step verbal command with visual cues with 60% accuracy with max cues. LTG: Patient will increase neuro-linguistic abilities to increase safety and awareness of deficits. Speech language pathology: Speech-language and cognitive note: Daily Note  3    NAME/AGE/GENDER: Daisy Feliz is a 63 y.o. female  DATE: 3/22/2018  PRIMARY DIAGNOSIS: Acute encephalopathy  Brain metastasis ([facility]  Seizure ([facility]  Lung cancer ([facility]       ICD-10: Treatment Diagnosis: R.41.841 Cognitive-Communication Deficit    INTERDISCIPLINARY COLLABORATION: Registered NurseASSESSMENT:Patient seen for language treatment. Sister-in-law at bedside. Patient more verbal today and increased spontaneous speech. Patient unable to independently verbalize responses to orientation question, but did respond to yes/no questions regarding orientation with 75% accuracy. She repeated phrase \"I am at [address] [place] [address]" with phonemic errors, which were independently self-corrected. Patient continued to perseverate on \"[place]\" for several minutes following. All attempts at repeating temporal orientation questions and automatic speech tasks (KWABENA and singing) were marked by perseveration. Verbal feedback, visual cues, and rest breaks were not effective in re-direction. Eventual re-direction with patient counting 1-16 with visual cues for first number only. When presented with yes/no questions, patient with poor ability to grasp concept. She attempted to repeat all prompts (perseveration on \"[place]\") rather than answering questions. She followed 1 step body movement commands with 75% accuracy and visually identified objects in room with 60% accuracy. Continued improvements in expressive and receptive language abilities.  Perseveration continues to be biggest barrier. Patient will benefit from skilled intervention to address the below impairments. ?????? ? ? This section established at most recent assessment??????????  PROBLEM LIST (Impairments causing functional limitations):  1. Expressive Language  2. Receptive language  REHABILITATION POTENTIAL FOR STATED GOALS: Guarded  PLAN OF CARE:   Patient will benefit from skilled intervention to address the following impairments. INTERVENTIONS PLANNED: (Benefits and precautions of therapy have been discussed with the patient.)  1. Cognitive-linguistic deficits  FREQUENCY/DURATION: Continue to follow patient 3 session trial to assess patient' ability to benefit from skilled therapy services to address above goals. RECOMMENDED REHABILITATION/EQUIPMENT: (at time of discharge pending progress): Due to the probability of continued deficits (see above) this patient will not likely need continued skilled speech therapy after discharge. SUBJECTIVE:   Pleasant. Author Manati to participate. Sister in law at bedside. History of Present Injury/Illness: Ms. Reina Farias  has a past medical history of Former cigarette smoker; GERD (gastroesophageal reflux disease); Hypertension; and Lung cancer ([facility] She also has no past medical history of Adverse effect of anesthesia; Difficult intubation; Malignant hyperthermia due to anesthesia; Nausea & vomiting; or Pseudocholinesterase deficiency. .  She also  has a past surgical history that includes hx tubal ligation and hx vascular access. Present Symptoms: Expressive/receptive language defiicts     Pain Intensity 1: 0  Pain Intervention(s) 1: Emotional support, Family Support, Repositioned  Current Medications:   No current facility-administered medications on file prior to encounter. Current Outpatient Prescriptions on File Prior to Encounter   Medication Sig Dispense Refill    mirtazapine (REMERON) 15 mg tablet Take 1 Tab by mouth nightly.  30 Tab 1    ondansetron hcl (ZOFRAN) 8 mg tablet Take 1 Tab by mouth every eight (8) hours as needed for Nausea. 90 Tab 1    lidocaine-prilocaine (EMLA) topical cream Apply 1/2 to 1 inch to port site one hour prior to needle access. 30 g 1    nicotine (NICODERM CQ) 7 mg/24 hr 1 Patch by TransDERmal route every twenty-four (24) hours.  amLODIPine (NORVASC) 5 mg tablet Take 5 mg by mouth daily.  senna-docusate (SHERRI-COLACE) 8.6-50 mg per tablet Take 1 Tab by mouth two (2) times a day. 60 Tab 2    raNITIdine (ZANTAC) 150 mg tablet Take 150 mg by mouth every morning.  prochlorperazine (COMPAZINE) 10 mg tablet Take 5 mg by mouth every six (6) hours as needed for Nausea.  acetaminophen (TYLENOL) 325 mg tablet Take 2 Tabs by mouth every four (4) hours as needed. 30 Tab 0     Current Dietary Status:  Mechanical soft/thin liquids      Social History/Home Situation:    Home Environment: Private residence  # Steps to Enter: 3  Rails to Enter: No  One/Two Story Residence: One story  Living Alone: No  Support Systems: Parent, Family member(s) (takes care of mother whom she lives with)  Patient Expects to be Discharged to[de-identified] Unknown  Current DME Used/Available at Home: None  Tub or Shower Type: Tub/Shower combination  Work/Activity History:   OBJECTIVE:   Oral Motor Structure/Speech:  Oral-Motor Structure/Motor Speech  Labial: No impairment  Dentition: Edentulous, Natural, Limited  Oral Hygiene: Adequate  Lingual: No impairment  Velum: No impairment    SPEECH-LANGUAGE COGNITIVE EVALUATION    Mental Status:  Neurologic State: Alert  Orientation Level: Oriented to person  Cognition: Follows commands  Perception: Verbal  Perseveration: Verbal cues provided  Safety/Judgement: Fall prevention    Motor Speech:        Auditory Comprehension:        Reading Comprehension:        Verbal Expression:        Written Expression:        Neuro-Linguistics:                         Pragmatics:          Assessment/Reassessment only, no treatment provided today    Tool Used: Functional Delta Measure (FIMTM)   Score Comments   Eating       Comprehension       Expression   2     Social Interaction       Problem Solving       Memory          Score:  Initial: 1 Most Recent: X (Date: -- )   Interpretation of Tool: Provides a uniform system of measurement for disability based on the International Classification of Impairment, Disabilities and Handicaps; measures the level of a patient's disability and indicates how much assistance is required for the individual to carry out activities of daily living. Score 7 6 5 4 3 2 1   Modifier CH CI CJ CK CL CM CN   ? Spoken Expression:    [id number] - CURRENT STATUS: CM - 80%-99% impaired, limited or restricted    - GOAL STATUS:  CL - 60%-79% impaired, limited or restricted    - D/C STATUS:  ---------------To be determined---------------  Payor: ABSOLUTE TOTAL CARE / [place] [employer]OL[place] TOTAL C[place] / Product Type: Managed Care Medicaid /   __________________________________________________________________________________________________  Safety:   After treatment position/precautions:  · Up in chair. Progression/Medical Necessity:   · Patient is expected to demonstrate progress in expressive communication and receptive ability to decrease assistance required communication, increase independence with activities of daily living and increase communication with family/caregivers. Compliance with Program/Exercises: Will assess as treatment progresses. Reason for Continuation of Services/Other Comments:  · Patient continues to require skilled intervention due to language deficits. Recommendations/Intent for next treatment session: \"Treatment next visit will focus on language tasks\".     Total Treatment Duration:  Time In: 1870  Time Out: 1533    RUBY Farrar, CCC-SLP, CBIS

## 2018-03-22 NOTE — PROGRESS NOTES
Date of Outreach Update:  Carol Kapoor was seen and assessed. MEWS Score: 1 (03/22/18 0327)  Vitals:    03/21/18 2344 03/22/18 0028 03/22/18 0104 03/22/18 0327   BP: 114/68   111/65   Pulse: 76   92   Resp: 19   18   Temp: 97.5 °F (36.4 °C)   97.7 °F (36.5 °C)   SpO2: 98%  97% 99%   Weight:  54.6 kg (120 lb 6.4 oz)     Height:             Pain Assessment  Pain Intensity 1: 0 (03/22/18 0208)     Pain Intervention(s) 1: Emotional support, Family Support, Repositioned  Patient Stated Pain Goal: 0      Previous Outreach assessment has been reviewed. There have been no significant clinical changes since the completion of the last dated Outreach assessment. Spoke with primary nurse concerning pt progress.   Pt to be discharged from outreach program.      Signed By:   Micaela Leos RN    March 22, 2018 5:56 AM

## 2018-03-23 NOTE — PROCEDURES
[facility] Neurology   Routine Electroencephalogram Report      DATE: March 22, 2018    EEG Number:  [id number]    Indication: Follow-up EEG for seizure activities    Medications:   Current Facility-Administered Medications   Medication Dose Route Frequency Provider Last Rate Last Dose    acetaminophen (TYLENOL) tablet 650 mg  650 mg Oral Q6H PRN Abbey Marques MD   650 mg at 03/22/18 1658    mirtazapine (REMERON) tablet 15 mg  15 mg Oral QHS Theo Roberson NP   Stopped at 03/21/18 2200    PHENobarbital (LUMINAL) injection 30 mg  30 mg IntraVENous BID Theo Roberson NP   30 mg at 03/22/18 0837    diphenhydrAMINE (BENADRYL) injection 12.5 mg  12.5 mg IntraVENous Q6H PRN Sudeep Jaimes MD        sodium chloride (NS) flush 5-10 mL  5-10 mL IntraVENous Q8H Michael Tejada MD   10 mL at 03/22/18 1219    sodium chloride (NS) flush 5-10 mL  5-10 mL IntraVENous PRN Michael Tejada MD        acetaminophen (TYLENOL) suppository 650 mg  650 mg Rectal Q6H PRN Michael Tejada MD        [facility]) injection 0.4 mg  0.4 mg IntraVENous PRN Michael Tejada MD        ondansetron [facility]) injection 4 mg  4 mg IntraVENous Q4H PRN Michael Tejada MD        LORazepam (ATIVAN) injection 2 mg  2 mg IntraVENous Q6H PRN Michael Tejada MD   2 mg at 03/17/18 0058    LORazepam (ATIVAN) injection 2 mg  2 mg IntraVENous Q1H PRN Michael Tejada MD        influenza vaccine 2017-18 (3 yrs+)(PF) (FLUZONE QUAD/FLUARIX QUAD) injection 0.5 mL  0.5 mL IntraMUSCular PRIOR TO DISCHARGE Michael Tejada MD        [facility]) nebulizer soln 0.63 mg/3 mL  0.63 mg Nebulization Q6H RT Michael Tejada MD   0.63 mg at 03/22/18 1402    dexamethasone (DECADRON) 4 mg/mL injection 4 mg  4 mg IntraVENous Q6H Abram Randle MD   4 mg at 03/22/18 1659    pantoprazole (PROTONIX) 40 mg in sodium chloride 10 mL injection  40 mg IntraVENous ACB Sudeep Jaimes MD   40 mg at 03/22/18 0837    levETIRAcetam (KEPPRA) 1,000 mg in 0.9% sodium chloride 100 mL IVPB  1,000 mg IntraVENous Q12H Jossue Warren MD   1,000 mg at 18 1055       Technique: This EEG was performed using the [employer] 10/20 System. An EKG was monitored. The length of the recording was 30 minutes. State of Consciousness: awake and drowsy       Description:  Background showed persistent PLEDs in the left hemisphere with highest voltage at frontal leads. Posterior rhythm reached alpha 8-9 Hz, better formed on the right. Adequate drowsy periods without additional abnormality. No subclinical seizure was identified. Activation Procedures:  Hyperventilation: Not performed  Photic Stimulation: Bilateral minimal driving    EK/ELX, regular      Interpretation: This is a moderately abnormal EEG due to the presence of persistent PLEDs through the recording in the left hemisphere with focus likely at frontal leads. Right hemisphere is normal.  No subclinical seizure is identified.   EKG monitoring is normal.

## 2018-03-23 NOTE — PROGRESS NOTES
END OF SHIFT NOTE:    Intake/Output  03/23 0701 - 03/23 1900  In: 358 [P.O.:358]  Out: 500 [Urine:500]   Voiding: YES  Catheter: NO  Drain:              Stool:  0 occurrences. Stool Assessment  Stool Color: Tan (Comment) (03/23/18 1353)  Stool Appearance: Soft (03/23/18 1353)  Stool Amount: Small (03/23/18 1353)  Stool Source/Status: Rectum (03/23/18 1353)    Emesis:  0 occurrences. VITAL SIGNS  Patient Vitals for the past 12 hrs:   Temp Pulse Resp BP SpO2   03/23/18 1541 97.6 °F (36.4 °C) 86 18 133/75 97 %   03/23/18 1425 - - - - 97 %   03/23/18 1118 97.8 °F (36.6 °C) 76 18 122/78 98 %   03/23/18 0843 - - - - 99 %   03/23/18 0743 98.2 °F (36.8 °C) 76 18 129/80 100 %       Pain Assessment  Pain 1  Pain Scale 1: Visual (03/23/18 1541)  Pain Intensity 1: 0 (03/23/18 1541)  Patient Stated Pain Goal: 0 (03/23/18 0815)  Pain Reassessment 1: Yes (03/21/18 1410)  Pain Intervention(s) 1: Emotional support; Family Support;Repositioned (03/17/18 1201)    Ambulating  Yes    Additional Information:     Shift report given to oncoming nurse at the bedside.     Royal Avitia

## 2018-03-23 NOTE — PROGRESS NOTES
Problem: Mobility Impaired (Adult and Pediatric)  Goal: *Acute Goals and Plan of Care (Insert Text)  STG:  (1.)Ms. Shamika Ruiz will move from supine to sit and sit to supine , scoot up and down and roll side to side with SUPERVISION within 3 treatment day(s). Goal met 3/22/2018  (2.)Ms. Shamika Ruiz will transfer from bed to chair and chair to bed with MINIMAL ASSIST using the least restrictive device within 3 treatment day(s). Goal met 3/21/2018  (3.)Ms. Shamika Ruiz will ambulate with MINIMAL ASSIST for 30 feet with the least restrictive device within 3 treatment day(s). Goal met 3/22/2018    LTG:  (1.)Ms. Shamika Ruiz will move from supine to sit and sit to supine , scoot up and down and roll side to side in bed with MODIFIED INDEPENDENCE within 7 treatment day(s). (2.)Ms. Shamika Ruiz will transfer from bed to chair and chair to bed with CONTACT GUARD ASSIST using the least restrictive device within 7 treatment day(s). (3.)Ms. Shamika Ruiz will ambulate with CONTACT GUARD ASSIST for 150 feet with the least restrictive device within 7 treatment day(s). ________________________________________________________________________________________________      PHYSICAL THERAPY: Daily Note, Treatment Day: 3rd, AM 3/23/2018  INPATIENT: Hospital Day: 8  Payor: ABSOLUTE TOTAL CARE / [place] / Product Type: Managed Care Medicaid /      NAME/AGE/GENDER: Carmen Daugherty is a 63 y.o. female   PRIMARY DIAGNOSIS: Acute encephalopathy  Brain metastasis ([address]  Seizure ([address]  Lung cancer ([address] Epilepsy with status epilepticus ([address] Epilepsy with status epilepticus ([address]        ICD-10: Treatment Diagnosis:   · Generalized Muscle Weakness (M62.81)  · Difficulty in walking, Not elsewhere classified (R26.2)   Precaution/Allergies:  Metaxalone and Oxycodone      ASSESSMENT:     Ms. Shamika Ruiz is sitting up in bedside chair upon contact just finished getting cleaned up with PCT. Pt performed STS with CGA and ambulated 100 ft with CGA.  Pt experienced 1 LOB posteriorly but was able to recover cues. Pt ambulates with narrowed JOHN, fluctuating gait speeds, and path deviations at times. Pt returned to room and performed exercises while sitting in bedside chair. Pt left sitting up with all needs met and within reach with chair alarm activated. Pt demonstrated improved command following, balance, and with improved speech this session. Will continue efforts. This section established at most recent assessment   PROBLEM LIST (Impairments causing functional limitations):  1. Decreased Strength  2. Decreased ADL/Functional Activities  3. Decreased Transfer Abilities  4. Decreased Ambulation Ability/Technique  5. Decreased Balance  6. Decreased Activity Tolerance  7. Decreased Pacing Skills  8. Decreased Novice with Home Exercise Program  9. Decreased Cognition   INTERVENTIONS PLANNED: (Benefits and precautions of physical therapy have been discussed with the patient.)  1. Balance Exercise  2. Bed Mobility  3. Cold  4. Family Education  5. Gait Training  6. Home Exercise Program (HEP)  7. Manual Therapy  8. Neuromuscular Re-education/Strengthening  9. Range of Motion (ROM)  10. Therapeutic Activites  11. Therapeutic Exercise/Strengthening  12. Transfer Training     TREATMENT PLAN: Frequency/Duration: 3 times a week for duration of hospital stay  Rehabilitation Potential For Stated Goals: [facility] REHABILITATION/EQUIPMENT: (at time of discharge pending progress): Due to the probability of continued deficits (see above) this patient will likely need continued skilled physical therapy after discharge. Equipment:    Walkers, Type: Rolling Walker              HISTORY:   History of Present Injury/Illness (Reason for Referral):  Patient is 63years old female with pmhx of extensive stage small cell lung cancer with brain metastasis, GERD, HTN, previous tobacco abuse presented in view of new onset seizure like activity.   As per the pt's sister, pt was in her usual health until yesterday, today she appeared more confused, lethargic and somnolent. While she was driving the pt home, pt started appearing confused and then had generalized body shakes. EMS was summoned. Pt received 2 doses of ativan PTA. In ER, pt is post ictal, sleeping, difficult to arouse. Pt is DNR. In ER, CT head showed cerebral metastatic disease with left parietal convexity meningioma, CXR was unremarkable. CT chest on 1/30/18 showed interval treatment response with significantly decreased mediastinal and right hilar lymphadenopathy. Past Medical History/Comorbidities:   Ms. Shamika Ruiz  has a past medical history of Former cigarette smoker; GERD (gastroesophageal reflux disease); Hypertension; and Lung cancer ([facility] She also has no past medical history of Adverse effect of anesthesia; Difficult intubation; Malignant hyperthermia due to anesthesia; Nausea & vomiting; or Pseudocholinesterase deficiency. Ms. Shamika Ruiz  has a past surgical history that includes hx tubal ligation and hx vascular access.   Social History/Living Environment:   Home Environment: Private residence  # Steps to Enter: 3  Rails to Enter: No  One/Two Story Residence: One story  Living Alone: No  Support Systems: Parent, Family member(s) (takes care of mother whom she lives with)  Patient Expects to be Discharged to[de-identified] Unknown  Current DME Used/Available at Home: None  Tub or Shower Type: Tub/Shower combination  Prior Level of Function/Work/Activity:  Taking care of mother, independent, no AD, intact cognition and communication     Number of Personal Factors/Comorbidities that affect the Plan of Care: 3+: HIGH COMPLEXITY   EXAMINATION:   Most Recent Physical Functioning:   Gross Assessment:                  Posture:     Balance:  Sitting - Static: Good (unsupported)  Sitting - Dynamic: Good (unsupported)  Standing - Static: Fair  Standing - Dynamic : Fair Bed Mobility:     Wheelchair Mobility:     Transfers:  Sit to Stand: Contact guard assistance  Stand to Sit: Contact guard assistance  Gait:     Base of Support: Narrowed; Center of gravity altered  Speed/Aissatou: Fluctuations  Gait Abnormalities: Decreased step clearance;Trunk sway increased; Ataxic;Path deviations  Distance (ft): 100 Feet (ft)  Ambulation - Level of Assistance: Contact guard assistance      Body Structures Involved:  1. Heart  2. Lungs  3. Bones  4. Joints  5. Muscles  6. Ligaments Body Functions Affected:  1. Mental  2. Voice and Speech  3. Cardio  4. Respiratory  5. Neuromusculoskeletal  6. Movement Related Activities and Participation Affected:  1. Learning and Applying Knowledge  2. General Tasks and Demands  3. Communication  4. Mobility  5. Self Care  6. Domestic Life  7. Interpersonal Interactions and Relationships  8. Community, Social and [place]   Number of elements that affect the Plan of Care: 4+: HIGH COMPLEXITY   CLINICAL PRESENTATION:   Presentation: Evolving clinical presentation with changing clinical characteristics: MODERATE COMPLEXITY   CLINICAL DECISION MAKIN [facility] Mobility Inpatient Short Form  How much difficulty does the patient currently have. .. Unable A Lot A Little None   1. Turning over in bed (including adjusting bedclothes, sheets and blankets)? [] 1   [] 2   [] 3   [x] 4   2. Sitting down on and standing up from a chair with arms ( e.g., wheelchair, bedside commode, etc.)   [] 1   [] 2   [] 3   [x] 4   3. Moving from lying on back to sitting on the side of the bed? [] 1   [] 2   [] 3   [x] 4   How much help from another person does the patient currently need. .. Total A Lot A Little None   4. Moving to and from a bed to a chair (including a wheelchair)? [] 1   [] 2   [x] 3   [] 4   5. Need to walk in hospital room? [] 1   [] 2   [x] 3   [] 4   6. Climbing 3-5 steps with a railing? [x] 1   [] 2   [] 3   [] 4   © , Trustees of [address] [place] [address] [address] [id number], under license to [employer].  All rights reserved      Score:  Initial: 17 Most Recent: 19 (Date: 3/22/2018 )    Interpretation of Tool:  Represents activities that are increasingly more difficult (i.e. Bed mobility, Transfers, Gait). Score 24 23 22-20 19-15 14-10 9-7 6     Modifier CH CI CJ CK CL CM CN      ? Mobility - Walking and Moving Around:     - CURRENT STATUS: CK - 40%-59% impaired, limited or restricted    - GOAL STATUS: CJ - 20%-39% impaired, limited or restricted    - D/C STATUS:  ---------------To be determined---------------  Payor: ABSOLUTE TOTAL CARE / [place] / Product Type: Managed Care Medicaid /      Medical Necessity:     · Patient is expected to demonstrate progress in strength, range of motion, balance and coordination to decrease assistance required with transfers, ambulation, and functional mobility. Reason for Services/Other Comments:  · Patient continues to require skilled intervention due to decreased cognition, activity tolerance, and functional mobility. Use of outcome tool(s) and clinical judgement create a POC that gives a: Questionable prediction of patient's progress: MODERATE COMPLEXITY            TREATMENT:   (In addition to Assessment/Re-Assessment sessions the following treatments were rendered)   Pre-treatment Symptoms/Complaints:  Still has expressive aphasia, but increased vocabulary and able to communicate simple phrases   Pain: Initial: 0/10  Pain Intensity 1: 0  Post Session:  0/10   Therapeutic Activity: (    10 minutes): Therapeutic activities including Bed transfers, Chair transfers, Ambulation on level ground, and exercises and moderate verbal and tactile cues for safety of transfers and mobility and awareness of enviornment to improve mobility, strength, balance and coordination. Required moderate   to promote dynamic balance in standing and promote coordination of bilateral, upper extremity(s), lower extremity(s).        Braces/Orthotics/Lines/Etc:   · IV  · O2 Device: Room air  Treatment/Session Assessment:    · Response to Treatment:  Ambulated 100 ft with CGA  and no AD  · Interdisciplinary Collaboration:   o Physical Therapist  o Registered Nurse  o Certified Nursing Assistant/Patient Care Technician  o SPT  · After treatment position/precautions:   o Up in chair  o Bed alarm/tab alert on  o Bed/Chair-wheels locked  o Bed in low position  o Caregiver at bedside  o Call light within reach   · Compliance with Program/Exercises: Will assess as treatment progresses. · Recommendations/Intent for next treatment session: \"Next visit will focus on reduction in assistance provided\".   Total Treatment Duration:  PT Patient Time In/Time Out  Time In: 0936  Time Out: [address]

## 2018-03-23 NOTE — PROGRESS NOTES
Problem: Nutrition Deficit  Goal: *Optimize nutritional status  Nutrition LOS Note: day 7  Assessment  Diet order(s): NPO 3/17, Mechanical soft 2 gm Na 3/18-present  Food,Nutrition, and Pertinent History: Patient is somewhat confused throughout RD interview. No family at bedside. Noted h/o stage IV metastatic SCLC (brain). She is receiving decadron and remeron. Anthropometrics: Height: 5' 8\" (172.7 cm), Weight Source: Standing scale (comment), Weight: 49.8 kg (109 lb 11.2 oz) (bed scale 54.4 kg), Body mass index is 16.68 kg/(m^2). BMI class of underweight. WT / BMI 3/23/2018 3/15/2018 3/14/2018 2/28/2018   WEIGHT 109 lb 11.2 oz 120 lb 6.4 oz 121 lb 118 lb 4.8 oz     WT / BMI 2/14/2018 2/7/2018 2/7/2018 1/30/2018 1/24/2018   WEIGHT 118 lb 3.2 oz 115 lb 11.2 oz 115 lb 117 lb 117 lb     WT / BMI 1/9/2018 12/22/2017   WEIGHT 111 lb 117 lb 6.4 oz   Per weights in EMR, potential for an 8 pound, 6.8% weight loss over ~3 months. Noted wt difference of ~11 pounds over 8 days. Macronutrient Needs:  · EER:  1304-4514 kcal /day (30-35 kcal/kg listed BW)  · EPR:  50-65 grams protein/day (1-1.3 grams/kg listed BW)(GFR >60)  Intake/Comparative Standards: Average intake for past 6 day(s)/11 recorded meal(s): 72%. This potentially meets ~96% of kcal and ~100% of protein needs. Nutrition Diagnosis: Predicted sub-optimal intake r/t decreased ability to consume adequate oral intake, as evidenced by patient with confusion and underweight for age. Intervention:   Meals and snacks: Continue current diet.   Supplementation: Ensure enlive TID, magic cup BID  Discharge nutrition plan: No discharge needs identified    Mireya Hernandez [address] [place] [address], [address] [place] [address], [phone]

## 2018-03-23 NOTE — PROGRESS NOTES
My initial visit to assess pt's spiritual needs. Intent:  Build connection, offer support, encourage hope, lessen loneliness, explore presence of God. Pt stated she is feeling much better and is being well cared for. Ministry of presence & prayer to demonstrate caring & concern, convey emotional & spiritual support.     Chaplain Paul Rucker MDiv,ThM,PhD

## 2018-03-23 NOTE — PROGRESS NOTES
[facility]        Inpatient Hematology / Oncology Progress Note      Admission Date: 3/16/2018  9:00 PM  Reason for Admission/Hospital Course: Acute encephalopathy  Brain metastasis (HCC)  Seizure ([facility]  Lung cancer ([facility]      24 Hour Events:  Afebrile, VSS  On Dex, Keppra, and Phenobarbital  Speaking phrases today      ROS:  Constitutional: Negative for fever, chills. CV: Negative for chest pain, palpitations, edema. Respiratory: Negative for dyspnea, cough, wheezing. GI: Negative for nausea, abdominal pain, diarrhea. 10 point review of systems is otherwise negative with the exception of the elements mentioned above in the HPI. Allergies   Allergen Reactions    Metaxalone Other (comments)    Oxycodone Swelling       OBJECTIVE:  Patient Vitals for the past 8 hrs:   BP Temp Pulse Resp SpO2 Weight   18 0843 - - - - 99 % -   18 0743 129/80 98.2 °F (36.8 °C) 76 18 100 % -   18 0432 131/87 97.4 °F (36.3 °C) 80 18 98 % 109 lb 11.2 oz (49.8 kg)   18 0301 - - - - 91 % -     Temp (24hrs), Av.8 °F (36.6 °C), Min:96.6 °F (35.9 °C), Max:99.3 °F (37.4 °C)         Physical Exam:  Constitutional: Well developed, well nourished female in no acute distress, sitting comfortably in the bedside chair. HEENT: Normocephalic and atraumatic. Oropharynx is clear, mucous membranes are moist. Extraocular muscles are intact. Sclerae anicteric. Neck supple without JVD. No thyromegaly present. Skin Warm and dry. No bruising and no rash noted. No erythema. No pallor. Respiratory Lungs are clear to auscultation bilaterally without wheezes, rales or rhonchi, normal air exchange without accessory muscle use. CVS Normal rate, regular rhythm and normal S1 and S2. No murmurs, gallops, or rubs. Abdomen Soft, nontender and nondistended, normoactive bowel sounds. No palpable mass. No hepatosplenomegaly. Neuro Expressive aphasia - improving.   Responds appropriately to questions this AM.   MSK Normal range of motion in general.  No edema and no tenderness. Psych Alert. Calm. Labs:      Recent Labs      03/23/18 0445 03/21/18 0419   WBC  6.8  5.4   RBC  4.22  4.43   HGB  9.6*  10.2*   HCT  30.0*  31.2*   MCV  71.1*  70.4*   MCH  22.7*  23.0*   MCHC  32.0  32.7   RDW  18.1*  17.5*   PLT  233  212   GRANS  80*  81*   LYMPH  14  15   MONOS  5  3*   EOS  0*  0*   BASOS  0  0   IG  1  1   DF  AUTOMATED  AUTOMATED   ANEU  5.5  4.4   ABL  0.9  0.8   ABM  0.3  0.2   LESLIE  0.0  0.0   ABB  0.0  0.0   AIG  0.1  0.1        Recent Labs      03/23/18 0445 03/22/18 0331 03/21/18 0419   NA  140  140  140   K  4.1  3.7  3.6   CL  103  104  104   CO2  27  26  26   AGAP  10  10  10   GLU  120*  134*  125*   BUN  14  16  20   CREA  0.84  0.79  0.87   GFRAA  >60  >60  >60   GFRNA  >60  >60  >60   CA  8.8  8.1*  8.1*   SGOT  126*  75*  92*   AP  87  80  86   TP  6.8  6.4  6.7   ALB  2.9*  3.1*  3.2*   GLOB  3.9*  3.3  3.5   AGRAT  0.7*  0.9*  0.9*   MG  1.6*   --   1.8         Imaging:  MRI BRAIN W WO CONT [[id number]] Collected: 03/18/18 1307      Order Status: Completed Updated: 03/18/18 1319     Narrative:       MRI of the Brain with contrast: 3/18/2018  History: Lung cancer  Sequences: Axial pre and post contrast T1, FLAIR, T2, diffusion, coronal post  contrast T1 and sagittal precontrast T1-weighted images of the brain. Comparison: MRI the brain 4/21/2017  20 cc of IV MultiHance   was injected to aid in the detection of intracranial  pathology. Findings:    Supratentorial enhancing metastatic lesions are now demonstrated. These lesions  all display hemosiderin and restricted diffusion.  There is a 5 mm lesion in the  right temporal white matter, 1 cm lesion right frontal white matter, left  posterior parasagittal convexity lesion measuring 14 mm x 27 x 31 mm, 14 mm left  occipital white matter lesion, 9 mm left frontal lesion, and 2 left parietal  enhancing lesions measuring respectively 7 mm and 24 mm. There is some mild mass  upon the left ventricular atrium. The pituitary and sinuses are unremarkable. 7th and 8th nerves and orbits are unremarkable. There is some fluid in the right  mastoid air cells.       Impression:       IMPRESSION: Interval supratentorial metastatic lesions as above. DC4  The significant findings in this report have been referred to the Imaging  Navigator in order to communicate to the referring provider or his/her designee  as outlined in Section II.C.2.a.ii-iii of the ACR  Practice Guideline for Communication of Diagnostic Imaging Findings.           XR ABD (KUB) [[id number]] Collected: 03/17/18 0115     Order Status: Completed Updated: 03/17/18 0117     Narrative:       Abdomen x-ray single view. HISTORY: Nasogastric tube placement. COMPARISON: None. FINDINGS: Nasogastric tube its tip in the gastric body. Bowel gas pattern is  nonspecific.       Impression:       IMPRESSION:    Nasogastric tube its tip in the gastric body.     XR CHEST PORT [[id number]] Collected: 03/16/18 2138     Order Status: Completed Updated: 03/16/18 2146     Narrative:       EXAM:  XR CHEST PORT    INDICATION:  AMS    COMPARISON:  4/18/2017    FINDINGS: A portable AP radiograph of the chest was obtained at 2124 hours. The  patient is on a cardiac monitor.  The lungs are clear.  The cardiac and  mediastinal contours and pulmonary vascularity are normal.  The bones and soft  tissues are grossly within normal limits.        Impression:       IMPRESSION: No acute cardiopulmonary disease.         CT HEAD WITHOUT CONTRAST [[id number]] Collected: 03/16/18 2130     Order Status: Completed Updated: 03/16/18 2134     Narrative:       CT HEAD WITHOUT CONTRAST     HISTORY:  Seizure. COMPARISON: None. TECHNIQUE: Axial imaging was performed without intravenous contrast utilizing  5mm slice thickness. Sagittal and coronal reformats were performed.  Radiation  dose reduction techniques were used for this study. Our CT scanner uses one or  all of the following:    Automated exposure control, adjustment of the MAS or KUB according to patient's  size and iterative reconstruction. FINDINGS:        *BRAIN:      -  There are no early signs of territorial or lacunar infarction by CT.     -  1.3 x 1.6 cm left parietal mass with surrounding vasogenic edema. 0.6 x  0.9 cm mass in left frontal lobe with vasogenic edema. Partly calcified mass in  left superior parafalcine region measuring 2.9 x 2.6 cm. Consider the  possibility of meningioma.     -  No gross white matter abnormality by CT.    *VISUALIZED PARANASAL SINUSES: Well aerated. *MASTOIDS:  Clear. *CALVARIUM AND SCALP: Unremarkable.         Impression:       IMPRESSION:    Cerebral metastatic disease. Possible left parietal convexity meningioma.     Date of Dictation: 3/16/2018 9:30 PM         ASSESSMENT:    Problem List  Date Reviewed: 3/14/2018          Codes Class Noted    * (Principal)Epilepsy with status epilepticus ([facility] ICD-10-CM: G40.901  ICD-9-CM: 345.3  3/18/2018        Hyperkalemia ICD-10-CM: E87.5  ICD-9-CM: 276.7  3/17/2018        Abnormal EKG ICD-10-CM: R94.31  ICD-9-CM: 794.31  3/17/2018        Seizure ([facility] ICD-10-CM: R56.9  ICD-9-CM: 780.39  3/16/2018        Lung cancer ([facility] ICD-10-CM: C34.90  ICD-9-CM: 162.9  3/16/2018        Brain metastasis ([facility] ICD-10-CM: C79.31  ICD-9-CM: 198.3  3/16/2018        Acute encephalopathy ICD-10-CM: G93.40  ICD-9-CM: 348.30  3/16/2018        Bone metastases ([facility] ICD-10-CM: C79.51  ICD-9-CM: 198.5  8/30/2017        Small cell carcinoma of right lung (HCC) ICD-10-CM: C34.91  ICD-9-CM: 162.9  4/21/2017        Smoker (Chronic) ICD-10-CM: R48.473  ICD-9-CM: 305.1  Unknown        Mediastinal mass ICD-10-CM: J98.59  ICD-9-CM: 786.6  4/18/2017        SVC (superior vena cava obstruction) ICD-10-CM: I87.1  ICD-9-CM: 459.2  4/18/2017        Essential hypertension ICD-10-CM: I10  ICD-9-CM: 401.9  4/18/2017        SVC syndrome ICD-10-CM: I87.1  ICD-9-CM: 459.2  4/18/2017            Ms. Jann Sy is a 63 y.o. female admitted on 3/16/2018 with a primary diagnosis of lung cancer, acute encephalopathy, brain metastasis, and fever. Ms. Jann Sy is a well known patient of Dr. Jorge Momin. She was just seen in the clinic on 3/14/18. While planning for Nivolumab she had rapid growth of b/l cervical LAD. He discussed  the aggressive pattern of her disease. Restaging CT and MRI of brain were ordered. The plan was to start nivolumab/ipilimimab on 4/4/18. He discussed the need for her to inform family of her aggressive disease and to designate POA. Unfortunately, yesterday she was confused, lethargic and somnolent. Patient's sister was driving her home when she became more confused and start having what her sister described as generalized body shakes. 911 was called. She received 2 doses of ativan PTA to ED. In ED she was post ictal and difficult to arouse. CT head showed cerebral metastatic disease with left parietal convexity meningioma. CXR was unremarkable. CT chest on 1/30/18 showed interval treatment response with significantly decreased mediastinal and right hilar lymphadenopathy. PLAN:  Stage IV SCLC with brain mets  - s/p C1 nivolumab/ipilimumab on 3/15. C2 due 4/4  3/21 Rad Onc consult scheduled for this AM  3/22 Pt met with Dr. Orion Davidson yesterday. He spoke with pt's family. Plan is to watch her over the weekend to see how much she improves before making decision in regards to treatment    Seizures  3/19 CT head revealed cerebral metastatic disease with L parietal convxity meningioma. Per teleneuro, pt has ongoing L-sided seizures and was started on Fosphenytoin yesterday - currently being held for high level phenytoin. Also on Dex and Keppra. Dr. Sharlene Hensley to check with Dr. Jorge Momin to see if pt needs to be transferred to [facility] to neuro service vs. daily teleneuro eval here.   Pt alert - responds with \"OK\" to every question. SLP following. 3/20  Follow-up teleneuro eval pending. Remains confused, but able to speak more words today. SLP following. Continues on Dex, Keppra, and Fosphenytoin. Phenytoin level pending. 3/21 Per neuro, Dr. Celestino Mensah, hold phenytoin and allow levels to trend down. Repeat EEG. Give loading dose of Phenobarbital 90mg IV x 1. Then Phenobarbital 30mg BID.  3/22 Repeat EEG pending. Pt's expressive aphasia is improving daily. Pt was able to answer questions and use phrases appropriately during exam this AM.    3/23 Repeat EEG \"moderately abnormal EGG d/t the presence of persistent PLEDs through the recording in the L hemisphere with focus likely at frontal leads. No subclinical seizure is identified\". Follow-up teleneuro consult ordered. Con't Dex, Keppra, and Phenobarbital.    Hypokalemia/Hypomagnesemia  3/19 Replete K+  3/20 K+ up to 3.8  3/23 Replete Mg+    Continue home meds  Moni SOPs  SCDs for DVT prophylaxis  Per PT, may benefit from rehab upon discharge. CM working on placement. William Quinones NP   [facility]  [address] [address]  Office : [phone]  Fax : [phone]     Attending Addendum:  I personally evaluated the patient with William Quinones, and agree with the assessment, findings and plan as documented. Appears well, heart regular without murmur, lungs clear, abdomen benign. Much more verbal today - stringing words in full sentences today. C/w supportive care.             Katie Lemon MD  [facility]  [address] [place] [address]  Office : [phone]  Fax : [phone]

## 2018-03-23 NOTE — PROGRESS NOTES
OT Note:    OT attempted to see patient this afternoon for therapy. Pt asleep at this time. OT will re-attempt to see patient at a later date/time.     Thanks,  Penny Dave

## 2018-03-23 NOTE — PROGRESS NOTES
STG: Patient will answer basic yes/no comprehension questions with 75% accuracy given max cues. STG: Patient will participate in automatic speech tasks with 75% accuracy given max cues  STG: Patient will follow 1 step verbal command with visual cues with 60% accuracy with max cues. LTG: Patient will increase neuro-linguistic abilities to increase safety and awareness of deficits. Speech language pathology: Speech-language and cognitive note: Daily Note  3    NAME/AGE/GENDER: Jesse Diane is a 63 y.o. female  DATE: 3/23/2018  PRIMARY DIAGNOSIS: Acute encephalopathy  Brain metastasis ([facility]  Seizure ([facility]  Lung cancer ([facility]       ICD-10: Treatment Diagnosis: R.41.841 Cognitive-Communication Deficit    INTERDISCIPLINARY COLLABORATION: Registered NurseASSESSMENT:Patient seen for language treatment. Patient independently able to state name and \"faint stranfis\" as place. Able to repeat [facility] correctly with verbal cue. Completed Aphasia rapid test performing single step directions with 50% accuracy, 2 step directions with 0% accuracy, 100% accuracy with word repetition, 50% with sentence repetition, inability to name common items (0% accuracy), and inability to complete divergent naming task (0%). Total score 15/26 indicating 58% speech/language impairment. Perseveration noted after naming task, repeating \"watch\" for all answers. Patient was aware answer was incorrect but could not correct/discontinue use. Patient with increasing perseveration noted at end of session, increasing frustration. Encouraged pause/deep breath. Still ineffective. Education with patient regarding progress towards goals and encouraged rest. Verbalized understanding. Continued improvements in expressive and receptive language abilities. Patient will benefit from skilled intervention to address the below impairments. ?????? ? ? This section established at most recent assessment??????????  PROBLEM LIST (Impairments causing functional limitations):  1. Expressive Language  2. Receptive language  REHABILITATION POTENTIAL FOR STATED GOALS: Guarded  PLAN OF CARE:   Patient will benefit from skilled intervention to address the following impairments. INTERVENTIONS PLANNED: (Benefits and precautions of therapy have been discussed with the patient.)  1. Cognitive-linguistic deficits  FREQUENCY/DURATION: Continue to follow patient 3 session trial to assess patient' ability to benefit from skilled therapy services to address above goals. RECOMMENDED REHABILITATION/EQUIPMENT: (at time of discharge pending progress): Due to the probability of continued deficits (see above) this patient will not likely need continued skilled speech therapy after discharge. SUBJECTIVE:   Patient actively participated in treatment, but fatigue was notable with tasks and session therefore shortened. History of Present Injury/Illness: Ms. Chel Monroe  has a past medical history of Former cigarette smoker; GERD (gastroesophageal reflux disease); Hypertension; and Lung cancer ([facility] She also has no past medical history of Adverse effect of anesthesia; Difficult intubation; Malignant hyperthermia due to anesthesia; Nausea & vomiting; or Pseudocholinesterase deficiency. .  She also  has a past surgical history that includes hx tubal ligation and hx vascular access. Present Symptoms: Expressive/receptive language defiicts     Pain Intensity 1: 0  Pain Intervention(s) 1: Emotional support, Family Support, Repositioned  Current Medications:   No current facility-administered medications on file prior to encounter. Current Outpatient Prescriptions on File Prior to Encounter   Medication Sig Dispense Refill    mirtazapine (REMERON) 15 mg tablet Take 1 Tab by mouth nightly. 30 Tab 1    ondansetron hcl (ZOFRAN) 8 mg tablet Take 1 Tab by mouth every eight (8) hours as needed for Nausea.  90 Tab 1    lidocaine-prilocaine (EMLA) topical cream Apply 1/2 to 1 inch to port site one hour prior to needle access. 30 g 1    nicotine (NICODERM CQ) 7 mg/24 hr 1 Patch by TransDERmal route every twenty-four (24) hours.  amLODIPine (NORVASC) 5 mg tablet Take 5 mg by mouth daily.  senna-docusate (SHERRI-COLACE) 8.6-50 mg per tablet Take 1 Tab by mouth two (2) times a day. 60 Tab 2    raNITIdine (ZANTAC) 150 mg tablet Take 150 mg by mouth every morning.  prochlorperazine (COMPAZINE) 10 mg tablet Take 5 mg by mouth every six (6) hours as needed for Nausea.  acetaminophen (TYLENOL) 325 mg tablet Take 2 Tabs by mouth every four (4) hours as needed. 30 Tab 0     Current Dietary Status:  Mechanical soft/thin liquids      Social History/Home Situation:    Home Environment: Private residence  # Steps to Enter: 3  Rails to Enter: No  One/Two Story Residence: One story  Living Alone: No  Support Systems: Parent, Family member(s) (takes care of mother whom she lives with)  Patient Expects to be Discharged to[de-identified] Unknown  Current DME Used/Available at Home: None  Tub or Shower Type: Tub/Shower combination  Work/Activity History:   OBJECTIVE:   Oral Motor Structure/Speech:  Oral-Motor Structure/Motor Speech  Labial: No impairment  Dentition: Edentulous, Natural, Limited  Oral Hygiene: Adequate  Lingual: No impairment  Velum: No impairment    SPEECH-LANGUAGE COGNITIVE EVALUATION    Mental Status:  Neurologic State: Alert  Orientation Level: Oriented to person;Oriented to place; Disoriented to situation;Disoriented to time  Cognition: Decreased command following  Perception: Appears intact  Perseveration: Perseverates during conversation  Safety/Judgement: Decreased awareness of environment;Decreased awareness of need for assistance;Decreased awareness of need for safety;Decreased insight into deficits    Motor Speech:        Auditory Comprehension:   Auditory Comprehension  Auditory Impairment: Yes  One-Step Basic Commands (%): 50 %    Reading Comprehension:        Verbal Expression:   Verbal Expression  Divergent (%): 0 %  Objects (%): 0 %    Written Expression:        Neuro-Linguistics:                         Pragmatics:          Assessment/Reassessment only, no treatment provided today    Tool Used: Functional [place] Measure (FIMTM)   Score Comments   Eating       Comprehension       Expression   2     Social Interaction       Problem Solving       Memory          Score:  Initial: 1 Most Recent: X (Date: -- )   Interpretation of Tool: Provides a uniform system of measurement for disability based on the International Classification of Impairment, Disabilities and Handicaps; measures the level of a patient's disability and indicates how much assistance is required for the individual to carry out activities of daily living. Score 7 6 5 4 3 2 1   Modifier CH CI CJ CK CL CM CN   ? Spoken Expression:    [id number] - CURRENT STATUS: CM - 80%-99% impaired, limited or restricted    - GOAL STATUS:  CL - 60%-79% impaired, limited or restricted    - D/C STATUS:  ---------------To be determined---------------  Payor: ABSOLUTE TOTAL CARE / [place] / Product Type: Managed Care Medicaid /   __________________________________________________________________________________________________  Safety:   After treatment position/precautions:  · Up in chair. Progression/Medical Necessity:   · Patient is expected to demonstrate progress in expressive communication and receptive ability to decrease assistance required communication, increase independence with activities of daily living and increase communication with family/caregivers. Compliance with Program/Exercises: Will assess as treatment progresses. Reason for Continuation of Services/Other Comments:  · Patient continues to require skilled intervention due to language deficits. Recommendations/Intent for next treatment session: \"Treatment next visit will focus on language tasks\".     Total Treatment Duration:  Time In: 1525  Time Out: [address] [facility]  MS Tyrone, CCC-SLP

## 2018-03-24 NOTE — PROGRESS NOTES
END OF SHIFT NOTE:    Intake/Output  03/23 1901 - 03/24 0700  In: -   Out: 400 [Urine:400]   Voiding: yes  Catheter: no  Drain:              Stool: 0 occurrences. Stool Assessment  Stool Color: Tan (Comment) (03/23/18 1353)  Stool Appearance: Soft (03/23/18 1353)  Stool Amount: Small (03/23/18 1353)  Stool Source/Status: Rectum (03/23/18 1353)    Emesis:  0 occurrences. VITAL SIGNS  Patient Vitals for the past 12 hrs:   Temp Pulse Resp BP SpO2   03/24/18 0359 98.3 °F (36.8 °C) 85 18 126/86 98 %   03/24/18 0117 - - - - 95 %   03/23/18 2307 96.4 °F (35.8 °C) 81 18 112/70 97 %   03/23/18 2242 - - - - 94 %   03/23/18 1922 98.1 °F (36.7 °C) 83 18 119/76 96 %       Pain Assessment  Pain 1  Pain Scale 1: Visual (03/24/18 0315)  Pain Intensity 1: 0 (03/24/18 0315)  Patient Stated Pain Goal: 0 (03/24/18 0315)  Pain Reassessment 1: Patient sleeping (03/24/18 0315)  Pain Intervention(s) 1: Emotional support; Family Support;Repositioned (03/17/18 1201)    Ambulating  yes    Additional Information: Pt has rested comfortably in bed during the entire shift. Pt's sister was at the bedside for part of the night. Shift report given to oncoming nurse at the bedside.     Suzette Coreas RN

## 2018-03-24 NOTE — PROGRESS NOTES
[facility] Hematology & Oncology        Inpatient Hematology / Oncology Progress Note      Admission Date: 3/16/2018  9:00 PM  Reason for Admission/Hospital Course: Acute encephalopathy  Brain metastasis (HCC)  Seizure ([address]  Lung cancer (HCC)      24 Hour Events:  Afebrile, VSS  On Dex, Keppra, and Phenobarbital  Speech improving daily  Per neuro, continue current management      ROS:  Constitutional: Negative for fever, chills. CV: Negative for chest pain, palpitations, edema. Respiratory: Negative for dyspnea, cough, wheezing. GI: Negative for nausea, abdominal pain, diarrhea. 10 point review of systems is otherwise negative with the exception of the elements mentioned above in the HPI. Allergies   Allergen Reactions    Metaxalone Other (comments)    Oxycodone Swelling       OBJECTIVE:  Patient Vitals for the past 8 hrs:   BP Temp Pulse Resp SpO2   18 1041 - - - - 99 %   18 1040 129/78 97.3 °F (36.3 °C) (!) 101 18 98 %   18 0717 121/88 98.1 °F (36.7 °C) 79 18 94 %   18 0359 126/86 98.3 °F (36.8 °C) 85 18 98 %     Temp (24hrs), Av.6 °F (36.4 °C), Min:96.4 °F (35.8 °C), Max:98.3 °F (36.8 °C)     0701 -  1900  In: 120 [P.O.:120]  Out: 400 [Urine:400]    Physical Exam:  Constitutional: Well developed, well nourished female in no acute distress, sitting comfortably in the bedside chair. HEENT: Normocephalic and atraumatic. Oropharynx is clear, mucous membranes are moist. Extraocular muscles are intact. Sclerae anicteric. Neck supple without JVD. No thyromegaly present. Skin Warm and dry. No bruising and no rash noted. No erythema. No pallor. Respiratory Lungs are clear to auscultation bilaterally without wheezes, rales or rhonchi, normal air exchange without accessory muscle use. CVS Normal rate, regular rhythm and normal S1 and S2. No murmurs, gallops, or rubs. Abdomen Soft, nontender and nondistended, normoactive bowel sounds.   No palpable mass.  No hepatosplenomegaly. Neuro Expressive aphasia - improving. Responds appropriately to questions this AM.   MSK Normal range of motion in general.  No edema and no tenderness. Psych Alert. Calm. Labs:      Recent Labs      03/23/18   0445   WBC  6.8   RBC  4.22   HGB  9.6*   HCT  30.0*   MCV  71.1*   MCH  22.7*   MCHC  32.0   RDW  18.1*   PLT  233   GRANS  80*   LYMPH  14   MONOS  5   EOS  0*   BASOS  0   IG  1   DF  AUTOMATED   ANEU  5.5   ABL  0.9   ABM  0.3   LESLIE  0.0   ABB  0.0   AIG  0.1        Recent Labs      03/24/18   0359  03/23/18   0445  03/22/18   0331   NA  139  140  140   K  4.4  4.1  3.7   CL  101  103  104   CO2  27  27  26   AGAP  11  10  10   GLU  121*  120*  134*   BUN  19  14  16   CREA  0.85  0.84  0.79   GFRAA  >60  >60  >60   GFRNA  >60  >60  >60   CA  8.8  8.8  8.1*   SGOT  57*  126*  75*   AP  84  87  80   TP  6.7  6.8  6.4   ALB  3.4*  2.9*  3.1*   GLOB  3.3  3.9*  3.3   AGRAT  1.0*  0.7*  0.9*   MG   --   1.6*   --          Imaging:  MRI BRAIN W WO CONT [[id number]] Collected: 03/18/18 1307      Order Status: Completed Updated: 03/18/18 1319     Narrative:       MRI of the Brain with contrast: 3/18/2018  History: Lung cancer  Sequences: Axial pre and post contrast T1, FLAIR, T2, diffusion, coronal post  contrast T1 and sagittal precontrast T1-weighted images of the brain. Comparison: MRI the brain 4/21/2017  20 cc of IV MultiHance   was injected to aid in the detection of intracranial  pathology. Findings:    Supratentorial enhancing metastatic lesions are now demonstrated. These lesions  all display hemosiderin and restricted diffusion. There is a 5 mm lesion in the  right temporal white matter, 1 cm lesion right frontal white matter, left  posterior parasagittal convexity lesion measuring 14 mm x 27 x 31 mm, 14 mm left  occipital white matter lesion, 9 mm left frontal lesion, and 2 left parietal  enhancing lesions measuring respectively 7 mm and 24 mm.  There is some mild mass  upon the left ventricular atrium. The pituitary and sinuses are unremarkable. 7th and 8th nerves and orbits are unremarkable. There is some fluid in the right  mastoid air cells.       Impression:       IMPRESSION: Interval supratentorial metastatic lesions as above. DC4  The significant findings in this report have been referred to the Imaging  Navigator in order to communicate to the referring provider or his/her designee  as outlined in Section II.C.2.a.ii-iii of the ACR  Practice Guideline for Communication of Diagnostic Imaging Findings.           XR ABD (KUB) [[id number]] Collected: 03/17/18 0115     Order Status: Completed Updated: 03/17/18 0117     Narrative:       Abdomen x-ray single view. HISTORY: Nasogastric tube placement. COMPARISON: None. FINDINGS: Nasogastric tube its tip in the gastric body. Bowel gas pattern is  nonspecific.       Impression:       IMPRESSION:    Nasogastric tube its tip in the gastric body.     XR CHEST PORT [[id number]] Collected: 03/16/18 2138     Order Status: Completed Updated: 03/16/18 2146     Narrative:       EXAM:  XR CHEST PORT    INDICATION:  AMS    COMPARISON:  4/18/2017    FINDINGS: A portable AP radiograph of the chest was obtained at 2124 hours. The  patient is on a cardiac monitor.  The lungs are clear.  The cardiac and  mediastinal contours and pulmonary vascularity are normal.  The bones and soft  tissues are grossly within normal limits.        Impression:       IMPRESSION: No acute cardiopulmonary disease.         CT HEAD WITHOUT CONTRAST [[id number]] Collected: 03/16/18 2130     Order Status: Completed Updated: 03/16/18 2134     Narrative:       CT HEAD WITHOUT CONTRAST     HISTORY:  Seizure. COMPARISON: None. TECHNIQUE: Axial imaging was performed without intravenous contrast utilizing  5mm slice thickness. Sagittal and coronal reformats were performed. Radiation  dose reduction techniques were used for this study.  Our CT scanner uses one or  all of the following:    Automated exposure control, adjustment of the MAS or KUB according to patient's  size and iterative reconstruction. FINDINGS:        *BRAIN:      -  There are no early signs of territorial or lacunar infarction by CT.     -  1.3 x 1.6 cm left parietal mass with surrounding vasogenic edema. 0.6 x  0.9 cm mass in left frontal lobe with vasogenic edema. Partly calcified mass in  left superior parafalcine region measuring 2.9 x 2.6 cm. Consider the  possibility of meningioma.     -  No gross white matter abnormality by CT.    *VISUALIZED PARANASAL SINUSES: Well aerated. *MASTOIDS:  Clear. *CALVARIUM AND SCALP: Unremarkable.         Impression:       IMPRESSION:    Cerebral metastatic disease. Possible left parietal convexity meningioma.     Date of Dictation: 3/16/2018 9:30 PM         ASSESSMENT:    Problem List  Date Reviewed: 3/14/2018          Codes Class Noted    * (Principal)Epilepsy with status epilepticus ([facility] ICD-10-CM: G40.901  ICD-9-CM: 345.3  3/18/2018        Hyperkalemia ICD-10-CM: E87.5  ICD-9-CM: 276.7  3/17/2018        Abnormal EKG ICD-10-CM: R94.31  ICD-9-CM: 794.31  3/17/2018        Seizure ([facility] ICD-10-CM: R56.9  ICD-9-CM: 780.39  3/16/2018        Lung cancer ([facility] ICD-10-CM: C34.90  ICD-9-CM: 162.9  3/16/2018        Brain metastasis ([facility] ICD-10-CM: C79.31  ICD-9-CM: 198.3  3/16/2018        Acute encephalopathy ICD-10-CM: G93.40  ICD-9-CM: 348.30  3/16/2018        Bone metastases ([facility] ICD-10-CM: C79.51  ICD-9-CM: 198.5  8/30/2017        Small cell carcinoma of right lung (HCC) ICD-10-CM: C34.91  ICD-9-CM: 162.9  4/21/2017        Smoker (Chronic) ICD-10-CM: I35.174  ICD-9-CM: 305.1  Unknown        Mediastinal mass ICD-10-CM: J98.59  ICD-9-CM: 786.6  4/18/2017        SVC (superior vena cava obstruction) ICD-10-CM: I87.1  ICD-9-CM: 459.2  4/18/2017        Essential hypertension ICD-10-CM: I10  ICD-9-CM: 401.9  4/18/2017        SVC syndrome ICD-10-CM: I87.1  ICD-9-CM: 459.2  4/18/2017            Ms. Alessia Lyons is a 63 y.o. female admitted on 3/16/2018 with a primary diagnosis of lung cancer, acute encephalopathy, brain metastasis, and fever. Ms. Alessia Lyons is a well known patient of Dr. Tomas Brower. She was just seen in the clinic on 3/14/18. While planning for Nivolumab she had rapid growth of b/l cervical LAD. He discussed  the aggressive pattern of her disease. Restaging CT and MRI of brain were ordered. The plan was to start nivolumab/ipilimimab on 4/4/18. He discussed the need for her to inform family of her aggressive disease and to designate POA. Unfortunately, yesterday she was confused, lethargic and somnolent. Patient's sister was driving her home when she became more confused and start having what her sister described as generalized body shakes. 911 was called. She received 2 doses of ativan PTA to ED. In ED she was post ictal and difficult to arouse. CT head showed cerebral metastatic disease with left parietal convexity meningioma. CXR was unremarkable. CT chest on 1/30/18 showed interval treatment response with significantly decreased mediastinal and right hilar lymphadenopathy. PLAN:  Stage IV SCLC with brain mets  - s/p C1 nivolumab/ipilimumab on 3/15. C2 due 4/4  3/21 Rad Onc consult scheduled for this AM  3/22 Pt met with Dr. Kashif Ledezma yesterday. He spoke with pt's family. Plan is to watch her over the weekend to see how much she improves before making decision in regards to treatment    Seizures  3/19 CT head revealed cerebral metastatic disease with L parietal convxity meningioma. Per teleneuro, pt has ongoing L-sided seizures and was started on Fosphenytoin yesterday - currently being held for high level phenytoin. Also on Dex and Keppra. Dr. Pradhan [place] to check with Dr. Tomas Brower to see if pt needs to be transferred to [facility] to neuro service vs. daily teleneuro eval here. Pt alert - responds with \"OK\" to every question. SLP following.   3/20 Follow-up teleneuro eval pending. Remains confused, but able to speak more words today. SLP following. Continues on Dex, Keppra, and Fosphenytoin. Phenytoin level pending. 3/21 Per neuro, Dr. Jasmine Duke, hold phenytoin and allow levels to trend down. Repeat EEG. Give loading dose of Phenobarbital 90mg IV x 1. Then Phenobarbital 30mg BID.  3/22 Repeat EEG pending. Pt's expressive aphasia is improving daily. Pt was able to answer questions and use phrases appropriately during exam this AM.    3/23 Repeat EEG \"moderately abnormal EGG d/t the presence of persistent PLEDs through the recording in the L hemisphere with focus likely at frontal leads. No subclinical seizure is identified\". Follow-up teleneuro consult ordered. Con't Dex, Keppra, and Phenobarbital.  3/24 Speech improving daily. Per neuro, continue current management    Hypokalemia/Hypomagnesemia  3/19 Replete K+  3/20 K+ up to 3.8  3/24 Replete Mg+    Continue home meds  Moni SOPs  SCDs for DVT prophylaxis  Per PT, may benefit from rehab upon discharge. CM working on placement. Mckenzie Sosa NP   [facility] Hematology & Oncology  [address] [place] [address]  Office : [phone]  Fax : [phone]     Attending Addendum:  I personally evaluated the patient with Mckenzie Sosa, and agree with the assessment, findings and plan as documented. Appears well, heart regular without murmur, lungs clear, abdomen benign. Mentation/speech improving daily. C/w supportive care.           Riky Overton MD  [facility] Hematology and Oncology  [address]  Office : [phone]  Fax : [phone]

## 2018-03-24 NOTE — PROGRESS NOTES
0640-Bedside report received from [facility]. Resting in bed. No needs voiced. No s/s of acute distress. 1800-END OF SHIFT NOTE:  Pts VSS and is in no acute distress. Pt awaiting neuro status recheck by MD on Monday. Intake/Output  03/24 0701 - 03/24 1900  In: 360 [P.O.:360]  Out: 1000 [Urine:1000]   Voiding: YES  Catheter: NO  Drain:      Stool:  0 occurrences. Stool Assessment  Stool Color: Tan (Comment) (03/23/18 1353)  Stool Appearance: Soft (03/23/18 1353)  Stool Amount: Small (03/23/18 1353)  Stool Source/Status: Rectum (03/23/18 1353)    Emesis:  0 occurrences. VITAL SIGNS  Patient Vitals for the past 12 hrs:   Temp Pulse Resp BP SpO2   03/24/18 1435 98.1 °F (36.7 °C) 86 18 123/76 94 %   03/24/18 1400 - - - - 95 %   03/24/18 1041 - - - - 99 %   03/24/18 1040 97.3 °F (36.3 °C) (!) 101 18 129/78 98 %   03/24/18 0717 98.1 °F (36.7 °C) 79 18 121/88 94 %       Pain Assessment  Pain 1  Pain Scale 1: Numeric (0 - 10) (03/24/18 1435)  Pain Intensity 1: 0 (03/24/18 1435)  Patient Stated Pain Goal: 0 (03/24/18 0315)  Pain Reassessment 1: Patient sleeping (03/24/18 0315)  Pain Intervention(s) 1: Emotional support; Family Support;Repositioned (03/17/18 1201)    Ambulating  Yes    Shift report given to oncoming nurse at the bedside.     Janeth Mejía

## 2018-03-25 NOTE — PROGRESS NOTES
Problem: Falls - Risk of  Goal: *Absence of Falls  Document Ashleigh Fall Risk and appropriate interventions in the flowsheet.    Outcome: Progressing Towards Goal  Fall Risk Interventions:  Mobility Interventions: Patient to call before getting OOB    Mentation Interventions: Door open when patient unattended    Medication Interventions: Bed/chair exit alarm    Elimination Interventions: Call light in reach

## 2018-03-25 NOTE — PROGRESS NOTES
Bedside report received from [place], [facility]. Time allotted for questions and concerns.

## 2018-03-25 NOTE — PROGRESS NOTES
Respiratory Care Services     Policy Number: -[id number]    Title: Aerosolized Medication Protocol    Effective Date: 10/1998    Revised Date: 06/2013, 03/2016    Reviewed Date: 05/2014/ 03/2015 , 06/2017       I. Policy: The Aerosolized Medication Protocol shall by implemented by Respiratory Care              Practitioners (RCP) for patients with orders to receive aerosol therapy with medication. II. Purpose: To open and maintain obstructed airways, the RCP, will utilize the following   protocol to select the indicated aerosolized medication(s) and determine the most effective method of delivery to the patient. III. Patient Type: All patients who are determined to meet aerosolized medication criteria as          outlined in this protocol. IV. Responsibility: Director, [address], registered Respiratory Care                                                     Practitioners (RCPs) with documented competency in the performance of                                     respiratory therapeutic techniques. V. Equipment needed:  A. Stethoscope  B. Pulse oximeter  C. IPPB machine and circuit  D. Aerosol nebulizer  E. MDI Inhaler     VI. Protocol:   A. The following conditions are accepted indications for aerosolized medication therapy. 1. Bronchospasm/wheezing  2. Impaired mucociliary clearance  3. Tracheobronchial mucosal congestion/and laryngeal stridor  4. Diseases which commonly require aerosolized medication therapy include, but are not limited to:  a. Asthma/reactive airway disease  b. Bronchitis/emphysema (COPD)  c. Cystic fibrosis  d. Severe laryngitis/tracheitis  e. Bronchiectasis  f. Smoke inhalation or chemical trauma to the lung or upper airway  g. Physical trauma to the upper airway  h. Laryngotracheobronchitis  i. Bronchiolitis  j. Non-specific wheezing              B. Indications for bronchodilator medications will include:  a.  Bronchospasm/ wheezing  b. Asthma/reactive airway disease  c. Chronic obstructive pulmonary disease  d. Obstructive defect on pulmonary function testing  C. Administration of medications  1. If a bronchodilator or any other type of respiratory medication is needed, a physician order must be indicated in the medication section in the patients EMR. 2. When the physician specifies the medication and dosage at the time of request, the ordered medication will be used as part of the care plan. D. The following guidelines will be utilized in the evaluation and selection of the         appropriate delivery device for indicated medication(s):  1. IPPB  a. Ventilation is inadequate (rapid shallow breathing)  b. Refractory atelectasis has developed, other forms of therapy are unsuccessful  c. Inability to clear secretions  d. Need to improve lung expansion  2. Unassisted aerosol (UA) is the preferred method of aerosol delivery and indicated if  a. Ventilation is inadequate  b. Patient demonstrates wheezing   c. patient is unable to perform MDI effectively   d. Patient preference  3. Metered Dose Inhaler (MDI)   a. Patient is alert/cooperative  b. Medication(s) available in this delivery method. c. Able to perform 3 second breath hold. d. Patient has demonstrated ability to use MDI effectively  e. Patient has used MDI therapy previously, either at home or in the hospital.  f. Note: The only approved inhalers on formulary are albuterol and Spiriva. VII. Guidelines:   Monitor patients vital signs and evaluate patients clinical status. The need to change medication and/or modality may be indicated by:  1. A pulse greater than 120 bpm, or if a pulse increase of 20 bpm occurs with bronchodilator medications. 2. Significant worsening of dyspnea or wheezing occurring during or within 30 minutes of discontinuing therapy.   3. Worsening of patients sensorium (e.g. patient becomes confused or obtunded, and unable to follow directions). 4. Worsening of patients chest x-ray. 5. Change in sputum (e.g. increased pulmonary infiltrate, which might indicate need for volume expansion therapy). 6. Patient has difficulty coughing up secretions, which might indicate need for acetylcysteine and/or bronchial hygiene therapy. 7. Call physician immediately if dyspnea worsens and is not responsive to modifications allowed by protocol. VIII. Clinical Responsibility:  A. The therapy assessment guidelines will be used to evaluate all patients receiving aerosolized medications with the exception of critical care areas. 1. RCPs will perform changes in therapy per protocol. 2. It will be the responsibility of RCP to provide instruction regarding respiratory medications, aerosol therapy and proper MDI technique, as well as, spacer usage to patients ordered MDI therapy. 3. Current therapy that is part of a patients home regimen will not be discontinued. IX. Documentation  A. Document assessment findings in the respiratory assessment section of the patients EMR. B. Document changes in therapy per protocol in the respiratory orders section and in the care plan section of the patients EMR. C. Document patient education in the patient education section of the patients EMR. X. Outcome Criteria:  A. Relief of wheezes and obstruction  B. Improved cough and sputum color and consistency  C. Improved chest x-ray  D. Improved arterial oxygen tension and or SaO2  E. Improved Peak Flow on asthmatic patients        XI. Related Protocols:  A. Respiratory Patient Care Protocols  B. Bronchial Hygiene Therapy  C.  Oxygen Protocol    Reference:

## 2018-03-25 NOTE — PROGRESS NOTES
[employer] [facility]        Inpatient Hematology / Oncology Progress Note      Admission Date: 3/16/2018  9:00 PM  Reason for Admission/Hospital Course: Acute encephalopathy  Brain metastasis (HCC)  Seizure ([facility]  Lung cancer (HCC)      24 Hour Events:  Afebrile, VSS  On Dex, Keppra, and Phenobarbital  Speech improving daily  Per neuro, continue current management      ROS:  Constitutional: Negative for fever, chills. CV: Negative for chest pain, palpitations, edema. Respiratory: Negative for dyspnea, cough, wheezing. GI: Negative for nausea, abdominal pain, diarrhea. 10 point review of systems is otherwise negative with the exception of the elements mentioned above in the HPI. Allergies   Allergen Reactions    Metaxalone Other (comments)    Oxycodone Swelling       OBJECTIVE:  Patient Vitals for the past 8 hrs:   BP Temp Pulse Resp SpO2   18 0755 - - - - 100 %   18 0710 129/67 98 °F (36.7 °C) 78 18 96 %   18 0400 115/75 97.6 °F (36.4 °C) 63 18 99 %     Temp (24hrs), Av.8 °F (36.6 °C), Min:97.3 °F (36.3 °C), Max:98.2 °F (36.8 °C)     0701 -  1900  In: 120 [P.O.:120]  Out: 400 [Urine:400]    Physical Exam:  Constitutional: Well developed, well nourished female in no acute distress, sitting comfortably in the bedside chair. HEENT: Normocephalic and atraumatic. Oropharynx is clear, mucous membranes are moist. Extraocular muscles are intact. Sclerae anicteric. Neck supple without JVD. No thyromegaly present. Skin Warm and dry. No bruising and no rash noted. No erythema. No pallor. Respiratory Lungs are clear to auscultation bilaterally without wheezes, rales or rhonchi, normal air exchange without accessory muscle use. CVS Normal rate, regular rhythm and normal S1 and S2. No murmurs, gallops, or rubs. Abdomen Soft, nontender and nondistended, normoactive bowel sounds. No palpable mass. No hepatosplenomegaly.    Neuro Expressive aphasia - improving. Responds appropriately to questions this AM.   MSK Normal range of motion in general.  No edema and no tenderness. Psych Alert. Calm. Labs:      Recent Labs      03/23/18   0445   WBC  6.8   RBC  4.22   HGB  9.6*   HCT  30.0*   MCV  71.1*   MCH  22.7*   MCHC  32.0   RDW  18.1*   PLT  233   GRANS  80*   LYMPH  14   MONOS  5   EOS  0*   BASOS  0   IG  1   DF  AUTOMATED   ANEU  5.5   ABL  0.9   ABM  0.3   LESLIE  0.0   ABB  0.0   AIG  0.1        Recent Labs      03/25/18   0355  03/24/18   0359  03/23/18   0445   NA  138  139  140   K  4.3  4.4  4.1   CL  102  101  103   CO2  28  27  27   AGAP  8  11  10   GLU  121*  121*  120*   BUN  18  19  14   CREA  0.86  0.85  0.84   GFRAA  >60  >60  >60   GFRNA  >60  >60  >60   CA  8.4  8.8  8.8   SGOT  51*  57*  126*   AP  81  84  87   TP  6.5  6.7  6.8   ALB  3.4*  3.4*  2.9*   GLOB  3.1  3.3  3.9*   AGRAT  1.1*  1.0*  0.7*   MG   --    --   1.6*         Imaging:  MRI BRAIN W WO CONT [[id number]] Collected: 03/18/18 1307      Order Status: Completed Updated: 03/18/18 1319     Narrative:       MRI of the Brain with contrast: 3/18/2018  History: Lung cancer  Sequences: Axial pre and post contrast T1, FLAIR, T2, diffusion, coronal post  contrast T1 and sagittal precontrast T1-weighted images of the brain. Comparison: MRI the brain 4/21/2017  20 cc of IV MultiHance   was injected to aid in the detection of intracranial  pathology. Findings:    Supratentorial enhancing metastatic lesions are now demonstrated. These lesions  all display hemosiderin and restricted diffusion. There is a 5 mm lesion in the  right temporal white matter, 1 cm lesion right frontal white matter, left  posterior parasagittal convexity lesion measuring 14 mm x 27 x 31 mm, 14 mm left  occipital white matter lesion, 9 mm left frontal lesion, and 2 left parietal  enhancing lesions measuring respectively 7 mm and 24 mm. There is some mild mass  upon the left ventricular atrium.  The pituitary and sinuses are unremarkable. 7th and 8th nerves and orbits are unremarkable. There is some fluid in the right  mastoid air cells.       Impression:       IMPRESSION: Interval supratentorial metastatic lesions as above. DC4  The significant findings in this report have been referred to the Imaging  Navigator in order to communicate to the referring provider or his/her designee  as outlined in Section II.C.2.a.ii-iii of the ACR  Practice Guideline for Communication of Diagnostic Imaging Findings.           XR ABD (KUB) [[id number]] Collected: 03/17/18 0115     Order Status: Completed Updated: 03/17/18 0117     Narrative:       Abdomen x-ray single view. HISTORY: Nasogastric tube placement. COMPARISON: None. FINDINGS: Nasogastric tube its tip in the gastric body. Bowel gas pattern is  nonspecific.       Impression:       IMPRESSION:    Nasogastric tube its tip in the gastric body.     XR CHEST PORT [[id number]] Collected: 03/16/18 2138     Order Status: Completed Updated: 03/16/18 2146     Narrative:       EXAM:  XR CHEST PORT    INDICATION:  AMS    COMPARISON:  4/18/2017    FINDINGS: A portable AP radiograph of the chest was obtained at 2124 hours. The  patient is on a cardiac monitor.  The lungs are clear.  The cardiac and  mediastinal contours and pulmonary vascularity are normal.  The bones and soft  tissues are grossly within normal limits.        Impression:       IMPRESSION: No acute cardiopulmonary disease.         CT HEAD WITHOUT CONTRAST [[id number]] Collected: 03/16/18 2130     Order Status: Completed Updated: 03/16/18 2134     Narrative:       CT HEAD WITHOUT CONTRAST     HISTORY:  Seizure. COMPARISON: None. TECHNIQUE: Axial imaging was performed without intravenous contrast utilizing  5mm slice thickness. Sagittal and coronal reformats were performed. Radiation  dose reduction techniques were used for this study.  Our CT scanner uses one or  all of the following:    Automated exposure control, adjustment of the MAS or KUB according to patient's  size and iterative reconstruction. FINDINGS:        *BRAIN:      -  There are no early signs of territorial or lacunar infarction by CT.     -  1.3 x 1.6 cm left parietal mass with surrounding vasogenic edema. 0.6 x  0.9 cm mass in left frontal lobe with vasogenic edema. Partly calcified mass in  left superior parafalcine region measuring 2.9 x 2.6 cm. Consider the  possibility of meningioma.     -  No gross white matter abnormality by CT.    *VISUALIZED PARANASAL SINUSES: Well aerated. *MASTOIDS:  Clear. *CALVARIUM AND SCALP: Unremarkable.         Impression:       IMPRESSION:    Cerebral metastatic disease. Possible left parietal convexity meningioma.     Date of Dictation: 3/16/2018 9:30 PM         ASSESSMENT:    Problem List  Date Reviewed: 3/14/2018          Codes Class Noted    * (Principal)Epilepsy with status epilepticus ([facility] ICD-10-CM: G40.901  ICD-9-CM: 345.3  3/18/2018        Hyperkalemia ICD-10-CM: E87.5  ICD-9-CM: 276.7  3/17/2018        Abnormal EKG ICD-10-CM: R94.31  ICD-9-CM: 794.31  3/17/2018        Seizure ([facility] ICD-10-CM: R56.9  ICD-9-CM: 780.39  3/16/2018        Lung cancer ([facility] ICD-10-CM: C34.90  ICD-9-CM: 162.9  3/16/2018        Brain metastasis ([facility] ICD-10-CM: C79.31  ICD-9-CM: 198.3  3/16/2018        Acute encephalopathy ICD-10-CM: G93.40  ICD-9-CM: 348.30  3/16/2018        Bone metastases ([facility] ICD-10-CM: C79.51  ICD-9-CM: 198.5  8/30/2017        Small cell carcinoma of right lung (HCC) ICD-10-CM: C34.91  ICD-9-CM: 162.9  4/21/2017        Smoker (Chronic) ICD-10-CM: P17.923  ICD-9-CM: 305.1  Unknown        Mediastinal mass ICD-10-CM: J98.59  ICD-9-CM: 786.6  4/18/2017        SVC (superior vena cava obstruction) ICD-10-CM: I87.1  ICD-9-CM: 459.2  4/18/2017        Essential hypertension ICD-10-CM: I10  ICD-9-CM: 401.9  4/18/2017        SVC syndrome ICD-10-CM: I87.1  ICD-9-CM: 459.2  4/18/2017            Ms. Pratt Child is a 63 y.o. female admitted on 3/16/2018 with a primary diagnosis of lung cancer, acute encephalopathy, brain metastasis, and fever. Ms. Cheron Boeck is a well known patient of Dr. Leah Landau. She was just seen in the clinic on 3/14/18. While planning for Nivolumab she had rapid growth of b/l cervical LAD. He discussed  the aggressive pattern of her disease. Restaging CT and MRI of brain were ordered. The plan was to start nivolumab/ipilimimab on 4/4/18. He discussed the need for her to inform family of her aggressive disease and to designate POA. Unfortunately, yesterday she was confused, lethargic and somnolent. Patient's sister was driving her home when she became more confused and start having what her sister described as generalized body shakes. 911 was called. She received 2 doses of ativan PTA to ED. In ED she was post ictal and difficult to arouse. CT head showed cerebral metastatic disease with left parietal convexity meningioma. CXR was unremarkable. CT chest on 1/30/18 showed interval treatment response with significantly decreased mediastinal and right hilar lymphadenopathy. PLAN:  Stage IV SCLC with brain mets  - s/p C1 nivolumab/ipilimumab on 3/15. C2 due 4/4  3/21 Rad Onc consult scheduled for this AM  3/22 Pt met with Dr. Haydee Howard yesterday. He spoke with pt's family. Plan is to watch her over the weekend to see how much she improves before making decision in regards to treatment    Seizures  3/19 CT head revealed cerebral metastatic disease with L parietal convxity meningioma. Per teleneuro, pt has ongoing L-sided seizures and was started on Fosphenytoin yesterday - currently being held for high level phenytoin. Also on Dex and Keppra. Dr. Marylen Inch to check with Dr. Leah Landau to see if pt needs to be transferred to [facility] to neuro service vs. daily teleneuro eval here. Pt alert - responds with \"OK\" to every question. SLP following. 3/20  Follow-up teleneuro eval pending.   Remains confused, but able to speak more words today. SLP following. Continues on Dex, Keppra, and Fosphenytoin. Phenytoin level pending. 3/21 Per neuro, Dr. Bibiana Friedman, hold phenytoin and allow levels to trend down. Repeat EEG. Give loading dose of Phenobarbital 90mg IV x 1. Then Phenobarbital 30mg BID.  3/22 Repeat EEG pending. Pt's expressive aphasia is improving daily. Pt was able to answer questions and use phrases appropriately during exam this AM.    3/23 Repeat EEG \"moderately abnormal EGG d/t the presence of persistent PLEDs through the recording in the L hemisphere with focus likely at frontal leads. No subclinical seizure is identified\". Follow-up teleneuro consult ordered. Con't Dex, Keppra, and Phenobarbital.  3/25 Speech improving daily. Per neuro, continue current management    Hypokalemia/Hypomagnesemia  3/19 Replete K+  3/20 K+ up to 3.8  3/24 Replete Mg+    Continue home meds  Moni SOPs  SCDs for DVT prophylaxis  Per PT, may benefit from rehab upon discharge. CM working on placement. Hasmukh Nguyen NP   [facility]  [address] [place] [address]  Office : [phone]  Fax : [phone]     Attending Addendum:  I personally evaluated the patient with Hasmukh Nguyen, and agree with the assessment, findings and plan as documented. Appears well, heart regular without murmur, lungs clear, abdomen benign. Much more verbal, speaking in full sentences. The plan is to revisit what she and the family would like to proceed with tomorrow. The family wanted to see if she was going to improve over the weekend. LFTS improving. C/w supportive care.           Raji Bae MD  [facility]  [address]  Office : [phone]  Fax : [phone]

## 2018-03-25 NOTE — PROGRESS NOTES
END OF SHIFT NOTE:    Intake/Output      Voiding: yes  Catheter:no  Drain:              Stool:  0 occurrences. Stool Assessment  Stool Color: Tan (Comment) (03/23/18 1353)  Stool Appearance: Soft (03/23/18 1353)  Stool Amount: Small (03/23/18 1353)  Stool Source/Status: Rectum (03/23/18 1353)    Emesis:  0 occurrences. VITAL SIGNS  Patient Vitals for the past 12 hrs:   Temp Pulse Resp BP SpO2   03/25/18 0400 97.6 °F (36.4 °C) 63 18 115/75 99 %   03/25/18 0126 - - - - 96 %   03/24/18 2340 97.7 °F (36.5 °C) 76 18 122/78 96 %   03/24/18 2028 - - - - 96 %       Pain Assessment  Pain 1  Pain Scale 1: Visual (03/25/18 0224)  Pain Intensity 1: 0 (03/25/18 0224)  Patient Stated Pain Goal: 0 (03/25/18 0224)  Pain Reassessment 1: Patient sleeping (03/25/18 0224)  Pain Intervention(s) 1: Emotional support; Family Support;Repositioned (03/17/18 1201)    Ambulating  yes    Additional Information: Pt is resting comfortably. Pt followed all commands with no issue. Pt is still slightly confused. Shift report given to oncoming nurse at the bedside.     Linda Bailon, RN

## 2018-03-26 NOTE — PROGRESS NOTES
Shift: 03/26/18 ([id number])    AM Handoff Nurse: Twan Ruiz RN    Pain: No c/o pain during the shift. Neuro: A&Ox2-3. Disoriented to time and occasionally situation. No c/o numbness or tingling. No seizure activity during the shift. Cardio: S1/S2. SR. CV VS WNL for specified parameters. Resp: RA. Clear bilaterally. Symmetric chest wall excursion. Resp VS WNL. GI/: Voiding. Urine is yellow and clear. Last BM: 03/26/18. Stool is brown and formed. No c/o N/V. Diet: Tolerating diet. Adequate intake noted. See I&O for further details. Ambulation: Pt ambulates independently. No falls during the shift. PT assessed during shift. Linen Change: 03/26/18    Additional Information: Radiation appointment tomorrow. Leaving at 745am. Give breakfast early! Continue with POC. EOS Handoff Nurse:  Diaz Burk, [address] SIGNS  Patient Vitals for the past 12 hrs:   Temp Pulse Resp BP SpO2   03/26/18 1535 98.4 °F (36.9 °C) 85 16 111/62 100 %   03/26/18 1140 98.3 °F (36.8 °C) 73 18 128/85 99 %   03/26/18 0850 98.3 °F (36.8 °C) 84 16 105/60 99 %

## 2018-03-26 NOTE — PROGRESS NOTES
STG: Patient will answer basic yes/no comprehension questions with 75% accuracy given max cues. STG: Patient will participate in automatic speech tasks with 75% accuracy given max cues  STG: Patient will follow 1 step verbal command with visual cues with 60% accuracy with max cues. LTG: Patient will increase neuro-linguistic abilities to increase safety and awareness of deficits. Speech language pathology: Speech-language and cognitive note: Daily Note  4    NAME/AGE/GENDER: Daisy Feliz is a 63 y.o. female  DATE: 3/26/2018  PRIMARY DIAGNOSIS: Acute encephalopathy  Brain metastasis ([facility]  Seizure ([facility]  Lung cancer ([facility]       ICD-10: Treatment Diagnosis: R.41.841 Cognitive-Communication Deficit    INTERDISCIPLINARY COLLABORATION: Registered NurseASSESSMENT:Patient seen for language treatment. Pt completed simple yes/no questions 3/5, complex yes/no questions 2/5, 1-2 step commands 3/5, 3 step commands 2/5, named 2 items per minute in food category problem solving questions 2/5 and reasoning questions 3/5. Education with patient regarding progress towards goals and encouraged rest. Verbalized understanding. Continued improvements in expressive and receptive language abilities. Patient will benefit from skilled intervention to address the below impairments. ?????? ? ? This section established at most recent assessment??????????  PROBLEM LIST (Impairments causing functional limitations):  1. Expressive Language  2. Receptive language  REHABILITATION POTENTIAL FOR STATED GOALS: Guarded  PLAN OF CARE:   Patient will benefit from skilled intervention to address the following impairments. INTERVENTIONS PLANNED: (Benefits and precautions of therapy have been discussed with the patient.)  1. Cognitive-linguistic deficits  FREQUENCY/DURATION: Continue to follow patient 3 session trial to assess patient' ability to benefit from skilled therapy services to address above goals. RECOMMENDED REHABILITATION/EQUIPMENT: (at time of discharge pending progress): Due to the probability of continued deficits (see above) this patient will not likely need continued skilled speech therapy after discharge. SUBJECTIVE:   Patient actively participated in treatment    History of Present Injury/Illness: Ms. Praveen Genao  has a past medical history of Former cigarette smoker; GERD (gastroesophageal reflux disease); Hypertension; and Lung cancer ([facility] She also has no past medical history of Adverse effect of anesthesia; Difficult intubation; Malignant hyperthermia due to anesthesia; Nausea & vomiting; or Pseudocholinesterase deficiency. .  She also  has a past surgical history that includes hx tubal ligation and hx vascular access. Present Symptoms: Expressive/receptive language defiicts     Pain Intensity 1: 0  Pain Intervention(s) 1: Emotional support, Family Support, Repositioned  Current Medications:   No current facility-administered medications on file prior to encounter. Current Outpatient Prescriptions on File Prior to Encounter   Medication Sig Dispense Refill    mirtazapine (REMERON) 15 mg tablet Take 1 Tab by mouth nightly. 30 Tab 1    ondansetron hcl (ZOFRAN) 8 mg tablet Take 1 Tab by mouth every eight (8) hours as needed for Nausea. 90 Tab 1    lidocaine-prilocaine (EMLA) topical cream Apply 1/2 to 1 inch to port site one hour prior to needle access. 30 g 1    nicotine (NICODERM CQ) 7 mg/24 hr 1 Patch by TransDERmal route every twenty-four (24) hours.  amLODIPine (NORVASC) 5 mg tablet Take 5 mg by mouth daily.  senna-docusate (SHERRI-COLACE) 8.6-50 mg per tablet Take 1 Tab by mouth two (2) times a day. 60 Tab 2    raNITIdine (ZANTAC) 150 mg tablet Take 150 mg by mouth every morning.  prochlorperazine (COMPAZINE) 10 mg tablet Take 5 mg by mouth every six (6) hours as needed for Nausea.  acetaminophen (TYLENOL) 325 mg tablet Take 2 Tabs by mouth every four (4) hours as needed.  30 Tab 0     Current Dietary Status:  Mechanical soft/thin liquids      Social History/Home Situation:    Home Environment: Private residence  # Steps to Enter: 3  Rails to Enter: No  One/Two Story Residence: One story  Living Alone: No  Support Systems: Parent, Family member(s) (takes care of mother whom she lives with)  Patient Expects to be Discharged to[de-identified] Unknown  Current DME Used/Available at Home: None  Tub or Shower Type: Tub/Shower combination  Work/Activity History:   OBJECTIVE:   Oral Motor Structure/Speech:  Oral-Motor Structure/Motor Speech  Labial: No impairment  Dentition: Edentulous, Natural, Limited  Oral Hygiene: Adequate  Lingual: No impairment  Velum: No impairment    SPEECH-LANGUAGE COGNITIVE EVALUATION    Mental Status:  Neurologic State: Alert;Confused  Orientation Level: Oriented to person;Oriented to place; Disoriented to time;Oriented to situation  Cognition: Follows commands        Neuro-Linguistics:  Verbal Reasoning Tasks: Impaired        Verbal Organization: Impaired     Speech/Language Activities: Activities/Procedures listed utilized to improve expressive communication, receptive ability and cognitive ability. Required moderate cueing to increase independence with activities of daily living. Tool Used: Functional [place] Measure (FIMTM)   Score Comments   Eating       Comprehension       Expression   2     Social Interaction       Problem Solving       Memory          Score:  Initial: 1 Most Recent: X (Date: -- )   Interpretation of Tool: Provides a uniform system of measurement for disability based on the International Classification of Impairment, Disabilities and Handicaps; measures the level of a patient's disability and indicates how much assistance is required for the individual to carry out activities of daily living. Score 7 6 5 4 3 2 1   Modifier CH CI CJ CK CL CM CN   ?  Spoken Expression:    [id number] - CURRENT STATUS: CM - 80%-99% impaired, limited or restricted    - GOAL STATUS:  CL - 60%-79% impaired, limited or restricted    - D/C STATUS:  ---------------To be determined---------------  Payor: ABSOLUTE TOTAL CARE / [place] ABS[place] / Product Type: Managed Care Medicaid /   __________________________________________________________________________________________________  Safety:   After treatment position/precautions:  · Up in chair. Progression/Medical Necessity:   · Patient is expected to demonstrate progress in expressive communication and receptive ability to decrease assistance required communication, increase independence with activities of daily living and increase communication with family/caregivers. Compliance with Program/Exercises: Will assess as treatment progresses. Reason for Continuation of Services/Other Comments:  · Patient continues to require skilled intervention due to language deficits. Recommendations/Intent for next treatment session: \"Treatment next visit will focus on language tasks\".     Total Treatment Duration:  Time In: 1240  Time Out: Arturo Mckenzie MA/CCC/SLP

## 2018-03-26 NOTE — PROGRESS NOTES
[facility] pt[facility] as her insurance is not in network. After speaking to NP, pt chemo and radiation schedule is still not confirmed. CM spoke with Rachel Cox, pt's sister, to inform her. It was decided that the family and CM will reassess DC needs near the end of the week.

## 2018-03-26 NOTE — ADT AUTH CERT NOTES
Utilization Review           LOC:Acute Adult-General Medical by Darrius Starr RN        Review Status Review Entered       In Primary 3/25/2018       Details         REVIEW SUMMARY     Patient: Lauryn Tamayo  Review Number: [id number]  Review Status: In Primary     Condition Specific: Yes        OUTCOMES  Outcome Type: Primary           REVIEW DETAILS     Product: Azael Solano Adult  Subset: General Medical      (Symptom or finding within 24h)         (Excludes PO medications unless noted)     Version: Comprehensive Care 2017.2  [employer] and [employer]  © 2017 [employer] and/or one of its [place]. All Rights Reserved. CPT only © 2016 American Medical Association. All Rights Reserved.              Additional Notes       SCDs for DVT prophylaxis       Per PT, may benefit from rehab upon discharge.  CM working on placement           Azael Solano Adult-General Medical by Darrius Starr RN        Review Status Review Entered       In Primary 3/25/2018       Details         REVIEW SUMMARY     Patient: Lauryn Tamayo  Review Number: [id number]  Review Status: In Primary     Condition Specific: Yes        OUTCOMES  Outcome Type: Primary           REVIEW DETAILS     Product: Azael Solano Adult  Subset: General Medical      (Symptom or finding within 24h)         (Excludes PO medications unless noted)     Version: Comprehensive Care 2017.2  [employer]ine and [employer]  © 2017 [employer] and/or one of its [place]. All Rights Reserved. CPT only © 2016 American Medical Association.   All Rights Reserved.              Additional Notes       3/24/18       Pt w/ acute encephalopathy        Afebrile - VSS       Speech improving daily              Labs glucose 121, ast 51              Meds-        Decadron 4mg iv q6hrs       Keppra 1g po q12hrs       Mag ox po        Remeron 15mg po qhs       Protonix po qd       Mag 2g iv x1       Xopenex neb q6                Vitals- 97.3, 129/78, 101. 18. 98% on RA              Plans- Replete Mg+, cont hm meds, CM working on placement, scd/dvt proph

## 2018-03-26 NOTE — PROGRESS NOTES
CM left message with [facility] regarding placement for pt. CM will follow up today.

## 2018-03-26 NOTE — PROGRESS NOTES
Problem: Mobility Impaired (Adult and Pediatric)  Goal: *Acute Goals and Plan of Care (Insert Text)  STG:  (1.)Ms. Reina Farias will move from supine to sit and sit to supine , scoot up and down and roll side to side with SUPERVISION within 3 treatment day(s). Goal met 3/22/2018  (2.)Ms. Reina Farias will transfer from bed to chair and chair to bed with MINIMAL ASSIST using the least restrictive device within 3 treatment day(s). Goal met 3/21/2018  (3.)Ms. Reina Farias will ambulate with MINIMAL ASSIST for 30 feet with the least restrictive device within 3 treatment day(s). Goal met 3/22/2018    LTG:  (1.)Ms. Reina Farias will move from supine to sit and sit to supine , scoot up and down and roll side to side in bed with MODIFIED INDEPENDENCE within 7 treatment day(s). (2.)Ms. Reina Farias will transfer from bed to chair and chair to bed with CONTACT GUARD ASSIST using the least restrictive device within 7 treatment day(s). (3.)Ms. Reina Farias will ambulate with CONTACT GUARD ASSIST for 150 feet with the least restrictive device within 7 treatment day(s). ________________________________________________________________________________________________      PHYSICAL THERAPY: Daily Note, Treatment Day: 3rd 3/26/2018  INPATIENT: Hospital Day: 11  Payor: ABSOLUTE TOTAL CARE / [place] / Product Type: Managed Care Medicaid /      NAME/AGE/GENDER: Jean Claude Mari is a 63 y.o. female   PRIMARY DIAGNOSIS: Acute encephalopathy  Brain metastasis ([address]  Seizure ([address]  Lung cancer ([address] Epilepsy with status epilepticus ([address] Epilepsy with status epilepticus ([address]        ICD-10: Treatment Diagnosis:   · Generalized Muscle Weakness (M62.81)  · Difficulty in walking, Not elsewhere classified (R26.2)   Precaution/Allergies:  Metaxalone and Oxycodone      ASSESSMENT:     Ms. Reina Farias required SBA for bed mobility and transfers today. CGx1 was needed during bout of ambulation. No AD was needed. Pt.  Moving progressively better.   She should be safe to go home with family support at the time of hospital D/C. This section established at most recent assessment   PROBLEM LIST (Impairments causing functional limitations):  1. Decreased Strength  2. Decreased ADL/Functional Activities  3. Decreased Transfer Abilities  4. Decreased Ambulation Ability/Technique  5. Decreased Balance  6. Decreased Activity Tolerance  7. Decreased Pacing Skills  8. Decreased [place] with Home Exercise Program  9. Decreased Cognition   INTERVENTIONS PLANNED: (Benefits and precautions of physical therapy have been discussed with the patient.)  1. Balance Exercise  2. Bed Mobility  3. Cold  4. Family Education  5. Gait Training  6. Home Exercise Program (HEP)  7. Manual Therapy  8. Neuromuscular Re-education/Strengthening  9. Range of Motion (ROM)  10. Therapeutic Activites  11. Therapeutic Exercise/Strengthening  12. Transfer Training     TREATMENT PLAN: Frequency/Duration: 3 times a week for duration of hospital stay  Rehabilitation Potential For Stated Goals: Bhumika Villalobos REHABILITATION/EQUIPMENT: (at time of discharge pending progress): Due to the probability of continued deficits (see above) this patient will likely need continued skilled physical therapy after discharge. Equipment:    Walkers, Type: Rolling Walker              HISTORY:   History of Present Injury/Illness (Reason for Referral):  Patient is 63years old female with pmhx of extensive stage small cell lung cancer with brain metastasis, GERD, HTN, previous tobacco abuse presented in view of new onset seizure like activity. As per the pt's sister, pt was in her usual health until yesterday, today she appeared more confused, lethargic and somnolent. While she was driving the pt home, pt started appearing confused and then had generalized body shakes. EMS was summoned. Pt received 2 doses of ativan PTA. In ER, pt is post ictal, sleeping, difficult to arouse. Pt is DNR.   In ER, CT head showed cerebral metastatic disease with left parietal convexity meningioma, CXR was unremarkable. CT chest on 18 showed interval treatment response with significantly decreased mediastinal and right hilar lymphadenopathy. Past Medical History/Comorbidities:   Ms. Perry De Mossville  has a past medical history of Former cigarette smoker; GERD (gastroesophageal reflux disease); Hypertension; and Lung cancer ([facility] She also has no past medical history of Adverse effect of anesthesia; Difficult intubation; Malignant hyperthermia due to anesthesia; Nausea & vomiting; or Pseudocholinesterase deficiency. Ms. Perry De Mossville  has a past surgical history that includes hx tubal ligation and hx vascular access. Social History/Living Environment:   Home Environment: Private residence  # Steps to Enter: 3  Rails to Enter: No  One/Two Story Residence: One story  Living Alone: No  Support Systems: Parent, Family member(s) (takes care of mother whom she lives with)  Patient Expects to be Discharged to[de-identified] Unknown  Current DME Used/Available at Home: None  Tub or Shower Type: Tub/Shower combination  Prior Level of Function/Work/Activity:  Taking care of mother, independent, no AD, intact cognition and communication     Number of Personal Factors/Comorbidities that affect the Plan of Care: 3+: HIGH COMPLEXITY   EXAMINATION:   Most Recent Physical Functioning:   Gross Assessment:  Strength: Generally decreased, functional               Posture:     Balance:  Sitting: Intact  Standing: Impaired Bed Mobility:  Supine to Sit: Stand-by assistance  Scooting: Stand-by assistance  Wheelchair Mobility:     Transfers:  Sit to Stand: Contact guard assistance  Stand to Sit: Contact guard assistance  Gait:     Distance (ft): 250 Feet (ft)  Assistive Device:  (None)  Ambulation - Level of Assistance: Contact guard assistance      Body Structures Involved:  1. Heart  2. Lungs  3. Bones  4. Joints  5. Muscles  6. Ligaments Body Functions Affected:  1. Mental  2.  Voice and Speech  3. Cardio  4. Respiratory  5. Neuromusculoskeletal  6. Movement Related Activities and Participation Affected:  1. Learning and Applying Knowledge  2. General Tasks and Demands  3. Communication  4. Mobility  5. Self Care  6. Domestic Life  7. Interpersonal Interactions and Relationships  8. Community, Social and [place]   Number of elements that affect the Plan of Care: 4+: HIGH COMPLEXITY   CLINICAL PRESENTATION:   Presentation: Evolving clinical presentation with changing clinical characteristics: MODERATE COMPLEXITY   CLINICAL DECISION MAKIN [facility] Mobility Inpatient Short Form  How much difficulty does the patient currently have. .. Unable A Lot A Little None   1. Turning over in bed (including adjusting bedclothes, sheets and blankets)? [] 1   [] 2   [] 3   [x] 4   2. Sitting down on and standing up from a chair with arms ( e.g., wheelchair, bedside commode, etc.)   [] 1   [] 2   [] 3   [x] 4   3. Moving from lying on back to sitting on the side of the bed? [] 1   [] 2   [] 3   [x] 4   How much help from another person does the patient currently need. .. Total A Lot A Little None   4. Moving to and from a bed to a chair (including a wheelchair)? [] 1   [] 2   [x] 3   [] 4   5. Need to walk in hospital room? [] 1   [] 2   [x] 3   [] 4   6. Climbing 3-5 steps with a railing? [x] 1   [] 2   [] 3   [] 4   © , Trustees of [address] [place] [address], under license to [employer]. All rights reserved      Score:  Initial: 17 Most Recent: 19 (Date: 3/22/2018 )    Interpretation of Tool:  Represents activities that are increasingly more difficult (i.e. Bed mobility, Transfers, Gait). Score 24 23 22-20 19-15 14-10 9-7 6     Modifier CH CI CJ CK CL CM CN      ?  Mobility - Walking and Moving Around:     - CURRENT STATUS: CK - 40%-59% impaired, limited or restricted    - GOAL STATUS: CJ - 20%-39% impaired, limited or restricted    - D/C STATUS:  ---------------To be determined---------------  Payor: ABSOLUTE TOTAL CARE / [place] / Product Type: Managed Care Medicaid /      Medical Necessity:     · Patient is expected to demonstrate progress in strength, range of motion, balance and coordination to decrease assistance required with transfers, ambulation, and functional mobility. Reason for Services/Other Comments:  · Patient continues to require skilled intervention due to decreased cognition, activity tolerance, and functional mobility. Use of outcome tool(s) and clinical judgement create a POC that gives a: Questionable prediction of patient's progress: MODERATE COMPLEXITY            TREATMENT:   (In addition to Assessment/Re-Assessment sessions the following treatments were rendered)   Pre-treatment Symptoms/Complaints:  Still has expressive aphasia, but increased vocabulary and able to communicate simple phrases   Pain: Initial: 0/10  Pain Intensity 1: 0  Post Session:  0/10   Therapeutic Activity: (    23 minutes): Therapeutic activities including Bed transfers, Chair transfers, Ambulation on level ground, and exercises and moderate verbal and tactile cues for safety of transfers and mobility and awareness of enviornment to improve mobility, strength, balance and coordination. Required moderate   to promote dynamic balance in standing and promote coordination of bilateral, upper extremity(s), lower extremity(s).      Braces/Orthotics/Lines/Etc:   · O2 Device: Room air  Treatment/Session Assessment:    · Response to Treatment:  Ambulated 100 ft with CGA  and no AD  · Interdisciplinary Collaboration:   o Physical Therapist  o Registered Nurse  o Certified Nursing Assistant/Patient Care Technician  o SPT  · After treatment position/precautions:   o Bed alarm/tab alert on  o Bed/Chair-wheels locked  o Bed in low position  o Caregiver at bedside  o Call light within reach  o Family at bedside  o Nurse at bedside · Compliance with Program/Exercises: Will assess as treatment progresses. · Recommendations/Intent for next treatment session: \"Next visit will focus on reduction in assistance provided\".   Total Treatment Duration:  PT Patient Time In/Time Out  Time In: 1322  Time Out: 4605 Iman Gustafson, PT

## 2018-03-26 NOTE — PROGRESS NOTES
[facility]        Inpatient Hematology / Oncology Progress Note      Admission Date: 3/16/2018  9:00 PM  Reason for Admission/Hospital Course: Acute encephalopathy  Brain metastasis (HCC)  Seizure ([address]  Lung cancer ([address]      24 Hour Events:  Afebrile, VSS  On Dex, Keppra, and Phenobarbital  Able to carry on a conversation this AM  Pt expresses interest in proceeding with XRT - Dr. Meridith Hatchet notified      ROS:  Constitutional: Negative for fever, chills. CV: Negative for chest pain, palpitations, edema. Respiratory: Negative for dyspnea, cough, wheezing. GI: Negative for nausea, abdominal pain, diarrhea. 10 point review of systems is otherwise negative with the exception of the elements mentioned above in the HPI. Allergies   Allergen Reactions    Metaxalone Other (comments)    Oxycodone Swelling       OBJECTIVE:  Patient Vitals for the past 8 hrs:   BP Temp Pulse Resp SpO2 Weight   18 0850 105/60 98.3 °F (36.8 °C) 84 16 99 % -   18 0447 109/84 97.7 °F (36.5 °C) 66 16 99 % 110 lb (49.9 kg)     Temp (24hrs), Av.8 °F (36.6 °C), Min:97.3 °F (36.3 °C), Max:98.3 °F (36.8 °C)     0701 -  1900  In: 120 [P.O.:120]  Out: 200 [Urine:200]    Physical Exam:  Constitutional: Well developed, well nourished female in no acute distress, sitting comfortably in the hospital bed. HEENT: Normocephalic and atraumatic. Oropharynx is clear, mucous membranes are moist. Extraocular muscles are intact. Sclerae anicteric. Neck supple without JVD. No thyromegaly present. Skin Warm and dry. No bruising and no rash noted. No erythema. No pallor. Respiratory Lungs are clear to auscultation bilaterally without wheezes, rales or rhonchi, normal air exchange without accessory muscle use. CVS Normal rate, regular rhythm and normal S1 and S2. No murmurs, gallops, or rubs. Abdomen Soft, nontender and nondistended, normoactive bowel sounds. No palpable mass.   No hepatosplenomegaly. Neuro Expressive aphasia - much improved. Able to carry on a conversation this AM.   MSK Normal range of motion in general.  No edema and no tenderness. Psych Alert. Calm. Labs:      Recent Labs      03/26/18   0351   WBC  9.4   RBC  4.00*   HGB  9.2*   HCT  29.2*   MCV  73.0*   MCH  23.0*   MCHC  31.5   RDW  18.8*   PLT  219   GRANS  74   LYMPH  14*   MONOS  7   EOS  1   DF  MANUAL   ANEU  7.3   ABL  1.3   ABM  0.7   LESLIE  0.1        Recent Labs      03/26/18   0351  03/25/18   0355  03/24/18   0359   NA  138  138  139   K  4.7  4.3  4.4   CL  100  102  101   CO2  29  28  27   AGAP  9  8  11   GLU  126*  121*  121*   BUN  20  18  19   CREA  0.86  0.86  0.85   GFRAA  >60  >60  >60   GFRNA  >60  >60  >60   CA  8.5  8.4  8.8   SGOT  105*  51*  57*   AP  88  81  84   TP  6.5  6.5  6.7   ALB  3.3*  3.4*  3.4*   GLOB  3.2  3.1  3.3   AGRAT  1.0*  1.1*  1.0*   MG  2.2   --    --          Imaging:  MRI BRAIN W WO CONT [[id number]] Collected: 03/18/18 1307      Order Status: Completed Updated: 03/18/18 1319     Narrative:       MRI of the Brain with contrast: 3/18/2018  History: Lung cancer  Sequences: Axial pre and post contrast T1, FLAIR, T2, diffusion, coronal post  contrast T1 and sagittal precontrast T1-weighted images of the brain. Comparison: MRI the brain 4/21/2017  20 cc of IV MultiHance   was injected to aid in the detection of intracranial  pathology. Findings:    Supratentorial enhancing metastatic lesions are now demonstrated. These lesions  all display hemosiderin and restricted diffusion. There is a 5 mm lesion in the  right temporal white matter, 1 cm lesion right frontal white matter, left  posterior parasagittal convexity lesion measuring 14 mm x 27 x 31 mm, 14 mm left  occipital white matter lesion, 9 mm left frontal lesion, and 2 left parietal  enhancing lesions measuring respectively 7 mm and 24 mm. There is some mild mass  upon the left ventricular atrium.  The pituitary and sinuses are unremarkable. 7th and 8th nerves and orbits are unremarkable. There is some fluid in the right  mastoid air cells.       Impression:       IMPRESSION: Interval supratentorial metastatic lesions as above. DC4  The significant findings in this report have been referred to the Imaging  Navigator in order to communicate to the referring provider or his/her designee  as outlined in Section II.C.2.a.ii-iii of the ACR  Practice Guideline for Communication of Diagnostic Imaging Findings.           XR ABD (KUB) [[id number]] Collected: 03/17/18 0115     Order Status: Completed Updated: 03/17/18 0117     Narrative:       Abdomen x-ray single view. HISTORY: Nasogastric tube placement. COMPARISON: None. FINDINGS: Nasogastric tube its tip in the gastric body. Bowel gas pattern is  nonspecific.       Impression:       IMPRESSION:    Nasogastric tube its tip in the gastric body.     XR CHEST PORT [[id number]] Collected: 03/16/18 2138     Order Status: Completed Updated: 03/16/18 2146     Narrative:       EXAM:  XR CHEST PORT    INDICATION:  AMS    COMPARISON:  4/18/2017    FINDINGS: A portable AP radiograph of the chest was obtained at 2124 hours. The  patient is on a cardiac monitor.  The lungs are clear.  The cardiac and  mediastinal contours and pulmonary vascularity are normal.  The bones and soft  tissues are grossly within normal limits.        Impression:       IMPRESSION: No acute cardiopulmonary disease.         CT HEAD WITHOUT CONTRAST [[id number]] Collected: 03/16/18 2130     Order Status: Completed Updated: 03/16/18 2134     Narrative:       CT HEAD WITHOUT CONTRAST     HISTORY:  Seizure. COMPARISON: None. TECHNIQUE: Axial imaging was performed without intravenous contrast utilizing  5mm slice thickness. Sagittal and coronal reformats were performed. Radiation  dose reduction techniques were used for this study.  Our CT scanner uses one or  all of the following:    Automated exposure control, adjustment of the MAS or KUB according to patient's  size and iterative reconstruction. FINDINGS:        *BRAIN:      -  There are no early signs of territorial or lacunar infarction by CT.     -  1.3 x 1.6 cm left parietal mass with surrounding vasogenic edema. 0.6 x  0.9 cm mass in left frontal lobe with vasogenic edema. Partly calcified mass in  left superior parafalcine region measuring 2.9 x 2.6 cm. Consider the  possibility of meningioma.     -  No gross white matter abnormality by CT.    *VISUALIZED PARANASAL SINUSES: Well aerated. *MASTOIDS:  Clear. *CALVARIUM AND SCALP: Unremarkable.         Impression:       IMPRESSION:    Cerebral metastatic disease. Possible left parietal convexity meningioma.     Date of Dictation: 3/16/2018 9:30 PM         ASSESSMENT:    Problem List  Date Reviewed: 3/14/2018          Codes Class Noted    * (Principal)Epilepsy with status epilepticus ([facility] ICD-10-CM: G40.901  ICD-9-CM: 345.3  3/18/2018        Hyperkalemia ICD-10-CM: E87.5  ICD-9-CM: 276.7  3/17/2018        Abnormal EKG ICD-10-CM: R94.31  ICD-9-CM: 794.31  3/17/2018        Seizure ([facility] ICD-10-CM: R56.9  ICD-9-CM: 780.39  3/16/2018        Lung cancer ([facility] ICD-10-CM: C34.90  ICD-9-CM: 162.9  3/16/2018        Brain metastasis ([facility] ICD-10-CM: C79.31  ICD-9-CM: 198.3  3/16/2018        Acute encephalopathy ICD-10-CM: G93.40  ICD-9-CM: 348.30  3/16/2018        Bone metastases ([facility] ICD-10-CM: C79.51  ICD-9-CM: 198.5  8/30/2017        Small cell carcinoma of right lung (HCC) ICD-10-CM: C34.91  ICD-9-CM: 162.9  4/21/2017        Smoker (Chronic) ICD-10-CM: S48.439  ICD-9-CM: 305.1  Unknown        Mediastinal mass ICD-10-CM: J98.59  ICD-9-CM: 786.6  4/18/2017        SVC (superior vena cava obstruction) ICD-10-CM: I87.1  ICD-9-CM: 459.2  4/18/2017        Essential hypertension ICD-10-CM: I10  ICD-9-CM: 401.9  4/18/2017        SVC syndrome ICD-10-CM: I87.1  ICD-9-CM: 459.2  4/18/2017            MsKevin Perrinelaine Timo is a 63 y.o. female admitted on 3/16/2018 with a primary diagnosis of lung cancer, acute encephalopathy, brain metastasis, and fever. Ms. Ruby Bradford is a well known patient of Dr. Kely Welch. She was just seen in the clinic on 3/14/18. While planning for Nivolumab she had rapid growth of b/l cervical LAD. He discussed  the aggressive pattern of her disease. Restaging CT and MRI of brain were ordered. The plan was to start nivolumab/ipilimimab on 4/4/18. He discussed the need for her to inform family of her aggressive disease and to designate POA. Unfortunately, yesterday she was confused, lethargic and somnolent. Patient's sister was driving her home when she became more confused and start having what her sister described as generalized body shakes. 911 was called. She received 2 doses of ativan PTA to ED. In ED she was post ictal and difficult to arouse. CT head showed cerebral metastatic disease with left parietal convexity meningioma. CXR was unremarkable. CT chest on 1/30/18 showed interval treatment response with significantly decreased mediastinal and right hilar lymphadenopathy. PLAN:  Stage IV SCLC with brain mets  - s/p C1 nivolumab/ipilimumab on 3/15. C2 due 4/4  3/21 Rad Onc consult scheduled for this AM  3/22 Pt met with Dr. Garth Dee yesterday. He spoke with pt's family. Plan is to watch her over the weekend to see how much she improves before making decision in regards to treatment  3/26 Pt continues to improve daily with speech and ability to communicate. She expresses interest today in pursing XRT. Dr. Garth Dee notified - he states they will get her in for consent and simulation, probably at [facility] tomorrow. Seizures  3/19 CT head revealed cerebral metastatic disease with L parietal convxity meningioma. Per teleneuro, pt has ongoing L-sided seizures and was started on Fosphenytoin yesterday - currently being held for high level phenytoin. Also on Dex and Keppra.   Dr. Delarosa Reading to check with Dr. Kely Welch to see if pt needs to be transferred to [facility] to neuro service vs. daily teleneuro eval here. Pt alert - responds with \"OK\" to every question. SLP following. 3/20  Follow-up teleneuro eval pending. Remains confused, but able to speak more words today. SLP following. Continues on Dex, Keppra, and Fosphenytoin. Phenytoin level pending. 3/21 Per neuro, Dr. Vadim Stevens, hold phenytoin and allow levels to trend down. Repeat EEG. Give loading dose of Phenobarbital 90mg IV x 1. Then Phenobarbital 30mg BID.  3/22 Repeat EEG pending. Pt's expressive aphasia is improving daily. Pt was able to answer questions and use phrases appropriately during exam this AM.    3/23 Repeat EEG \"moderately abnormal EGG d/t the presence of persistent PLEDs through the recording in the L hemisphere with focus likely at frontal leads. No subclinical seizure is identified\". Follow-up teleneuro consult ordered. Con't Dex, Keppra, and Phenobarbital.  3/25 Speech improving daily. Per neuro, continue current management    Hypokalemia/Hypomagnesemia  3/19 Replete K+  3/20 K+ up to 3.8  3/24 Replete Mg+    Continue home meds  Moni SOPs  SCDs for DVT prophylaxis  Per PT, may benefit from rehab upon discharge. CM working on placement. 3/26:  Spoke with Kristy Campos (sister) with update. Mitch White NP   [facility]  [address] [place] [address]  Office : [phone]  Fax : [phone]       Attending Addendum:  I personally evaluated the patient with Mitch hWite N.PARDEEP.,  and agree with the assessment, findings and plan as documented. Appears stable, we will arrange for er to start WBRT.               Paul Ann MD  [address]  Office : [phone]  Fax : [phone]

## 2018-03-27 NOTE — PROGRESS NOTES
OT note:  Pt just arrived back to the floor and is eating lunch. Will re-attempt at a later date/time.   William Wei

## 2018-03-27 NOTE — PROGRESS NOTES
[employer] [facility]        Inpatient Hematology / Oncology Progress Note      Admission Date: 3/16/2018  9:00 PM  Reason for Admission/Hospital Course: Acute encephalopathy  Brain metastasis (HCC)  Seizure ([facility]  Lung cancer ([facility]      24 Hour Events:  Afebrile, VSS  On Dex, Keppra, and Phenobarbital  Simulation today for XRT  Multiple family members at bedside    ROS:  Constitutional: Negative for fever, chills. CV: Negative for chest pain, palpitations, edema. Respiratory: Negative for dyspnea, cough, wheezing. GI: Negative for nausea, abdominal pain, diarrhea. 10 point review of systems is otherwise negative with the exception of the elements mentioned above in the HPI. Allergies   Allergen Reactions    Metaxalone Other (comments)    Oxycodone Swelling       OBJECTIVE:  Patient Vitals for the past 8 hrs:   BP Temp Pulse Resp SpO2   18 0708 (!) 143/91 97.5 °F (36.4 °C) 76 18 96 %   18 0507 131/75 98 °F (36.7 °C) 85 18 95 %     Temp (24hrs), Av.9 °F (36.6 °C), Min:97.3 °F (36.3 °C), Max:98.4 °F (36.9 °C)     0701 -  1900  In: 135 [P.O.:120; I.V.:15]  Out: 500 [Urine:500]    Physical Exam:  Constitutional: Well developed, well nourished female in no acute distress, sitting comfortably in the hospital bed. HEENT: Normocephalic and atraumatic. Oropharynx is clear, mucous membranes are moist. Extraocular muscles are intact. Sclerae anicteric. Neck supple without JVD. No thyromegaly present. Skin Warm and dry. No bruising and no rash noted. No erythema. No pallor. Respiratory Lungs are clear to auscultation bilaterally without wheezes, rales or rhonchi, normal air exchange without accessory muscle use. CVS Normal rate, regular rhythm and normal S1 and S2. No murmurs, gallops, or rubs. Abdomen Soft, nontender and nondistended, normoactive bowel sounds. No palpable mass. No hepatosplenomegaly. Neuro Expressive aphasia - much improved.   Able to carry on a conversation this AM.   MSK Normal range of motion in general.  No edema and no tenderness. Psych Alert. Calm. Labs:      Recent Labs      03/26/18   0351   WBC  9.4   RBC  4.00*   HGB  9.2*   HCT  29.2*   MCV  73.0*   MCH  23.0*   MCHC  31.5   RDW  18.8*   PLT  219   GRANS  74   LYMPH  14*   MONOS  7   EOS  1   DF  MANUAL   ANEU  7.3   ABL  1.3   ABM  0.7   LESLIE  0.1        Recent Labs      03/27/18   0450  03/26/18   0351  03/25/18   0355   NA  137  138  138   K  4.7  4.7  4.3   CL  102  100  102   CO2  27  29  28   AGAP  8  9  8   GLU  124*  126*  121*   BUN  23  20  18   CREA  0.97  0.86  0.86   GFRAA  >60  >60  >60   GFRNA  >60  >60  >60   CA  9.0  8.5  8.4   SGOT  60*  105*  51*   AP  92  88  81   TP  6.9  6.5  6.5   ALB  3.4*  3.3*  3.4*   GLOB  3.5  3.2  3.1   AGRAT  1.0*  1.0*  1.1*   MG   --   2.2   --          Imaging:  MRI BRAIN W WO CONT [[id number]] Collected: 03/18/18 1307      Order Status: Completed Updated: 03/18/18 1319     Narrative:       MRI of the Brain with contrast: 3/18/2018  History: Lung cancer  Sequences: Axial pre and post contrast T1, FLAIR, T2, diffusion, coronal post  contrast T1 and sagittal precontrast T1-weighted images of the brain. Comparison: MRI the brain 4/21/2017  20 cc of IV MultiHance   was injected to aid in the detection of intracranial  pathology. Findings:    Supratentorial enhancing metastatic lesions are now demonstrated. These lesions  all display hemosiderin and restricted diffusion. There is a 5 mm lesion in the  right temporal white matter, 1 cm lesion right frontal white matter, left  posterior parasagittal convexity lesion measuring 14 mm x 27 x 31 mm, 14 mm left  occipital white matter lesion, 9 mm left frontal lesion, and 2 left parietal  enhancing lesions measuring respectively 7 mm and 24 mm. There is some mild mass  upon the left ventricular atrium. The pituitary and sinuses are unremarkable.   7th and 8th nerves and orbits are unremarkable. There is some fluid in the right  mastoid air cells.       Impression:       IMPRESSION: Interval supratentorial metastatic lesions as above. DC4  The significant findings in this report have been referred to the Imaging  Navigator in order to communicate to the referring provider or his/her designee  as outlined in Section II.C.2.a.ii-iii of the ACR  Practice Guideline for Communication of Diagnostic Imaging Findings.           XR ABD (KUB) [[id number]] Collected: 03/17/18 0115     Order Status: Completed Updated: 03/17/18 0117     Narrative:       Abdomen x-ray single view. HISTORY: Nasogastric tube placement. COMPARISON: None. FINDINGS: Nasogastric tube its tip in the gastric body. Bowel gas pattern is  nonspecific.       Impression:       IMPRESSION:    Nasogastric tube its tip in the gastric body.     XR CHEST PORT [[id number]] Collected: 03/16/18 2138     Order Status: Completed Updated: 03/16/18 2146     Narrative:       EXAM:  XR CHEST PORT    INDICATION:  AMS    COMPARISON:  4/18/2017    FINDINGS: A portable AP radiograph of the chest was obtained at 2124 hours. The  patient is on a cardiac monitor.  The lungs are clear.  The cardiac and  mediastinal contours and pulmonary vascularity are normal.  The bones and soft  tissues are grossly within normal limits.        Impression:       IMPRESSION: No acute cardiopulmonary disease.         CT HEAD WITHOUT CONTRAST [[id number]] Collected: 03/16/18 2130     Order Status: Completed Updated: 03/16/18 2134     Narrative:       CT HEAD WITHOUT CONTRAST     HISTORY:  Seizure. COMPARISON: None. TECHNIQUE: Axial imaging was performed without intravenous contrast utilizing  5mm slice thickness. Sagittal and coronal reformats were performed. Radiation  dose reduction techniques were used for this study.  Our CT scanner uses one or  all of the following:    Automated exposure control, adjustment of the MAS or KUB according to patient's  size and iterative reconstruction. FINDINGS:        *BRAIN:      -  There are no early signs of territorial or lacunar infarction by CT.     -  1.3 x 1.6 cm left parietal mass with surrounding vasogenic edema. 0.6 x  0.9 cm mass in left frontal lobe with vasogenic edema. Partly calcified mass in  left superior parafalcine region measuring 2.9 x 2.6 cm. Consider the  possibility of meningioma.     -  No gross white matter abnormality by CT.    *VISUALIZED PARANASAL SINUSES: Well aerated. *MASTOIDS:  Clear. *CALVARIUM AND SCALP: Unremarkable.         Impression:       IMPRESSION:    Cerebral metastatic disease. Possible left parietal convexity meningioma.     Date of Dictation: 3/16/2018 9:30 PM         ASSESSMENT:    Problem List  Date Reviewed: 3/14/2018          Codes Class Noted    * (Principal)Epilepsy with status epilepticus ([facility] ICD-10-CM: G40.901  ICD-9-CM: 345.3  3/18/2018        Hyperkalemia ICD-10-CM: E87.5  ICD-9-CM: 276.7  3/17/2018        Abnormal EKG ICD-10-CM: R94.31  ICD-9-CM: 794.31  3/17/2018        Seizure ([facility] ICD-10-CM: R56.9  ICD-9-CM: 780.39  3/16/2018        Lung cancer ([facility] ICD-10-CM: C34.90  ICD-9-CM: 162.9  3/16/2018        Brain metastasis (HCC) ICD-10-CM: C79.31  ICD-9-CM: 198.3  3/16/2018        Acute encephalopathy ICD-10-CM: G93.40  ICD-9-CM: 348.30  3/16/2018        Bone metastases ([facility] ICD-10-CM: C79.51  ICD-9-CM: 198.5  8/30/2017        Small cell carcinoma of right lung (HCC) ICD-10-CM: C34.91  ICD-9-CM: 162.9  4/21/2017        Smoker (Chronic) ICD-10-CM: G57.238  ICD-9-CM: 305.1  Unknown        Mediastinal mass ICD-10-CM: J98.59  ICD-9-CM: 786.6  4/18/2017        SVC (superior vena cava obstruction) ICD-10-CM: I87.1  ICD-9-CM: 459.2  4/18/2017        Essential hypertension ICD-10-CM: I10  ICD-9-CM: 401.9  4/18/2017        SVC syndrome ICD-10-CM: I87.1  ICD-9-CM: 459.2  4/18/2017            Ms. Kitty Mckay is a 63 y.o. female admitted on 3/16/2018 with a primary diagnosis of lung cancer, acute encephalopathy, brain metastasis, and fever. Ms. Ruby Bradford is a well known patient of Dr. Kely Welch. She was just seen in the clinic on 3/14/18. While planning for Nivolumab she had rapid growth of b/l cervical LAD. He discussed  the aggressive pattern of her disease. Restaging CT and MRI of brain were ordered. The plan was to start nivolumab/ipilimimab on 4/4/18. He discussed the need for her to inform family of her aggressive disease and to designate POA. Unfortunately, yesterday she was confused, lethargic and somnolent. Patient's sister was driving her home when she became more confused and start having what her sister described as generalized body shakes. 911 was called. She received 2 doses of ativan PTA to ED. In ED she was post ictal and difficult to arouse. CT head showed cerebral metastatic disease with left parietal convexity meningioma. CXR was unremarkable. CT chest on 1/30/18 showed interval treatment response with significantly decreased mediastinal and right hilar lymphadenopathy. PLAN:  Stage IV SCLC with brain mets  - s/p C1 nivolumab/ipilimumab on 3/15. C2 due 4/4  3/21 Rad Onc consult scheduled for this AM  3/22 Pt met with Dr. Garth Dee yesterday. He spoke with pt's family. Plan is to watch her over the weekend to see how much she improves before making decision in regards to treatment  3/26 Pt continues to improve daily with speech and ability to communicate. She expresses interest today in pursing XRT. Dr. Garth Dee notified - he states they will get her in for consent and simulation, probably at [facility] tomorrow. 3/27 Went to [facility] this AM for simulation    Seizures  3/19 CT head revealed cerebral metastatic disease with L parietal convxity meningioma. Per teleneuro, pt has ongoing L-sided seizures and was started on Fosphenytoin yesterday - currently being held for high level phenytoin. Also on Dex and Keppra.   Dr. Delarosa Reading to check with Dr. Kely Welch to see if pt needs to be transferred to [address] [place] [address] to neuro service vs. daily teleneuro eval here. Pt alert - responds with \"OK\" to every question. SLP following. 3/20  Follow-up teleneuro eval pending. Remains confused, but able to speak more words today. SLP following. Continues on Dex, Keppra, and Fosphenytoin. Phenytoin level pending. 3/21 Per neuro, Dr. Candida Corbin, hold phenytoin and allow levels to trend down. Repeat EEG. Give loading dose of Phenobarbital 90mg IV x 1. Then Phenobarbital 30mg BID.  3/22 Repeat EEG pending. Pt's expressive aphasia is improving daily. Pt was able to answer questions and use phrases appropriately during exam this AM.    3/23 Repeat EEG \"moderately abnormal EGG d/t the presence of persistent PLEDs through the recording in the L hemisphere with focus likely at frontal leads. No subclinical seizure is identified\". Follow-up teleneuro consult ordered. Con't Dex, Keppra, and Phenobarbital.  3/25 Speech improving daily. Per neuro, continue current management    Hypokalemia/Hypomagnesemia  3/19 Replete K+  3/20 K+ up to 3.8  3/24 Replete Mg+    Continue home meds  Moni SOPs  SCDs for DVT prophylaxis  Per PT, may benefit from rehab upon discharge. CM working on placement. Disposition: TBD. ?STR vs home with family. Angelo Gorman NP   [facility]  [address] [place] [address]  Office : [phone]  Fax : [phone]         Attending Addendum:  I personally evaluated the patient with Angelo Gorman, N.P.,  and agree with the assessment, findings and plan as documented. Appears stable, continue WBRT.               Regine Villeda MD  [facility]  [id number] [place] [address]  Office : [phone]  Fax : [phone]

## 2018-03-27 NOTE — PROGRESS NOTES
Speech therapy note  Attempted to see pt this pm, however, pt out of room. Pt just coming down the garcia.      Darlene Lagos MA/CARLOS MANUEL/SLP

## 2018-03-27 NOTE — PROGRESS NOTES
Problem: Mobility Impaired (Adult and Pediatric)  Goal: *Acute Goals and Plan of Care (Insert Text)  STG:  (1.)Ms. Crow Daigle will move from supine to sit and sit to supine , scoot up and down and roll side to side with SUPERVISION within 3 treatment day(s). Goal met 3/22/2018  (2.)Ms. Crow Daigle will transfer from bed to chair and chair to bed with MINIMAL ASSIST using the least restrictive device within 3 treatment day(s). Goal met 3/21/2018  (3.)Ms. Crow Daigle will ambulate with MINIMAL ASSIST for 30 feet with the least restrictive device within 3 treatment day(s). Goal met 3/22/2018    LTG:  (1.)Ms. Crow Daigle will move from supine to sit and sit to supine , scoot up and down and roll side to side in bed with MODIFIED INDEPENDENCE within 7 treatment day(s). Goal met 3/27/18  (2.)Ms. Crow Daigle will transfer from bed to chair and chair to bed with CONTACT GUARD ASSIST using the least restrictive device within 7 treatment day(s). Goal met 3/27/18  (3.)Ms. Crow Daigle will ambulate with CONTACT GUARD ASSIST for 150 feet with the least restrictive device within 7 treatment day(s). Goal met 3/27/18    New Goals (LTG) Updated: 3/27/18  (1.)Ms. Crow Daigle will move from supine to sit and sit to supine  in bed with INDEPENDENT within 7 treatment day(s). (2.)Ms. Crow Daigle will transfer from bed to chair and chair to bed with INDEPENDENT using the least restrictive device within 7 treatment day(s). (3.)Ms. Crow Daigle will ambulate with SUPERVISION for 500+ feet with the least restrictive device within 7 treatment day(s). (4.)Ms. Crow Daigle will ascend/descend 3 steps with use of 1 railing with SUPERVISION within 7 treatment days.   ________________________________________________________________________________________________        ________________________________________________________________________________________________      PHYSICAL THERAPY: Daily Note, Re-evaluation, Treatment Day: Day of Assessment, PM 3/27/2018  INPATIENT: Hospital Day: 12  Payor: ABSOLUTE TOTAL CARE / [place] / Product Type: Managed Care Medicaid /      NAME/AGE/GENDER: Jose C Macias is a 63 y.o. female   PRIMARY DIAGNOSIS: Acute encephalopathy  Brain metastasis ([address]  Seizure ([address]  Lung cancer ([address] Epilepsy with status epilepticus ([address] Epilepsy with status epilepticus ([facility]        ICD-10: Treatment Diagnosis:   · Generalized Muscle Weakness (M62.81)  · Difficulty in walking, Not elsewhere classified (R26.2)   Precaution/Allergies:  Metaxalone and Oxycodone      ASSESSMENT:     Ms. Chuy Massey is supine in bed upon contact and agreeable to PT this afternoon. Pt reports she is feeling much more like herself and is able to communicate and respond appropriately throughout our session. Pt does have occasional difficulty with finding exact word at times. Pt transitioned supine to sit EOB independently and performed STS with CGA-SBA. Pt ambulated 250 ft in hallway with CGA. Pt ambulates with narrowed JOHN and path deviations at times but no LOB noted. Pt returned to room and left sitting EOB visiting with family at bedside. Pt has met all STG and LTG established at initial evaluation. LTG have been added as seen above to reflect pt return to PLOF. Pt will continue to benefit from skilled PT interventions at this time to improve balance and reduce falls risk. This section established at most recent assessment   PROBLEM LIST (Impairments causing functional limitations):  1. Decreased Strength  2. Decreased ADL/Functional Activities  3. Decreased Transfer Abilities  4. Decreased Ambulation Ability/Technique  5. Decreased Balance  6. Decreased Activity Tolerance  7. Decreased Pacing Skills  8. Decreased [place] with Home Exercise Program  9. Decreased Cognition   INTERVENTIONS PLANNED: (Benefits and precautions of physical therapy have been discussed with the patient.)  1. Balance Exercise  2. Bed Mobility  3. Cold  4. Family Education  5. Gait Training  6.  Home Exercise Program (HEP)  7. Manual Therapy  8. Neuromuscular Re-education/Strengthening  9. Range of Motion (ROM)  10. Therapeutic Activites  11. Therapeutic Exercise/Strengthening  12. Transfer Training     TREATMENT PLAN: Frequency/Duration: 3 times a week for duration of hospital stay  Rehabilitation Potential For Stated Goals: EXCELLENT     RECOMMENDED REHABILITATION/EQUIPMENT: (at time of discharge pending progress): Due to the probability of continued deficits (see above) this patient will likely need continued skilled physical therapy after discharge. Equipment:    Walkers, Type: Rolling Walker              HISTORY:   History of Present Injury/Illness (Reason for Referral):  Patient is 63years old female with pmhx of extensive stage small cell lung cancer with brain metastasis, GERD, HTN, previous tobacco abuse presented in view of new onset seizure like activity. As per the pt's sister, pt was in her usual health until yesterday, today she appeared more confused, lethargic and somnolent. While she was driving the pt home, pt started appearing confused and then had generalized body shakes. EMS was summoned. Pt received 2 doses of ativan PTA. In ER, pt is post ictal, sleeping, difficult to arouse. Pt is DNR. In ER, CT head showed cerebral metastatic disease with left parietal convexity meningioma, CXR was unremarkable. CT chest on 1/30/18 showed interval treatment response with significantly decreased mediastinal and right hilar lymphadenopathy. Past Medical History/Comorbidities:   Ms. Crow Daigle  has a past medical history of Former cigarette smoker; GERD (gastroesophageal reflux disease); Hypertension; and Lung cancer ([facility] She also has no past medical history of Adverse effect of anesthesia; Difficult intubation; Malignant hyperthermia due to anesthesia; Nausea & vomiting; or Pseudocholinesterase deficiency. Ms. Crow Daigle  has a past surgical history that includes hx tubal ligation and hx vascular access.   Social History/Living Environment:   Home Environment: Private residence  # Steps to Enter: 3  Rails to [facility]: No  One/Two Story Residence: One story  Living Alone: No  Support Systems: Parent, Family member(s) (takes care of mother whom she lives with)  Patient Expects to be Discharged to[de-identified] Unknown  Current DME Used/Available at Home: None  Tub or Shower Type: Tub/Shower combination  Prior Level of Function/Work/Activity:  Taking care of mother, independent, no AD, intact cognition and communication     Number of Personal Factors/Comorbidities that affect the Plan of Care: 3+: HIGH COMPLEXITY   EXAMINATION:   Most Recent Physical Functioning:   Gross Assessment:                  Posture:     Balance:  Standing - Static: Fair  Standing - Dynamic : Fair Bed Mobility:  Supine to Sit: Independent  Wheelchair Mobility:     Transfers:  Sit to Stand: Contact guard assistance;Stand-by assistance  Stand to Sit: Contact guard assistance;Stand-by assistance  Gait:     Base of Support: Narrowed  Speed/Aissatou: Slow  Step Length: Left shortened;Right shortened  Gait Abnormalities: Trunk sway increased; Path deviations  Distance (ft): 250 Feet (ft)  Assistive Device:  (none)  Ambulation - Level of Assistance: Contact guard assistance      Body Structures Involved:  1. Heart  2. Lungs  3. Bones  4. Joints  5. Muscles  6. Ligaments Body Functions Affected:  1. Mental  2. Voice and Speech  3. Cardio  4. Respiratory  5. Neuromusculoskeletal  6. Movement Related Activities and Participation Affected:  1. Learning and Applying Knowledge  2. General Tasks and Demands  3. Communication  4. Mobility  5. Self Care  6. Domestic Life  7. Interpersonal Interactions and Relationships  8.  Community, Social and [place]   Number of elements that affect the Plan of Care: 4+: HIGH COMPLEXITY   CLINICAL PRESENTATION:   Presentation: Evolving clinical presentation with changing clinical characteristics: MODERATE COMPLEXITY   CLINICAL DECISION MAKING: [facility] Mobility Inpatient Short Form  How much difficulty does the patient currently have. .. Unable A Lot A Little None   1. Turning over in bed (including adjusting bedclothes, sheets and blankets)? [] 1   [] 2   [] 3   [x] 4   2. Sitting down on and standing up from a chair with arms ( e.g., wheelchair, bedside commode, etc.)   [] 1   [] 2   [] 3   [x] 4   3. Moving from lying on back to sitting on the side of the bed? [] 1   [] 2   [] 3   [x] 4   How much help from another person does the patient currently need. .. Total A Lot A Little None   4. Moving to and from a bed to a chair (including a wheelchair)? [] 1   [] 2   [x] 3   [] 4   5. Need to walk in hospital room? [] 1   [] 2   [x] 3   [] 4   6. Climbing 3-5 steps with a railing? [] 1   [] 2   [x] 3   [] 4   © 2007, Trustees of [facility], under license to [employer]. All rights reserved      Score:  Initial: 17 Most Recent: 21 (Date: 3/22/2018 )    Interpretation of Tool:  Represents activities that are increasingly more difficult (i.e. Bed mobility, Transfers, Gait). Score 24 23 22-20 19-15 14-10 9-7 6     Modifier CH CI CJ CK CL CM CN      ? Mobility - Walking and Moving Around:     - CURRENT STATUS: CJ - 20%-39% impaired, limited or restricted    - GOAL STATUS: CI - 1%-19% impaired, limited or restricted    - D/C STATUS:  ---------------To be determined---------------  Payor: ABSOLUTE TOTAL CARE / [place] / Product Type: Managed Care Medicaid /      Medical Necessity:     · Patient is expected to demonstrate progress in strength, range of motion, balance and coordination to decrease assistance required with transfers, ambulation, and functional mobility. Reason for Services/Other Comments:  · Patient continues to require skilled intervention due to decreased cognition, activity tolerance, and functional mobility.    Use of outcome tool(s) and clinical judgement create a POC that gives a: Clear prediction of patient's progress: LOW COMPLEXITY            TREATMENT:   (In addition to Assessment/Re-Assessment sessions the following treatments were rendered)   Pre-treatment Symptoms/Complaints:  None, concerned about transportation to radiation treatments  Pain: Initial: 0/10  Pain Intensity 1: 0  Post Session:  0/10   Therapeutic Activity: (    10 minutes): Therapeutic activities including Bed transfers, Ambulation on level ground, and verbal and tactile cues for safety of transfers and mobility and awareness of enviornment to improve mobility, strength, balance and coordination. Required minimal   to promote dynamic balance in standing and promote coordination of bilateral, upper extremity(s), lower extremity(s). Braces/Orthotics/Lines/Etc:   · O2 Device: Room air  Treatment/Session Assessment:    · Response to Treatment:  Ambulated 250 ft with CGA, path deviations at times. · Interdisciplinary Collaboration:   o Physical Therapist  o Registered Nurse  · After treatment position/precautions:   o Bed/Chair-wheels locked  o Bed in low position  o Caregiver at bedside  o Call light within reach  o Family at bedside  o sitting EOB   · Compliance with Program/Exercises: Will assess as treatment progresses. · Recommendations/Intent for next treatment session: \"Next visit will focus on reduction in assistance provided\".   Total Treatment Duration:  PT Patient Time In/Time Out  Time In: 1446  Time Out: [address]

## 2018-03-27 NOTE — PROGRESS NOTES
Shift: 03/27/18 ([id number])     AM Handoff Nurse: SHANEL Black     Pain: No c/o pain during the shift.      Neuro: A&Ox3-4. Occasionally disoriented to time. No c/o numbness or tingling. No seizure activity during the shift.      Cardio: S1/S2. SR. Higher BP after post activity but normal when resting. CV VS WNL for specified parameters.      Resp: RA. Clear bilaterally. Symmetric chest wall excursion. Resp VS WNL.      GI/: Voiding. Urine is yellow and clear. Last BM: 03/27/18. Stool is brown and loose. Held evening dose of magnesium. No c/o N/V.      Diet: Tolerating diet. Adequate intake noted. See I&O for further details.      Ambulation: Pt ambulates independently. No falls during the shift. PT assessed during shift.      Linen Change: 03/27/18.     Additional Information: Radiation today and tomorrow.  at 1045. Continue with POC.      EOS Handoff Nurse:  Gladis Cuevas, [address]  Patient Vitals for the past 12 hrs:   Temp Pulse Resp BP SpO2   03/27/18 1845 - 77 - 125/79 -   03/27/18 1432 98.4 °F (36.9 °C) 93 18 (!) 155/92 99 %

## 2018-03-27 NOTE — PROGRESS NOTES
END OF SHIFT NOTE:    Intake/Output      Voiding: yes  Catheter: no  Drain:              Stool:  0 occurrences. Stool Assessment  Stool Color: Liss Adie (03/26/18 1129)  Stool Appearance: Formed (03/26/18 1129)  Stool Amount: Small (03/26/18 1129)  Stool Source/Status: Rectum (03/26/18 1129)    Emesis:  0 occurrences. VITAL SIGNS  Patient Vitals for the past 12 hrs:   Temp Pulse Resp BP SpO2   03/27/18 0507 98 °F (36.7 °C) 85 18 131/75 95 %   03/27/18 0022 98.3 °F (36.8 °C) 86 18 122/52 99 %   03/26/18 1959 97.3 °F (36.3 °C) 81 19 116/60 97 %       Pain Assessment  Pain 1  Pain Scale 1: Visual (03/27/18 0313)  Pain Intensity 1: 0 (03/27/18 0313)  Patient Stated Pain Goal: 0 (03/27/18 0313)  Pain Reassessment 1: Patient sleeping (03/27/18 0313)  Pain Intervention(s) 1: Emotional support; Family Support;Repositioned (03/17/18 1201)    Ambulating  yes    Additional Information: Pt is ambulating in the room. Pt is more alert and oriented today than yesterday. No needs voiced. Pt is going to [place] for radiation this AM (0740). Shift report given to oncoming nurse at the bedside.     Tyler Brooks RN

## 2018-03-28 NOTE — PROGRESS NOTES
Problem: Mobility Impaired (Adult and Pediatric)  Goal: *Acute Goals and Plan of Care (Insert Text)  STG:  (1.)Ms. Ad Portillo will move from supine to sit and sit to supine , scoot up and down and roll side to side with SUPERVISION within 3 treatment day(s). Goal met 3/22/2018  (2.)Ms. Ad Portillo will transfer from bed to chair and chair to bed with MINIMAL ASSIST using the least restrictive device within 3 treatment day(s). Goal met 3/21/2018  (3.)Ms. Ad Portillo will ambulate with MINIMAL ASSIST for 30 feet with the least restrictive device within 3 treatment day(s). Goal met 3/22/2018    LTG:  (1.)Ms. Ad Portillo will move from supine to sit and sit to supine , scoot up and down and roll side to side in bed with MODIFIED INDEPENDENCE within 7 treatment day(s). Goal met 3/27/18  (2.)Ms. Ad Portillo will transfer from bed to chair and chair to bed with CONTACT GUARD ASSIST using the least restrictive device within 7 treatment day(s). Goal met 3/27/18  (3.)Ms. Ad Portillo will ambulate with CONTACT GUARD ASSIST for 150 feet with the least restrictive device within 7 treatment day(s). Goal met 3/27/18    New Goals (LTG) Updated: 3/27/18  (1.)Ms. Ad Portillo will move from supine to sit and sit to supine  in bed with INDEPENDENT within 7 treatment day(s). (2.)Ms. Ad Portillo will transfer from bed to chair and chair to bed with INDEPENDENT using the least restrictive device within 7 treatment day(s). (3.)Ms. Ad Portillo will ambulate with SUPERVISION for 500+ feet with the least restrictive device within 7 treatment day(s). (4.)Ms. Ad Portillo will ascend/descend 3 steps with use of 1 railing with SUPERVISION within 7 treatment days.   ________________________________________________________________________________________________        ________________________________________________________________________________________________      PHYSICAL THERAPY: Daily Note, Treatment Day: 1st, AM 3/28/2018  INPATIENT: Hospital Day: 13  Payor: ABSOLUTE TOTAL CARE / [place] [employer] / Product Type: Managed Care Medicaid /      NAME/AGE/GENDER: Jeannie Platt is a 63 y.o. female   PRIMARY DIAGNOSIS: Acute encephalopathy  Brain metastasis ([facility] [address]  Seizure ([facility] [address]  Lung cancer ([facility] [address] Epilepsy with status epilepticus ([facility] [address] Epilepsy with status epilepticus ([facility] [address]        ICD-10: Treatment Diagnosis:   · Generalized Muscle Weakness (M62.81)  · Difficulty in walking, Not elsewhere classified (R26.2)   Precaution/Allergies:  Metaxalone and Oxycodone      ASSESSMENT:     Ms. rEnestina Hurst is sitting in chair upon contact and agreeable to PT this AM.  Pt does have occasional difficulty with finding exact word at times. Performed STS with SBA. Pt ambulated 150 ft in hallway with close SBA. Pt entered stair well and descended/ascended 10 steps with SBA/CGA. She ambulated additional 350'. She was instructed in tandem walking but had difficulty following commands. She did ambulate with more narrow JOHN but not Tandem. She seemed to think she was doing what was being asked of her even with verbal and visual cues but did not complete correctly. Pt ambulates with narrowed JOHN and path deviations at times but no LOB noted. Outside room she held to handrail and performed braiding. Held tightly to handrail. Pt returned to room and left sitting in chair with family at bedside. Pt will continue to benefit from skilled PT interventions at this time to improve balance and reduce falls risk. This section established at most recent assessment   PROBLEM LIST (Impairments causing functional limitations):  1. Decreased Strength  2. Decreased ADL/Functional Activities  3. Decreased Transfer Abilities  4. Decreased Ambulation Ability/Technique  5. Decreased Balance  6. Decreased Activity Tolerance  7. Decreased Pacing Skills  8. Decreased [place] with Home Exercise Program  9.  Decreased Cognition   INTERVENTIONS PLANNED: (Benefits and precautions of physical therapy have been discussed with the patient.)  1. Balance Exercise  2. Bed Mobility  3. Cold  4. Family Education  5. Gait Training  6. Home Exercise Program (HEP)  7. Manual Therapy  8. Neuromuscular Re-education/Strengthening  9. Range of Motion (ROM)  10. Therapeutic Activites  11. Therapeutic Exercise/Strengthening  12. Transfer Training     TREATMENT PLAN: Frequency/Duration: 3 times a week for duration of hospital stay  Rehabilitation Potential For Stated Goals: EXCELLENT     RECOMMENDED REHABILITATION/EQUIPMENT: (at time of discharge pending progress): Due to the probability of continued deficits (see above) this patient will likely need continued skilled physical therapy after discharge. Equipment:    Walkers, Type: Rolling Walker              HISTORY:   History of Present Injury/Illness (Reason for Referral):  Patient is 63years old female with pmhx of extensive stage small cell lung cancer with brain metastasis, GERD, HTN, previous tobacco abuse presented in view of new onset seizure like activity. As per the pt's sister, pt was in her usual health until yesterday, today she appeared more confused, lethargic and somnolent. While she was driving the pt home, pt started appearing confused and then had generalized body shakes. EMS was summoned. Pt received 2 doses of ativan PTA. In ER, pt is post ictal, sleeping, difficult to arouse. Pt is DNR. In ER, CT head showed cerebral metastatic disease with left parietal convexity meningioma, CXR was unremarkable. CT chest on 1/30/18 showed interval treatment response with significantly decreased mediastinal and right hilar lymphadenopathy. Past Medical History/Comorbidities:   Ms. Isaiah Sanchez  has a past medical history of Former cigarette smoker; GERD (gastroesophageal reflux disease); Hypertension; and Lung cancer ([facility] [address] She also has no past medical history of Adverse effect of anesthesia; Difficult intubation; Malignant hyperthermia due to anesthesia;  Nausea & vomiting; or Pseudocholinesterase deficiency. Ms. Kassi Torres  has a past surgical history that includes hx tubal ligation and hx vascular access. Social History/Living Environment:   Home Environment: Private residence  # Steps to Enter: 3  Rails to Enter: No  One/Two Story Residence: One story  Living Alone: No  Support Systems: Parent, Family member(s) (takes care of mother whom she lives with)  Patient Expects to be Discharged to[de-identified] Unknown  Current DME Used/Available at Home: None  Tub or Shower Type: Tub/Shower combination  Prior Level of Function/Work/Activity:  Taking care of mother, independent, no AD, intact cognition and communication     Number of Personal Factors/Comorbidities that affect the Plan of Care: 3+: HIGH COMPLEXITY   EXAMINATION:   Most Recent Physical Functioning:   Gross Assessment:                  Posture:     Balance:  Sitting: Intact  Standing - Static: Fair  Standing - Dynamic : Fair Bed Mobility:     Wheelchair Mobility:     Transfers:  Sit to Stand: Stand-by assistance  Gait:     Base of Support: Center of gravity altered;Narrowed  Speed/Aissatou: Slow  Step Length: Left shortened;Right shortened  Gait Abnormalities: Path deviations; Shuffling gait;Trunk sway increased  Distance (ft): 500 Feet (ft)  Assistive Device:  (none)  Ambulation - Level of Assistance: Contact guard assistance      Body Structures Involved:  1. Heart  2. Lungs  3. Bones  4. Joints  5. Muscles  6. Ligaments Body Functions Affected:  1. Mental  2. Voice and Speech  3. Cardio  4. Respiratory  5. Neuromusculoskeletal  6. Movement Related Activities and Participation Affected:  1. Learning and Applying Knowledge  2. General Tasks and Demands  3. Communication  4. Mobility  5. Self Care  6. Domestic Life  7. Interpersonal Interactions and Relationships  8.  Community, Social and [place]   Number of elements that affect the Plan of Care: 4+: HIGH COMPLEXITY   CLINICAL PRESENTATION:   Presentation: Evolving clinical presentation with changing clinical characteristics: MODERATE COMPLEXITY   CLINICAL DECISION MAKING:   [facility] AM-PAC 6 Clicks   Basic Mobility Inpatient Short Form  How much difficulty does the patient currently have. .. Unable A Lot A Little None   1. Turning over in bed (including adjusting bedclothes, sheets and blankets)? [] 1   [] 2   [] 3   [x] 4   2. Sitting down on and standing up from a chair with arms ( e.g., wheelchair, bedside commode, etc.)   [] 1   [] 2   [] 3   [x] 4   3. Moving from lying on back to sitting on the side of the bed? [] 1   [] 2   [] 3   [x] 4   How much help from another person does the patient currently need. .. Total A Lot A Little None   4. Moving to and from a bed to a chair (including a wheelchair)? [] 1   [] 2   [x] 3   [] 4   5. Need to walk in hospital room? [] 1   [] 2   [x] 3   [] 4   6. Climbing 3-5 steps with a railing? [] 1   [] 2   [x] 3   [] 4   © 2007, Trustees of [facility], under license to [employer]. All rights reserved      Score:  Initial: 17 Most Recent: 21 (Date: 3/22/2018 )    Interpretation of Tool:  Represents activities that are increasingly more difficult (i.e. Bed mobility, Transfers, Gait). Score 24 23 22-20 19-15 14-10 9-7 6     Modifier CH CI CJ CK CL CM CN      ? Mobility - Walking and Moving Around:     - CURRENT STATUS: CJ - 20%-39% impaired, limited or restricted    - GOAL STATUS: CI - 1%-19% impaired, limited or restricted    - D/C STATUS:  ---------------To be determined---------------  Payor: ABSOLUTE TOTAL CARE / [place] / Product Type: Managed Care Medicaid /      Medical Necessity:     · Patient is expected to demonstrate progress in strength, range of motion, balance and coordination to decrease assistance required with transfers, ambulation, and functional mobility.   Reason for Services/Other Comments:  · Patient continues to require skilled intervention due to decreased cognition, activity tolerance, and functional mobility. Use of outcome tool(s) and clinical judgement create a POC that gives a: Clear prediction of patient's progress: LOW COMPLEXITY            TREATMENT:   (In addition to Assessment/Re-Assessment sessions the following treatments were rendered)   Pre-treatment Symptoms/Complaints:  None, concerned about transportation to radiation treatments  Pain: Initial: 0/10  Pain Intensity 1: 0  Post Session:  0/10   Therapeutic Activity: (    10 minutes): Therapeutic activities including Bed transfers, Ambulation on level ground, and verbal and tactile cues for safety of transfers and mobility and awareness of enviornment to improve mobility, strength, balance and coordination. Required minimal   to promote dynamic balance in standing and promote coordination of bilateral, upper extremity(s), lower extremity(s). Braces/Orthotics/Lines/Etc:   · O2 Device: Room air  Treatment/Session Assessment:    · Response to Treatment:  Fair balance with path deviations at times. · Interdisciplinary Collaboration:   o Physical Therapist  o Physical Therapy Assistant  o Registered Nurse  · After treatment position/precautions:   o Up in chair  o Bed/Chair-wheels locked  o Bed in low position  o Call light within reach  o Family at bedside   · Compliance with Program/Exercises: Seems good. · Recommendations/Intent for next treatment session: \"Next visit will focus on reduction in assistance provided\".   Total Treatment Duration:  PT Patient Time In/Time Out  Time In: 1015  Time Out: 501 Jayne Brennan PTA

## 2018-03-28 NOTE — PROGRESS NOTES
Problem: Self Care Deficits Care Plan (Adult)  Goal: *Acute Goals and Plan of Care (Insert Text)  1. Patient will complete lower body bathing and dressing with modified independence and adaptive equipment as needed. 2. Patient will complete toileting with modified independence. 3. Patient will tolerate 20 minutes of OT treatment with no rest breaks to increase activity tolerance for ADLs. 4. Patient will complete functional transfers with modified independence and adaptive equipment as needed. Timeframe: 7 visits       OCCUPATIONAL THERAPY: Daily Note, Treatment Day: 1st and PM    3/28/2018  INPATIENT: Hospital Day: 13  Payor: ABSOLUTE TOTAL CARE / [place] / Product Type: Managed Care Medicaid /      NAME/AGE/GENDER: Rafa Jeff is a 63 y.o. female   PRIMARY DIAGNOSIS:  Acute encephalopathy  Brain metastasis ([address]  Seizure ([address]  Lung cancer ([address] Epilepsy with status epilepticus ([address] Epilepsy with status epilepticus ([address]        ICD-10: Treatment Diagnosis:    · Other lack of cordination (R27.8)   Precautions/Allergies:     Metaxalone and Oxycodone      ASSESSMENT:     Ms. Janet Lawrence is a 63year old female with history of lung cancer and brain mets admitted with seizures. 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 She is typically independent with ADLs. 3/28/2018 Pt presents up in the chair upon arrival. Pt agreeable to treatment and completed functional mobility in room and in hallway with SBA/CGA. Pt returned to room to participate in UE exercises. Pt was highly distracted during exercises perseverating on conversation. Finally, pt was left up in the chair with belongings in reach. Will continue therapeutic efforts. Continue OT POC. This section established at most recent assessment   PROBLEM LIST (Impairments causing functional limitations):  1. Decreased ADL/Functional Activities  2. Decreased Transfer Abilities  3.  Decreased Ambulation Ability/Technique  4. Decreased Balance  5. Decreased Cognition   INTERVENTIONS PLANNED: (Benefits and precautions of occupational therapy have been discussed with the patient.)  1. Activities of daily living training  2. Adaptive equipment training  3. Cognitive training  4. Group therapy  5. Neuromuscular re-eduation  6. Therapeutic activity  7. Therapeutic exercise     TREATMENT PLAN: Frequency/Duration: Follow patient 3x/ week to address above goals. Rehabilitation Potential For Stated Goals: Fair     RECOMMENDED REHABILITATION/EQUIPMENT: (at time of discharge pending progress): Due to the probability of continued deficits (see above) this patient will likely need continued skilled occupational therapy after discharge. Equipment:    None at this time              OCCUPATIONAL PROFILE AND HISTORY:   History of Present Injury/Illness (Reason for Referral):  Per H&P, \"Patient is 63years old female with pmhx of extensive stage small cell lung cancer with brain metastasis, GERD, HTN, previous tobacco abuse presented in view of new onset seizure like activity. As per the pt's sister, pt was in her usual health until yesterday, today she appeared more confused, lethargic and somnolent. While she was driving the pt home, pt started appearing confused and then had generalized body shakes. EMS was summoned. Pt received 2 doses of ativan PTA. In ER, pt is post ictal, sleeping, difficult to arouse. Pt is DNR. In ER, CT head showed cerebral metastatic disease with left parietal convexity meningioma, CXR was unremarkable. CT chest on 1/30/18 showed interval treatment response with significantly decreased mediastinal and right hilar lymphadenopathy. \"  Past Medical History/Comorbidities:   Ms. Cynthia Garvey  has a past medical history of Former cigarette smoker; GERD (gastroesophageal reflux disease); Hypertension; and Lung cancer ([address] She also has no past medical history of Adverse effect of anesthesia;  Difficult intubation; Malignant hyperthermia due to anesthesia; Nausea & vomiting; or Pseudocholinesterase deficiency. Ms. Perry   has a past surgical history that includes hx tubal ligation and hx vascular access. Social History/Living Environment:   Home Environment: Private residence  # Steps to Enter: 3  Rails to Enter: No  One/Two Story Residence: One story  Living Alone: No  Support Systems: Parent, Family member(s) (takes care of mother whom she lives with)  Patient Expects to be Discharged to[de-identified] Unknown  Current DME Used/Available at Home: None  Tub or Shower Type: Tub/Shower combination  Prior Level of Function/Work/Activity:  Patient lives with her mother and is her caregiver. She is typically independent with ADLs. Number of Personal Factors/Comorbidities that affect the Plan of Care: Brief history (0):  LOW COMPLEXITY   ASSESSMENT OF OCCUPATIONAL PERFORMANCE[de-identified]   Activities of Daily Living:           Basic ADLs (From Assessment) Complex ADLs (From Assessment)   Basic ADL  Feeding: Minimum assistance  Oral Facial Hygiene/Grooming: Minimum assistance  Bathing: Moderate assistance  Type of Bath: Chlorhexidine (CHG), Bath Pack  Upper Body Dressing: Minimum assistance  Lower Body Dressing: Moderate assistance  Toileting: Minimum assistance Instrumental ADL  Meal Preparation: Total assistance  Homemaking:  Total assistance  Medication Management: Total assistance  Financial Management: Total assistance   Grooming/Bathing/Dressing Activities of Daily Living                             Bed/Mat Mobility  Sit to Stand: Stand-by assistance       Most Recent Physical Functioning:   Gross Assessment:                  Posture:     Balance:  Sitting: Intact  Standing - Static: Fair  Standing - Dynamic : Fair Bed Mobility:     Wheelchair Mobility:     Transfers:  Sit to Stand: Stand-by assistance  Stand to Sit: Stand-by assistance                Patient Vitals for the past 6 hrs:   BP SpO2 Pulse   18 1042 (!) 121/91 96 % 83 Mental Status  Neurologic State: Alert  Orientation Level: Oriented to person, Oriented to place, Oriented to situation, Disoriented to time  Cognition: Follows commands  Perception: Appears intact  Perseveration: Perseverates during conversation  Safety/Judgement: Decreased awareness of environment, Decreased awareness of need for assistance, Decreased awareness of need for safety, Decreased insight into deficits                          Physical Skills Involved:  1. Balance  2. Strength  3. Activity Tolerance Cognitive Skills Affected (resulting in the inability to perform in a timely and safe manner):  1. Perception  2. Executive Function  3. Comprehension  4. Expression Psychosocial Skills Affected:  1. Habits/Routines  2. Environmental Adaptation   Number of elements that affect the Plan of Care: 5+:  HIGH COMPLEXITY   CLINICAL DECISION MAKING:   [facility] AM-PAC 6 Clicks   Daily Activity Inpatient Short Form  How much help from another person does the patient currently need. .. Total A Lot A Little None   1. Putting on and taking off regular lower body clothing? [] 1   [] 2   [x] 3   [] 4   2. Bathing (including washing, rinsing, drying)? [] 1   [] 2   [x] 3   [] 4   3. Toileting, which includes using toilet, bedpan or urinal?   [] 1   [] 2   [x] 3   [] 4   4. Putting on and taking off regular upper body clothing? [] 1   [] 2   [x] 3   [] 4   5. Taking care of personal grooming such as brushing teeth? [] 1   [] 2   [x] 3   [] 4   6. Eating meals? [] 1   [] 2   [x] 3   [] 4   © 2007, Trustees of [facility], under license to [employer]. All rights reserved      Score:  Initial: 18 Most Recent: X (Date: -- )    Interpretation of Tool:  Represents activities that are increasingly more difficult (i.e. Bed mobility, Transfers, Gait). Score 24 23 22-20 19-15 14-10 9-7 6     Modifier CH CI CJ CK CL CM CN      ?  Self Care:     - CURRENT STATUS: CK - 40%-59% impaired, limited or restricted    - GOAL STATUS: CJ - 20%-39% impaired, limited or restricted    - D/C STATUS:  ---------------To be determined---------------  Payor: ABSOLUTE TOTAL CARE / [place] / Product Type: Managed Care Medicaid /      Medical Necessity:     · Patient demonstrates good rehab potential due to higher previous functional level. Reason for Services/Other Comments:  · Patient continues to require present interventions due to patient's inability to care for self at baseline level of function. Use of outcome tool(s) and clinical judgement create a POC that gives a: MODERATE COMPLEXITY         TREATMENT:   (In addition to Assessment/Re-Assessment sessions the following treatments were rendered)     Pre-treatment Symptoms/Complaints:  None   Pain: Initial:   Pain Intensity 1: 0  Post Session:  None      Therapeutic Activity: (15 minutes): Therapeutic activities including Chair transfers and static/dynamic standing  to improve mobility, strength and balance. Required SBA/CGA to promote static and dynamic balance in standing. Therapeutic Exercise: (16 minutes):  Exercises per grid below to improve mobility and strength. Required moderate visual and verbal cues to promote proper body mechanics and decrease perseveration during exercises. Progressed resistance, range and repetitions as indicated.    Date:  3/28/18 Date:   Date:     Activity/Exercise Parameters Parameters Parameters   Shoulder flexion/extension 15 reps with red theraband     Shoulder horizontal add/abb 15 reps with red theraband     Punches 15 reps with red theraband     Elbow flexion/extension 15 reps with red theraband     Tricep extension 15 reps with red theraband               Braces/Orthotics/Lines/Etc:   · IV  · O2 Device: Room air  Treatment/Session Assessment:    · Response to Treatment:  Tolerated well   · Interdisciplinary Collaboration:   o Certified Occupational Therapy Assistant  o Registered Nurse  · After treatment position/precautions:   o Up in chair  o Bed alarm/tab alert on  o Bed/Chair-wheels locked  o Call light within reach   · Compliance with Program/Exercises: Will assess as treatment progresses. · Recommendations/Intent for next treatment session: \"Next visit will focus on advancements to more challenging activities and reduction in assistance provided\".   Total Treatment Duration:  OT Patient Time In/Time Out  Time In: 1505  Time Out: [address] Peggy Amato

## 2018-03-28 NOTE — PROGRESS NOTES
STG: Patient will answer basic yes/no comprehension questions with 75% accuracy given max cues. STG: Patient will participate in automatic speech tasks with 75% accuracy given max cues  STG: Patient will follow 1 step verbal command with visual cues with 60% accuracy with max cues. LTG: Patient will increase neuro-linguistic abilities to increase safety and awareness of deficits. Speech language pathology: Speech-language and cognitive note: Daily Note  4    NAME/AGE/GENDER: Betzy Duncan is a 63 y.o. female  DATE: 3/28/2018  PRIMARY DIAGNOSIS: Acute encephalopathy  Brain metastasis ([facility]  Seizure ([facility]  Lung cancer ([facility]       ICD-10: Treatment Diagnosis: R.41.841 Cognitive-Communication Deficit    INTERDISCIPLINARY COLLABORATION: Registered NurseASSESSMENT:Patient seen for continued language treatment. Upon clinician's arrival, patient verbalizing frustration related to family situation. Patient engaging in emotionally driven conversation with complex sentence structure and advance vocabulary. Mild through organization difficulties noted with patient independently stating \"Hold on, let me think about how to say this. \". She benefit from brief pausing for thought organization, planning, and word finding to improve verbal communication at the conversational language. Structured therapy tasks attempted; however patient highly distracted and perseverative on family situation. Moderate-max verbal prompts to re-direct patient. Centering exercise completed in attempted to re-direct patient, but she again diverted to internal thoughts and frustrations after 5 minutes of therapy tasks. Patient participated in basic level synonyms with 25% accuracy. She repeatedly attempted to name rhyming words and required moderate-max cues to grasp and maintain synonym concept. She increased to 80% accuracy with moderate cues from clinician.  Sentence completion task completed with 20% accuracy with patient's ability to conceptualize task impacting accuracy. Max assistance to increase to 60% accuracy. Patient is exhibiting significant improvements in spontaneous speech, including ability to communicate at the conversational level. However, moderate-severe expressive language deficits persist during structured therapy tasks. Attention and perseveration are believed to impact her ability to participate in structured tasks. Patient will benefit from skilled intervention to address the below impairments. ?????? ? ? This section established at most recent assessment??????????  PROBLEM LIST (Impairments causing functional limitations):  1. Expressive Language  2. Receptive language  REHABILITATION POTENTIAL FOR STATED GOALS: Guarded  PLAN OF CARE:   Patient will benefit from skilled intervention to address the following impairments. INTERVENTIONS PLANNED: (Benefits and precautions of therapy have been discussed with the patient.)  1. Cognitive-linguistic deficits  FREQUENCY/DURATION: Continue to follow patient 3 session trial to assess patient' ability to benefit from skilled therapy services to address above goals. RECOMMENDED REHABILITATION/EQUIPMENT: (at time of discharge pending progress): Due to the probability of continued deficits (see above) this patient will not likely need continued skilled speech therapy after discharge. SUBJECTIVE:   Patient actively participated in treatment    History of Present Injury/Illness: Ms. Doyle Fraser  has a past medical history of Former cigarette smoker; GERD (gastroesophageal reflux disease); Hypertension; and Lung cancer ([facility] [address] She also has no past medical history of Adverse effect of anesthesia; Difficult intubation; Malignant hyperthermia due to anesthesia; Nausea & vomiting; or Pseudocholinesterase deficiency. .  She also  has a past surgical history that includes hx tubal ligation and hx vascular access.    Present Symptoms: Expressive/receptive language defiicts     Pain Intensity 1: 0  Pain Intervention(s) 1: Emotional support, Family Support, Repositioned  Current Medications:   No current facility-administered medications on file prior to encounter. Current Outpatient Prescriptions on File Prior to Encounter   Medication Sig Dispense Refill    mirtazapine (REMERON) 15 mg tablet Take 1 Tab by mouth nightly. 30 Tab 1    ondansetron hcl (ZOFRAN) 8 mg tablet Take 1 Tab by mouth every eight (8) hours as needed for Nausea. 90 Tab 1    lidocaine-prilocaine (EMLA) topical cream Apply 1/2 to 1 inch to port site one hour prior to needle access. 30 g 1    nicotine (NICODERM CQ) 7 mg/24 hr 1 Patch by TransDERmal route every twenty-four (24) hours.  amLODIPine (NORVASC) 5 mg tablet Take 5 mg by mouth daily.  senna-docusate (SHERRI-COLACE) 8.6-50 mg per tablet Take 1 Tab by mouth two (2) times a day. 60 Tab 2    raNITIdine (ZANTAC) 150 mg tablet Take 150 mg by mouth every morning.  prochlorperazine (COMPAZINE) 10 mg tablet Take 5 mg by mouth every six (6) hours as needed for Nausea.  acetaminophen (TYLENOL) 325 mg tablet Take 2 Tabs by mouth every four (4) hours as needed.  30 Tab 0     Current Dietary Status:  Mechanical soft/thin liquids      Social History/Home Situation:    Home Environment: Private residence  # Steps to Enter: 3  Rails to Enter: No  One/Two Story Residence: One story  Living Alone: No  Support Systems: Parent, Family member(s) (takes care of mother whom she lives with)  Patient Expects to be Discharged to[de-identified] Unknown  Current DME Used/Available at Home: None  Tub or Shower Type: Tub/Shower combination  Work/Activity History:   OBJECTIVE:   Oral Motor Structure/Speech:  Oral-Motor Structure/Motor Speech  Labial: No impairment  Dentition: Edentulous, Natural, Limited  Oral Hygiene: Adequate  Lingual: No impairment  Velum: No impairment    SPEECH-LANGUAGE COGNITIVE EVALUATION    Mental Status:  Neurologic State: Alert  Orientation Level: Oriented to person;Oriented to place;Oriented to situation;Disoriented to time  Cognition: Follows commands        Neuro-Linguistics:                 Speech/Language Activities: Activities/Procedures listed utilized to improve expressive communication, receptive ability and cognitive ability. Required moderate cueing to increase independence with activities of daily living. Tool Used: Functional Watts Measure (FIMTM)   Score Comments   Eating       Comprehension       Expression   2     Social Interaction       Problem Solving       Memory          Score:  Initial: 1 Most Recent: X (Date: -- )   Interpretation of Tool: Provides a uniform system of measurement for disability based on the International Classification of Impairment, Disabilities and Handicaps; measures the level of a patient's disability and indicates how much assistance is required for the individual to carry out activities of daily living. Score 7 6 5 4 3 2 1   Modifier CH CI CJ CK CL CM CN   ? Spoken Expression:    [id number] - CURRENT STATUS: CM - 80%-99% impaired, limited or restricted    - GOAL STATUS:  CL - 60%-79% impaired, limited or restricted    - D/C STATUS:  ---------------To be determined---------------  Payor: ABSOLUTE TOTAL CARE [place] / Product Type: Managed Care Medicaid /   __________________________________________________________________________________________________  Safety:   After treatment position/precautions:  · Up in chair. Progression/Medical Necessity:   · Patient is expected to demonstrate progress in expressive communication and receptive ability to decrease assistance required communication, increase independence with activities of daily living and increase communication with family/caregivers. Compliance with Program/Exercises: Will assess as treatment progresses.    Reason for Continuation of Services/Other Comments:  · Patient continues to require skilled intervention due to language deficits. Recommendations/Intent for next treatment session: \"Treatment next visit will focus on language tasks\".     Total Treatment Duration:  Time In: 1432  Time Out: 1453    RUBY Cheek, CCC-SLP, CBIS

## 2018-03-28 NOTE — PROGRESS NOTES
END OF SHIFT NOTE:    Pt received 1/14 XRT today. No c/o pain, N/V/D. VSS. NO needs at present. Intake/Output  03/28 0701 - 03/28 1900  In: 720 [P.O.:720]  Out: 150 [Urine:150]   Voiding: YES  Catheter: NO  Drain:              Stool:  1 occurrences. Stool Assessment  Stool Color: Cain Boom (03/28/18 0820)  Stool Appearance: Formed (03/28/18 0820)  Stool Amount: Small (03/28/18 0820)  Stool Source/Status: Rectum (03/28/18 0820)    Emesis:  0 occurrences. VITAL SIGNS  Patient Vitals for the past 12 hrs:   Temp Pulse Resp BP SpO2   03/28/18 1559 97.6 °F (36.4 °C) 89 18 142/84 97 %   03/28/18 1440 98 °F (36.7 °C) (!) 103 18 (!) 140/102 97 %   03/28/18 1042 97.5 °F (36.4 °C) 83 18 (!) 121/91 96 %   03/28/18 0705 98.6 °F (37 °C) 92 18 139/85 97 %       Pain Assessment  Pain 1  Pain Scale 1: Numeric (0 - 10) (03/28/18 1505)  Pain Intensity 1: 0 (03/28/18 1505)  Patient Stated Pain Goal: 0 (03/28/18 0127)  Pain Reassessment 1: Patient sleeping (03/28/18 0127)  Pain Intervention(s) 1: Emotional support; Family Support;Repositioned (03/17/18 1201)    Ambulating  Yes    Additional Information:     Shift report will be given to oncoming nurse at the bedside.     Katty Kaye

## 2018-03-28 NOTE — PROGRESS NOTES
Problem: Nutrition Deficit  Goal: *Optimize nutritional status  Nutrition F/U  Assessment  Diet order(s): Mechanical soft 2 gm Na with ensure enlive TID and magic cup BID  Food,Nutrition, and Pertinent History: Patient reports eating 100% of meals served and beginning to drink ensure enlive this week. I observed snacks at bedside. She had XRT simulation yesterday with plans to start XRT to brain (h/o SCLC). She is receiving decadron and remeron. Anthropometrics: Height: 5' 8\" (172.7 cm), Weight Source: Standing scale (5th floor, 3/27), Weight: 51 kg  Noted 3 pound weight gain within 1 week. Macronutrient Needs:  · EER:  5839-9907 kcal /day (30-35 kcal/kg listed BW)  · EPR:  50-65 grams protein/day (1-1.3 grams/kg listed BW)(GFR >60)  Intake/Comparative Standards: Average intake for past 7 day(s)/13 recorded meal(s): 92%. This potentially meets ~100% of kcal and ~100% of protein needs.     Intervention:   Meals and snacks: Continue current diet.   Supplementation: continue Ensure enlive TID-flavor preference added; d/c magic cup BID  Discharge nutrition plan: No discharge needs identified     Mireya Umana [address], [address] [place] [address], [phone]

## 2018-03-28 NOTE — PROGRESS NOTES
Speech therapy note  Attempted to see pt this am, however, pt going off floor.      Jadyn Barroso MA/CARLOS MANUEL/SLP

## 2018-03-28 NOTE — PROGRESS NOTES
END OF SHIFT NOTE:    Intake/Output      Voiding: yes  Catheter: no  Drain:        Stool: 1 occurrences. Stool Assessment  Stool Color: Maura Free (03/26/18 1129)  Stool Appearance: Formed (03/26/18 1129)  Stool Amount: Small (03/26/18 1129)  Stool Source/Status: Rectum (03/26/18 1129)    Emesis:  0 occurrences. VITAL SIGNS  Patient Vitals for the past 12 hrs:   Temp Pulse Resp BP SpO2   03/28/18 0534 98.1 °F (36.7 °C) 90 16 134/82 99 %   03/27/18 2325 98.1 °F (36.7 °C) 77 17 105/64 97 %   03/27/18 1919 98.1 °F (36.7 °C) 80 16 133/76 99 %       Pain Assessment  Pain 1  Pain Scale 1: Visual (03/28/18 0127)  Pain Intensity 1: 0 (03/28/18 0127)  Patient Stated Pain Goal: 0 (03/28/18 0127)  Pain Reassessment 1: Patient sleeping (03/28/18 0127)  Pain Intervention(s) 1: Emotional support; Family Support;Repositioned (03/17/18 1201)    Ambulating  yes    Additional Information: Pt is more alert today. Pt's daughter remained at the bedside during the night. No needs voiced. Shift report given to oncoming nurse at the bedside.     Claudetta Roa, RN

## 2018-03-28 NOTE — PROGRESS NOTES
[facility] Hematology & Oncology        Inpatient Hematology / Oncology Progress Note      Admission Date: 3/16/2018  9:00 PM  Reason for Admission/Hospital Course: Acute encephalopathy  Brain metastasis (HCC)  Seizure ([facility]  Lung cancer ([facility]    24 Hour Events:  No fevers, vitals stable  On Dex, Keppra, and Phenobarbital   Starting day 1/14 XRT treatments today  Daughter     ROS:  Constitutional: Negative for fever, chills. CV: Negative for chest pain, palpitations, edema. Respiratory: Negative for dyspnea, cough, wheezing. GI: Negative for nausea, abdominal pain, diarrhea. 10 point review of systems is otherwise negative with the exception of the elements mentioned above in the HPI. Allergies   Allergen Reactions    Metaxalone Other (comments)    Oxycodone Swelling       OBJECTIVE:  Patient Vitals for the past 8 hrs:   BP Temp Pulse Resp SpO2   18 1042 (!) 121/91 97.5 °F (36.4 °C) 83 18 96 %   18 0705 139/85 98.6 °F (37 °C) 92 18 97 %   18 0534 134/82 98.1 °F (36.7 °C) 90 16 99 %     Temp (24hrs), Av.1 °F (36.7 °C), Min:97.5 °F (36.4 °C), Max:98.6 °F (37 °C)     07 -  1900  In: 240 [P.O.:240]  Out: -     Physical Exam:  Constitutional: Well developed, well nourished female in no acute distress, sitting comfortably in the hospital bed. HEENT: Normocephalic and atraumatic. Oropharynx is clear, mucous membranes are moist. Extraocular muscles are intact. Sclerae anicteric. Neck supple       Skin Warm and dry. No bruising and no rash noted. No erythema. No pallor. Respiratory Lungs are clear to auscultation bilaterally without wheezes, rales or rhonchi, normal air exchange without accessory muscle use. CVS Normal rate, regular rhythm and normal S1 and S2. No murmurs, gallops, or rubs. Abdomen Soft, nontender and nondistended, normoactive bowel sounds. No palpable mass. No hepatosplenomegaly.    Neuro Expressive aphasia - continues with improving, conversing appropriately. No focal deficits   MSK Normal range of motion in general.  No edema and no tenderness. Psych Alert. Calm. Labs:      Recent Labs      03/28/18   0505  03/26/18   0351   WBC  15.6*  9.4   RBC  4.33  4.00*   HGB  10.1*  9.2*   HCT  31.7*  29.2*   MCV  73.2*  73.0*   MCH  23.3*  23.0*   MCHC  31.9  31.5   RDW  19.5*  18.8*   PLT  247  219   GRANS  84*  74   LYMPH  11*  14*   MONOS  5  7   EOS  0*  1   BASOS  0   --    IG  3   --    DF  AUTOMATED  MANUAL   ANEU  13.1*  7.3   ABL  1.7  1.3   ABM  0.8  0.7   LESLIE  0.0  0.1   ABB  0.0   --    AIG  0.5   --         Recent Labs      03/28/18   0505  03/27/18   0450  03/26/18   0351   NA  139  137  138   K  4.8  4.7  4.7   CL  100  102  100   CO2  30  27  29   AGAP  9  8  9   GLU  121*  124*  126*   BUN  22  23  20   CREA  1.00  0.97  0.86   GFRAA  >60  >60  >60   GFRNA  >60  >60  >60   CA  8.4  9.0  8.5   SGOT  48*  60*  105*   AP  87  92  88   TP  7.1  6.9  6.5   ALB  3.6  3.4*  3.3*   GLOB  3.5  3.5  3.2   AGRAT  1.0*  1.0*  1.0*   MG  2.2   --   2.2         Imaging:  MRI BRAIN W WO CONT [[id number]] Collected: 03/18/18 1307      Order Status: Completed Updated: 03/18/18 1319     Narrative:       MRI of the Brain with contrast: 3/18/2018  History: Lung cancer  Sequences: Axial pre and post contrast T1, FLAIR, T2, diffusion, coronal post  contrast T1 and sagittal precontrast T1-weighted images of the brain. Comparison: MRI the brain 4/21/2017  20 cc of IV MultiHance   was injected to aid in the detection of intracranial  pathology. Findings:    Supratentorial enhancing metastatic lesions are now demonstrated. These lesions  all display hemosiderin and restricted diffusion.  There is a 5 mm lesion in the  right temporal white matter, 1 cm lesion right frontal white matter, left  posterior parasagittal convexity lesion measuring 14 mm x 27 x 31 mm, 14 mm left  occipital white matter lesion, 9 mm left frontal lesion, and 2 left parietal  enhancing lesions measuring respectively 7 mm and 24 mm. There is some mild mass  upon the left ventricular atrium. The pituitary and sinuses are unremarkable. 7th and 8th nerves and orbits are unremarkable. There is some fluid in the right  mastoid air cells.       Impression:       IMPRESSION: Interval supratentorial metastatic lesions as above. DC4  The significant findings in this report have been referred to the Imaging  Navigator in order to communicate to the referring provider or his/her designee  as outlined in Section II.C.2.a.ii-iii of the ACR  Practice Guideline for Communication of Diagnostic Imaging Findings.           XR ABD (KUB) [[id number]] Collected: 03/17/18 0115     Order Status: Completed Updated: 03/17/18 0117     Narrative:       Abdomen x-ray single view. HISTORY: Nasogastric tube placement. COMPARISON: None. FINDINGS: Nasogastric tube its tip in the gastric body. Bowel gas pattern is  nonspecific.       Impression:       IMPRESSION:    Nasogastric tube its tip in the gastric body.     XR CHEST PORT [[id number]] Collected: 03/16/18 2138     Order Status: Completed Updated: 03/16/18 2146     Narrative:       EXAM:  XR CHEST PORT    INDICATION:  AMS    COMPARISON:  4/18/2017    FINDINGS: A portable AP radiograph of the chest was obtained at 2124 hours. The  patient is on a cardiac monitor.  The lungs are clear.  The cardiac and  mediastinal contours and pulmonary vascularity are normal.  The bones and soft  tissues are grossly within normal limits.        Impression:       IMPRESSION: No acute cardiopulmonary disease.         CT HEAD WITHOUT CONTRAST [[id number]] Collected: 03/16/18 2130     Order Status: Completed Updated: 03/16/18 2134     Narrative:       CT HEAD WITHOUT CONTRAST     HISTORY:  Seizure. COMPARISON: None. TECHNIQUE: Axial imaging was performed without intravenous contrast utilizing  5mm slice thickness. Sagittal and coronal reformats were performed. Radiation  dose reduction techniques were used for this study. Our CT scanner uses one or  all of the following:    Automated exposure control, adjustment of the MAS or KUB according to patient's  size and iterative reconstruction. FINDINGS:        *BRAIN:      -  There are no early signs of territorial or lacunar infarction by CT.     -  1.3 x 1.6 cm left parietal mass with surrounding vasogenic edema. 0.6 x  0.9 cm mass in left frontal lobe with vasogenic edema. Partly calcified mass in  left superior parafalcine region measuring 2.9 x 2.6 cm. Consider the  possibility of meningioma.     -  No gross white matter abnormality by CT.    *VISUALIZED PARANASAL SINUSES: Well aerated. *MASTOIDS:  Clear. *CALVARIUM AND SCALP: Unremarkable.         Impression:       IMPRESSION:    Cerebral metastatic disease. Possible left parietal convexity meningioma.     Date of Dictation: 3/16/2018 9:30 PM         ASSESSMENT:    Problem List  Date Reviewed: 3/14/2018          Codes Class Noted    * (Principal)Epilepsy with status epilepticus ([facility] ICD-10-CM: G40.901  ICD-9-CM: 345.3  3/18/2018        Hyperkalemia ICD-10-CM: E87.5  ICD-9-CM: 276.7  3/17/2018        Abnormal EKG ICD-10-CM: R94.31  ICD-9-CM: 794.31  3/17/2018        Seizure ([facility] ICD-10-CM: R56.9  ICD-9-CM: 780.39  3/16/2018        Lung cancer ([facility] ICD-10-CM: C34.90  ICD-9-CM: 162.9  3/16/2018        Brain metastasis ([facility] ICD-10-CM: C79.31  ICD-9-CM: 198.3  3/16/2018        Acute encephalopathy ICD-10-CM: G93.40  ICD-9-CM: 348.30  3/16/2018        Bone metastases ([facility] ICD-10-CM: C79.51  ICD-9-CM: 198.5  8/30/2017        Small cell carcinoma of right lung (HCC) ICD-10-CM: C34.91  ICD-9-CM: 162.9  4/21/2017        Smoker (Chronic) ICD-10-CM: K88.850  ICD-9-CM: 305.1  Unknown        Mediastinal mass ICD-10-CM: J98.59  ICD-9-CM: 786.6  4/18/2017        SVC (superior vena cava obstruction) ICD-10-CM: I87.1  ICD-9-CM: 459.2  4/18/2017        Essential hypertension ICD-10-CM: I10  ICD-9-CM: 401.9  4/18/2017        SVC syndrome ICD-10-CM: I87.1  ICD-9-CM: 459.2  4/18/2017            Ms. Ernestina Hurst is a 63 y.o. female admitted on 3/16/2018 with a primary diagnosis of lung cancer, acute encephalopathy, brain metastasis, and fever. Ms. Ernestina Hurst is a well known patient of Dr. Yesika Marx. She was just seen in the clinic on 3/14/18. While planning for Nivolumab she had rapid growth of b/l cervical LAD. He discussed  the aggressive pattern of her disease. Restaging CT and MRI of brain were ordered. The plan was to start nivolumab/ipilimimab on 4/4/18. He discussed the need for her to inform family of her aggressive disease and to designate POA. Unfortunately, yesterday she was confused, lethargic and somnolent. Patient's sister was driving her home when she became more confused and start having what her sister described as generalized body shakes. 911 was called. She received 2 doses of ativan PTA to ED. In ED she was post ictal and difficult to arouse. CT head showed cerebral metastatic disease with left parietal convexity meningioma. CXR was unremarkable. CT chest on 1/30/18 showed interval treatment response with significantly decreased mediastinal and right hilar lymphadenopathy. PLAN:  Stage IV SCLC with brain mets  - s/p C1 nivolumab/ipilimumab on 3/15. C2 due 4/4  3/21 Rad Onc consult scheduled for this AM  3/22 Pt met with Dr. Payton Palomino yesterday. He spoke with pt's family. Plan is to watch her over the weekend to see how much she improves before making decision in regards to treatment  3/26 Pt continues to improve daily with speech and ability to communicate. She expresses interest today in pursing XRT. Dr. Payton Palomino notified - he states they will get her in for consent and simulation, probably at [facility] tomorrow.   3/27 Went to [place] this AM for simulation  3/28 Day 1/14 XRT treatments    Seizures  3/19 CT head revealed cerebral metastatic disease with L parietal convxity meningioma. Per teleneuro, pt has ongoing L-sided seizures and was started on Fosphenytoin yesterday - currently being held for high level phenytoin. Also on Dex and Keppra. Dr. Venancio Em to check with Dr. Mery Matamoros to see if pt needs to be transferred to [facility] to neuro service vs. daily teleneuro eval here. Pt alert - responds with \"OK\" to every question. SLP following. 3/20  Follow-up teleneuro eval pending. Remains confused, but able to speak more words today. SLP following. Continues on Dex, Keppra, and Fosphenytoin. Phenytoin level pending. 3/21 Per neuro, Dr. Gerson Mueller, hold phenytoin and allow levels to trend down. Repeat EEG. Give loading dose of Phenobarbital 90mg IV x 1. Then Phenobarbital 30mg BID.  3/22 Repeat EEG pending. Pt's expressive aphasia is improving daily. Pt was able to answer questions and use phrases appropriately during exam this AM.    3/23 Repeat EEG \"moderately abnormal EGG d/t the presence of persistent PLEDs through the recording in the L hemisphere with focus likely at frontal leads. No subclinical seizure is identified\". Follow-up teleneuro consult ordered. Con't Dex, Keppra, and Phenobarbital.  3/25 Speech improving daily. Per neuro, continue current management  3/28 No new seizures reported    Hypokalemia/Hypomagnesemia  3/19 Replete K+  3/20 K+ up to 3.8  3/24 Replete Mg+    Continue home meds  Moni SOPs  SCDs for DVT prophylaxis  Per PT, may benefit from rehab upon discharge. CM working on placement. Disposition: Will remain hospitalized through completion of her radiation due to transportation issues. Case management updated            Avani Rowe NP   [facility] Hematology & Oncology  [address] [address]  [address] [place] [address]  Office : [phone]  Fax : [phone]       Attending Addendum:  I personally evaluated the patient with Frantz Duke N.P.,  and agree with the assessment, findings and plan as documented.   Appears stable, continue WBRT.              Lisandra Lynn MD  [facility]  [address] [address]  Office : [phone]  Fax : [phone]

## 2018-03-28 NOTE — PROGRESS NOTES
Problem: Falls - Risk of  Goal: *Absence of Falls  Document Ashleigh Fall Risk and appropriate interventions in the flowsheet.    Outcome: Progressing Towards Goal  Fall Risk Interventions:  Mobility Interventions: Communicate number of staff needed for ambulation/transfer    Mentation Interventions: Adequate sleep, hydration, pain control    Medication Interventions: Teach patient to arise slowly, Patient to call before getting OOB    Elimination Interventions: Call light in reach

## 2018-03-28 NOTE — PROGRESS NOTES
CM checked in with pt before she went to radiation. She stated that she plans to return home following radiation cycle and voiced no needs as it relates to DME of HH. CM will continue to follow.

## 2018-03-29 NOTE — PROGRESS NOTES
Problem: Mobility Impaired (Adult and Pediatric)  Goal: *Acute Goals and Plan of Care (Insert Text)  STG:  (1.)Ms. Orlando Gerardo will move from supine to sit and sit to supine , scoot up and down and roll side to side with SUPERVISION within 3 treatment day(s). Goal met 3/22/2018  (2.)Ms. Orlando Gerardo will transfer from bed to chair and chair to bed with MINIMAL ASSIST using the least restrictive device within 3 treatment day(s). Goal met 3/21/2018  (3.)Ms. Orlando Gerardo will ambulate with MINIMAL ASSIST for 30 feet with the least restrictive device within 3 treatment day(s). Goal met 3/22/2018    LTG:  (1.)Ms. Orlando Gerardo will move from supine to sit and sit to supine , scoot up and down and roll side to side in bed with MODIFIED INDEPENDENCE within 7 treatment day(s). Goal met 3/27/18  (2.)Ms. Orlando Gerardo will transfer from bed to chair and chair to bed with CONTACT GUARD ASSIST using the least restrictive device within 7 treatment day(s). Goal met 3/27/18  (3.)Ms. Orlando Gerardo will ambulate with CONTACT GUARD ASSIST for 150 feet with the least restrictive device within 7 treatment day(s). Goal met 3/27/18    New Goals (LTG) Updated: 3/27/18  (1.)Ms. Orlando Gerardo will move from supine to sit and sit to supine  in bed with INDEPENDENT within 7 treatment day(s). (2.)Ms. Orlando Gerardo will transfer from bed to chair and chair to bed with INDEPENDENT using the least restrictive device within 7 treatment day(s). (3.)Ms. Orlando Gerardo will ambulate with SUPERVISION for 500+ feet with the least restrictive device within 7 treatment day(s). (4.)Ms. Orlando Gerardo will ascend/descend 3 steps with use of 1 railing with SUPERVISION within 7 treatment days.   ________________________________________________________________________________________________        ________________________________________________________________________________________________      PHYSICAL THERAPY: Daily Note, Treatment Day: 2nd, AM 3/29/2018  INPATIENT: Hospital Day: 14  Payor: ABSOLUTE TOTAL CARE / [place] / Product Type: Managed Care Medicaid /      NAME/AGE/GENDER: Blaise Burger is a 63 y.o. female   PRIMARY DIAGNOSIS: Acute encephalopathy  Brain metastasis ([facility] [address]  Seizure ([facility] [address]  Lung cancer ([facility] [address] Epilepsy with status epilepticus ([facility] [address] Epilepsy with status epilepticus ([facility] [address]        ICD-10: Treatment Diagnosis:   · Generalized Muscle Weakness (M62.81)  · Difficulty in walking, Not elsewhere classified (R26.2)   Precaution/Allergies:  Metaxalone and Oxycodone      ASSESSMENT:     Ms. Anabelle Yarbrough seen this AM for gait training. Pt. Able to ambulate w/ supervision this visit w/ balance impairments noted, although pt. Is able to self correct. Session limited as this visit as EMS arrived during session for transport to radiation. Pt. Cont. To demonstrate higher level balance impairments and is at increased risk of falls, so benefits from cont. PT services to address. This section established at most recent assessment   PROBLEM LIST (Impairments causing functional limitations):  1. Decreased Strength  2. Decreased ADL/Functional Activities  3. Decreased Transfer Abilities  4. Decreased Ambulation Ability/Technique  5. Decreased Balance  6. Decreased Activity Tolerance  7. Decreased Pacing Skills  8. Decreased [place] with Home Exercise Program  9. Decreased Cognition   INTERVENTIONS PLANNED: (Benefits and precautions of physical therapy have been discussed with the patient.)  1. Balance Exercise  2. Bed Mobility  3. Cold  4. Family Education  5. Gait Training  6. Home Exercise Program (HEP)  7. Manual Therapy  8. Neuromuscular Re-education/Strengthening  9. Range of Motion (ROM)  10. Therapeutic Activites  11. Therapeutic Exercise/Strengthening  12.  Transfer Training     TREATMENT PLAN: Frequency/Duration: 3 times a week for duration of hospital stay  Rehabilitation Potential For Stated Goals: EXCELLENT     RECOMMENDED REHABILITATION/EQUIPMENT: (at time of discharge pending progress): Due to the probability of continued deficits (see above) this patient will likely need continued skilled physical therapy after discharge. Equipment:    None at this time              HISTORY:   History of Present Injury/Illness (Reason for Referral):  Patient is 63years old female with pmhx of extensive stage small cell lung cancer with brain metastasis, GERD, HTN, previous tobacco abuse presented in view of new onset seizure like activity. As per the pt's sister, pt was in her usual health until yesterday, today she appeared more confused, lethargic and somnolent. While she was driving the pt home, pt started appearing confused and then had generalized body shakes. EMS was summoned. Pt received 2 doses of ativan PTA. In ER, pt is post ictal, sleeping, difficult to arouse. Pt is DNR. In ER, CT head showed cerebral metastatic disease with left parietal convexity meningioma, CXR was unremarkable. CT chest on 1/30/18 showed interval treatment response with significantly decreased mediastinal and right hilar lymphadenopathy. Past Medical History/Comorbidities:   Ms. Teodoro Bettencourt  has a past medical history of Former cigarette smoker; GERD (gastroesophageal reflux disease); Hypertension; and Lung cancer ([facility] [address] She also has no past medical history of Adverse effect of anesthesia; Difficult intubation; Malignant hyperthermia due to anesthesia; Nausea & vomiting; or Pseudocholinesterase deficiency. Ms. Teodoro Bettencourt  has a past surgical history that includes hx tubal ligation and hx vascular access.   Social History/Living Environment:   Home Environment: Private residence  # Steps to Enter: 3  Rails to Enter: No  One/Two Story Residence: One story  Living Alone: No  Support Systems: Parent, Family member(s) (takes care of mother whom she lives with)  Patient Expects to be Discharged to[de-identified] Unknown  Current DME Used/Available at Home: None  Tub or Shower Type: Tub/Shower combination  Prior Level of Function/Work/Activity:  Taking care of mother, independent, no AD, intact cognition and communication     Number of Personal Factors/Comorbidities that affect the Plan of Care: 3+: HIGH COMPLEXITY   EXAMINATION:   Most Recent Physical Functioning:   Gross Assessment:                  Posture:  Posture (WDL): Exceptions to WDL  Posture Assessment: Trunk flexion, Rounded shoulders  Balance:  Sitting: Intact  Sitting - Static: Good (unsupported)  Sitting - Dynamic: Good (unsupported)  Standing: Impaired  Standing - Static: Good  Standing - Dynamic : Fair Bed Mobility:  Supine to Sit: Independent  Sit to Supine: Independent  Wheelchair Mobility:     Transfers:  Sit to Stand: Modified independent  Stand to Sit: Modified independent  Gait:     Speed/Aissatou: Pace decreased (<100 feet/min)  Step Length: Right shortened;Left shortened  Gait Abnormalities: Path deviations;Trunk sway increased; Other; Altered arm swing (decreased heel strike/roll over)  Distance (ft): 250 Feet (ft)  Assistive Device: Other (comment) (no A/D)  Ambulation - Level of Assistance: Supervision      Body Structures Involved:  1. Heart  2. Lungs  3. Bones  4. Joints  5. Muscles  6. Ligaments Body Functions Affected:  1. Mental  2. Voice and Speech  3. Cardio  4. Respiratory  5. Neuromusculoskeletal  6. Movement Related Activities and Participation Affected:  1. Learning and Applying Knowledge  2. General Tasks and Demands  3. Communication  4. Mobility  5. Self Care  6. Domestic Life  7. Interpersonal Interactions and Relationships  8. Community, Social and [place]   Number of elements that affect the Plan of Care: 4+: HIGH COMPLEXITY   CLINICAL PRESENTATION:   Presentation: Evolving clinical presentation with changing clinical characteristics: MODERATE COMPLEXITY   CLINICAL DECISION MAKIN [facility] Mobility Inpatient Short Form  How much difficulty does the patient currently have. .. Unable A Lot A Little None   1.   Turning over in bed (including adjusting bedclothes, sheets and blankets)? [] 1   [] 2   [] 3   [x] 4   2. Sitting down on and standing up from a chair with arms ( e.g., wheelchair, bedside commode, etc.)   [] 1   [] 2   [] 3   [x] 4   3. Moving from lying on back to sitting on the side of the bed? [] 1   [] 2   [] 3   [x] 4   How much help from another person does the patient currently need. .. Total A Lot A Little None   4. Moving to and from a bed to a chair (including a wheelchair)? [] 1   [] 2   [x] 3   [] 4   5. Need to walk in hospital room? [] 1   [] 2   [x] 3   [] 4   6. Climbing 3-5 steps with a railing? [] 1   [] 2   [x] 3   [] 4   © 2007, Trustees of [facility] [employer], under license to [employer]. All rights reserved      Score:  Initial: 17 Most Recent: 21 (Date: 3/22/2018 )    Interpretation of Tool:  Represents activities that are increasingly more difficult (i.e. Bed mobility, Transfers, Gait). Score 24 23 22-20 19-15 14-10 9-7 6     Modifier CH CI CJ CK CL CM CN      ? Mobility - Walking and Moving Around:     - CURRENT STATUS: CJ - 20%-39% impaired, limited or restricted    - GOAL STATUS: CI - 1%-19% impaired, limited or restricted    - D/C STATUS:  ---------------To be determined---------------  Payor: ABSOLUTE TOTAL CARE / [place] / Product Type: Managed Care Medicaid /      Medical Necessity:     · Patient is expected to demonstrate progress in strength, range of motion, balance and coordination to decrease assistance required with transfers, ambulation, and functional mobility. Reason for Services/Other Comments:  · Patient continues to require skilled intervention due to decreased cognition, activity tolerance, and functional mobility.    Use of outcome tool(s) and clinical judgement create a POC that gives a: Clear prediction of patient's progress: LOW COMPLEXITY            TREATMENT:   (In addition to Assessment/Re-Assessment sessions the following treatments were rendered)   Pre-treatment Symptoms/Complaints: \"If it's His time to take me back to glory, then I'm ready. \"  Pain: Initial: 0/10  Pain Intensity 1: 0  Post Session:  0/10   Gait Training ( 14 minutes):  Gait training to improve and/or restore physical functioning as related to mobility. Ambulated 250 Feet (ft) with supervision/set-up w/ cues to improve attention to R side as well as improve trunk rotation/arm swing. Assistive Device: Other (comment) (no A/D)  Ambulation - Level of Assistance: Supervision  Distance (ft): 250 Feet (ft)    Braces/Orthotics/Lines/Etc:   · O2 Device: Room air  Treatment/Session Assessment:    · Response to Treatment:  Fair balance with path deviations at times. · Interdisciplinary Collaboration:   o Physical Therapist  o Registered Nurse  · After treatment position/precautions:   o on stretcher w/ EMs for transportation to XRT   · Compliance with Program/Exercises: good. · Recommendations/Intent for next treatment session: \"Next visit will focus on reduction in assistance provided\".   Total Treatment Duration:  PT Patient Time In/Time Out  Time In: 1056  Time Out: 7335 [facility], PT

## 2018-03-29 NOTE — PROGRESS NOTES
END OF SHIFT NOTE:    Intake/Output  03/28 1901 - 03/29 0700  In: -   Out: 500 [Urine:500]   Voiding: YES  Catheter: NO  Drain:              Stool:  0 occurrences. Stool Assessment  Stool Color: Earmon Rom (03/29/18 0515)  Stool Appearance: Formed (03/29/18 0515)  Stool Amount: Small (03/29/18 0515)  Stool Source/Status: Rectum (03/29/18 0515)    Emesis:  0 occurrences. VITAL SIGNS  Patient Vitals for the past 12 hrs:   Temp Pulse Resp BP SpO2   03/29/18 0244 98 °F (36.7 °C) 84 16 104/65 95 %   03/29/18 0022 97.9 °F (36.6 °C) 85 17 108/62 99 %   03/28/18 2022 97.5 °F (36.4 °C) 91 20 128/53 98 %       Pain Assessment  Pain 1  Pain Scale 1: Visual (03/29/18 0244)  Pain Intensity 1: 0 (03/29/18 0244)  Patient Stated Pain Goal: 0 (03/29/18 0244)  Pain Reassessment 1: Patient sleeping (03/29/18 0244)  Pain Intervention(s) 1: Emotional support; Family Support;Repositioned (03/17/18 1201)    Ambulating  Yes    Additional Information:   Pt rested well last night, denies any pain. Ready for XRT this AM.     Shift report given to oncoming nurse at the bedside.     Jerrod Mosley RN

## 2018-03-29 NOTE — PROGRESS NOTES
Problem: Falls - Risk of  Goal: *Absence of Falls  Document Ashleigh Fall Risk and appropriate interventions in the flowsheet.    Outcome: Progressing Towards Goal  Fall Risk Interventions:  Mobility Interventions: Communicate number of staff needed for ambulation/transfer    Mentation Interventions: Adequate sleep, hydration, pain control    Medication Interventions: Patient to call before getting OOB, Teach patient to arise slowly    Elimination Interventions: Call light in reach, Patient to call for help with toileting needs

## 2018-03-29 NOTE — PROGRESS NOTES
Problem: Self Care Deficits Care Plan (Adult)  Goal: *Acute Goals and Plan of Care (Insert Text)  1. Patient will complete lower body bathing and dressing with modified independence and adaptive equipment as needed. 2. Patient will complete toileting with modified independence. 3. Patient will tolerate 20 minutes of OT treatment with no rest breaks to increase activity tolerance for ADLs. 4. Patient will complete functional transfers with modified independence and adaptive equipment as needed. Timeframe: 7 visits       OCCUPATIONAL THERAPY: Daily Note, Treatment Day: 1st and PM    3/29/2018  INPATIENT: Hospital Day: 14  Payor: ABSOLUTE TOTAL CARE / [place] / Product Type: Managed Care Medicaid /      NAME/AGE/GENDER: Betzy Duncan is a 63 y.o. female   PRIMARY DIAGNOSIS:  Acute encephalopathy  Brain metastasis ([address]  Seizure ([address]  Lung cancer ([address] Epilepsy with status epilepticus ([address] Epilepsy with status epilepticus ([address]        ICD-10: Treatment Diagnosis:    · Other lack of cordination (R27.8)   Precautions/Allergies:     Metaxalone and Oxycodone      ASSESSMENT:     Ms. Ras Almaguer is a 63year old female with history of lung cancer and brain mets admitted with seizures. 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 She is typically independent with ADLs.       3/29/2018 Patient pleasant and agreeable to OT treatment. Reports no pain. Participated in lower body dressing, toileting, and grooming at sink. See below for assistance required. Practiced functional mobility in hallway with CGA for balance. Able to identify items in hallway with 100% accuracy. Patient is progressing towards goals. Will continue to follow. This section established at most recent assessment   PROBLEM LIST (Impairments causing functional limitations):  1. Decreased ADL/Functional Activities  2. Decreased Transfer Abilities  3.  Decreased Ambulation Ability/Technique  4. Decreased Balance  5. Decreased Cognition   INTERVENTIONS PLANNED: (Benefits and precautions of occupational therapy have been discussed with the patient.)  1. Activities of daily living training  2. Adaptive equipment training  3. Cognitive training  4. Group therapy  5. Neuromuscular re-eduation  6. Therapeutic activity  7. Therapeutic exercise     TREATMENT PLAN: Frequency/Duration: Follow patient 3x/ week to address above goals. Rehabilitation Potential For Stated Goals: Fair     RECOMMENDED REHABILITATION/EQUIPMENT: (at time of discharge pending progress): Due to the probability of continued deficits (see above) this patient will likely need continued skilled occupational therapy after discharge. Equipment:    None at this time              OCCUPATIONAL PROFILE AND HISTORY:   History of Present Injury/Illness (Reason for Referral):  Per H&P, \"Patient is 63years old female with pmhx of extensive stage small cell lung cancer with brain metastasis, GERD, HTN, previous tobacco abuse presented in view of new onset seizure like activity. As per the pt's sister, pt was in her usual health until yesterday, today she appeared more confused, lethargic and somnolent. While she was driving the pt home, pt started appearing confused and then had generalized body shakes. EMS was summoned. Pt received 2 doses of ativan PTA. In ER, pt is post ictal, sleeping, difficult to arouse. Pt is DNR. In ER, CT head showed cerebral metastatic disease with left parietal convexity meningioma, CXR was unremarkable. CT chest on 1/30/18 showed interval treatment response with significantly decreased mediastinal and right hilar lymphadenopathy. \"  Past Medical History/Comorbidities:   Ms. Abundio Dueñas  has a past medical history of Former cigarette smoker; GERD (gastroesophageal reflux disease); Hypertension; and Lung cancer ([facility] [address] She also has no past medical history of Adverse effect of anesthesia;  Difficult intubation; Malignant hyperthermia due to anesthesia; Nausea & vomiting; or Pseudocholinesterase deficiency. Ms. Alessia Lyons  has a past surgical history that includes hx tubal ligation and hx vascular access. Social History/Living Environment:   Home Environment: Private residence  # Steps to Enter: 3  Rails to Enter: No  One/Two Story Residence: One story  Living Alone: No  Support Systems: Parent, Family member(s) (takes care of mother whom she lives with)  Patient Expects to be Discharged to[de-identified] Unknown  Current DME Used/Available at Home: None  Tub or Shower Type: Tub/Shower combination  Prior Level of Function/Work/Activity:  Patient lives with her mother and is her caregiver. She is typically independent with ADLs. Number of Personal Factors/Comorbidities that affect the Plan of Care: Brief history (0):  LOW COMPLEXITY   ASSESSMENT OF OCCUPATIONAL PERFORMANCE[de-identified]   Activities of Daily Living:           Basic ADLs (From Assessment) Complex ADLs (From Assessment)   Basic ADL  Feeding: Minimum assistance  Oral Facial Hygiene/Grooming: Minimum assistance  Bathing: Moderate assistance  Type of Bath: Chlorhexidine (CHG), Bath Pack  Upper Body Dressing: Minimum assistance  Lower Body Dressing: Moderate assistance  Toileting: Minimum assistance Instrumental ADL  Meal Preparation: Total assistance  Homemaking: Total assistance  Medication Management: Total assistance  Financial Management: Total assistance   Grooming/Bathing/Dressing Activities of Daily Living   Grooming  Grooming Assistance: Supervision/set up (in standing at sink)  Washing Face: Supervision/set-up Cognitive Retraining  Problem Solving: Awareness of environment  Safety/Judgement: Awareness of environment; Fall prevention           Toileting  Toileting Assistance: Supervision/set up  Bladder Hygiene: Supervision/set-up  Bowel Hygiene: Supervision/set-up  Clothing Management: Supervision/set-up     Functional Transfers  Bathroom Mobility: Contact guard assistance  Toilet Transfer : Contact guard assistance   Lower Body Dressing Assistance  Dressing Assistance: Modified independent  Socks: Modified independent Bed/Mat Mobility  Supine to Sit: Independent  Sit to Supine: Independent  Sit to Stand: Independent  Bed to Chair: Independent       Most Recent Physical Functioning:   Gross Assessment:  AROM: Within functional limits (BUE)  Strength: Generally decreased, functional (BUE 4/5)  Coordination: Generally decreased, functional (BUE)               Posture:  Posture (WDL): Exceptions to WDL  Posture Assessment: Trunk flexion, Rounded shoulders  Balance:  Sitting: Intact  Sitting - Static: Good (unsupported)  Sitting - Dynamic: Good (unsupported)  Standing: Impaired  Standing - Static: Good  Standing - Dynamic : Fair Bed Mobility:  Supine to Sit: Independent  Sit to Supine: Independent  Wheelchair Mobility:     Transfers:  Sit to Stand: Independent  Stand to Sit: Modified independent  Bed to Chair: Independent                Patient Vitals for the past 6 hrs:   BP BP Patient Position SpO2 Pulse   18 1057 119/76 At rest 95 % 82       Mental Status  Neurologic State: Alert  Orientation Level: Oriented X4  Cognition: Follows commands  Perception: Appears intact  Perseveration: No perseveration noted  Safety/Judgement: Awareness of environment, Fall prevention                          Physical Skills Involved:  1. Balance  2. Strength  3. Activity Tolerance Cognitive Skills Affected (resulting in the inability to perform in a timely and safe manner):  1. Perception  2. Executive Function  3. Comprehension  4. Expression Psychosocial Skills Affected:  1. Habits/Routines  2. Environmental Adaptation   Number of elements that affect the Plan of Care: 5+:  HIGH COMPLEXITY   CLINICAL DECISION MAKIN [facility] [address] [address] AM-PAC 6 Clicks   Daily Activity Inpatient Short Form  How much help from another person does the patient currently need. ..  Total A Lot A Little None   1. Putting on and taking off regular lower body clothing? [] 1   [] 2   [x] 3   [] 4   2. Bathing (including washing, rinsing, drying)? [] 1   [] 2   [x] 3   [] 4   3. Toileting, which includes using toilet, bedpan or urinal?   [] 1   [] 2   [x] 3   [] 4   4. Putting on and taking off regular upper body clothing? [] 1   [] 2   [x] 3   [] 4   5. Taking care of personal grooming such as brushing teeth? [] 1   [] 2   [x] 3   [] 4   6. Eating meals? [] 1   [] 2   [x] 3   [] 4   © 2007, Trustees of [address] [place] [address] [address] [id number], under license to [employer]. All rights reserved      Score:  Initial: 18 Most Recent: X (Date: -- )    Interpretation of Tool:  Represents activities that are increasingly more difficult (i.e. Bed mobility, Transfers, Gait). Score 24 23 22-20 19-15 14-10 9-7 6     Modifier CH CI CJ CK CL CM CN      ? Self Care:     - CURRENT STATUS: CK - 40%-59% impaired, limited or restricted    - GOAL STATUS: CJ - 20%-39% impaired, limited or restricted    - D/C STATUS:  ---------------To be determined---------------  Payor: ABSOLUTE TOTAL CARE / [place] / Product Type: Managed Care Medicaid /      Medical Necessity:     · Patient demonstrates good rehab potential due to higher previous functional level. Reason for Services/Other Comments:  · Patient continues to require present interventions due to patient's inability to care for self at baseline level of function.    Use of outcome tool(s) and clinical judgement create a POC that gives a: MODERATE COMPLEXITY         TREATMENT:   (In addition to Assessment/Re-Assessment sessions the following treatments were rendered)     Pre-treatment Symptoms/Complaints:  None   Pain: Initial:   Pain Intensity 1: 0  Post Session:  None      Self Care: (18 minutes): Procedure(s) (per grid) utilized to improve and/or restore self-care/home management as related to lower body dressing, toileting, grooming and toilet transfer/ bathroom mobility. Required minimal verbal cueing to facilitate activities of daily living skills     . Date:  3/28/18 Date:   Date:     Activity/Exercise Parameters Parameters Parameters   Shoulder flexion/extension 15 reps with red theraband     Shoulder horizontal add/abb 15 reps with red theraband     Punches 15 reps with red theraband     Elbow flexion/extension 15 reps with red theraband     Tricep extension 15 reps with red theraband               Braces/Orthotics/Lines/Etc:   · IV  · O2 Device: Room air  Treatment/Session Assessment:    · Response to Treatment:  Tolerated well   · Interdisciplinary Collaboration:   o Occupational Therapist  o Registered Nurse  · After treatment position/precautions:   o Up in chair  o Bed/Chair-wheels locked  o Call light within reach  o RN notified  o Family at bedside   · Compliance with Program/Exercises: Will assess as treatment progresses. · Recommendations/Intent for next treatment session: \"Next visit will focus on advancements to more challenging activities and reduction in assistance provided\".   Total Treatment Duration:  OT Patient Time In/Time Out  Time In: 1424  Time Out: 1221 HILARIO Donovan/RANDY

## 2018-03-29 NOTE — PROGRESS NOTES
END OF SHIFT NOTE:    Pt received 2/14 XRT today. No c/o pain, N/V/D. No needs at present    Intake/Output  03/29 0701 - 03/29 1900  In: 487 [P.O.:487]  Out: 200 [Urine:200]   Voiding: YES  Catheter: NO  Drain:              Stool:  1 occurrences. Stool Assessment  Stool Color: Cain Boom (03/29/18 0515)  Stool Appearance: Formed (03/29/18 0515)  Stool Amount: Small (03/29/18 0515)  Stool Source/Status: Rectum (03/29/18 0515)    Emesis:  0 occurrences. VITAL SIGNS  Patient Vitals for the past 12 hrs:   Temp Pulse Resp BP SpO2   03/29/18 1518 97.4 °F (36.3 °C) 86 16 114/87 96 %   03/29/18 1057 98 °F (36.7 °C) 82 16 119/76 95 %   03/29/18 0806 98.1 °F (36.7 °C) 80 16 121/75 95 %       Pain Assessment  Pain 1  Pain Scale 1: Numeric (0 - 10) (03/29/18 1424)  Pain Intensity 1: 0 (03/29/18 1424)  Patient Stated Pain Goal: 0 (03/29/18 0244)  Pain Reassessment 1: Patient sleeping (03/29/18 0244)  Pain Intervention(s) 1: Emotional support; Family Support;Repositioned (03/17/18 1201)    Ambulating  Yes    Additional Information:     Shift report will be given to oncoming nurse at the bedside.     Katty Kaye

## 2018-03-29 NOTE — PROGRESS NOTES
STG: Patient will answer basic yes/no comprehension questions with 75% accuracy given max cues. STG: Patient will participate in automatic speech tasks with 75% accuracy given max cues  STG: Patient will follow 1 step verbal command with visual cues with 60% accuracy with max cues. LTG: Patient will increase neuro-linguistic abilities to increase safety and awareness of deficits. Speech language pathology: Speech-language and cognitive note: Daily Note  4    NAME/AGE/GENDER: Yolanda Yen is a 63 y.o. female  DATE: 3/29/2018  PRIMARY DIAGNOSIS: Acute encephalopathy  Brain metastasis ([facility]  Seizure ([facility]  Lung cancer ([facility]       ICD-10: Treatment Diagnosis: R.41.841 Cognitive-Communication Deficit    INTERDISCIPLINARY COLLABORATION: Registered NurseASSESSMENT:Continued language treatment today. Patient continues to progress with speech at the conversational level with occasional word and sound substitutions. Patient participated in sentence completion tasks related to household tasks with 50% accuracy, increasing to 80% with minimal prompting. She completed responsive naming tasks related to occupations with 40% accuracy. She perseverated on response of \"people\" and required moderate assistance to name specific occupations. Basic synonyms completed with 90% accuracy. Patient required moderate verbal cues to re-direct to therapy tasks. Internally distracted and repeatedly stating need for increased time to complete word finding tasks. Frequent interruptions by patient when clinician was attempting to provide education on word finding deficits/strategies. Patient will benefit from skilled intervention to address the below impairments. ?????? ? ? This section established at most recent assessment??????????  PROBLEM LIST (Impairments causing functional limitations):  1. Expressive Language  2.  Receptive language  REHABILITATION POTENTIAL FOR STATED GOALS: Guarded  PLAN OF CARE:   Patient will benefit from skilled intervention to address the following impairments. INTERVENTIONS PLANNED: (Benefits and precautions of therapy have been discussed with the patient.)  1. Cognitive-linguistic deficits  FREQUENCY/DURATION: Continue to follow patient 3 session trial to assess patient' ability to benefit from skilled therapy services to address above goals. RECOMMENDED REHABILITATION/EQUIPMENT: (at time of discharge pending progress): Due to the probability of continued deficits (see above) this patient will not likely need continued skilled speech therapy after discharge. SUBJECTIVE:   \"I am getting better a day at a time\". History of Present Injury/Illness: Ms. Krystyna Lagos  has a past medical history of Former cigarette smoker; GERD (gastroesophageal reflux disease); Hypertension; and Lung cancer ([facility] She also has no past medical history of Adverse effect of anesthesia; Difficult intubation; Malignant hyperthermia due to anesthesia; Nausea & vomiting; or Pseudocholinesterase deficiency. .  She also  has a past surgical history that includes hx tubal ligation and hx vascular access. Present Symptoms: Expressive/receptive language defiicts     Pain Intensity 1: 0  Pain Intervention(s) 1: Emotional support, Family Support, Repositioned  Current Medications:   No current facility-administered medications on file prior to encounter. Current Outpatient Prescriptions on File Prior to Encounter   Medication Sig Dispense Refill    mirtazapine (REMERON) 15 mg tablet Take 1 Tab by mouth nightly. 30 Tab 1    ondansetron hcl (ZOFRAN) 8 mg tablet Take 1 Tab by mouth every eight (8) hours as needed for Nausea. 90 Tab 1    lidocaine-prilocaine (EMLA) topical cream Apply 1/2 to 1 inch to port site one hour prior to needle access. 30 g 1    nicotine (NICODERM CQ) 7 mg/24 hr 1 Patch by TransDERmal route every twenty-four (24) hours.  amLODIPine (NORVASC) 5 mg tablet Take 5 mg by mouth daily.       senna-docusate (SHERRI-COLACE) 8.6-50 mg per tablet Take 1 Tab by mouth two (2) times a day. 60 Tab 2    raNITIdine (ZANTAC) 150 mg tablet Take 150 mg by mouth every morning.  prochlorperazine (COMPAZINE) 10 mg tablet Take 5 mg by mouth every six (6) hours as needed for Nausea.  acetaminophen (TYLENOL) 325 mg tablet Take 2 Tabs by mouth every four (4) hours as needed. 30 Tab 0     Current Dietary Status:  Mechanical soft/thin liquids      Social History/Home Situation:    Home Environment: Private residence  # Steps to Enter: 3  Rails to Enter: No  One/Two Story Residence: One story  Living Alone: No  Support Systems: Parent, Family member(s) (takes care of mother whom she lives with)  Patient Expects to be Discharged to[de-identified] Unknown  Current DME Used/Available at Home: None  Tub or Shower Type: Tub/Shower combination  Work/Activity History:   OBJECTIVE:   Oral Motor Structure/Speech:  Oral-Motor Structure/Motor Speech  Labial: No impairment  Dentition: Edentulous, Natural, Limited  Oral Hygiene: Adequate  Lingual: No impairment  Velum: No impairment    SPEECH-LANGUAGE COGNITIVE EVALUATION    Mental Status:  Neurologic State: Alert  Orientation Level: Oriented X4  Cognition: Follows commands        Neuro-Linguistics:                 Speech/Language Activities: Activities/Procedures listed utilized to improve expressive communication, receptive ability and cognitive ability. Required moderate cueing to increase independence with activities of daily living.     Tool Used: Functional [place] Measure (FIMTM)   Score Comments   Eating       Comprehension       Expression   2     Social Interaction       Problem Solving       Memory          Score:  Initial: 1 Most Recent: X (Date: -- )   Interpretation of Tool: Provides a uniform system of measurement for disability based on the International Classification of Impairment, Disabilities and Handicaps; measures the level of a patient's disability and indicates how much assistance is required for the individual to carry out activities of daily living. Score 7 6 5 4 3 2 1   Modifier CH CI CJ CK CL CM CN   ? Spoken Expression:    [id number] - CURRENT STATUS: CJ - 20%-39% impaired, limited or restricted    - GOAL STATUS:  CL - 60%-79% impaired, limited or restricted    - D/C STATUS:  ---------------To be determined---------------  Payor: ABSOLUTE TOTAL CARE / [place] [employer]OTAL [place] / Product Type: Managed Care Medicaid /   __________________________________________________________________________________________________  Safety:   After treatment position/precautions:  · Up in chair. Progression/Medical Necessity:   · Patient is expected to demonstrate progress in expressive communication and receptive ability to decrease assistance required communication, increase independence with activities of daily living and increase communication with family/caregivers. Compliance with Program/Exercises: Will assess as treatment progresses. Reason for Continuation of Services/Other Comments:  · Patient continues to require skilled intervention due to language deficits. Recommendations/Intent for next treatment session: \"Treatment next visit will focus on language tasks\".     Total Treatment Duration:  Time In: 8591  Time Out: 1406    RUBY Sr, CCC-SLP, CBIS

## 2018-03-29 NOTE — PROGRESS NOTES
[facility] Hematology & Oncology        Inpatient Hematology / Oncology Progress Note      Admission Date: 3/16/2018  9:00 PM  Reason for Admission/Hospital Course: Acute encephalopathy  Brain metastasis (HCC)  Seizure ([facility]  Lung cancer ([facility]    24 Hour Events:  Afebrile, VSS  On Dex, Keppra, and Phenobarbital   XRT  today       ROS:  Constitutional: Negative for fever, chills. CV: Negative for chest pain, palpitations, edema. Respiratory: Negative for dyspnea, cough, wheezing. GI: Negative for nausea, abdominal pain, diarrhea. 10 point review of systems is otherwise negative with the exception of the elements mentioned above in the HPI. Allergies   Allergen Reactions    Metaxalone Other (comments)    Oxycodone Swelling       OBJECTIVE:  Patient Vitals for the past 8 hrs:   BP Temp Pulse Resp SpO2   18 0806 121/75 98.1 °F (36.7 °C) 80 16 95 %     Temp (24hrs), Av.9 °F (36.6 °C), Min:97.5 °F (36.4 °C), Max:98.1 °F (36.7 °C)     0701 -  1900  In: 240 [P.O.:240]  Out: -     Physical Exam:  Constitutional: Well developed, well nourished female in no acute distress, sitting comfortably in the bedside chair. HEENT: Normocephalic and atraumatic. Oropharynx is clear, mucous membranes are moist. Extraocular muscles are intact. Sclerae anicteric. Neck supple       Skin Warm and dry. No bruising and no rash noted. No erythema. No pallor. Respiratory Lungs are clear to auscultation bilaterally without wheezes, rales or rhonchi, normal air exchange without accessory muscle use. CVS Normal rate, regular rhythm and normal S1 and S2. No murmurs, gallops, or rubs. Abdomen Soft, nontender and nondistended, normoactive bowel sounds. No palpable mass. No hepatosplenomegaly. Neuro Expressive aphasia - continues with improving, conversing appropriately. No focal deficits   MSK Normal range of motion in general.  No edema and no tenderness. Psych Alert. Calm.        Labs: Recent Labs      03/28/18   0505   WBC  15.6*   RBC  4.33   HGB  10.1*   HCT  31.7*   MCV  73.2*   MCH  23.3*   MCHC  31.9   RDW  19.5*   PLT  247   GRANS  84*   LYMPH  11*   MONOS  5   EOS  0*   BASOS  0   IG  3   DF  AUTOMATED   ANEU  13.1*   ABL  1.7   ABM  0.8   LESLIE  0.0   ABB  0.0   AIG  0.5        Recent Labs      03/29/18   0237  03/28/18   0505  03/27/18   0450   NA  139  139  137   K  4.8  4.8  4.7   CL  98  100  102   CO2  33*  30  27   AGAP  8  9  8   GLU  141*  121*  124*   BUN  30*  22  23   CREA  0.91  1.00  0.97   GFRAA  >60  >60  >60   GFRNA  >60  >60  >60   CA  8.8  8.4  9.0   SGOT  38*  48*  60*   AP  78  87  92   TP  6.2*  7.1  6.9   ALB  3.3*  3.6  3.4*   GLOB  2.9  3.5  3.5   AGRAT  1.1*  1.0*  1.0*   MG   --   2.2   --          Imaging:  MRI BRAIN W WO CONT [[id number]] Collected: 03/18/18 1307      Order Status: Completed Updated: 03/18/18 1319     Narrative:       MRI of the Brain with contrast: 3/18/2018  History: Lung cancer  Sequences: Axial pre and post contrast T1, FLAIR, T2, diffusion, coronal post  contrast T1 and sagittal precontrast T1-weighted images of the brain. Comparison: MRI the brain 4/21/2017  20 cc of IV MultiHance   was injected to aid in the detection of intracranial  pathology. Findings:    Supratentorial enhancing metastatic lesions are now demonstrated. These lesions  all display hemosiderin and restricted diffusion. There is a 5 mm lesion in the  right temporal white matter, 1 cm lesion right frontal white matter, left  posterior parasagittal convexity lesion measuring 14 mm x 27 x 31 mm, 14 mm left  occipital white matter lesion, 9 mm left frontal lesion, and 2 left parietal  enhancing lesions measuring respectively 7 mm and 24 mm. There is some mild mass  upon the left ventricular atrium. The pituitary and sinuses are unremarkable. 7th and 8th nerves and orbits are unremarkable.  There is some fluid in the right  mastoid air cells.       Impression:       IMPRESSION: Interval supratentorial metastatic lesions as above. DC4  The significant findings in this report have been referred to the Imaging  Navigator in order to communicate to the referring provider or his/her designee  as outlined in Section II.C.2.a.ii-iii of the ACR  Practice Guideline for Communication of Diagnostic Imaging Findings.           XR ABD (KUB) [[id number]] Collected: 03/17/18 0115     Order Status: Completed Updated: 03/17/18 0117     Narrative:       Abdomen x-ray single view. HISTORY: Nasogastric tube placement. COMPARISON: None. FINDINGS: Nasogastric tube its tip in the gastric body. Bowel gas pattern is  nonspecific.       Impression:       IMPRESSION:    Nasogastric tube its tip in the gastric body.     XR CHEST PORT [[id number]] Collected: 03/16/18 2138     Order Status: Completed Updated: 03/16/18 2146     Narrative:       EXAM:  XR CHEST PORT    INDICATION:  AMS    COMPARISON:  4/18/2017    FINDINGS: A portable AP radiograph of the chest was obtained at 2124 hours. The  patient is on a cardiac monitor.  The lungs are clear.  The cardiac and  mediastinal contours and pulmonary vascularity are normal.  The bones and soft  tissues are grossly within normal limits.        Impression:       IMPRESSION: No acute cardiopulmonary disease.         CT HEAD WITHOUT CONTRAST [[id number]] Collected: 03/16/18 2130     Order Status: Completed Updated: 03/16/18 2134     Narrative:       CT HEAD WITHOUT CONTRAST     HISTORY:  Seizure. COMPARISON: None. TECHNIQUE: Axial imaging was performed without intravenous contrast utilizing  5mm slice thickness. Sagittal and coronal reformats were performed. Radiation  dose reduction techniques were used for this study. Our CT scanner uses one or  all of the following:    Automated exposure control, adjustment of the MAS or KUB according to patient's  size and iterative reconstruction.     FINDINGS:        *BRAIN:      -  There are no early signs of territorial or lacunar infarction by CT.     -  1.3 x 1.6 cm left parietal mass with surrounding vasogenic edema. 0.6 x  0.9 cm mass in left frontal lobe with vasogenic edema. Partly calcified mass in  left superior parafalcine region measuring 2.9 x 2.6 cm. Consider the  possibility of meningioma.     -  No gross white matter abnormality by CT.    *VISUALIZED PARANASAL SINUSES: Well aerated. *MASTOIDS:  Clear. *CALVARIUM AND SCALP: Unremarkable.         Impression:       IMPRESSION:    Cerebral metastatic disease. Possible left parietal convexity meningioma. Date of Dictation: 3/16/2018 9:30 PM         ASSESSMENT:    Problem List  Date Reviewed: 3/14/2018          Codes Class Noted    * (Principal)Epilepsy with status epilepticus ([facility] ICD-10-CM: G40.901  ICD-9-CM: 345.3  3/18/2018        Hyperkalemia ICD-10-CM: E87.5  ICD-9-CM: 276.7  3/17/2018        Abnormal EKG ICD-10-CM: R94.31  ICD-9-CM: 794.31  3/17/2018        Seizure ([facility] ICD-10-CM: R56.9  ICD-9-CM: 780.39  3/16/2018        Lung cancer ([facility] ICD-10-CM: C34.90  ICD-9-CM: 162.9  3/16/2018        Brain metastasis (HCC) ICD-10-CM: C79.31  ICD-9-CM: 198.3  3/16/2018        Acute encephalopathy ICD-10-CM: G93.40  ICD-9-CM: 348.30  3/16/2018        Bone metastases ([facility] ICD-10-CM: C79.51  ICD-9-CM: 198.5  8/30/2017        Small cell carcinoma of right lung (HCC) ICD-10-CM: C34.91  ICD-9-CM: 162.9  4/21/2017        Smoker (Chronic) ICD-10-CM: P86.727  ICD-9-CM: 305.1  Unknown        Mediastinal mass ICD-10-CM: J98.59  ICD-9-CM: 786.6  4/18/2017        SVC (superior vena cava obstruction) ICD-10-CM: I87.1  ICD-9-CM: 459.2  4/18/2017        Essential hypertension ICD-10-CM: I10  ICD-9-CM: 401.9  4/18/2017        SVC syndrome ICD-10-CM: I87.1  ICD-9-CM: 459.2  4/18/2017            Ms. Pratt Child is a 63 y.o. female admitted on 3/16/2018 with a primary diagnosis of lung cancer, acute encephalopathy, brain metastasis, and fever.   Ms. Pratt Child is a well known patient of Dr. Myrl Schwab. She was just seen in the clinic on 3/14/18. While planning for Nivolumab she had rapid growth of b/l cervical LAD. He discussed  the aggressive pattern of her disease. Restaging CT and MRI of brain were ordered. The plan was to start nivolumab/ipilimimab on 4/4/18. He discussed the need for her to inform family of her aggressive disease and to designate POA. Unfortunately, yesterday she was confused, lethargic and somnolent. Patient's sister was driving her home when she became more confused and start having what her sister described as generalized body shakes. 911 was called. She received 2 doses of ativan PTA to ED. In ED she was post ictal and difficult to arouse. CT head showed cerebral metastatic disease with left parietal convexity meningioma. CXR was unremarkable. CT chest on 1/30/18 showed interval treatment response with significantly decreased mediastinal and right hilar lymphadenopathy. PLAN:  Stage IV SCLC with brain mets  - s/p C1 nivolumab/ipilimumab on 3/15. C2 due 4/4  3/21 Rad Onc consult scheduled for this AM  3/22 Pt met with Dr. Anders Smith yesterday. He spoke with pt's family. Plan is to watch her over the weekend to see how much she improves before making decision in regards to treatment  3/26 Pt continues to improve daily with speech and ability to communicate. She expresses interest today in pursing XRT. Dr. Anders Smith notified - he states they will get her in for consent and simulation, probably at [facility] tomorrow. 3/27 Went to [place] this AM for simulation  3/29 Day 2/14 XRT treatments. C2 nivo/ipi due on 4/4. Seizures  3/19 CT head revealed cerebral metastatic disease with L parietal convxity meningioma. Per teleneuro, pt has ongoing L-sided seizures and was started on Fosphenytoin yesterday - currently being held for high level phenytoin. Also on Dex and Keppra.   Dr. Srinath Ford to check with Dr. Myrl Schwab to see if pt needs to be transferred to [facility] to neuro service vs. daily teleneuro eval here. Pt alert - responds with \"OK\" to every question. SLP following. 3/20  Follow-up teleneuro eval pending. Remains confused, but able to speak more words today. SLP following. Continues on Dex, Keppra, and Fosphenytoin. Phenytoin level pending. 3/21 Per neuro, Dr. Blossom Ledesma, hold phenytoin and allow levels to trend down. Repeat EEG. Give loading dose of Phenobarbital 90mg IV x 1. Then Phenobarbital 30mg BID.  3/22 Repeat EEG pending. Pt's expressive aphasia is improving daily. Pt was able to answer questions and use phrases appropriately during exam this AM.    3/23 Repeat EEG \"moderately abnormal EGG d/t the presence of persistent PLEDs through the recording in the L hemisphere with focus likely at frontal leads. No subclinical seizure is identified\". Follow-up teleneuro consult ordered. Con't Dex, Keppra, and Phenobarbital.  3/25 Speech improving daily. Per neuro, continue current management  3/28 No new seizures reported    Hypokalemia/Hypomagnesemia  3/19 Replete K+  3/20 K+ up to 3.8  3/24 Replete Mg+    Continue home meds  [place] SOPs  SCDs for DVT prophylaxis  Per PT, may benefit from rehab upon discharge. CM working on placement. Disposition: Will remain hospitalized through completion of her radiation due to transportation issues. Last treatment 4/16. Could potentially discharge on 4/13 if she has transportation for her final XRT on 4/16. Rodríguez Gonsales NP   [facility]  [id number] [place] [address]  Office : [phone]  Fax : [phone]         Attending Addendum:  I personally evaluated the patient with Rodríguez Gonsales N.P.,  and agree with the assessment, findings and plan as documented. Appears stable, she is tolerating WBRT quite well.               Yadi Garcia MD  [address] [place] [address]  [address] [address]  Office : [phone]  Fax : [phone]

## 2018-03-30 NOTE — PROGRESS NOTES
Assumed care of patient. Assessment completed and documented, see docflow. Patient ambulating to bathroom. Patient A/Ox3. NAD noted at present. Respirations even and non labored on RA. XRT today at 1100; per report. Call light within reach. Will continue to monitor.

## 2018-03-30 NOTE — PROGRESS NOTES
Reassessment unchanged. Patient sitting on side of bed. Sister at bedside. NAD noted. Will continue to monitor.

## 2018-03-30 NOTE — PROGRESS NOTES
Patient had uneventful shift. Patient's sister; Emerson Bowels remains at bedside. Patient A/Ox3, very talkative this shift. Patient remains stable.  BSR given to SHANEL Melara

## 2018-03-30 NOTE — PROGRESS NOTES
STG: Patient will answer basic yes/no comprehension questions with 75% accuracy given max cues. STG: Patient will participate in automatic speech tasks with 75% accuracy given max cues  STG: Patient will follow 1 step verbal command with visual cues with 60% accuracy with max cues. LTG: Patient will increase neuro-linguistic abilities to increase safety and awareness of deficits. Speech language pathology: Speech-language and cognitive note: Daily Note: 6    NAME/AGE/GENDER: Carmen Daugherty is a 63 y.o. female  DATE: 3/30/2018  PRIMARY DIAGNOSIS: Acute encephalopathy  Brain metastasis ([facility]  Seizure ([facility]  Lung cancer ([facility]       ICD-10: Treatment Diagnosis: R.41.841 Cognitive-Communication Deficit    INTERDISCIPLINARY COLLABORATION: Registered NurseASSESSMENT:Patient seen for speech therapy. She was asking why she is here and if I could give her a print out saying why she's here. When educated on event that resulted in hospitalization she stated, \"I have these knots on my neck and it may have come from that. I'm hoping radiation will take that away\". Pt did very well with conversational speech this date. Pt stated she was in room 503 at [place] arrived during the session. Named the food on her tray as well as her utensils without difficulty. Difficulty observed with divergent naming task. Pt gave reasons for difficulties and stated once \"I don't know that one at this point. It's not that I don't know it I just don't know it at this time\". Recommend continuing with ST to address deficits. Patient will benefit from skilled intervention to address the below impairments. ?????? ? ? This section established at most recent assessment??????????  PROBLEM LIST (Impairments causing functional limitations):  1. Expressive Language  2.  Receptive language  REHABILITATION POTENTIAL FOR STATED GOALS: Guarded  PLAN OF CARE:   Patient will benefit from skilled intervention to address the following impairments. INTERVENTIONS PLANNED: (Benefits and precautions of therapy have been discussed with the patient.)  1. Cognitive-linguistic deficits  FREQUENCY/DURATION: Continue to follow patient 3 session trial to assess patient' ability to benefit from skilled therapy services to address above goals. RECOMMENDED REHABILITATION/EQUIPMENT: (at time of discharge pending progress): Due to the probability of continued deficits (see above) this patient will not likely need continued skilled speech therapy after discharge. SUBJECTIVE:   \"Have a seat\". History of Present Injury/Illness: Ms. Jairo Pike  has a past medical history of Former cigarette smoker; GERD (gastroesophageal reflux disease); Hypertension; and Lung cancer ([facility] She also has no past medical history of Adverse effect of anesthesia; Difficult intubation; Malignant hyperthermia due to anesthesia; Nausea & vomiting; or Pseudocholinesterase deficiency. .  She also  has a past surgical history that includes hx tubal ligation and hx vascular access. Present Symptoms: Expressive/receptive language defiicts     Pain Intensity 1: 0  Pain Intervention(s) 1: Emotional support, Family Support, Repositioned  Current Medications:   No current facility-administered medications on file prior to encounter. Current Outpatient Prescriptions on File Prior to Encounter   Medication Sig Dispense Refill    mirtazapine (REMERON) 15 mg tablet Take 1 Tab by mouth nightly. 30 Tab 1    ondansetron hcl (ZOFRAN) 8 mg tablet Take 1 Tab by mouth every eight (8) hours as needed for Nausea. 90 Tab 1    lidocaine-prilocaine (EMLA) topical cream Apply 1/2 to 1 inch to port site one hour prior to needle access. 30 g 1    nicotine (NICODERM CQ) 7 mg/24 hr 1 Patch by TransDERmal route every twenty-four (24) hours.  amLODIPine (NORVASC) 5 mg tablet Take 5 mg by mouth daily.  senna-docusate (SHERRI-COLACE) 8.6-50 mg per tablet Take 1 Tab by mouth two (2) times a day.  61 Tab 2    raNITIdine (ZANTAC) 150 mg tablet Take 150 mg by mouth every morning.  prochlorperazine (COMPAZINE) 10 mg tablet Take 5 mg by mouth every six (6) hours as needed for Nausea.  acetaminophen (TYLENOL) 325 mg tablet Take 2 Tabs by mouth every four (4) hours as needed. 30 Tab 0     Current Dietary Status:  Mechanical soft/thin liquids      Social History/Home Situation:    Home Environment: Private residence  # Steps to Enter: 3  Rails to Enter: No  One/Two Story Residence: One story  Living Alone: No  Support Systems: Parent, Family member(s) (takes care of mother whom she lives with)  Patient Expects to be Discharged to[de-identified] Unknown  Current DME Used/Available at Home: None  Tub or Shower Type: Tub/Shower combination  Work/Activity History:   OBJECTIVE:   Mental Status:  Neurologic State: Alert     Auditory comprehension:   Basic yes/no questions: 100%. Expressive language:   Naming items on her lunch tray: 100%. Days of the week: 100%. Naming a category member given the beginning letter: 40%. Speech/Language Activities: Activities/Procedures listed utilized to improve expressive communication, receptive ability and cognitive ability. Required moderate cueing to increase independence with activities of daily living. Tool Used: Functional [place] Measure (FIMTM)   Score Comments   Eating       Comprehension       Expression   2     Social Interaction       Problem Solving       Memory          Score:  Initial: 1 Most Recent: X (Date: -- )   Interpretation of Tool: Provides a uniform system of measurement for disability based on the International Classification of Impairment, Disabilities and Handicaps; measures the level of a patient's disability and indicates how much assistance is required for the individual to carry out activities of daily living. Score 7 6 5 4 3 2 1   Modifier CH CI CJ CK CL CM CN   ?  Spoken Expression:     - CURRENT STATUS: CJ - 20%-39% impaired, limited or restricted    - GOAL STATUS:  CL - 60%-79% impaired, limited or restricted    - D/C STATUS:  ---------------To be determined---------------  Payor: ABSOLUTE TOTAL CARE / Plan[place] [employer] TOTAL C[place] / Product Type: Managed Care Medicaid /   __________________________________________________________________________________________________  Safety:   After treatment position/precautions:  · Up in chair. Progression/Medical Necessity:   · Patient is expected to demonstrate progress in expressive communication and receptive ability to decrease assistance required communication, increase independence with activities of daily living and increase communication with family/caregivers. Compliance with Program/Exercises: Will assess as treatment progresses. Reason for Continuation of Services/Other Comments:  · Patient continues to require skilled intervention due to language deficits. Recommendations/Intent for next treatment session: \"Treatment next visit will focus on language tasks\".     Total Treatment Duration:  Time In: 1143  Time Out: 3600 Kierra Larios, RUBY, CCC-SLP

## 2018-03-30 NOTE — PROGRESS NOTES
Problem: Falls - Risk of  Goal: *Absence of Falls  Document Ashleigh Fall Risk and appropriate interventions in the flowsheet.    Outcome: Progressing Towards Goal  Fall Risk Interventions:  Mobility Interventions: Communicate number of staff needed for ambulation/transfer    Mentation Interventions: Adequate sleep, hydration, pain control    Medication Interventions: Evaluate medications/consider consulting pharmacy    Elimination Interventions: Call light in reach, Patient to call for help with toileting needs

## 2018-03-30 NOTE — PROGRESS NOTES
[facility] Hematology & Oncology        Inpatient Hematology / Oncology Progress Note      Admission Date: 3/16/2018  9:00 PM  Reason for Admission/Hospital Course: Acute encephalopathy  Brain metastasis (HCC)  Seizure ([facility]  Lung cancer ([facility]    24 Hour Events:  Afebrile, VSS  On Dex, Keppra, and Phenobarbital   XRT 3/14 today       ROS:  Constitutional: Negative for fever, chills. CV: Negative for chest pain, palpitations, edema. Respiratory: Negative for dyspnea, cough, wheezing. GI: Negative for nausea, abdominal pain, diarrhea. 10 point review of systems is otherwise negative with the exception of the elements mentioned above in the HPI. Allergies   Allergen Reactions    Metaxalone Other (comments)    Oxycodone Swelling       OBJECTIVE:  Patient Vitals for the past 8 hrs:   BP Temp Pulse Resp SpO2 Weight   18 0705 145/89 97.4 °F (36.3 °C) 82 19 96 % -   18 0420 142/86 97.9 °F (36.6 °C) 79 16 98 % -   18 0416 - - - - - 113 lb 6.4 oz (51.4 kg)     Temp (24hrs), Av.7 °F (36.5 °C), Min:97.4 °F (36.3 °C), Max:98 °F (36.7 °C)         Physical Exam:  Constitutional: Well developed, well nourished female in no acute distress, sitting comfortably in the bedside chair. HEENT: Normocephalic and atraumatic. Oropharynx is clear, mucous membranes are moist. Extraocular muscles are intact. Sclerae anicteric. Neck supple       Skin Warm and dry. No bruising and no rash noted. No erythema. No pallor. Respiratory Lungs are clear to auscultation bilaterally without wheezes, rales or rhonchi, normal air exchange without accessory muscle use. CVS Normal rate, regular rhythm and normal S1 and S2. No murmurs, gallops, or rubs. Abdomen Soft, nontender and nondistended, normoactive bowel sounds. No palpable mass. No hepatosplenomegaly. Neuro Expressive aphasia - continues with improving, conversing appropriately.  No focal deficits   MSK Normal range of motion in general.  No edema and no tenderness. Psych Alert. Calm. Labs:      Recent Labs      03/30/18   0411  03/28/18   0505   WBC  12.1*  15.6*   RBC  3.85*  4.33   HGB  9.0*  10.1*   HCT  28.2*  31.7*   MCV  73.2*  73.2*   MCH  23.4*  23.3*   MCHC  31.9  31.9   RDW  19.6*  19.5*   PLT  204  247   GRANS  81*  84*   LYMPH  11*  11*   MONOS  8  5   EOS  0*  0*   BASOS  0  0   IG  2  3   DF  AUTOMATED  AUTOMATED   ANEU  9.8*  13.1*   ABL  1.3  1.7   ABM  1.0  0.8   LESLIE  0.0  0.0   ABB  0.0  0.0   AIG  0.2  0.5        Recent Labs      03/30/18   0411  03/29/18   0237  03/28/18   0505   NA  137  139  139   K  4.9  4.8  4.8   CL  98  98  100   CO2  32  33*  30   AGAP  7  8  9   GLU  128*  141*  121*   BUN  28*  30*  22   CREA  0.89  0.91  1.00   GFRAA  >60  >60  >60   GFRNA  >60  >60  >60   CA  9.4  8.8  8.4   SGOT  28  38*  48*   AP  74  78  87   TP  6.3  6.2*  7.1   ALB  3.2*  3.3*  3.6   GLOB  3.1  2.9  3.5   AGRAT  1.0*  1.1*  1.0*   MG  2.1   --   2.2         Imaging:  MRI BRAIN W WO CONT [[id number]] Collected: 03/18/18 1307      Order Status: Completed Updated: 03/18/18 1319     Narrative:       MRI of the Brain with contrast: 3/18/2018  History: Lung cancer  Sequences: Axial pre and post contrast T1, FLAIR, T2, diffusion, coronal post  contrast T1 and sagittal precontrast T1-weighted images of the brain. Comparison: MRI the brain 4/21/2017  20 cc of IV MultiHance   was injected to aid in the detection of intracranial  pathology. Findings:    Supratentorial enhancing metastatic lesions are now demonstrated. These lesions  all display hemosiderin and restricted diffusion. There is a 5 mm lesion in the  right temporal white matter, 1 cm lesion right frontal white matter, left  posterior parasagittal convexity lesion measuring 14 mm x 27 x 31 mm, 14 mm left  occipital white matter lesion, 9 mm left frontal lesion, and 2 left parietal  enhancing lesions measuring respectively 7 mm and 24 mm.  There is some mild mass  upon the left ventricular atrium. The pituitary and sinuses are unremarkable. 7th and 8th nerves and orbits are unremarkable. There is some fluid in the right  mastoid air cells.       Impression:       IMPRESSION: Interval supratentorial metastatic lesions as above. DC4  The significant findings in this report have been referred to the Imaging  Navigator in order to communicate to the referring provider or his/her designee  as outlined in Section II.C.2.a.ii-iii of the ACR  Practice Guideline for Communication of Diagnostic Imaging Findings.           XR ABD (KUB) [[id number]] Collected: 03/17/18 0115     Order Status: Completed Updated: 03/17/18 0117     Narrative:       Abdomen x-ray single view. HISTORY: Nasogastric tube placement. COMPARISON: None. FINDINGS: Nasogastric tube its tip in the gastric body. Bowel gas pattern is  nonspecific.       Impression:       IMPRESSION:    Nasogastric tube its tip in the gastric body.     XR CHEST PORT [[id number]] Collected: 03/16/18 2138     Order Status: Completed Updated: 03/16/18 2146     Narrative:       EXAM:  XR CHEST PORT    INDICATION:  AMS    COMPARISON:  4/18/2017    FINDINGS: A portable AP radiograph of the chest was obtained at 2124 hours. The  patient is on a cardiac monitor.  The lungs are clear.  The cardiac and  mediastinal contours and pulmonary vascularity are normal.  The bones and soft  tissues are grossly within normal limits.        Impression:       IMPRESSION: No acute cardiopulmonary disease.         CT HEAD WITHOUT CONTRAST [[id number]] Collected: 03/16/18 2130     Order Status: Completed Updated: 03/16/18 2134     Narrative:       CT HEAD WITHOUT CONTRAST     HISTORY:  Seizure. COMPARISON: None. TECHNIQUE: Axial imaging was performed without intravenous contrast utilizing  5mm slice thickness. Sagittal and coronal reformats were performed. Radiation  dose reduction techniques were used for this study.  Our CT scanner uses one or  all of the following:    Automated exposure control, adjustment of the MAS or KUB according to patient's  size and iterative reconstruction. FINDINGS:        *BRAIN:      -  There are no early signs of territorial or lacunar infarction by CT.     -  1.3 x 1.6 cm left parietal mass with surrounding vasogenic edema. 0.6 x  0.9 cm mass in left frontal lobe with vasogenic edema. Partly calcified mass in  left superior parafalcine region measuring 2.9 x 2.6 cm. Consider the  possibility of meningioma.     -  No gross white matter abnormality by CT.    *VISUALIZED PARANASAL SINUSES: Well aerated. *MASTOIDS:  Clear. *CALVARIUM AND SCALP: Unremarkable.         Impression:       IMPRESSION:    Cerebral metastatic disease. Possible left parietal convexity meningioma.     Date of Dictation: 3/16/2018 9:30 PM         ASSESSMENT:    Problem List  Date Reviewed: 3/14/2018          Codes Class Noted    * (Principal)Epilepsy with status epilepticus ([facility] ICD-10-CM: G40.901  ICD-9-CM: 345.3  3/18/2018        Hyperkalemia ICD-10-CM: E87.5  ICD-9-CM: 276.7  3/17/2018        Abnormal EKG ICD-10-CM: R94.31  ICD-9-CM: 794.31  3/17/2018        Seizure ([facility] ICD-10-CM: R56.9  ICD-9-CM: 780.39  3/16/2018        Lung cancer ([facility] ICD-10-CM: C34.90  ICD-9-CM: 162.9  3/16/2018        Brain metastasis ([facility] ICD-10-CM: C79.31  ICD-9-CM: 198.3  3/16/2018        Acute encephalopathy ICD-10-CM: G93.40  ICD-9-CM: 348.30  3/16/2018        Bone metastases ([facility] ICD-10-CM: C79.51  ICD-9-CM: 198.5  8/30/2017        Small cell carcinoma of right lung (HCC) ICD-10-CM: C34.91  ICD-9-CM: 162.9  4/21/2017        Smoker (Chronic) ICD-10-CM: K80.428  ICD-9-CM: 305.1  Unknown        Mediastinal mass ICD-10-CM: J98.59  ICD-9-CM: 786.6  4/18/2017        SVC (superior vena cava obstruction) ICD-10-CM: I87.1  ICD-9-CM: 459.2  4/18/2017        Essential hypertension ICD-10-CM: I10  ICD-9-CM: 401.9  4/18/2017        SVC syndrome ICD-10-CM: I87.1  ICD-9-CM: 459.2 4/18/2017            Ms. Parrish is a 63 y.o. female admitted on 3/16/2018 with a primary diagnosis of lung cancer, acute encephalopathy, brain metastasis, and fever. Ms. Parrish is a well known patient of Dr. Myrl Schwab. She was just seen in the clinic on 3/14/18. While planning for Nivolumab she had rapid growth of b/l cervical LAD. He discussed  the aggressive pattern of her disease. Restaging CT and MRI of brain were ordered. The plan was to start nivolumab/ipilimimab on 4/4/18. He discussed the need for her to inform family of her aggressive disease and to designate POA. Unfortunately, yesterday she was confused, lethargic and somnolent. Patient's sister was driving her home when she became more confused and start having what her sister described as generalized body shakes. 911 was called. She received 2 doses of ativan PTA to ED. In ED she was post ictal and difficult to arouse. CT head showed cerebral metastatic disease with left parietal convexity meningioma. CXR was unremarkable. CT chest on 1/30/18 showed interval treatment response with significantly decreased mediastinal and right hilar lymphadenopathy. PLAN:  Stage IV SCLC with brain mets  - s/p C1 nivolumab/ipilimumab on 3/15. C2 due 4/4  3/21 Rad Onc consult scheduled for this AM  3/22 Pt met with Dr. Anders Smith yesterday. He spoke with pt's family. Plan is to watch her over the weekend to see how much she improves before making decision in regards to treatment  3/26 Pt continues to improve daily with speech and ability to communicate. She expresses interest today in pursing XRT. Dr. Anders Smith notified - he states they will get her in for consent and simulation, probably at [facility] tomorrow. 3/27 Went to [place] this AM for simulation  3/30 Day 3/14 XRT treatments. C2 nivo/ipi due on 4/4. Seizures  3/19 CT head revealed cerebral metastatic disease with L parietal convxity meningioma.   Per teleneuro, pt has ongoing L-sided seizures and was started on Fosphenytoin yesterday - currently being held for high level phenytoin. Also on Dex and Keppra. Dr. Miko Sanchez to check with Dr. Burroughs Ten to see if pt needs to be transferred to [facility] to neuro service vs. daily teleneuro eval here. Pt alert - responds with \"OK\" to every question. SLP following. 3/20  Follow-up teleneuro eval pending. Remains confused, but able to speak more words today. SLP following. Continues on Dex, Keppra, and Fosphenytoin. Phenytoin level pending. 3/21 Per neuro, Dr. Chelsie Meyers, hold phenytoin and allow levels to trend down. Repeat EEG. Give loading dose of Phenobarbital 90mg IV x 1. Then Phenobarbital 30mg BID.  3/22 Repeat EEG pending. Pt's expressive aphasia is improving daily. Pt was able to answer questions and use phrases appropriately during exam this AM.    3/23 Repeat EEG \"moderately abnormal EGG d/t the presence of persistent PLEDs through the recording in the L hemisphere with focus likely at frontal leads. No subclinical seizure is identified\". Follow-up teleneuro consult ordered. Con't Dex, Keppra, and Phenobarbital.  3/25 Speech improving daily. Per neuro, continue current management  3/28 No new seizures reported    Hypokalemia/Hypomagnesemia  3/19 Replete K+  3/20 K+ up to 3.8  3/24 Replete Mg+    Continue home meds  Moni SOPs  SCDs for DVT prophylaxis  Per PT, may benefit from rehab upon discharge. CM working on placement. Disposition: Will remain hospitalized through completion of her radiation due to transportation issues. Last treatment 4/16. Could potentially discharge on 4/13 if she has transportation for her final XRT on 4/16. Daniel Pollard NP   [facility]  [id number] [address]  [address] [place] [address]  Office : [phone]  Fax : [phone]           Attending Addendum:  I personally evaluated the patient with Daniel Pollard, N.P.,  and agree with the assessment, findings and plan as documented.   Appears stable, continue WBRT.              Edie Rascon MD  [facility]  [id number] [place] [address] Avenue  Office : [phone]  Fax : [phone]

## 2018-03-30 NOTE — PROGRESS NOTES
END OF SHIFT NOTE:    Intake/Output  03/29 1901 - 03/30 0700  In: 100 [P.O.:100]  Out: 600 [Urine:600]   Voiding: YES  Catheter: NO  Drain:              Stool:  1 occurrences. Stool Assessment  Stool Color: Maura Free (03/30/18 0423)  Stool Appearance: Formed (03/30/18 0423)  Stool Amount: Small (03/30/18 0423)  Stool Source/Status: Rectum (03/30/18 0423)    Emesis:  0 occurrences. VITAL SIGNS  Patient Vitals for the past 12 hrs:   Temp Pulse Resp BP SpO2   03/30/18 0420 97.9 °F (36.6 °C) 79 16 142/86 98 %   03/30/18 0026 97.7 °F (36.5 °C) 83 16 113/55 98 %   03/29/18 2002 97.8 °F (36.6 °C) 87 20 124/89 98 %       Pain Assessment  Pain 1  Pain Scale 1: Visual (03/30/18 0230)  Pain Intensity 1: 0 (03/30/18 0230)  Patient Stated Pain Goal: 0 (03/29/18 2034)  Pain Reassessment 1: Patient sleeping (03/30/18 0230)  Pain Intervention(s) 1: Emotional support; Family Support;Repositioned (03/17/18 1201)    Ambulating  Yes    Additional Information: Pt rested well through the night. Uneventful night. Shift report given to oncoming nurse at the bedside.     Marlene Ramirez

## 2018-03-30 NOTE — PROGRESS NOTES
Problem: Mobility Impaired (Adult and Pediatric)  Goal: *Acute Goals and Plan of Care (Insert Text)  STG:  (1.)Ms. Cynthia Garvey will move from supine to sit and sit to supine , scoot up and down and roll side to side with SUPERVISION within 3 treatment day(s). Goal met 3/22/2018  (2.)Ms. Cynthia Garvey will transfer from bed to chair and chair to bed with MINIMAL ASSIST using the least restrictive device within 3 treatment day(s). Goal met 3/21/2018  (3.)Ms. Cynthia Garvey will ambulate with MINIMAL ASSIST for 30 feet with the least restrictive device within 3 treatment day(s). Goal met 3/22/2018    LTG:  (1.)Ms. Cynthia Garvey will move from supine to sit and sit to supine , scoot up and down and roll side to side in bed with MODIFIED INDEPENDENCE within 7 treatment day(s). Goal met 3/27/18  (2.)Ms. Cynthia Garvey will transfer from bed to chair and chair to bed with CONTACT GUARD ASSIST using the least restrictive device within 7 treatment day(s). Goal met 3/27/18  (3.)Ms. Cynthia Garvey will ambulate with CONTACT GUARD ASSIST for 150 feet with the least restrictive device within 7 treatment day(s). Goal met 3/27/18    New Goals (LTG) Updated: 3/27/18  (1.)Ms. Cynthia Garvey will move from supine to sit and sit to supine  in bed with INDEPENDENT within 7 treatment day(s). (2.)Ms. Cynthia Garvey will transfer from bed to chair and chair to bed with INDEPENDENT using the least restrictive device within 7 treatment day(s). (3.)Ms. Cynthia Garvey will ambulate with SUPERVISION for 500+ feet with the least restrictive device within 7 treatment day(s). (4.)Ms. Cynthia Garvey will ascend/descend 3 steps with use of 1 railing with SUPERVISION within 7 treatment days.   ________________________________________________________________________________________________        ________________________________________________________________________________________________      PHYSICAL THERAPY: Daily Note, Treatment Day: 3rd, AM 3/30/2018  INPATIENT: Hospital Day: 15  Payor: ABSOLUTE TOTAL CARE / [place] / Product Type: Managed Care Medicaid /      NAME/AGE/GENDER: Connor Corbin is a 63 y.o. female   PRIMARY DIAGNOSIS: Acute encephalopathy  Brain metastasis ([facility]  Seizure ([facility]  Lung cancer ([facility] Epilepsy with status epilepticus ([facility] Epilepsy with status epilepticus ([facility]        ICD-10: Treatment Diagnosis:   · Generalized Muscle Weakness (M62.81)  · Difficulty in walking, Not elsewhere classified (R26.2)   Precaution/Allergies:  Metaxalone and Oxycodone      ASSESSMENT:     Ms. Alessia Lyons seen this AM for gait & balance training. Pt. Able to progress to standing balance exercises. However, pt. Required UE support for stability and was especially challenged by activities requiring unilateral stance. Therefore, pt. Cont. To benefit from PT services to address balance impairments to decrease falls risk. This section established at most recent assessment   PROBLEM LIST (Impairments causing functional limitations):  1. Decreased Strength  2. Decreased ADL/Functional Activities  3. Decreased Transfer Abilities  4. Decreased Ambulation Ability/Technique  5. Decreased Balance  6. Decreased Activity Tolerance  7. Decreased Pacing Skills  8. Decreased [place] with Home Exercise Program  9. Decreased Cognition   INTERVENTIONS PLANNED: (Benefits and precautions of physical therapy have been discussed with the patient.)  1. Balance Exercise  2. Bed Mobility  3. Cold  4. Family Education  5. Gait Training  6. Home Exercise Program (HEP)  7. Manual Therapy  8. Neuromuscular Re-education/Strengthening  9. Range of Motion (ROM)  10. Therapeutic Activites  11. Therapeutic Exercise/Strengthening  12.  Transfer Training     TREATMENT PLAN: Frequency/Duration: 3 times a week for duration of hospital stay  Rehabilitation Potential For Stated Goals: EXCELLENT     RECOMMENDED REHABILITATION/EQUIPMENT: (at time of discharge pending progress): Due to the probability of continued deficits (see above) this patient will likely need continued skilled physical therapy after discharge. Equipment:    None at this time              HISTORY:   History of Present Injury/Illness (Reason for Referral):  Patient is 63years old female with pmhx of extensive stage small cell lung cancer with brain metastasis, GERD, HTN, previous tobacco abuse presented in view of new onset seizure like activity. As per the pt's sister, pt was in her usual health until yesterday, today she appeared more confused, lethargic and somnolent. While she was driving the pt home, pt started appearing confused and then had generalized body shakes. EMS was summoned. Pt received 2 doses of ativan PTA. In ER, pt is post ictal, sleeping, difficult to arouse. Pt is DNR. In ER, CT head showed cerebral metastatic disease with left parietal convexity meningioma, CXR was unremarkable. CT chest on 1/30/18 showed interval treatment response with significantly decreased mediastinal and right hilar lymphadenopathy. Past Medical History/Comorbidities:   Ms. Isidoro Carlson  has a past medical history of Former cigarette smoker; GERD (gastroesophageal reflux disease); Hypertension; and Lung cancer ([facility] She also has no past medical history of Adverse effect of anesthesia; Difficult intubation; Malignant hyperthermia due to anesthesia; Nausea & vomiting; or Pseudocholinesterase deficiency. Ms. Isidoro Carlson  has a past surgical history that includes hx tubal ligation and hx vascular access.   Social History/Living Environment:   Home Environment: Private residence  # Steps to Enter: 3  Rails to Enter: No  One/Two Story Residence: One story  Living Alone: No  Support Systems: Parent, Family member(s) (takes care of mother whom she lives with)  Patient Expects to be Discharged to[de-identified] Unknown  Current DME Used/Available at Home: None  Tub or Shower Type: Tub/Shower combination  Prior Level of Function/Work/Activity:  Taking care of mother, independent, no AD, intact cognition and communication Number of Personal Factors/Comorbidities that affect the Plan of Care: 3+: HIGH COMPLEXITY   EXAMINATION:   Most Recent Physical Functioning:   Gross Assessment:                  Posture:  Posture (WDL): Exceptions to WDL  Posture Assessment: Trunk flexion, Rounded shoulders  Balance:  Sitting: Intact  Sitting - Static: Good (unsupported)  Sitting - Dynamic: Fair (occasional) Bed Mobility:     Wheelchair Mobility:     Transfers:  Sit to Stand: Modified independent  Stand to Sit: Modified independent  Gait:     Base of Support: Narrowed  Speed/Aissatou: Pace decreased (<100 feet/min)  Step Length: Right shortened  Swing Pattern: Right asymmetrical  Stance: Left increased  Gait Abnormalities: Path deviations;Trunk sway increased; Other; Altered arm swing (decreased clearance R LE)  Distance (ft): 250 Feet (ft)  Assistive Device: Other (comment) (no A/D)  Ambulation - Level of Assistance: Supervision      Body Structures Involved:  1. Heart  2. Lungs  3. Bones  4. Joints  5. Muscles  6. Ligaments Body Functions Affected:  1. Mental  2. Voice and Speech  3. Cardio  4. Respiratory  5. Neuromusculoskeletal  6. Movement Related Activities and Participation Affected:  1. Learning and Applying Knowledge  2. General Tasks and Demands  3. Communication  4. Mobility  5. Self Care  6. Domestic Life  7. Interpersonal Interactions and Relationships  8. Community, Social and [place]   Number of elements that affect the Plan of Care: 4+: HIGH COMPLEXITY   CLINICAL PRESENTATION:   Presentation: Evolving clinical presentation with changing clinical characteristics: MODERATE COMPLEXITY   CLINICAL DECISION MAKIN [facility] Inpatient Short Form  How much difficulty does the patient currently have. .. Unable A Lot A Little None   1. Turning over in bed (including adjusting bedclothes, sheets and blankets)? [] 1   [] 2   [] 3   [x] 4   2.   Sitting down on and standing up from a chair with arms ( e.g., wheelchair, bedside commode, etc.)   [] 1   [] 2   [] 3   [x] 4   3. Moving from lying on back to sitting on the side of the bed? [] 1   [] 2   [] 3   [x] 4   How much help from another person does the patient currently need. .. Total A Lot A Little None   4. Moving to and from a bed to a chair (including a wheelchair)? [] 1   [] 2   [x] 3   [] 4   5. Need to walk in hospital room? [] 1   [] 2   [x] 3   [] 4   6. Climbing 3-5 steps with a railing? [] 1   [] 2   [x] 3   [] 4   © 2007, Trustees of [facility] [employer], under license to [employer]. All rights reserved      Score:  Initial: 17 Most Recent: 21 (Date: 3/22/2018 )    Interpretation of Tool:  Represents activities that are increasingly more difficult (i.e. Bed mobility, Transfers, Gait). Score 24 23 22-20 19-15 14-10 9-7 6     Modifier CH CI CJ CK CL CM CN      ? Mobility - Walking and Moving Around:     - CURRENT STATUS: CJ - 20%-39% impaired, limited or restricted    - GOAL STATUS: CI - 1%-19% impaired, limited or restricted    - D/C STATUS:  ---------------To be determined---------------  Payor: ABSOLUTE TOTAL CARE / [place] / Product Type: Managed Care Medicaid /      Medical Necessity:     · Patient is expected to demonstrate progress in strength, range of motion, balance and coordination to decrease assistance required with transfers, ambulation, and functional mobility. Reason for Services/Other Comments:  · Patient continues to require skilled intervention due to decreased cognition, activity tolerance, and functional mobility. Use of outcome tool(s) and clinical judgement create a POC that gives a: Clear prediction of patient's progress: LOW COMPLEXITY            TREATMENT:   (In addition to Assessment/Re-Assessment sessions the following treatments were rendered)   Pre-treatment Symptoms/Complaints:  Pt.  Tangential & perseverative w/ speech, needing cues to stay focused on task.  Pain: Initial: 0/10  Pain Intensity 1: 0  Post Session:  0/10   Gait Training (10 minutes):  Gait training to improve and/or restore physical functioning as related to mobility. Ambulated 250 Feet (ft) with supervision/set-up w/ cues to improve attention to R side as well as improve trunk rotation/arm swing. Assistive Device: Other (comment) (no A/D)  Ambulation - Level of Assistance: Supervision  Distance (ft): 250 Feet (ft)  Therapeutic Exercise (13 minutes):  Pt. Performed B LE standing exercises x10 each w/ B UE support. Exercises are as follows:  STANDING EXERCISES Sets Reps Comments   Heel Raises 1 10    Toe Raises 1 10 Difficulty on R Le   Hip Flexion/Marching 1 10    Hamstring Curls 1 10    Hip Abduction 1 10    Hip Extension 1 10    Mini Squats 3 10      Braces/Orthotics/Lines/Etc:   · O2 Device: Room air  Treatment/Session Assessment:    · Response to Treatment:  Fair balance with path deviations at times. · Interdisciplinary Collaboration:   o Physical Therapist  o Registered Nurse  · After treatment position/precautions:   o Bed/Chair-wheels locked  o Bed in low position  o Call light within reach  o pt. sitting on EOB   · Compliance with Program/Exercises: good. · Recommendations/Intent for next treatment session: \"Next visit will focus on reduction in assistance provided\".   Total Treatment Duration:  PT Patient Time In/Time Out  Time In: 1048  Time Out: Desire Alarcon 1, PT

## 2018-03-31 NOTE — PROGRESS NOTES
[facility]        Inpatient Hematology / Oncology Progress Note      Admission Date: 3/16/2018  9:00 PM  Reason for Admission/Hospital Course: Acute encephalopathy  Brain metastasis (HCC)  Seizure ([facility]  Lung cancer ([facility]    24 Hour Events:  Afebrile, VSS  On Dex, Keppra, and Phenobarbital   XRT 3/14 -resume on Monday       ROS:  Constitutional: Negative for fever, chills. CV: Negative for chest pain, palpitations, edema. Respiratory: Negative for dyspnea, cough, wheezing. GI: Negative for nausea, abdominal pain, diarrhea. 10 point review of systems is otherwise negative with the exception of the elements mentioned above in the HPI. Allergies   Allergen Reactions    Metaxalone Other (comments)    Oxycodone Swelling       OBJECTIVE:  Patient Vitals for the past 8 hrs:   BP Temp Pulse Resp SpO2   18 0721 118/82 98.1 °F (36.7 °C) 77 16 100 %   18 0427 117/70 97.8 °F (36.6 °C) 84 16 98 %     Temp (24hrs), Av.7 °F (36.5 °C), Min:97.5 °F (36.4 °C), Max:98.1 °F (36.7 °C)     07 -  1900  In: 240 [P.O.:240]  Out: -     Physical Exam:  Constitutional: Well developed, well nourished female in no acute distress, sitting comfortably in the bedside chair. HEENT: Normocephalic and atraumatic. Oropharynx is clear, mucous membranes are moist. Extraocular muscles are intact. Sclerae anicteric. Neck supple       Skin Warm and dry. No bruising and no rash noted. No erythema. No pallor. Respiratory Lungs are clear to auscultation bilaterally without wheezes, rales or rhonchi, normal air exchange without accessory muscle use. CVS Normal rate, regular rhythm and normal S1 and S2. No murmurs, gallops, or rubs. Abdomen Soft, nontender and nondistended, normoactive bowel sounds. No palpable mass. No hepatosplenomegaly. Neuro Expressive aphasia - continues with improving, conversing appropriately.  No focal deficits   MSK Normal range of motion in general. No edema and no tenderness. Psych Alert. Calm. Labs:      Recent Labs      03/30/18   0411   WBC  12.1*   RBC  3.85*   HGB  9.0*   HCT  28.2*   MCV  73.2*   MCH  23.4*   MCHC  31.9   RDW  19.6*   PLT  204   GRANS  81*   LYMPH  11*   MONOS  8   EOS  0*   BASOS  0   IG  2   DF  AUTOMATED   ANEU  9.8*   ABL  1.3   ABM  1.0   LESLIE  0.0   ABB  0.0   AIG  0.2        Recent Labs      03/31/18   0420  03/30/18   0411  03/29/18   0237   NA  136  137  139   K  4.8  4.9  4.8   CL  98  98  98   CO2  31  32  33*   AGAP  7  7  8   GLU  130*  128*  141*   BUN  28*  28*  30*   CREA  0.96  0.89  0.91   GFRAA  >60  >60  >60   GFRNA  >60  >60  >60   CA  8.8  9.4  8.8   SGOT  24  28  38*   AP  67  74  78   TP  6.4  6.3  6.2*   ALB  3.3*  3.2*  3.3*   GLOB  3.1  3.1  2.9   AGRAT  1.1*  1.0*  1.1*   MG   --   2.1   --          Imaging:  MRI BRAIN W WO CONT [[id number]] Collected: 03/18/18 1307      Order Status: Completed Updated: 03/18/18 1319     Narrative:       MRI of the Brain with contrast: 3/18/2018  History: Lung cancer  Sequences: Axial pre and post contrast T1, FLAIR, T2, diffusion, coronal post  contrast T1 and sagittal precontrast T1-weighted images of the brain. Comparison: MRI the brain 4/21/2017  20 cc of IV MultiHance   was injected to aid in the detection of intracranial  pathology. Findings:    Supratentorial enhancing metastatic lesions are now demonstrated. These lesions  all display hemosiderin and restricted diffusion. There is a 5 mm lesion in the  right temporal white matter, 1 cm lesion right frontal white matter, left  posterior parasagittal convexity lesion measuring 14 mm x 27 x 31 mm, 14 mm left  occipital white matter lesion, 9 mm left frontal lesion, and 2 left parietal  enhancing lesions measuring respectively 7 mm and 24 mm. There is some mild mass  upon the left ventricular atrium. The pituitary and sinuses are unremarkable. 7th and 8th nerves and orbits are unremarkable.  There is some fluid in the right  mastoid air cells.       Impression:       IMPRESSION: Interval supratentorial metastatic lesions as above. DC4  The significant findings in this report have been referred to the Imaging  Navigator in order to communicate to the referring provider or his/her designee  as outlined in Section II.C.2.a.ii-iii of the ACR  Practice Guideline for Communication of Diagnostic Imaging Findings.           XR ABD (KUB) [[id number]] Collected: 03/17/18 0115     Order Status: Completed Updated: 03/17/18 0117     Narrative:       Abdomen x-ray single view. HISTORY: Nasogastric tube placement. COMPARISON: None. FINDINGS: Nasogastric tube its tip in the gastric body. Bowel gas pattern is  nonspecific.       Impression:       IMPRESSION:    Nasogastric tube its tip in the gastric body.     XR CHEST PORT [[id number]] Collected: 03/16/18 2138     Order Status: Completed Updated: 03/16/18 2146     Narrative:       EXAM:  XR CHEST PORT    INDICATION:  AMS    COMPARISON:  4/18/2017    FINDINGS: A portable AP radiograph of the chest was obtained at 2124 hours. The  patient is on a cardiac monitor.  The lungs are clear.  The cardiac and  mediastinal contours and pulmonary vascularity are normal.  The bones and soft  tissues are grossly within normal limits.        Impression:       IMPRESSION: No acute cardiopulmonary disease.         CT HEAD WITHOUT CONTRAST [[id number]] Collected: 03/16/18 2130     Order Status: Completed Updated: 03/16/18 2134     Narrative:       CT HEAD WITHOUT CONTRAST     HISTORY:  Seizure. COMPARISON: None. TECHNIQUE: Axial imaging was performed without intravenous contrast utilizing  5mm slice thickness. Sagittal and coronal reformats were performed. Radiation  dose reduction techniques were used for this study.  Our CT scanner uses one or  all of the following:    Automated exposure control, adjustment of the MAS or KUB according to patient's  size and iterative reconstruction. FINDINGS:        *BRAIN:      -  There are no early signs of territorial or lacunar infarction by CT.     -  1.3 x 1.6 cm left parietal mass with surrounding vasogenic edema. 0.6 x  0.9 cm mass in left frontal lobe with vasogenic edema. Partly calcified mass in  left superior parafalcine region measuring 2.9 x 2.6 cm. Consider the  possibility of meningioma.     -  No gross white matter abnormality by CT.    *VISUALIZED PARANASAL SINUSES: Well aerated. *MASTOIDS:  Clear. *CALVARIUM AND SCALP: Unremarkable.         Impression:       IMPRESSION:    Cerebral metastatic disease. Possible left parietal convexity meningioma.     Date of Dictation: 3/16/2018 9:30 PM         ASSESSMENT:    Problem List  Date Reviewed: 3/14/2018          Codes Class Noted    * (Principal)Epilepsy with status epilepticus ([facility] ICD-10-CM: G40.901  ICD-9-CM: 345.3  3/18/2018        Hyperkalemia ICD-10-CM: E87.5  ICD-9-CM: 276.7  3/17/2018        Abnormal EKG ICD-10-CM: R94.31  ICD-9-CM: 794.31  3/17/2018        Seizure ([facility] ICD-10-CM: R56.9  ICD-9-CM: 780.39  3/16/2018        Lung cancer ([facility] ICD-10-CM: C34.90  ICD-9-CM: 162.9  3/16/2018        Brain metastasis (HCC) ICD-10-CM: C79.31  ICD-9-CM: 198.3  3/16/2018        Acute encephalopathy ICD-10-CM: G93.40  ICD-9-CM: 348.30  3/16/2018        Bone metastases ([facility] ICD-10-CM: C79.51  ICD-9-CM: 198.5  8/30/2017        Small cell carcinoma of right lung (HCC) ICD-10-CM: C34.91  ICD-9-CM: 162.9  4/21/2017        Smoker (Chronic) ICD-10-CM: J20.034  ICD-9-CM: 305.1  Unknown        Mediastinal mass ICD-10-CM: J98.59  ICD-9-CM: 786.6  4/18/2017        SVC (superior vena cava obstruction) ICD-10-CM: I87.1  ICD-9-CM: 459.2  4/18/2017        Essential hypertension ICD-10-CM: I10  ICD-9-CM: 401.9  4/18/2017        SVC syndrome ICD-10-CM: I87.1  ICD-9-CM: 459.2  4/18/2017            Ms. Ernestina Hurst is a 63 y.o. female admitted on 3/16/2018 with a primary diagnosis of lung cancer, acute encephalopathy, brain metastasis, and fever. Ms. Isaiah Sanchez is a well known patient of Dr. Loly Tejada. She was just seen in the clinic on 3/14/18. While planning for Nivolumab she had rapid growth of b/l cervical LAD. He discussed  the aggressive pattern of her disease. Restaging CT and MRI of brain were ordered. The plan was to start nivolumab/ipilimimab on 4/4/18. He discussed the need for her to inform family of her aggressive disease and to designate POA. Unfortunately, yesterday she was confused, lethargic and somnolent. Patient's sister was driving her home when she became more confused and start having what her sister described as generalized body shakes. 911 was called. She received 2 doses of ativan PTA to ED. In ED she was post ictal and difficult to arouse. CT head showed cerebral metastatic disease with left parietal convexity meningioma. CXR was unremarkable. CT chest on 1/30/18 showed interval treatment response with significantly decreased mediastinal and right hilar lymphadenopathy. PLAN:  Stage IV SCLC with brain mets  - s/p C1 nivolumab/ipilimumab on 3/15. C2 due 4/4  3/21 Rad Onc consult scheduled for this AM  3/22 Pt met with Dr. Ramsey Singh yesterday. He spoke with pt's family. Plan is to watch her over the weekend to see how much she improves before making decision in regards to treatment  3/26 Pt continues to improve daily with speech and ability to communicate. She expresses interest today in pursing XRT. Dr. Ramsey Singh notified - he states they will get her in for consent and simulation, probably at [facility] tomorrow. 3/27 Went to [place] this AM for simulation  3/30 Day 3/14 XRT treatments. C2 nivo/ipi due on 4/4. Seizures  3/19 CT head revealed cerebral metastatic disease with L parietal convxity meningioma. Per teleneuro, pt has ongoing L-sided seizures and was started on Fosphenytoin yesterday - currently being held for high level phenytoin. Also on Dex and Keppra.   Dr. Juana Barnes to check with Dr. Christie Benjamin to see if pt needs to be transferred to [facility] to neuro service vs. daily teleneuro eval here. Pt alert - responds with \"OK\" to every question. SLP following. 3/20  Follow-up teleneuro eval pending. Remains confused, but able to speak more words today. SLP following. Continues on Dex, Keppra, and Fosphenytoin. Phenytoin level pending. 3/21 Per neuro, Dr. Jose Patel, hold phenytoin and allow levels to trend down. Repeat EEG. Give loading dose of Phenobarbital 90mg IV x 1. Then Phenobarbital 30mg BID.  3/22 Repeat EEG pending. Pt's expressive aphasia is improving daily. Pt was able to answer questions and use phrases appropriately during exam this AM.    3/23 Repeat EEG \"moderately abnormal EGG d/t the presence of persistent PLEDs through the recording in the L hemisphere with focus likely at frontal leads. No subclinical seizure is identified\". Follow-up teleneuro consult ordered. Con't Dex, Keppra, and Phenobarbital.  3/25 Speech improving daily. Per neuro, continue current management  3/28 No new seizures reported    Hypokalemia/Hypomagnesemia  3/19 Replete K+  3/20 K+ up to 3.8  3/24 Replete Mg+    Continue home meds  Moni SOPs  SCDs for DVT prophylaxis  Per PT, may benefit from rehab upon discharge. CM working on placement. Disposition: Will remain hospitalized through completion of her radiation due to transportation issues. Last treatment 4/16. Could potentially discharge on 4/13 if she has transportation for her final XRT on 4/16. Sylvia Pepper NP   [facility]  [address] [place] [address]  Office : [phone]  Fax : [phone]           Attending Addendum:  I personally evaluated the patient with Sylvia Pepper, N.P.,  and agree with the assessment, findings and plan as documented. Appears stable, we will continue Keppra, Dex and Phenobarb.               Milvia Em MD  [address] [place] [address] Oncology [address]  Office : [phone]  Fax : [phone]

## 2018-03-31 NOTE — PROGRESS NOTES
Assumed care of patient. Assessment completed and documented, see docflow. Patient sitting up in bedside chair. Patient A/Ox3. NAD noted at present. Respirations even and non labored on RA. Call light within reach. Will continue to monitor.

## 2018-03-31 NOTE — PROGRESS NOTES
Problem: Falls - Risk of  Goal: *Absence of Falls  Document Ashleigh Fall Risk and appropriate interventions in the flowsheet.    Outcome: Progressing Towards Goal  Fall Risk Interventions:  Mobility Interventions: Patient to call before getting OOB    Mentation Interventions: Adequate sleep, hydration, pain control, Evaluate medications/consider consulting pharmacy    Medication Interventions: Evaluate medications/consider consulting pharmacy, Patient to call before getting OOB    Elimination Interventions: Call light in reach, Patient to call for help with toileting needs

## 2018-03-31 NOTE — PROGRESS NOTES
END OF SHIFT NOTE:    Intake/Output  03/30 1901 - 03/31 0700  In: 100 [P.O.:100]  Out: 1000 [Urine:1000]   Voiding: YES  Catheter: NO  Drain:              Stool:  0 occurrences. Stool Assessment  Stool Color: Brown (03/30/18 0630)  Stool Appearance: Formed (03/30/18 0630)  Stool Amount: Medium (03/30/18 0630)  Stool Source/Status: Rectum (03/30/18 0630)    Emesis:  0 occurrences. VITAL SIGNS  Patient Vitals for the past 12 hrs:   Temp Pulse Resp BP SpO2   03/31/18 0427 97.8 °F (36.6 °C) 84 16 117/70 98 %   03/30/18 2318 97.5 °F (36.4 °C) 79 18 124/77 97 %   03/30/18 1956 97.7 °F (36.5 °C) 88 18 130/76 95 %       Pain Assessment  Pain 1  Pain Scale 1: Visual (03/31/18 0220)  Pain Intensity 1: 0 (03/31/18 0220)  Patient Stated Pain Goal: 0 (03/30/18 2024)  Pain Reassessment 1: Patient sleeping (03/31/18 0220)  Pain Location 1: Back (03/30/18 2024)  Pain Orientation 1: Mid (03/30/18 2024)  Pain Description 1: Marysue Nicely (03/30/18 2024)  Pain Intervention(s) 1: Declines (03/30/18 2024)    Ambulating  Yes    Additional Information: Pt rested well through the night. Uneventful night. Shift report given to oncoming nurse at the bedside.     Sania Monte

## 2018-04-01 NOTE — PROGRESS NOTES
Problem: Falls - Risk of  Goal: *Absence of Falls  Document Ashleigh Fall Risk and appropriate interventions in the flowsheet.    Outcome: Progressing Towards Goal  Fall Risk Interventions:  Mobility Interventions: Communicate number of staff needed for ambulation/transfer    Mentation Interventions: Adequate sleep, hydration, pain control    Medication Interventions: Evaluate medications/consider consulting pharmacy    Elimination Interventions: Call light in reach

## 2018-04-01 NOTE — PROGRESS NOTES
Patient had uneventful shift. Patient ambulated in hallway with PT x 2 and went outside hospital for \"fresh air\". Patient denied pain, SOB or CP this shift. Patient received 1L  Bolus, see MAR for . Patient emains A/Ox3. VSS.  BSR given to be given to oncoming RN

## 2018-04-01 NOTE — PROGRESS NOTES
Problem: Self Care Deficits Care Plan (Adult)  Goal: *Acute Goals and Plan of Care (Insert Text)  1. Patient will complete lower body bathing and dressing with modified independence and adaptive equipment as needed. 2. Patient will complete toileting with modified independence. 3. Patient will tolerate 20 minutes of OT treatment with no rest breaks to increase activity tolerance for ADLs. 4. Patient will complete functional transfers with modified independence and adaptive equipment as needed. Timeframe: 7 visits       OCCUPATIONAL THERAPY: Daily Note, Treatment Day: 2nd and AM    4/1/2018  INPATIENT: Hospital Day: 17  Payor: ABSOLUTE TOTAL CARE / [place] / Product Type: Managed Care Medicaid /      NAME/AGE/GENDER: Betzy Duncan is a 63 y.o. female   PRIMARY DIAGNOSIS:  Acute encephalopathy  Brain metastasis ([address]  Seizure ([address]  Lung cancer ([address] Epilepsy with status epilepticus ([address] Epilepsy with status epilepticus ([address]        ICD-10: Treatment Diagnosis:    · Other lack of cordination (R27.8)   Precautions/Allergies:     Metaxalone and Oxycodone      ASSESSMENT:   Ms. Ras Almaguer was admitted for the above diagnosis. Pt presents sitting up in chair upon arrival. Pt perform transfers with supervison. Pt practiced functional mobility in hallway pushing IV pole and later was able to go outside on uneven grounds. Pt did well with mobility. Standing balance is fair without IV pole. Pt returned to room and to chair. Progressing towards goals above. Will continue to benefit from skilled OT during stay. This section established at most recent assessment   PROBLEM LIST (Impairments causing functional limitations):  1. Decreased ADL/Functional Activities  2. Decreased Transfer Abilities  3. Decreased Ambulation Ability/Technique  4. Decreased Balance  5.  Decreased Cognition   INTERVENTIONS PLANNED: (Benefits and precautions of occupational therapy have been discussed with the patient.)  1. Activities of daily living training  2. Adaptive equipment training  3. Cognitive training  4. Group therapy  5. Neuromuscular re-eduation  6. Therapeutic activity  7. Therapeutic exercise     TREATMENT PLAN: Frequency/Duration: Follow patient 3x/ week to address above goals. Rehabilitation Potential For Stated Goals: Fair     RECOMMENDED REHABILITATION/EQUIPMENT: (at time of discharge pending progress): Due to the probability of continued deficits (see above) this patient will likely need continued skilled occupational therapy after discharge. Equipment:    None at this time          OCCUPATIONAL PROFILE AND HISTORY:   History of Present Injury/Illness (Reason for Referral):  Per H&P, \"Patient is 63years old female with pmhx of extensive stage small cell lung cancer with brain metastasis, GERD, HTN, previous tobacco abuse presented in view of new onset seizure like activity. As per the pt's sister, pt was in her usual health until yesterday, today she appeared more confused, lethargic and somnolent. While she was driving the pt home, pt started appearing confused and then had generalized body shakes. EMS was summoned. Pt received 2 doses of ativan PTA. In ER, pt is post ictal, sleeping, difficult to arouse. Pt is DNR. In ER, CT head showed cerebral metastatic disease with left parietal convexity meningioma, CXR was unremarkable. CT chest on 1/30/18 showed interval treatment response with significantly decreased mediastinal and right hilar lymphadenopathy. \"  Past Medical History/Comorbidities:   Ms. Kitty Mckay  has a past medical history of Former cigarette smoker; GERD (gastroesophageal reflux disease); Hypertension; and Lung cancer ([address] She also has no past medical history of Adverse effect of anesthesia; Difficult intubation; Malignant hyperthermia due to anesthesia; Nausea & vomiting; or Pseudocholinesterase deficiency.   Ms. Kitty Mckay  has a past surgical history that includes hx tubal ligation and hx vascular access. Social History/Living Environment:   Home Environment: Private residence  # Steps to Enter: 3  Rails to Enter: No  One/Two Story Residence: One story  Living Alone: No  Support Systems: Parent, Family member(s) (takes care of mother whom she lives with)  Patient Expects to be Discharged to[de-identified] Unknown  Current DME Used/Available at Home: None  Tub or Shower Type: Tub/Shower combination  Prior Level of Function/Work/Activity:  Patient lives with her mother and is her caregiver. She is typically independent with ADLs. Number of Personal Factors/Comorbidities that affect the Plan of Care: Brief history (0):  LOW COMPLEXITY   ASSESSMENT OF OCCUPATIONAL PERFORMANCE[de-identified]   Activities of Daily Living:           Basic ADLs (From Assessment) Complex ADLs (From Assessment)   Basic ADL  Feeding: Minimum assistance  Oral Facial Hygiene/Grooming: Minimum assistance  Bathing: Moderate assistance  Type of Bath: Chlorhexidine (CHG), Bath Pack  Upper Body Dressing: Minimum assistance  Lower Body Dressing: Moderate assistance  Toileting: Minimum assistance Instrumental ADL  Meal Preparation: Total assistance  Homemaking:  Total assistance  Medication Management: Total assistance  Financial Management: Total assistance   Grooming/Bathing/Dressing Activities of Daily Living     Cognitive Retraining  Safety/Judgement: Awareness of environment                       Bed/Mat Mobility  Sit to Stand: Supervision       Most Recent Physical Functioning:   Gross Assessment:                  Posture:  Posture (WDL): Exceptions to WDL  Posture Assessment: Trunk flexion, Rounded shoulders  Balance:  Sitting: Intact  Sitting - Static: Good (unsupported)  Sitting - Dynamic: Good (unsupported)  Standing: Intact  Standing - Static: Good  Standing - Dynamic : Fair Bed Mobility:     Wheelchair Mobility:     Transfers:  Sit to Stand: Supervision  Stand to Sit: Supervision                Patient Vitals for the past 6 hrs:   BP BP Patient Position SpO2 Pulse   18 0700 116/77 At rest;Sitting 98 % 82   18 1050 (!) 136/94 At rest;Sitting 97 % 80       Mental Status  Neurologic State: Alert  Orientation Level: Oriented X4  Cognition: Follows commands  Perception: Appears intact  Perseveration: No perseveration noted  Safety/Judgement: Awareness of environment                          Physical Skills Involved:  1. Balance  2. Strength  3. Activity Tolerance Cognitive Skills Affected (resulting in the inability to perform in a timely and safe manner):  1. Perception  2. Executive Function  3. Comprehension  4. Expression Psychosocial Skills Affected:  1. Habits/Routines  2. Environmental Adaptation   Number of elements that affect the Plan of Care: 5+:  HIGH COMPLEXITY   CLINICAL DECISION MAKIN[address] [place] [address] AM-PAC 6 Clicks   Daily Activity Inpatient Short Form  How much help from another person does the patient currently need. .. Total A Lot A Little None   1. Putting on and taking off regular lower body clothing? [] 1   [] 2   [x] 3   [] 4   2. Bathing (including washing, rinsing, drying)? [] 1   [] 2   [x] 3   [] 4   3. Toileting, which includes using toilet, bedpan or urinal?   [] 1   [] 2   [x] 3   [] 4   4. Putting on and taking off regular upper body clothing? [] 1   [] 2   [x] 3   [] 4   5. Taking care of personal grooming such as brushing teeth? [] 1   [] 2   [x] 3   [] 4   6. Eating meals? [] 1   [] 2   [x] 3   [] 4   © , Trustees of [address] [place], [place] [id number], under license to [employer]. All rights reserved      Score:  Initial: 18 Most Recent: X (Date: -- )    Interpretation of Tool:  Represents activities that are increasingly more difficult (i.e. Bed mobility, Transfers, Gait). Score 24 23 22-20 19-15 14-10 9-7 6     Modifier CH CI CJ CK CL CM CN      ?  Self Care:     - CURRENT STATUS: CK - 40%-59% impaired, limited or restricted    - GOAL STATUS: CJ - 20%-39% impaired, limited or restricted    - D/C STATUS:  ---------------To be determined---------------  Payor: ABSOLUTE TOTAL CARE / [place] / Product Type: Managed Care Medicaid /      Medical Necessity:     · Patient demonstrates good rehab potential due to higher previous functional level. Reason for Services/Other Comments:  · Patient continues to require present interventions due to patient's inability to care for self at baseline level of function. Use of outcome tool(s) and clinical judgement create a POC that gives a: MODERATE COMPLEXITY         TREATMENT:   (In addition to Assessment/Re-Assessment sessions the following treatments were rendered)     Pre-treatment Symptoms/Complaints:  None   Pain: Initial:   Pain Intensity 1: 0  Post Session:  None      Therapeutic Activity: (25 minutes): Therapeutic activities including Functional Mobility on level ground and Functional Mobility on slight incline to improve mobility, strength, balance and activity tolerance. Required SBA to promote static and dynamic balance in standing. Date:  3/28/18 Date:   Date:     Activity/Exercise Parameters Parameters Parameters   Shoulder flexion/extension 15 reps with red theraband     Shoulder horizontal add/abb 15 reps with red theraband     Punches 15 reps with red theraband     Elbow flexion/extension 15 reps with red theraband     Tricep extension 15 reps with red theraband           Braces/Orthotics/Lines/Etc:   · IV  · O2 Device: Room air  Treatment/Session Assessment:    · Response to Treatment:  Tolerated well   · Interdisciplinary Collaboration:   o Certified Occupational Therapy Assistant  o Registered Nurse  · After treatment position/precautions:   o Up in chair  o Bed/Chair-wheels locked  o Call light within reach  o RN notified   · Compliance with Program/Exercises: Will assess as treatment progresses. · Recommendations/Intent for next treatment session:   \"Next visit will focus on advancements to more challenging activities and reduction in assistance provided\".   Total Treatment Duration:  OT Patient Time In/Time Out  Time In: 0910  Time Out: R Massimo Li [address] Oriana Hamilton

## 2018-04-01 NOTE — PROGRESS NOTES
[facility] Hematology & Oncology        Inpatient Hematology / Oncology Progress Note      Admission Date: 3/16/2018  9:00 PM  Reason for Admission/Hospital Course: Acute encephalopathy  Brain metastasis (HCC)  Seizure ([facility]  Lung cancer ([facility]    24 Hour Events:  Afebrile, VSS  On Dex, Keppra, and Phenobarbital   XRT 3/14 -resume on Monday  Sitting up in bedside chair eating chocolates       ROS:  Constitutional: Negative for fever, chills. CV: Negative for chest pain, palpitations, edema. Respiratory: Negative for dyspnea, cough, wheezing. GI: Negative for nausea, abdominal pain, diarrhea. 10 point review of systems is otherwise negative with the exception of the elements mentioned above in the HPI. Allergies   Allergen Reactions    Metaxalone Other (comments)    Oxycodone Swelling       OBJECTIVE:  Patient Vitals for the past 8 hrs:   BP Temp Pulse Resp SpO2   18 1050 (!) 136/94 98 °F (36.7 °C) 80 16 97 %   18 0700 116/77 98 °F (36.7 °C) 82 16 98 %     Temp (24hrs), Av.8 °F (36.6 °C), Min:97.6 °F (36.4 °C), Max:98 °F (36.7 °C)     0701 -  1900  In: 120 [P.O.:120]  Out: 840 [Urine:840]    Physical Exam:  Constitutional: Well developed, well nourished female in no acute distress, sitting comfortably in the bedside chair. HEENT: Normocephalic and atraumatic. Oropharynx is clear, mucous membranes are moist. Extraocular muscles are intact. Sclerae anicteric. Neck supple       Skin Warm and dry. No bruising and no rash noted. No erythema. No pallor. Respiratory Lungs are clear to auscultation bilaterally without wheezes, rales or rhonchi, normal air exchange without accessory muscle use. CVS Normal rate, regular rhythm and normal S1 and S2. No murmurs, gallops, or rubs. Abdomen Soft, nontender and nondistended, normoactive bowel sounds. No palpable mass. No hepatosplenomegaly. Neuro Expressive aphasia resolved. . No focal deficits   MSK Normal range of motion in general.  No edema and no tenderness. Psych Alert. Calm. In good spirits       Labs:      Recent Labs      03/30/18   0411   WBC  12.1*   RBC  3.85*   HGB  9.0*   HCT  28.2*   MCV  73.2*   MCH  23.4*   MCHC  31.9   RDW  19.6*   PLT  204   GRANS  81*   LYMPH  11*   MONOS  8   EOS  0*   BASOS  0   IG  2   DF  AUTOMATED   ANEU  9.8*   ABL  1.3   ABM  1.0   LESLIE  0.0   ABB  0.0   AIG  0.2        Recent Labs      04/01/18   0420  03/31/18   0420  03/30/18   0411   NA  135*  136  137   K  4.6  4.8  4.9   CL  96*  98  98   CO2  30  31  32   AGAP  9  7  7   GLU  137*  130*  128*   BUN  33*  28*  28*   CREA  1.21*  0.96  0.89   GFRAA  59*  >60  >60   GFRNA  49*  >60  >60   CA  9.6  8.8  9.4   SGOT  32  24  28   AP  81  67  74   TP  6.8  6.4  6.3   ALB  3.6  3.3*  3.2*   GLOB  3.2  3.1  3.1   AGRAT  1.1*  1.1*  1.0*   MG  2.2   --   2.1         Imaging:  MRI BRAIN W WO CONT [[id number]] Collected: 03/18/18 1307      Order Status: Completed Updated: 03/18/18 1319     Narrative:       MRI of the Brain with contrast: 3/18/2018  History: Lung cancer  Sequences: Axial pre and post contrast T1, FLAIR, T2, diffusion, coronal post  contrast T1 and sagittal precontrast T1-weighted images of the brain. Comparison: MRI the brain 4/21/2017  20 cc of IV MultiHance   was injected to aid in the detection of intracranial  pathology. Findings:    Supratentorial enhancing metastatic lesions are now demonstrated. These lesions  all display hemosiderin and restricted diffusion. There is a 5 mm lesion in the  right temporal white matter, 1 cm lesion right frontal white matter, left  posterior parasagittal convexity lesion measuring 14 mm x 27 x 31 mm, 14 mm left  occipital white matter lesion, 9 mm left frontal lesion, and 2 left parietal  enhancing lesions measuring respectively 7 mm and 24 mm. There is some mild mass  upon the left ventricular atrium. The pituitary and sinuses are unremarkable.   7th and 8th nerves and orbits are unremarkable. There is some fluid in the right  mastoid air cells.       Impression:       IMPRESSION: Interval supratentorial metastatic lesions as above. DC4  The significant findings in this report have been referred to the Imaging  Navigator in order to communicate to the referring provider or his/her designee  as outlined in Section II.C.2.a.ii-iii of the ACR  Practice Guideline for Communication of Diagnostic Imaging Findings.           XR ABD (KUB) [[id number]] Collected: 03/17/18 0115     Order Status: Completed Updated: 03/17/18 0117     Narrative:       Abdomen x-ray single view. HISTORY: Nasogastric tube placement. COMPARISON: None. FINDINGS: Nasogastric tube its tip in the gastric body. Bowel gas pattern is  nonspecific.       Impression:       IMPRESSION:    Nasogastric tube its tip in the gastric body.     XR CHEST PORT [[id number]] Collected: 03/16/18 2138     Order Status: Completed Updated: 03/16/18 2146     Narrative:       EXAM:  XR CHEST PORT    INDICATION:  AMS    COMPARISON:  4/18/2017    FINDINGS: A portable AP radiograph of the chest was obtained at 2124 hours. The  patient is on a cardiac monitor.  The lungs are clear.  The cardiac and  mediastinal contours and pulmonary vascularity are normal.  The bones and soft  tissues are grossly within normal limits.        Impression:       IMPRESSION: No acute cardiopulmonary disease.         CT HEAD WITHOUT CONTRAST [[id number]] Collected: 03/16/18 2130     Order Status: Completed Updated: 03/16/18 2134     Narrative:       CT HEAD WITHOUT CONTRAST     HISTORY:  Seizure. COMPARISON: None. TECHNIQUE: Axial imaging was performed without intravenous contrast utilizing  5mm slice thickness. Sagittal and coronal reformats were performed. Radiation  dose reduction techniques were used for this study.  Our CT scanner uses one or  all of the following:    Automated exposure control, adjustment of the MAS or KUB according to patient's  size and iterative reconstruction. FINDINGS:        *BRAIN:      -  There are no early signs of territorial or lacunar infarction by CT.     -  1.3 x 1.6 cm left parietal mass with surrounding vasogenic edema. 0.6 x  0.9 cm mass in left frontal lobe with vasogenic edema. Partly calcified mass in  left superior parafalcine region measuring 2.9 x 2.6 cm. Consider the  possibility of meningioma.     -  No gross white matter abnormality by CT.    *VISUALIZED PARANASAL SINUSES: Well aerated. *MASTOIDS:  Clear. *CALVARIUM AND SCALP: Unremarkable.         Impression:       IMPRESSION:    Cerebral metastatic disease. Possible left parietal convexity meningioma.     Date of Dictation: 3/16/2018 9:30 PM         ASSESSMENT:    Problem List  Date Reviewed: 3/14/2018          Codes Class Noted    * (Principal)Epilepsy with status epilepticus ([facility] ICD-10-CM: G40.901  ICD-9-CM: 345.3  3/18/2018        Hyperkalemia ICD-10-CM: E87.5  ICD-9-CM: 276.7  3/17/2018        Abnormal EKG ICD-10-CM: R94.31  ICD-9-CM: 794.31  3/17/2018        Seizure ([facility] ICD-10-CM: R56.9  ICD-9-CM: 780.39  3/16/2018        Lung cancer ([facility] ICD-10-CM: C34.90  ICD-9-CM: 162.9  3/16/2018        Brain metastasis (HCC) ICD-10-CM: C79.31  ICD-9-CM: 198.3  3/16/2018        Acute encephalopathy ICD-10-CM: G93.40  ICD-9-CM: 348.30  3/16/2018        Bone metastases ([facility] ICD-10-CM: C79.51  ICD-9-CM: 198.5  8/30/2017        Small cell carcinoma of right lung (HCC) ICD-10-CM: C34.91  ICD-9-CM: 162.9  4/21/2017        Smoker (Chronic) ICD-10-CM: G48.802  ICD-9-CM: 305.1  Unknown        Mediastinal mass ICD-10-CM: J98.59  ICD-9-CM: 786.6  4/18/2017        SVC (superior vena cava obstruction) ICD-10-CM: I87.1  ICD-9-CM: 459.2  4/18/2017        Essential hypertension ICD-10-CM: I10  ICD-9-CM: 401.9  4/18/2017        SVC syndrome ICD-10-CM: I87.1  ICD-9-CM: 459.2  4/18/2017            Ms. Isaiah Sanchez is a 63 y.o. female admitted on 3/16/2018 with a primary diagnosis of lung cancer, acute encephalopathy, brain metastasis, and fever. Ms. Terence Schmid is a well known patient of Dr. Vincent Chen. She was just seen in the clinic on 3/14/18. While planning for Nivolumab she had rapid growth of b/l cervical LAD. He discussed  the aggressive pattern of her disease. Restaging CT and MRI of brain were ordered. The plan was to start nivolumab/ipilimimab on 4/4/18. He discussed the need for her to inform family of her aggressive disease and to designate POA. Unfortunately, yesterday she was confused, lethargic and somnolent. Patient's sister was driving her home when she became more confused and start having what her sister described as generalized body shakes. 911 was called. She received 2 doses of ativan PTA to ED. In ED she was post ictal and difficult to arouse. CT head showed cerebral metastatic disease with left parietal convexity meningioma. CXR was unremarkable. CT chest on 1/30/18 showed interval treatment response with significantly decreased mediastinal and right hilar lymphadenopathy. PLAN:  Stage IV SCLC with brain mets  - s/p C1 nivolumab/ipilimumab on 3/15. C2 due 4/4  3/21 Rad Onc consult scheduled for this AM  3/22 Pt met with Dr. Xena Rice yesterday. He spoke with pt's family. Plan is to watch her over the weekend to see how much she improves before making decision in regards to treatment  3/26 Pt continues to improve daily with speech and ability to communicate. She expresses interest today in pursing XRT. Dr. Xena Rice notified - he states they will get her in for consent and simulation, probably at [facility] tomorrow. 3/27 Went to [place] this AM for simulation  3/30 Day 3/14 XRT treatments. C2 nivo/ipi due on 4/4. Seizures  3/19 CT head revealed cerebral metastatic disease with L parietal convxity meningioma. Per teleneuro, pt has ongoing L-sided seizures and was started on Fosphenytoin yesterday - currently being held for high level phenytoin. Also on Dex and Keppra.    Ivan Mora to check with Dr. Barbara Li to see if pt needs to be transferred to [facility] to neuro service vs. daily teleneuro eval here. Pt alert - responds with \"OK\" to every question. SLP following. 3/20  Follow-up teleneuro eval pending. Remains confused, but able to speak more words today. SLP following. Continues on Dex, Keppra, and Fosphenytoin. Phenytoin level pending. 3/21 Per neuro, Dr. August Hernandez, hold phenytoin and allow levels to trend down. Repeat EEG. Give loading dose of Phenobarbital 90mg IV x 1. Then Phenobarbital 30mg BID.  3/22 Repeat EEG pending. Pt's expressive aphasia is improving daily. Pt was able to answer questions and use phrases appropriately during exam this AM.    3/23 Repeat EEG \"moderately abnormal EGG d/t the presence of persistent PLEDs through the recording in the L hemisphere with focus likely at frontal leads. No subclinical seizure is identified\". Follow-up teleneuro consult ordered. Con't Dex, Keppra, and Phenobarbital.  3/25 Speech improving daily. Per neuro, continue current management  3/28 No new seizures reported    Hypokalemia/Hypomagnesemia  3/19 Replete K+  3/20 K+ up to 3.8  3/24 Replete Mg+    Continue home meds  Moni SOPs  SCDs for DVT prophylaxis  Per PT, may benefit from rehab upon discharge. CM working on placement. Disposition: Will remain hospitalized through completion of her radiation due to transportation issues. Last treatment 4/16. Could potentially discharge on 4/13 if she has transportation for her final XRT on 4/16. Cuong Cruz NP   [facility] Hematology & Oncology  [address]  Office : [phone]  Fax : [phone]               Attending Addendum:  I personally evaluated the patient with Cuong Cruz, N.P.,  and agree with the assessment, findings and plan as documented.   Appears stable, she has completed 3 out of 14 WBRT sessions, tomorrow she will go for it again, we will continue Dex/Keppra/Phenobarb for now.               Kee Goodman MD  [facility]  [id number] [place] [address]  Office : [phone]  Fax : [phone]

## 2018-04-01 NOTE — PROGRESS NOTES
Problem: Mobility Impaired (Adult and Pediatric)  Goal: *Acute Goals and Plan of Care (Insert Text)  STG:  (1.)Ms. dA Portillo will move from supine to sit and sit to supine , scoot up and down and roll side to side with SUPERVISION within 3 treatment day(s). Goal met 3/22/2018  (2.)Ms. Ad Portillo will transfer from bed to chair and chair to bed with MINIMAL ASSIST using the least restrictive device within 3 treatment day(s). Goal met 3/21/2018  (3.)Ms. Ad Portillo will ambulate with MINIMAL ASSIST for 30 feet with the least restrictive device within 3 treatment day(s). Goal met 3/22/2018    LTG:  (1.)Ms. Ad Portillo will move from supine to sit and sit to supine , scoot up and down and roll side to side in bed with MODIFIED INDEPENDENCE within 7 treatment day(s). Goal met 3/27/18  (2.)Ms. Ad Portillo will transfer from bed to chair and chair to bed with CONTACT GUARD ASSIST using the least restrictive device within 7 treatment day(s). Goal met 3/27/18  (3.)Ms. Ad Portillo will ambulate with CONTACT GUARD ASSIST for 150 feet with the least restrictive device within 7 treatment day(s). Goal met 3/27/18    New Goals (LTG) Updated: 3/27/18  (1.)Ms. Ad Portillo will move from supine to sit and sit to supine  in bed with INDEPENDENT within 7 treatment day(s). (2.)Ms. Ad Portillo will transfer from bed to chair and chair to bed with INDEPENDENT using the least restrictive device within 7 treatment day(s). (3.)Ms. Ad Portillo will ambulate with SUPERVISION for 500+ feet with the least restrictive device within 7 treatment day(s). (4.)Ms. Ad Portillo will ascend/descend 3 steps with use of 1 railing with SUPERVISION within 7 treatment days.   ________________________________________________________________________________________________        ________________________________________________________________________________________________      PHYSICAL THERAPY: Daily Note, Treatment Day: 4th, AM 4/1/2018  INPATIENT: Hospital Day: 17  Payor: ABSOLUTE TOTAL CARE / [place] / Product Type: Managed Care Medicaid /      NAME/AGE/GENDER: Charli Coelho is a 63 y.o. female   PRIMARY DIAGNOSIS: Acute encephalopathy  Brain metastasis ([facility]  Seizure ([facility]  Lung cancer ([facility] Epilepsy with status epilepticus ([facility] Epilepsy with status epilepticus ([facility]        ICD-10: Treatment Diagnosis:   · Generalized Muscle Weakness (M62.81)  · Difficulty in walking, Not elsewhere classified (R26.2)   Precaution/Allergies:  Metaxalone and Oxycodone      ASSESSMENT:     Ms. Cynthia Garvey sitting in recliner chair upon contact and is very agreeable to working with therapy. Sit to stand with supervision. Gait training x 250 feet with slight use of the IV pole as patient has one slight episode of loss of balance. Strengthening exercises standing with UE support. Tolerated well. Patient perform exercises well. Progress demonstrated. Patient is very cooperative and pleasant to work with. Will continue PT efforts. This section established at most recent assessment   PROBLEM LIST (Impairments causing functional limitations):  1. Decreased Strength  2. Decreased ADL/Functional Activities  3. Decreased Transfer Abilities  4. Decreased Ambulation Ability/Technique  5. Decreased Balance  6. Decreased Activity Tolerance  7. Decreased Pacing Skills  8. Decreased [place] with Home Exercise Program  9. Decreased Cognition   INTERVENTIONS PLANNED: (Benefits and precautions of physical therapy have been discussed with the patient.)  1. Balance Exercise  2. Bed Mobility  3. Cold  4. Family Education  5. Gait Training  6. Home Exercise Program (HEP)  7. Manual Therapy  8. Neuromuscular Re-education/Strengthening  9. Range of Motion (ROM)  10. Therapeutic Activites  11. Therapeutic Exercise/Strengthening  12.  Transfer Training     TREATMENT PLAN: Frequency/Duration: 3 times a week for duration of hospital stay  Rehabilitation Potential For Stated Goals: [address] [place] [address] REHABILITATION/EQUIPMENT: (at time of discharge pending progress): Due to the probability of continued deficits (see above) this patient will likely need continued skilled physical therapy after discharge. Equipment:    None at this time              HISTORY:   History of Present Injury/Illness (Reason for Referral):  Patient is 63years old female with pmhx of extensive stage small cell lung cancer with brain metastasis, GERD, HTN, previous tobacco abuse presented in view of new onset seizure like activity. As per the pt's sister, pt was in her usual health until yesterday, today she appeared more confused, lethargic and somnolent. While she was driving the pt home, pt started appearing confused and then had generalized body shakes. EMS was summoned. Pt received 2 doses of ativan PTA. In ER, pt is post ictal, sleeping, difficult to arouse. Pt is DNR. In ER, CT head showed cerebral metastatic disease with left parietal convexity meningioma, CXR was unremarkable. CT chest on 1/30/18 showed interval treatment response with significantly decreased mediastinal and right hilar lymphadenopathy. Past Medical History/Comorbidities:   Ms. Cynthia Garvey  has a past medical history of Former cigarette smoker; GERD (gastroesophageal reflux disease); Hypertension; and Lung cancer ([facility] She also has no past medical history of Adverse effect of anesthesia; Difficult intubation; Malignant hyperthermia due to anesthesia; Nausea & vomiting; or Pseudocholinesterase deficiency. Ms. Cynthia Garvey  has a past surgical history that includes hx tubal ligation and hx vascular access.   Social History/Living Environment:   Home Environment: Private residence  # Steps to Enter: 3  Rails to Enter: No  One/Two Story Residence: One story  Living Alone: No  Support Systems: Parent, Family member(s) (takes care of mother whom she lives with)  Patient Expects to be Discharged to[de-identified] Unknown  Current DME Used/Available at Home: None  Tub or Shower Type: Tub/Shower combination  Prior Level of Function/Work/Activity:  Taking care of mother, independent, no AD, intact cognition and communication     Number of Personal Factors/Comorbidities that affect the Plan of Care: 3+: HIGH COMPLEXITY   EXAMINATION:   Most Recent Physical Functioning:   Gross Assessment:                  Posture:     Balance:  Sitting: Intact  Sitting - Static: Good (unsupported)  Sitting - Dynamic: Good (unsupported)  Standing: Intact  Standing - Static: Good  Standing - Dynamic : Fair Bed Mobility:     Wheelchair Mobility:     Transfers:  Sit to Stand: Supervision  Stand to Sit: Supervision  Gait:     Step Length: Right shortened  Distance (ft): 250 Feet (ft)  Assistive Device: Other (comment) (slight use of IV pole)  Ambulation - Level of Assistance: Stand-by assistance      Body Structures Involved:  1. Heart  2. Lungs  3. Bones  4. Joints  5. Muscles  6. Ligaments Body Functions Affected:  1. Mental  2. Voice and Speech  3. Cardio  4. Respiratory  5. Neuromusculoskeletal  6. Movement Related Activities and Participation Affected:  1. Learning and Applying Knowledge  2. General Tasks and Demands  3. Communication  4. Mobility  5. Self Care  6. Domestic Life  7. Interpersonal Interactions and Relationships  8. Community, Social and [place]   Number of elements that affect the Plan of Care: 4+: HIGH COMPLEXITY   CLINICAL PRESENTATION:   Presentation: Evolving clinical presentation with changing clinical characteristics: MODERATE COMPLEXITY   CLINICAL DECISION MAKIN [facility] Inpatient Short Form  How much difficulty does the patient currently have. .. Unable A Lot A Little None   1. Turning over in bed (including adjusting bedclothes, sheets and blankets)? [] 1   [] 2   [] 3   [x] 4   2. Sitting down on and standing up from a chair with arms ( e.g., wheelchair, bedside commode, etc.)   [] 1   [] 2   [] 3   [x] 4   3.   Moving from lying on back to sitting on the side of the bed? [] 1   [] 2   [] 3   [x] 4   How much help from another person does the patient currently need. .. Total A Lot A Little None   4. Moving to and from a bed to a chair (including a wheelchair)? [] 1   [] 2   [x] 3   [] 4   5. Need to walk in hospital room? [] 1   [] 2   [x] 3   [] 4   6. Climbing 3-5 steps with a railing? [] 1   [] 2   [x] 3   [] 4   © 2007, Trustees of [facility] [employer], under license to [employer]. All rights reserved      Score:  Initial: 17 Most Recent: 21 (Date: 3/22/2018 )    Interpretation of Tool:  Represents activities that are increasingly more difficult (i.e. Bed mobility, Transfers, Gait). Score 24 23 22-20 19-15 14-10 9-7 6     Modifier CH CI CJ CK CL CM CN      ? Mobility - Walking and Moving Around:     - CURRENT STATUS: CJ - 20%-39% impaired, limited or restricted    - GOAL STATUS: CI - 1%-19% impaired, limited or restricted    - D/C STATUS:  ---------------To be determined---------------  Payor: ABSOLUTE TOTAL CARE / [place] / Product Type: Managed Care Medicaid /      Medical Necessity:     · Patient is expected to demonstrate progress in strength, range of motion, balance and coordination to decrease assistance required with transfers, ambulation, and functional mobility. Reason for Services/Other Comments:  · Patient continues to require skilled intervention due to decreased cognition, activity tolerance, and functional mobility. Use of outcome tool(s) and clinical judgement create a POC that gives a: Clear prediction of patient's progress: LOW COMPLEXITY            TREATMENT:   (In addition to Assessment/Re-Assessment sessions the following treatments were rendered)   Pre-treatment Symptoms/Complaints:  \"OK  I will work with you\"  Pain: Initial: 0/10  Pain Intensity 1: 0  Post Session:  0/10   Therapeutic Activity (10  minutes):  Functional mobility activities to increase mobility, stability and tolerance/independnece for ADL's and  Gait training to improve and/or restore physical functioning as related to mobility. Ambulated 250 Feet (ft) with supervision/set-up w/ cues to improve attention to R side as well as improve trunk rotation/arm swing. Assistive Device: Other (comment) (slight use of IV pole)  Ambulation - Level of Assistance: Stand-by assistance  Distance (ft): 250 Feet (ft)  Therapeutic Exercise (14 minutes):  Pt. Performed B LE standing exercises x10 each w/ B UE support. Exercises are as follows:  STANDING EXERCISES Sets Reps Comments   Heel Raises 1 10    Toe Raises 1 10 Difficulty on R LE   Hip Flexion/Marching 1 10    Hamstring Curls      Hip Abduction 1 10    Hip Extension 1 10    Mini Squats 1 10      Braces/Orthotics/Lines/Etc:   · O2 Device: Room air  Treatment/Session Assessment:    · Response to Treatment: tolerated well  · Interdisciplinary Collaboration:   o Physical Therapy Assistant  o Registered Nurse  · After treatment position/precautions:   o Up in chair  o Bed/Chair-wheels locked  o Bed in low position  o Call light within reach   · Compliance with Program/Exercises: compliant  · Recommendations/Intent for next treatment session: \"Next visit will focus on reduction in assistance provided\".   Total Treatment Duration:  PT Patient Time In/Time Out  Time In: 1033  Time Out: [address] [place], PTA

## 2018-04-01 NOTE — PROGRESS NOTES
Problem: Falls - Risk of  Goal: *Absence of Falls  Document Ashleigh Fall Risk and appropriate interventions in the flowsheet.    Outcome: Progressing Towards Goal  Fall Risk Interventions:  Mobility Interventions: Communicate number of staff needed for ambulation/transfer    Mentation Interventions: Adequate sleep, hydration, pain control, Door open when patient unattended    Medication Interventions: Evaluate medications/consider consulting pharmacy, Patient to call before getting OOB    Elimination Interventions: Call light in reach, Patient to call for help with toileting needs

## 2018-04-01 NOTE — PROGRESS NOTES
END OF SHIFT NOTE:    Intake/Output  03/31 1901 - 04/01 0700  In: 150 [P.O.:150]  Out: 1400 [Urine:1400]   Voiding: YES  Catheter: NO  Drain:              Stool:  0 occurrences. Stool Assessment  Stool Color: Brown (04/01/18 0403)  Stool Appearance: Formed; Soft (04/01/18 0403)  Stool Amount: Small (04/01/18 0403)  Stool Source/Status: Rectum (04/01/18 0403)    Emesis:  0 occurrences. VITAL SIGNS  Patient Vitals for the past 12 hrs:   Temp Pulse Resp BP SpO2   04/01/18 0403 98 °F (36.7 °C) 79 16 126/76 97 %   03/31/18 2358 97.6 °F (36.4 °C) 84 18 124/78 98 %   03/31/18 1914 97.6 °F (36.4 °C) 82 18 130/76 97 %       Pain Assessment  Pain 1  Pain Scale 1: Visual (04/01/18 0226)  Pain Intensity 1: 0 (04/01/18 0226)  Patient Stated Pain Goal: 0 (03/31/18 1931)  Pain Reassessment 1: Patient sleeping (04/01/18 0226)  Pain Location 1: Back (03/30/18 2024)  Pain Orientation 1: Mid (03/30/18 2024)  Pain Description 1: David Andre (03/30/18 2024)  Pain Intervention(s) 1: Declines (03/30/18 2024)    Ambulating  Yes    Additional Information: Pt rested well through the night. Uneventful night. Shift report will be given to oncoming nurse at the bedside.     Rick Oconnor

## 2018-04-01 NOTE — PROGRESS NOTES
Assumed care of patient. Assessment completed and documented, see docflow. Patient sitting up in bedside chair. Patient A/Ox3. NAD noted at present. Respirations even and non labored on RA. Call light within reach. Encouraged patient to call for needs. Will continue to monitor.

## 2018-04-02 NOTE — PROGRESS NOTES
SPEECH PATHOLOGY NOTE:    Attempted to see patient this AM. Current at [facility] for XRT. Will re-attempt at later time/date.     RUBY Jordan, CCC-SLP, CBIS

## 2018-04-02 NOTE — PROGRESS NOTES
[facility] Hematology & Oncology        Inpatient Hematology / Oncology Progress Note      Admission Date: 3/16/2018  9:00 PM  Reason for Admission/Hospital Course: Acute encephalopathy  Brain metastasis (HCC)  Seizure ([facility]  Lung cancer ([facility]    24 Hour Events:  Afebrile, VSS  On Dex, Keppra, and Phenobarbital   XRT 3/14 -resume on Monday  Sitting up in bedside chair       ROS:  Constitutional: Negative for fever, chills. CV: Negative for chest pain, palpitations, edema. Respiratory: Negative for dyspnea, cough, wheezing. GI: Negative for nausea, abdominal pain, diarrhea. 10 point review of systems is otherwise negative with the exception of the elements mentioned above in the HPI. Allergies   Allergen Reactions    Metaxalone Other (comments)    Oxycodone Swelling       OBJECTIVE:  Patient Vitals for the past 8 hrs:   BP Temp Pulse Resp SpO2   18 0709 120/84 97.8 °F (36.6 °C) 80 18 100 %   18 0257 122/87 98.1 °F (36.7 °C) 86 16 97 %     Temp (24hrs), Av.9 °F (36.6 °C), Min:97.8 °F (36.6 °C), Max:98.1 °F (36.7 °C)     0701 -  1900  In: -   Out: 250 [Urine:250]    Physical Exam:  Constitutional: Well developed, well nourished female in no acute distress, sitting comfortably in the bedside chair. HEENT: Normocephalic and atraumatic. Oropharynx is clear, mucous membranes are moist. Extraocular muscles are intact. Sclerae anicteric. Neck supple   Skin Warm and dry. No bruising and no rash noted. No erythema. No pallor. Respiratory Lungs are clear to auscultation bilaterally without wheezes, rales or rhonchi, normal air exchange without accessory muscle use. CVS Normal rate, regular rhythm and normal S1 and S2. No murmurs, gallops, or rubs. Abdomen Soft, nontender and nondistended, normoactive bowel sounds. No palpable mass. No hepatosplenomegaly. Neuro Expressive aphasia resolved. . No focal deficits   MSK Normal range of motion in general.  No edema and no tenderness. Psych In good spirits. Talkative. Labs:      Recent Labs      04/02/18   0259   WBC  8.1   RBC  3.81*   HGB  8.9*   HCT  28.1*   MCV  73.8*   MCH  23.4*   MCHC  31.7   RDW  19.6*   PLT  174   GRANS  74   LYMPH  19   MONOS  7   EOS  0*   BASOS  0   IG  2   DF  AUTOMATED   ANEU  6.0   ABL  1.5   ABM  0.6   LESLIE  0.0   ABB  0.0   AIG  0.2        Recent Labs      04/02/18   0259  04/01/18   0420  03/31/18   0420   NA  137  135*  136   K  4.8  4.6  4.8   CL  100  96*  98   CO2  31  30  31   AGAP  6*  9  7   GLU  124*  137*  130*   BUN  26*  33*  28*   CREA  0.96  1.21*  0.96   GFRAA  >60  59*  >60   GFRNA  >60  49*  >60   CA  8.0*  9.6  8.8   SGOT  24  32  24   AP  65  81  67   TP  5.9*  6.8  6.4   ALB  3.1*  3.6  3.3*   GLOB  2.8  3.2  3.1   AGRAT  1.1*  1.1*  1.1*   MG  2.2  2.2   --          Imaging:  MRI BRAIN W WO CONT [[id number]] Collected: 03/18/18 1307      Order Status: Completed Updated: 03/18/18 1319     Narrative:       MRI of the Brain with contrast: 3/18/2018  History: Lung cancer  Sequences: Axial pre and post contrast T1, FLAIR, T2, diffusion, coronal post  contrast T1 and sagittal precontrast T1-weighted images of the brain. Comparison: MRI the brain 4/21/2017  20 cc of IV MultiHance   was injected to aid in the detection of intracranial  pathology. Findings:    Supratentorial enhancing metastatic lesions are now demonstrated. These lesions  all display hemosiderin and restricted diffusion. There is a 5 mm lesion in the  right temporal white matter, 1 cm lesion right frontal white matter, left  posterior parasagittal convexity lesion measuring 14 mm x 27 x 31 mm, 14 mm left  occipital white matter lesion, 9 mm left frontal lesion, and 2 left parietal  enhancing lesions measuring respectively 7 mm and 24 mm. There is some mild mass  upon the left ventricular atrium. The pituitary and sinuses are unremarkable. 7th and 8th nerves and orbits are unremarkable.  There is some fluid in the right  mastoid air cells.       Impression:       IMPRESSION: Interval supratentorial metastatic lesions as above. DC4  The significant findings in this report have been referred to the Imaging  Navigator in order to communicate to the referring provider or his/her designee  as outlined in Section II.C.2.a.ii-iii of the ACR  Practice Guideline for Communication of Diagnostic Imaging Findings.           XR ABD (KUB) [[id number]] Collected: 03/17/18 0115     Order Status: Completed Updated: 03/17/18 0117     Narrative:       Abdomen x-ray single view. HISTORY: Nasogastric tube placement. COMPARISON: None. FINDINGS: Nasogastric tube its tip in the gastric body. Bowel gas pattern is  nonspecific.       Impression:       IMPRESSION:    Nasogastric tube its tip in the gastric body.     XR CHEST PORT [[id number]] Collected: 03/16/18 2138     Order Status: Completed Updated: 03/16/18 2146     Narrative:       EXAM:  XR CHEST PORT    INDICATION:  AMS    COMPARISON:  4/18/2017    FINDINGS: A portable AP radiograph of the chest was obtained at 2124 hours. The  patient is on a cardiac monitor.  The lungs are clear.  The cardiac and  mediastinal contours and pulmonary vascularity are normal.  The bones and soft  tissues are grossly within normal limits.        Impression:       IMPRESSION: No acute cardiopulmonary disease.         CT HEAD WITHOUT CONTRAST [[id number]] Collected: 03/16/18 2130     Order Status: Completed Updated: 03/16/18 2134     Narrative:       CT HEAD WITHOUT CONTRAST     HISTORY:  Seizure. COMPARISON: None. TECHNIQUE: Axial imaging was performed without intravenous contrast utilizing  5mm slice thickness. Sagittal and coronal reformats were performed. Radiation  dose reduction techniques were used for this study. Our CT scanner uses one or  all of the following:    Automated exposure control, adjustment of the MAS or KUB according to patient's  size and iterative reconstruction.     FINDINGS:        *BRAIN:      -  There are no early signs of territorial or lacunar infarction by CT.     -  1.3 x 1.6 cm left parietal mass with surrounding vasogenic edema. 0.6 x  0.9 cm mass in left frontal lobe with vasogenic edema. Partly calcified mass in  left superior parafalcine region measuring 2.9 x 2.6 cm. Consider the  possibility of meningioma.     -  No gross white matter abnormality by CT.    *VISUALIZED PARANASAL SINUSES: Well aerated. *MASTOIDS:  Clear. *CALVARIUM AND SCALP: Unremarkable.         Impression:       IMPRESSION:    Cerebral metastatic disease. Possible left parietal convexity meningioma.     Date of Dictation: 3/16/2018 9:30 PM         ASSESSMENT:    Problem List  Date Reviewed: 3/14/2018          Codes Class Noted    * (Principal)Epilepsy with status epilepticus ([facility] ICD-10-CM: G40.901  ICD-9-CM: 345.3  3/18/2018        Hyperkalemia ICD-10-CM: E87.5  ICD-9-CM: 276.7  3/17/2018        Abnormal EKG ICD-10-CM: R94.31  ICD-9-CM: 794.31  3/17/2018        [facility] ICD-10-CM: R56.9  ICD-9-CM: 780.39  3/16/2018        Lung cancer ([facility] ICD-10-CM: C34.90  ICD-9-CM: 162.9  3/16/2018        Brain metastasis (HCC) ICD-10-CM: C79.31  ICD-9-CM: 198.3  3/16/2018        Acute encephalopathy ICD-10-CM: G93.40  ICD-9-CM: 348.30  3/16/2018        Bone metastases ([facility] ICD-10-CM: C79.51  ICD-9-CM: 198.5  8/30/2017        Small cell carcinoma of right lung (HCC) ICD-10-CM: C34.91  ICD-9-CM: 162.9  4/21/2017        Smoker (Chronic) ICD-10-CM: P26.848  ICD-9-CM: 305.1  Unknown        Mediastinal mass ICD-10-CM: J98.59  ICD-9-CM: 786.6  4/18/2017        SVC (superior vena cava obstruction) ICD-10-CM: I87.1  ICD-9-CM: 459.2  4/18/2017        Essential hypertension ICD-10-CM: I10  ICD-9-CM: 401.9  4/18/2017        SVC syndrome ICD-10-CM: I87.1  ICD-9-CM: 459.2  4/18/2017            Ms. Reina Farias is a 63 y.o. female admitted on 3/16/2018 with a primary diagnosis of lung cancer, acute encephalopathy, brain metastasis, and fever. Ms. Anu Ponce is a well known patient of Dr. Celina Valentin. She was just seen in the clinic on 3/14/18. While planning for Nivolumab she had rapid growth of b/l cervical LAD. He discussed  the aggressive pattern of her disease. Restaging CT and MRI of brain were ordered. The plan was to start nivolumab/ipilimimab on 4/4/18. He discussed the need for her to inform family of her aggressive disease and to designate POA. Unfortunately, yesterday she was confused, lethargic and somnolent. Patient's sister was driving her home when she became more confused and start having what her sister described as generalized body shakes. 911 was called. She received 2 doses of ativan PTA to ED. In ED she was post ictal and difficult to arouse. CT head showed cerebral metastatic disease with left parietal convexity meningioma. CXR was unremarkable. CT chest on 1/30/18 showed interval treatment response with significantly decreased mediastinal and right hilar lymphadenopathy. PLAN:  Stage IV SCLC with brain mets  - s/p C1 nivolumab/ipilimumab on 3/15. C2 due 4/4  3/21 Rad Onc consult scheduled for this AM  3/22 Pt met with Dr. Avtar Sneed yesterday. He spoke with pt's family. Plan is to watch her over the weekend to see how much she improves before making decision in regards to treatment  3/26 Pt continues to improve daily with speech and ability to communicate. She expresses interest today in pursing XRT. Dr. Avtar Sneed notified - he states they will get her in for consent and simulation, probably at [facility] tomorrow. 3/27 Went to [place] this AM for simulation  3/30 Day 3/14 XRT treatments. C2 nivo/ipi due on 4/4. 4/2 XRT 4/14 today    Seizures  3/19 CT head revealed cerebral metastatic disease with L parietal convxity meningioma. Per teleneuro, pt has ongoing L-sided seizures and was started on Fosphenytoin yesterday - currently being held for high level phenytoin. Also on Dex and Keppra.   Dr. Renteria Sep to check with Dr. Kely Welch to see if pt needs to be transferred to [facility] to neuro service vs. daily teleneuro eval here. Pt alert - responds with \"OK\" to every question. SLP following. 3/20  Follow-up teleneuro eval pending. Remains confused, but able to speak more words today. SLP following. Continues on Dex, Keppra, and Fosphenytoin. Phenytoin level pending. 3/21 Per neuro, Dr. Janki Mariano, hold phenytoin and allow levels to trend down. Repeat EEG. Give loading dose of Phenobarbital 90mg IV x 1. Then Phenobarbital 30mg BID.  3/22 Repeat EEG pending. Pt's expressive aphasia is improving daily. Pt was able to answer questions and use phrases appropriately during exam this AM.    3/23 Repeat EEG \"moderately abnormal EGG d/t the presence of persistent PLEDs through the recording in the L hemisphere with focus likely at frontal leads. No subclinical seizure is identified\". Follow-up teleneuro consult ordered. Con't Dex, Keppra, and Phenobarbital.  3/25 Speech improving daily. Per neuro, continue current management  3/28 No new seizures reported  4/2 No seizures. Continue meds. Hypokalemia/Hypomagnesemia  3/19 Replete K+  3/20 K+ up to 3.8  3/24 Replete Mg+    Continue home meds  Moni SOPs  SCDs for DVT prophylaxis  Per PT, may benefit from rehab upon discharge. CM working on placement. Disposition: Will remain hospitalized through completion of her radiation due to transportation issues. Last treatment 4/16. Could potentially discharge on 4/13 if she has transportation for her final XRT on 4/16. Kelsie rCuz NP   [facility] Hematology & Oncology  [address] [address]  Office : [phone]  Fax : [phone]               Attending Addendum:  I personally evaluated the patient with Kelsie Cruz, N.P.,  and agree with the assessment, findings and plan as documented.   Appears stable, she has completed 3 out of 14 WBRT sessions, tomorrow she will go for it again, we will continue Dex/Keppra/Phenobarb for now.               Eduardo Mills MD  [address]  Office : [phone]  Fax : [phone]

## 2018-04-02 NOTE — PROGRESS NOTES
Assumed care of patient. Assessment completed and documented, see docflow. Patient sitting up in bedside recliner. Patient A/Ox3. NAD noted at present. Respirations even and non labored on RA. Patient denies pain or needs. Call light within reach. Encouraged patient to call for needs. Will continue to monitor.

## 2018-04-02 NOTE — PROGRESS NOTES
Problem: Falls - Risk of  Goal: *Absence of Falls  Document Ashleigh Fall Risk and appropriate interventions in the flowsheet.    Outcome: Progressing Towards Goal  Fall Risk Interventions:  Mobility Interventions: Assess mobility with egress test, Patient to call before getting OOB    Mentation Interventions: Adequate sleep, hydration, pain control, Door open when patient unattended, Evaluate medications/consider consulting pharmacy    Medication Interventions: Patient to call before getting OOB, Evaluate medications/consider consulting pharmacy    Elimination Interventions: Patient to call for help with toileting needs, Call light in reach

## 2018-04-02 NOTE — PROGRESS NOTES
Patient had uneventful shift. Patient went to XRT this shift; tolerated well. Patient received Tylenol for pain x 2; see MAR. Patient currently denies needs, pain, SOB or CP. Patient remains A/Ox3. VSS.  BSR given to Vanessa Baer

## 2018-04-02 NOTE — PROGRESS NOTES
PT note:    RN states pt is about to go to radiation. She also states that the pt has been up and walking around this morning. Will follow up with pt later today as time allows.     James Sheth, PTA

## 2018-04-02 NOTE — PROGRESS NOTES
Patient returned to room 503 via stretcher; accompanied by transport. Patient A/Ox3, denies pain or needs. Door open, will continue to monitor.

## 2018-04-03 NOTE — PROGRESS NOTES
END OF SHIFT NOTE:    Intake/Output      Voiding: YES  Catheter: NO  Drain:              Stool:  0 occurrences. Stool Assessment  Stool Color: Tristan Piety (04/02/18 0931)  Stool Appearance: Formed; Soft (04/02/18 0931)  Stool Amount: Medium (04/02/18 0931)  Stool Source/Status: Rectum (04/02/18 0931)    Emesis:  0 occurrences. VITAL SIGNS  Patient Vitals for the past 12 hrs:   Temp Pulse Resp BP SpO2   04/03/18 0712 98 °F (36.7 °C) 79 20 133/82 99 %   04/02/18 2356 98.2 °F (36.8 °C) 82 14 101/65 95 %   04/02/18 1949 97.6 °F (36.4 °C) 82 14 102/64 97 %       Pain Assessment  Pain 1  Pain Scale 1: Numeric (0 - 10) (04/03/18 0718)  Pain Intensity 1: 0 (04/03/18 0718)  Patient Stated Pain Goal: 0 (04/02/18 1920)  Pain Reassessment 1: Patient sleeping (04/01/18 0226)  Pain Location 1: Back (03/30/18 2024)  Pain Orientation 1: Mid (03/30/18 2024)  Pain Description 1: Fronie Aroldo (03/30/18 2024)  Pain Intervention(s) 1: Declines (03/30/18 2024)    Ambulating  Yes    Additional Information: Pt is ambulatory and mostly alert. Pt rested well throughout the night. No needs voiced. Shift report given to oncoming nurse at the bedside.     Irma Avila RN

## 2018-04-03 NOTE — PROGRESS NOTES
STG: Patient will answer basic yes/no comprehension questions with 75% accuracy given max cues. - MET   STG: Patient will participate in automatic speech tasks with 75% accuracy given max cues - MET   STG: Patient will follow 1 step verbal command with visual cues with 60% accuracy with max cues. - MET   STG: Patient will participate in moderate level word findings tasks with 80% accuracy- ADDED 4/3/18  LTG: Patient will increase neuro-linguistic abilities to increase safety and awareness of deficits. Speech language pathology: Speech-language and cognitive note: Daily Note 6    NAME/AGE/GENDER: Carol Kapoor is a 63 y.o. female  DATE: 4/3/2018  PRIMARY DIAGNOSIS: Acute encephalopathy  Brain metastasis ([facility]  Seizure ([facility]  Lung cancer ([facility]       ICD-10: Treatment Diagnosis: R.41.841 Cognitive-Communication Deficit    INTERDISCIPLINARY COLLABORATION: Registered NurseASSESSMENT:Patient seen for continued language treatment. She remains verbose and tangential - perseverating on her ismael. She is now communicating at the conversational level with occasional semantic and phonemic paraphasias. Moderate verbal cues to re-direct patient to treatment tasks. Patient completed sentence completion tasks regarding functional household activities with 100% accuracy. Phrase completion related to movie and story titles 75% accuracy, increasing to 90% with phonemic cues. During session, patient independently answering phone call from outpatient MD's office. She provided detailed description of current hospitalization, plan for d/c soon, and message for physician. Good recall of recent events noted. Mild verbosity with decreased self-monitoring. However, she communicated at the conversational level with good accuracy. Patient has made strong improvements in expressive language abilities.  Cognitive impairments are becoming more apparent including verbosity, limited self-monitoring, and reduced filter when sharing personal information. However, she states that is happy with her progress and is encouraged by MD's reports. Patient will benefit from skilled intervention to address the below impairments. ?????? ? ? This section established at most recent assessment??????????  PROBLEM LIST (Impairments causing functional limitations):  1. Expressive Language  2. Receptive language  REHABILITATION POTENTIAL FOR STATED GOALS: Guarded  PLAN OF CARE:   Patient will benefit from skilled intervention to address the following impairments. INTERVENTIONS PLANNED: (Benefits and precautions of therapy have been discussed with the patient.)  1. Cognitive-linguistic deficits  FREQUENCY/DURATION: Continue to follow patient 3 session trial to assess patient' ability to benefit from skilled therapy services to address above goals. RECOMMENDED REHABILITATION/EQUIPMENT: (at time of discharge pending progress): Due to the probability of continued deficits (see above) this patient will not likely need continued skilled speech therapy after discharge. SUBJECTIVE:   \"My family says I am talking more that I have ever talked in my life. \"   History of Present Injury/Illness: Ms. Kassi Torres  has a past medical history of Former cigarette smoker; GERD (gastroesophageal reflux disease); Hypertension; and Lung cancer ([facility] She also has no past medical history of Adverse effect of anesthesia; Difficult intubation; Malignant hyperthermia due to anesthesia; Nausea & vomiting; or Pseudocholinesterase deficiency. .  She also  has a past surgical history that includes hx tubal ligation and hx vascular access. Present Symptoms: Expressive/receptive language defiicts     Pain Intensity 1: 0  Pain Location 1: Back  Pain Orientation 1: Mid  Pain Intervention(s) 1: Declines  Current Medications:   No current facility-administered medications on file prior to encounter.       Current Outpatient Prescriptions on File Prior to Encounter   Medication Sig Dispense Refill    mirtazapine (REMERON) 15 mg tablet Take 1 Tab by mouth nightly. 30 Tab 1    ondansetron hcl (ZOFRAN) 8 mg tablet Take 1 Tab by mouth every eight (8) hours as needed for Nausea. 90 Tab 1    lidocaine-prilocaine (EMLA) topical cream Apply 1/2 to 1 inch to port site one hour prior to needle access. 30 g 1    nicotine (NICODERM CQ) 7 mg/24 hr 1 Patch by TransDERmal route every twenty-four (24) hours.  amLODIPine (NORVASC) 5 mg tablet Take 5 mg by mouth daily.  senna-docusate (SHERRI-COLACE) 8.6-50 mg per tablet Take 1 Tab by mouth two (2) times a day. 60 Tab 2    raNITIdine (ZANTAC) 150 mg tablet Take 150 mg by mouth every morning.  prochlorperazine (COMPAZINE) 10 mg tablet Take 5 mg by mouth every six (6) hours as needed for Nausea.  acetaminophen (TYLENOL) 325 mg tablet Take 2 Tabs by mouth every four (4) hours as needed. 30 Tab 0     Current Dietary Status:  Mechanical soft/thin liquids      Social History/Home Situation:    Home Environment: Private residence  # Steps to Enter: 3  Rails to Enter: No  One/Two Story Residence: One story  Living Alone: No  Support Systems: Parent, Family member(s) (takes care of mother whom she lives with)  Patient Expects to be Discharged to[de-identified] Unknown  Current DME Used/Available at Home: None  Tub or Shower Type: Tub/Shower combination  Work/Activity History:   OBJECTIVE:   Oral Motor Structure/Speech:  Oral-Motor Structure/Motor Speech  Labial: No impairment  Dentition: Edentulous, Natural, Limited  Oral Hygiene: Adequate  Lingual: No impairment  Velum: No impairment    SPEECH-LANGUAGE COGNITIVE EVALUATION    Mental Status:  Neurologic State: Alert  Orientation Level: Oriented X4  Cognition: Appropriate decision making; Appropriate for age attention/concentration; Appropriate safety awareness; Follows commands        Neuro-Linguistics:                 Speech/Language Activities: Activities/Procedures listed utilized to improve expressive communication, receptive ability and cognitive ability. Required moderate cueing to increase independence with activities of daily living. Tool Used: Functional [place] Measure (FIMTM)   Score Comments   Eating       Comprehension       Expression   2     Social Interaction       Problem Solving       Memory          Score:  Initial: 1 Most Recent: X (Date: -- )   Interpretation of Tool: Provides a uniform system of measurement for disability based on the International Classification of Impairment, Disabilities and Handicaps; measures the level of a patient's disability and indicates how much assistance is required for the individual to carry out activities of daily living. Score 7 6 5 4 3 2 1   Modifier CH CI CJ CK CL CM CN   ? Spoken Expression:    [id number] - CURRENT STATUS: CJ - 20%-39% impaired, limited or restricted    - GOAL STATUS:  CL - 60%-79% impaired, limited or restricted    - D/C STATUS:  ---------------To be determined---------------  Payor: ABSOLUTE TOTAL CARE / [place] ABS[place]OTAL [place] / Product Type: Managed Care Medicaid /   __________________________________________________________________________________________________  Safety:   After treatment position/precautions:  · Up in chair. Progression/Medical Necessity:   · Patient is expected to demonstrate progress in expressive communication and receptive ability to decrease assistance required communication, increase independence with activities of daily living and increase communication with family/caregivers. Compliance with Program/Exercises: Will assess as treatment progresses. Reason for Continuation of Services/Other Comments:  · Patient continues to require skilled intervention due to language deficits. Recommendations/Intent for next treatment session: \"Treatment next visit will focus on language tasks\".     Total Treatment Duration:  Time In: 1313  Time Out: 1345    RUBY Sr, CCC-SLP, CBIS

## 2018-04-03 NOTE — PROGRESS NOTES
END OF SHIFT NOTE:  Patient out of bed sitting in recliner, one blood pressure elevation noted, patient voice concerns about family members , feeling powerless of control of  her life and worry about her son. Afebrile, no complain of pain or nausea. No seizure activities noted. Intake/Output  04/03 0701 - 04/03 1900  In: 480 [P.O.:480]  Out: 200 [Urine:200]   Voiding: yes   Catheter: no   Drain:              Stool:  One  occurrences. Stool Assessment  Stool Color: Brown (04/03/18 0820)  Stool Appearance: Formed; Soft (04/03/18 0820)  Stool Amount: Small (04/03/18 0820)  Stool Source/Status: Rectum (04/03/18 0820)    Emesis:  No  occurrences. VITAL SIGNS  Patient Vitals for the past 12 hrs:   Temp Pulse Resp BP SpO2   04/03/18 1759 - - - 137/81 -   04/03/18 1715 97.6 °F (36.4 °C) 89 20 (!) 137/93 97 %   04/03/18 1258 97.8 °F (36.6 °C) 85 18 108/73 97 %   04/03/18 0712 98 °F (36.7 °C) 79 20 133/82 99 %       Pain Assessment  Pain 1  Pain Scale 1: Numeric (0 - 10) (04/03/18 1352)  Pain Intensity 1: 0 (04/03/18 1352)  Patient Stated Pain Goal: 0 (04/02/18 1920)  Pain Reassessment 1: Patient sleeping (04/01/18 0226)  Pain Location 1: Back (03/30/18 2024)  Pain Orientation 1: Mid (03/30/18 2024)  Pain Description 1: Laverle Mandes (03/30/18 2024)  Pain Intervention(s) 1: Declines (03/30/18 2024)    Ambulating  Yes in room . Additional Information:  See above    Shift report given to oncoming nurse Kadi Sanchez , SHANEL  at the bedside.     Katie Reis RN

## 2018-04-03 NOTE — INTERDISCIPLINARY ROUNDS
Interdisciplinary rounds with  Care Management, Nursing, Nurse Practitioner, Pharmacy and Clinical Coordinator    Underlying Disease: Lung Cancer  Presenting Problem: acure encephalopathy, seizure  Anti-infectives: Keppra, on phenobarbital - change?   Daily Goal radiation - 4/14  Possible Discharge: radiation - has transportatiion - 4/5  Expected Discharge Needs: IGM,  Other: none

## 2018-04-03 NOTE — PROGRESS NOTES
[facility] Hematology & Oncology        Inpatient Hematology / Oncology Progress Note      Admission Date: 3/16/2018  9:00 PM  Reason for Admission/Hospital Course: Acute encephalopathy  Brain metastasis (HCC)  Seizure ([facility]  Lung cancer ([facility]    24 Hour Events:  Afebrile, VSS  On Dex, Keppra, and Phenobarbital   Sitting up in bedside chair       ROS:  Constitutional: Negative for fever, chills. CV: Negative for chest pain, palpitations, edema. Respiratory: Negative for dyspnea, cough, wheezing. GI: Negative for nausea, abdominal pain, diarrhea. 10 point review of systems is otherwise negative with the exception of the elements mentioned above in the HPI. Allergies   Allergen Reactions    Metaxalone Other (comments)    Oxycodone Swelling       OBJECTIVE:  Patient Vitals for the past 8 hrs:   BP Temp Pulse Resp SpO2 Weight   18 0712 133/82 98 °F (36.7 °C) 79 20 99 % -   18 0624 - - - - - 116 lb 12.8 oz (53 kg)     Temp (24hrs), Av.9 °F (36.6 °C), Min:97.6 °F (36.4 °C), Max:98.2 °F (36.8 °C)     0701 -  1900  In: 240 [P.O.:240]  Out: 200 [Urine:200]    Physical Exam:  Constitutional: Well developed, well nourished female in no acute distress, sitting comfortably in the bedside chair. HEENT: Normocephalic and atraumatic. Oropharynx is clear, mucous membranes are moist. Extraocular muscles are intact. Sclerae anicteric. Neck supple   Skin Warm and dry. No bruising and no rash noted. No erythema. No pallor. Respiratory Lungs are clear to auscultation bilaterally without wheezes, rales or rhonchi, normal air exchange without accessory muscle use. CVS Normal rate, regular rhythm and normal S1 and S2. No murmurs, gallops, or rubs. Abdomen Soft, nontender and nondistended, normoactive bowel sounds. No palpable mass. No hepatosplenomegaly. Neuro Expressive aphasia resolved. . No focal deficits   MSK Normal range of motion in general.  No edema and no tenderness. Psych In good spirits. Talkative. Labs:      Recent Labs      04/02/18   0259   WBC  8.1   RBC  3.81*   HGB  8.9*   HCT  28.1*   MCV  73.8*   MCH  23.4*   MCHC  31.7   RDW  19.6*   PLT  174   GRANS  74   LYMPH  19   MONOS  7   EOS  0*   BASOS  0   IG  2   DF  AUTOMATED   ANEU  6.0   ABL  1.5   ABM  0.6   LESLIE  0.0   ABB  0.0   AIG  0.2        Recent Labs      04/03/18   0521  04/02/18   0259  04/01/18   0420   NA  136  137  135*   K  4.9  4.8  4.6   CL  101  100  96*   CO2  28  31  30   AGAP  7  6*  9   GLU  113*  124*  137*   BUN  30*  26*  33*   CREA  0.99  0.96  1.21*   GFRAA  >60  >60  59*   GFRNA  >60  >60  49*   CA  8.6  8.0*  9.6   SGOT  28  24  32   AP  64  65  81   TP  6.2*  5.9*  6.8   ALB  3.2*  3.1*  3.6   GLOB  3.0  2.8  3.2   AGRAT  1.1*  1.1*  1.1*   MG   --   2.2  2.2         Imaging:  MRI BRAIN W WO CONT [[id number]] Collected: 03/18/18 1307      Order Status: Completed Updated: 03/18/18 1319     Narrative:       MRI of the Brain with contrast: 3/18/2018  History: Lung cancer  Sequences: Axial pre and post contrast T1, FLAIR, T2, diffusion, coronal post  contrast T1 and sagittal precontrast T1-weighted images of the brain. Comparison: MRI the brain 4/21/2017  20 cc of IV MultiHance   was injected to aid in the detection of intracranial  pathology. Findings:    Supratentorial enhancing metastatic lesions are now demonstrated. These lesions  all display hemosiderin and restricted diffusion. There is a 5 mm lesion in the  right temporal white matter, 1 cm lesion right frontal white matter, left  posterior parasagittal convexity lesion measuring 14 mm x 27 x 31 mm, 14 mm left  occipital white matter lesion, 9 mm left frontal lesion, and 2 left parietal  enhancing lesions measuring respectively 7 mm and 24 mm. There is some mild mass  upon the left ventricular atrium. The pituitary and sinuses are unremarkable. 7th and 8th nerves and orbits are unremarkable.  There is some fluid in the right  mastoid air cells.       Impression:       IMPRESSION: Interval supratentorial metastatic lesions as above. DC4  The significant findings in this report have been referred to the Imaging  Navigator in order to communicate to the referring provider or his/her designee  as outlined in Section II.C.2.a.ii-iii of the ACR  Practice Guideline for Communication of Diagnostic Imaging Findings.           XR ABD (KUB) [[id number]] Collected: 03/17/18 0115     Order Status: Completed Updated: 03/17/18 0117     Narrative:       Abdomen x-ray single view. HISTORY: Nasogastric tube placement. COMPARISON: None. FINDINGS: Nasogastric tube its tip in the gastric body. Bowel gas pattern is  nonspecific.       Impression:       IMPRESSION:    Nasogastric tube its tip in the gastric body.     XR CHEST PORT [[id number]] Collected: 03/16/18 2138     Order Status: Completed Updated: 03/16/18 2146     Narrative:       EXAM:  XR CHEST PORT    INDICATION:  AMS    COMPARISON:  4/18/2017    FINDINGS: A portable AP radiograph of the chest was obtained at 2124 hours. The  patient is on a cardiac monitor.  The lungs are clear.  The cardiac and  mediastinal contours and pulmonary vascularity are normal.  The bones and soft  tissues are grossly within normal limits.        Impression:       IMPRESSION: No acute cardiopulmonary disease.         CT HEAD WITHOUT CONTRAST [[id number]] Collected: 03/16/18 2130     Order Status: Completed Updated: 03/16/18 2134     Narrative:       CT HEAD WITHOUT CONTRAST     HISTORY:  Seizure. COMPARISON: None. TECHNIQUE: Axial imaging was performed without intravenous contrast utilizing  5mm slice thickness. Sagittal and coronal reformats were performed. Radiation  dose reduction techniques were used for this study. Our CT scanner uses one or  all of the following:    Automated exposure control, adjustment of the MAS or KUB according to patient's  size and iterative reconstruction.     FINDINGS:        *BRAIN:      -  There are no early signs of territorial or lacunar infarction by CT.     -  1.3 x 1.6 cm left parietal mass with surrounding vasogenic edema. 0.6 x  0.9 cm mass in left frontal lobe with vasogenic edema. Partly calcified mass in  left superior parafalcine region measuring 2.9 x 2.6 cm. Consider the  possibility of meningioma.     -  No gross white matter abnormality by CT.    *VISUALIZED PARANASAL SINUSES: Well aerated. *MASTOIDS:  Clear. *CALVARIUM AND SCALP: Unremarkable.         Impression:       IMPRESSION:    Cerebral metastatic disease. Possible left parietal convexity meningioma.     Date of Dictation: 3/16/2018 9:30 PM         ASSESSMENT:    Problem List  Date Reviewed: 3/14/2018          Codes Class Noted    * (Principal)Epilepsy with status epilepticus ([facility] ICD-10-CM: G40.901  ICD-9-CM: 345.3  3/18/2018        Hyperkalemia ICD-10-CM: E87.5  ICD-9-CM: 276.7  3/17/2018        Abnormal EKG ICD-10-CM: R94.31  ICD-9-CM: 794.31  3/17/2018        Se[facility] ([facility] ICD-10-CM: R56.9  ICD-9-CM: 780.39  3/16/2018        Lung cancer ([facility] ICD-10-CM: C34.90  ICD-9-CM: 162.9  3/16/2018        Brain metastasis (HCC) ICD-10-CM: C79.31  ICD-9-CM: 198.3  3/16/2018        Acute encephalopathy ICD-10-CM: G93.40  ICD-9-CM: 348.30  3/16/2018        Bone metastases ([facility] ICD-10-CM: C79.51  ICD-9-CM: 198.5  8/30/2017        Small cell carcinoma of right lung (HCC) ICD-10-CM: C34.91  ICD-9-CM: 162.9  4/21/2017        Smoker (Chronic) ICD-10-CM: V47.713  ICD-9-CM: 305.1  Unknown        Mediastinal mass ICD-10-CM: J98.59  ICD-9-CM: 786.6  4/18/2017        SVC (superior vena cava obstruction) ICD-10-CM: I87.1  ICD-9-CM: 459.2  4/18/2017        Essential hypertension ICD-10-CM: I10  ICD-9-CM: 401.9  4/18/2017        SVC syndrome ICD-10-CM: I87.1  ICD-9-CM: 459.2  4/18/2017            Ms. Grady Garcia is a 63 y.o. female admitted on 3/16/2018 with a primary diagnosis of lung cancer, acute encephalopathy, brain metastasis, and fever. Ms. Mo Cooley is a well known patient of Dr. Demarcus Garvin. She was just seen in the clinic on 3/14/18. While planning for Nivolumab she had rapid growth of b/l cervical LAD. He discussed  the aggressive pattern of her disease. Restaging CT and MRI of brain were ordered. The plan was to start nivolumab/ipilimimab on 4/4/18. He discussed the need for her to inform family of her aggressive disease and to designate POA. Unfortunately, yesterday she was confused, lethargic and somnolent. Patient's sister was driving her home when she became more confused and start having what her sister described as generalized body shakes. 911 was called. She received 2 doses of ativan PTA to ED. In ED she was post ictal and difficult to arouse. CT head showed cerebral metastatic disease with left parietal convexity meningioma. CXR was unremarkable. CT chest on 1/30/18 showed interval treatment response with significantly decreased mediastinal and right hilar lymphadenopathy. PLAN:  Stage IV SCLC with brain mets  - s/p C1 nivolumab/ipilimumab on 3/15. C2 due 4/4  3/21 Rad Onc consult scheduled for this AM  3/22 Pt met with Dr. Lauri Banuelos yesterday. He spoke with pt's family. Plan is to watch her over the weekend to see how much she improves before making decision in regards to treatment  3/26 Pt continues to improve daily with speech and ability to communicate. She expresses interest today in pursing XRT. Dr. Lauri Banuelos notified - he states they will get her in for consent and simulation, probably at [facility] tomorrow. 3/27 Went to [place] this AM for simulation  3/30 Day 3/14 XRT treatments. C2 nivo/ipi due on 4/4. 4/2 XRT 4/14 today    Seizures  3/19 CT head revealed cerebral metastatic disease with L parietal convxity meningioma. Per teleneuro, pt has ongoing L-sided seizures and was started on Fosphenytoin yesterday - currently being held for high level phenytoin. Also on Dex and Keppra.   Dr. Alesha Gomez to check with Dr. Maria Teresa Lobato to see if pt needs to be transferred to [facility] to neuro service vs. daily teleneuro eval here. Pt alert - responds with \"OK\" to every question. SLP following. 3/20  Follow-up teleneuro eval pending. Remains confused, but able to speak more words today. SLP following. Continues on Dex, Keppra, and Fosphenytoin. Phenytoin level pending. 3/21 Per neuro, Dr. Ar Solis, hold phenytoin and allow levels to trend down. Repeat EEG. Give loading dose of Phenobarbital 90mg IV x 1. Then Phenobarbital 30mg BID.  3/22 Repeat EEG pending. Pt's expressive aphasia is improving daily. Pt was able to answer questions and use phrases appropriately during exam this AM.    3/23 Repeat EEG \"moderately abnormal EGG d/t the presence of persistent PLEDs through the recording in the L hemisphere with focus likely at frontal leads. No subclinical seizure is identified\". Follow-up teleneuro consult ordered. Con't Dex, Keppra, and Phenobarbital.  3/25 Speech improving daily. Per neuro, continue current management  3/28 No new seizures reported  4/2 No seizures. Continue meds. Hypokalemia/Hypomagnesemia  3/19 Replete K+  3/20 K+ up to 3.8  3/24 Replete Mg+    Continue home meds  Moni SOPs  SCDs for DVT prophylaxis  Per PT, may benefit from rehab upon discharge. CM working on placement. Disposition: Transportation to and from radiation has been arranged to start April 6th therefore is all goes well she will be able to be discharged on Thursday after her radiation treatment. Rosanna Lion NP   [facility]ematology & On[facility]  [address] [facility] [employer] [address]  Office : [phone]  Fax : [phone]               Attending Addendum:  I personally evaluated the patient with Rosanna Lion NKARLOS.,  and agree with the assessment, findings and plan as documented.   Appears stable, she has completed 3 out of 14 WBRT sessions, tomorrow she will go for it again, we will continue Dex/Keppra/Phenobarb for now.               Bard Gary MD  [facility]  [address] [place] [address]  Office : [phone]  Fax : [phone]

## 2018-04-03 NOTE — PROGRESS NOTES
CM set up transportation via [employer] for radiation. Transport will begin 4.6.18 and is scheduled until 4.10.18 (weekend excluded). Following that, the facility will set up remaining transportation needs. CM confirmed that facility [facility] [phone]) will do so for the pt. They have documented that she needs transportation set up from 4.11.18-4.17.18. Transport Reference numbers:  [id number]  [id number]  [id number]    From: [address]  To: [facility] and [address] [place] [address], [address].  [facility], [address] [place] [address]

## 2018-04-04 NOTE — PROGRESS NOTES
Problem: Nutrition Deficit  Goal: *Optimize nutritional status  Nutrition F/U  Assessment  Diet order(s): Mechanical soft 2 gm Na with ensure enlive TID   Food,Nutrition, and Pertinent History: Attempted to see patient x 2, however, off floor at radiation. I observed multiple ensure at bedside unopened. Note potential discharge following radiation tomorrow. She is receiving decadron and remeron.    Anthropometrics: Height: 5' 8\" (172.7 cm), Weight Source: Standing scale (5th floor, 4/4), Weight: 52 kg   Macronutrient Needs:  · EER:  0114-2490 kcal /day (30-35 kcal/kg listed BW)  · EPR:  50-65 grams protein/day (1-1.3 grams/kg listed BW)(GFR >60)  Intake/Comparative Standards: Average intake for past 7 day(s)/21 recorded meal(s): 100%. This potentially meets ~100% of kcal and ~100% of protein needs.      Intervention:   Meals and snacks: Continue current diet. Supplementation: Decrease ensure enlive from TID to one meal/day.   Discharge nutrition plan: No discharge needs identified  [address] [place], [place], [address] [place] [address], [facility] [address], [phone]

## 2018-04-04 NOTE — PROGRESS NOTES
Problem: Mobility Impaired (Adult and Pediatric)  Goal: *Acute Goals and Plan of Care (Insert Text)  STG:  (1.)Ms. Carley Penn will move from supine to sit and sit to supine , scoot up and down and roll side to side with SUPERVISION within 3 treatment day(s). Goal met 3/22/2018  (2.)Ms. Carley Penn will transfer from bed to chair and chair to bed with MINIMAL ASSIST using the least restrictive device within 3 treatment day(s). Goal met 3/21/2018  (3.)Ms. Carley Penn will ambulate with MINIMAL ASSIST for 30 feet with the least restrictive device within 3 treatment day(s). Goal met 3/22/2018    LTG:  (1.)Ms. Carley Penn will move from supine to sit and sit to supine , scoot up and down and roll side to side in bed with MODIFIED INDEPENDENCE within 7 treatment day(s). Goal met 3/27/18  (2.)Ms. Carley Penn will transfer from bed to chair and chair to bed with CONTACT GUARD ASSIST using the least restrictive device within 7 treatment day(s). Goal met 3/27/18  (3.)Ms. Carley Penn will ambulate with CONTACT GUARD ASSIST for 150 feet with the least restrictive device within 7 treatment day(s). Goal met 3/27/18    New Goals (LTG) Updated: 3/27/18  (1.)Ms. Carley Penn will move from supine to sit and sit to supine  in bed with INDEPENDENT within 7 treatment day(s). Goal met 4/3/18  (2.)Ms. Carley Penn will transfer from bed to chair and chair to bed with INDEPENDENT using the least restrictive device within 7 treatment day(s). Goal met 4/3/18  (3.)Ms. Carley Penn will ambulate with SUPERVISION for 500+ feet with the least restrictive device within 7 treatment day(s). Goal met 4/4/18  (4.)Ms. Carley Penn will ascend/descend 3 steps with use of 1 railing with SUPERVISION within 7 treatment days.   ________________________________________________________________________________________________        ________________________________________________________________________________________________      PHYSICAL THERAPY: Daily Note, Treatment Day: 6th, AM 4/4/2018  INPATIENT: Hospital Day: 20  Payor: ABSOLUTE TOTAL CARE / [place] / Product Type: Managed Care Medicaid /      NAME/AGE/GENDER: Ulises Tolliver is a 63 y.o. female   PRIMARY DIAGNOSIS: Acute encephalopathy  Brain metastasis ([facility]  Seizure ([facility]  Lung cancer ([facility] Epilepsy with status epilepticus ([facility] Epilepsy with status epilepticus ([facility]        ICD-10: Treatment Diagnosis:   · Generalized Muscle Weakness (M62.81)  · Difficulty in walking, Not elsewhere classified (R26.2)   Precaution/Allergies:  Metaxalone and Oxycodone      ASSESSMENT:     Ms. Valerio La sitting in recliner chair upon contact and is very agreeable to working with therapy. Sit to stand with supervision. Gait training x 500 feet without an assistive device. No loss of balance noted this am.  Patient does has SOB noted. Patient has more symmetrical gait pattern. Strengthening exercises performed. Some SOB noted. Tolerated well. Patient perform exercises well. Progress demonstrated as patient is meeting goals. Patient is very cooperative and pleasant to work with. Will continue PT efforts. This section established at most recent assessment   PROBLEM LIST (Impairments causing functional limitations):  1. Decreased Strength  2. Decreased ADL/Functional Activities  3. Decreased Transfer Abilities  4. Decreased Ambulation Ability/Technique  5. Decreased Balance  6. Decreased Activity Tolerance  7. Decreased Pacing Skills  8. Decreased [place] with Home Exercise Program  9. Decreased Cognition   INTERVENTIONS PLANNED: (Benefits and precautions of physical therapy have been discussed with the patient.)  1. Balance Exercise  2. Bed Mobility  3. Cold  4. Family Education  5. Gait Training  6. Home Exercise Program (HEP)  7. Manual Therapy  8. Neuromuscular Re-education/Strengthening  9. Range of Motion (ROM)  10. Therapeutic Activites  11. Therapeutic Exercise/Strengthening  12.  Transfer Training     TREATMENT PLAN: Frequency/Duration: 3 times a week for duration of hospital stay  Rehabilitation Potential For Stated Goals: EXCELLENT     RECOMMENDED REHABILITATION/EQUIPMENT: (at time of discharge pending progress): Due to the probability of continued deficits (see above) this patient will likely need continued skilled physical therapy after discharge. Equipment:    None at this time              HISTORY:   History of Present Injury/Illness (Reason for Referral):  Patient is 63years old female with pmhx of extensive stage small cell lung cancer with brain metastasis, GERD, HTN, previous tobacco abuse presented in view of new onset seizure like activity. As per the pt's sister, pt was in her usual health until yesterday, today she appeared more confused, lethargic and somnolent. While she was driving the pt home, pt started appearing confused and then had generalized body shakes. EMS was summoned. Pt received 2 doses of ativan PTA. In ER, pt is post ictal, sleeping, difficult to arouse. Pt is DNR. In ER, CT head showed cerebral metastatic disease with left parietal convexity meningioma, CXR was unremarkable. CT chest on 1/30/18 showed interval treatment response with significantly decreased mediastinal and right hilar lymphadenopathy. Past Medical History/Comorbidities:   Ms. Carley Penn  has a past medical history of Former cigarette smoker; GERD (gastroesophageal reflux disease); Hypertension; and Lung cancer ([facility] She also has no past medical history of Adverse effect of anesthesia; Difficult intubation; Malignant hyperthermia due to anesthesia; Nausea & vomiting; or Pseudocholinesterase deficiency. Ms. Carley Penn  has a past surgical history that includes hx tubal ligation and hx vascular access.   Social History/Living Environment:   Home Environment: Private residence  # Steps to Enter: 3  Rails to Enter: No  One/Two Story Residence: One story  Living Alone: No  Support Systems: Parent, Family member(s) (takes care of mother whom she lives with)  Patient Expects to be Discharged to[de-identified] Unknown  Current DME Used/Available at Home: None  Tub or Shower Type: Tub/Shower combination  Prior Level of Function/Work/Activity:  Taking care of mother, independent, no AD, intact cognition and communication     Number of Personal Factors/Comorbidities that affect the Plan of Care: 3+: HIGH COMPLEXITY   EXAMINATION:   Most Recent Physical Functioning:   Gross Assessment:                  Posture:     Balance:  Sitting: Intact  Sitting - Static: Good (unsupported)  Sitting - Dynamic: Good (unsupported)  Standing: Intact  Standing - Static: Good  Standing - Dynamic : Good Bed Mobility:     Wheelchair Mobility:     Transfers:  Sit to Stand: Independent  Stand to Sit: Independent  Gait:     Distance (ft): 500 Feet (ft)  Assistive Device: Other (comment) (no assistive device used)  Ambulation - Level of Assistance: Stand-by assistance      Body Structures Involved:  1. Heart  2. Lungs  3. Bones  4. Joints  5. Muscles  6. Ligaments Body Functions Affected:  1. Mental  2. Voice and Speech  3. Cardio  4. Respiratory  5. Neuromusculoskeletal  6. Movement Related Activities and Participation Affected:  1. Learning and Applying Knowledge  2. General Tasks and Demands  3. Communication  4. Mobility  5. Self Care  6. Domestic Life  7. Interpersonal Interactions and Relationships  8. Community, Social and [place]   Number of elements that affect the Plan of Care: 4+: HIGH COMPLEXITY   CLINICAL PRESENTATION:   Presentation: Evolving clinical presentation with changing clinical characteristics: MODERATE COMPLEXITY   CLINICAL DECISION MAKIN [facility] Mobility Inpatient Short Form  How much difficulty does the patient currently have. .. Unable A Lot A Little None   1. Turning over in bed (including adjusting bedclothes, sheets and blankets)? [] 1   [] 2   [] 3   [x] 4   2.   Sitting down on and standing up from a chair with arms ( e.g., wheelchair, bedside commode, etc.)   [] 1   [] 2   [] 3   [x] 4   3. Moving from lying on back to sitting on the side of the bed? [] 1   [] 2   [] 3   [x] 4   How much help from another person does the patient currently need. .. Total A Lot A Little None   4. Moving to and from a bed to a chair (including a wheelchair)? [] 1   [] 2   [x] 3   [] 4   5. Need to walk in hospital room? [] 1   [] 2   [x] 3   [] 4   6. Climbing 3-5 steps with a railing? [] 1   [] 2   [x] 3   [] 4   © 2007, Trustees of [address] [place] [address] [address] [id number], under license to [facility]. All rights reserved      Score:  Initial: 17 Most Recent: 21 (Date: 3/22/2018 )    Interpretation of Tool:  Represents activities that are increasingly more difficult (i.e. Bed mobility, Transfers, Gait). Score 24 23 22-20 19-15 14-10 9-7 6     Modifier CH CI CJ CK CL CM CN      ? Mobility - Walking and Moving Around:     - CURRENT STATUS: CJ - 20%-39% impaired, limited or restricted    - GOAL STATUS: CI - 1%-19% impaired, limited or restricted    - D/C STATUS:  ---------------To be determined---------------  Payor: ABSOLUTE TOTAL CARE / [place] / Product Type: Managed Care Medicaid /      Medical Necessity:     · Patient is expected to demonstrate progress in strength, range of motion, balance and coordination to decrease assistance required with transfers, ambulation, and functional mobility. Reason for Services/Other Comments:  · Patient continues to require skilled intervention due to decreased cognition, activity tolerance, and functional mobility.    Use of outcome tool(s) and clinical judgement create a POC that gives a: Clear prediction of patient's progress: LOW COMPLEXITY            TREATMENT:   (In addition to Assessment/Re-Assessment sessions the following treatments were rendered)   Pre-treatment Symptoms/Complaints: \"Good morning\"  Pain: Initial: 0/10  Pain Intensity 1: 0  Post Session:  0/10   Therapeutic Activity (25 minutes): Functional mobility activities to increase mobility, stability and tolerance/independnece for ADL's and  Gait training to improve and/or restore physical functioning as related to mobility. Ambulated 500 Feet (ft) with supervision/set-up w/ cues to improve attention to R side as well as improve trunk rotation/arm swing. Assistive Device: Other (comment) (no assistive device used)  Ambulation - Level of Assistance: Stand-by assistance  Distance (ft): 500 Feet (ft)  Therapeutic Exercise (19 minutes):  Pt. Performed B LE standing exercises  w/ B UE support. Exercises are as follows:  exercises 4/4/18 reps     Ankle pumps  20     abduction  20     Hip extension       marching  20     squats       LAQ  20                            Braces/Orthotics/Lines/Etc:   · O2 Device: Room air  Treatment/Session Assessment:    · Response to Treatment: tolerated well  · Interdisciplinary Collaboration:   o Physical Therapy Assistant  o Registered Nurse  · After treatment position/precautions:   o Up in chair  o Bed/Chair-wheels locked  o Bed in low position  o Call light within reach   · Compliance with Program/Exercises: compliant  · Recommendations/Intent for next treatment session: \"Next visit will focus on reduction in assistance provided\".   Total Treatment Duration:  PT Patient Time In/Time Out  Time In: 0900  Time Out: 0944    Dustin Frank PTA

## 2018-04-04 NOTE — PROGRESS NOTES
SPEECH PATHOLOGY NOTE:    Attempted to see patient for speech therapy this AM. Currently out of the room. Will re-attempt at later time/date.      RUBY Herrera, CCC-SLP, CBIS

## 2018-04-04 NOTE — PROGRESS NOTES
[facility]        Inpatient Hematology / Oncology Progress Note      Admission Date: 3/16/2018  9:00 PM  Reason for Admission/Hospital Course: Acute encephalopathy  Brain metastasis (HCC)  Seizure ([facility]  Lung cancer ([facility]    24 Hour Events:  Afebrile, VSS  On Dex, Keppra, and Phenobarbital   Sitting up in bedside chair       ROS:  Constitutional: Negative for fever, chills. CV: Negative for chest pain, palpitations, edema. Respiratory: Negative for dyspnea, cough, wheezing. GI: Negative for nausea, abdominal pain, diarrhea. 10 point review of systems is otherwise negative with the exception of the elements mentioned above in the HPI. Allergies   Allergen Reactions    Metaxalone Other (comments)    Oxycodone Swelling       OBJECTIVE:  Patient Vitals for the past 8 hrs:   BP Temp Pulse Resp SpO2 Weight   18 1058 (!) 140/91 97.5 °F (36.4 °C) 85 18 99 % -   18 0714 139/79 98.2 °F (36.8 °C) 88 17 98 % -   18 0533 - - - - - 114 lb 11.2 oz (52 kg)     Temp (24hrs), Av.7 °F (36.5 °C), Min:97.4 °F (36.3 °C), Max:98.2 °F (36.8 °C)     07 -  1900  In: 240 [P.O.:240]  Out: -     Physical Exam:  Constitutional: Well developed, well nourished female in no acute distress, sitting comfortably in the bedside chair. HEENT: Normocephalic and atraumatic. Oropharynx is clear, mucous membranes are moist. Extraocular muscles are intact. Sclerae anicteric. Neck supple   Skin Warm and dry. No bruising and no rash noted. No erythema. No pallor. Respiratory Lungs are clear to auscultation bilaterally without wheezes, rales or rhonchi, normal air exchange without accessory muscle use. CVS Normal rate, regular rhythm and normal S1 and S2. No murmurs, gallops, or rubs. Abdomen Soft, nontender and nondistended, normoactive bowel sounds. No palpable mass. No hepatosplenomegaly. Neuro Expressive aphasia resolved. . No focal deficits   MSK Normal range of motion in general.  No edema and no tenderness. Psych In good spirits. Talkative. Labs:      Recent Labs      04/04/18   0520  04/02/18   0259   WBC  5.5  8.1   RBC  4.08  3.81*   HGB  9.7*  8.9*   HCT  29.8*  28.1*   MCV  73.0*  73.8*   MCH  23.8*  23.4*   MCHC  32.6  31.7   RDW  20.3*  19.6*   PLT  156  174   GRANS  76  74   LYMPH  15  19   MONOS  8  7   EOS  1  0*   BASOS  0  0   IG  3  2   DF  AUTOMATED  AUTOMATED   ANEU  4.2  6.0   ABL  0.8  1.5   ABM  0.4  0.6   LESLIE  0.1  0.0   ABB  0.0  0.0   AIG  0.2  0.2        Recent Labs      04/04/18   0520  04/03/18   0521  04/02/18   0259   NA  136  136  137   K  4.4  4.9  4.8   CL  98  101  100   CO2  29  28  31   AGAP  9  7  6*   GLU  152*  113*  124*   BUN  28*  30*  26*   CREA  1.03*  0.99  0.96   GFRAA  >60  >60  >60   GFRNA  59*  >60  >60   CA  8.3  8.6  8.0*   SGOT  30  28  24   AP  75  64  65   TP  6.9  6.2*  5.9*   ALB  3.5  3.2*  3.1*   GLOB  3.4  3.0  2.8   AGRAT  1.0*  1.1*  1.1*   MG  2.4   --   2.2         Imaging:  MRI BRAIN W WO CONT [[id number]] Collected: 03/18/18 1307      Order Status: Completed Updated: 03/18/18 1319     Narrative:       MRI of the Brain with contrast: 3/18/2018  History: Lung cancer  Sequences: Axial pre and post contrast T1, FLAIR, T2, diffusion, coronal post  contrast T1 and sagittal precontrast T1-weighted images of the brain. Comparison: MRI the brain 4/21/2017  20 cc of IV MultiHance   was injected to aid in the detection of intracranial  pathology. Findings:    Supratentorial enhancing metastatic lesions are now demonstrated. These lesions  all display hemosiderin and restricted diffusion. There is a 5 mm lesion in the  right temporal white matter, 1 cm lesion right frontal white matter, left  posterior parasagittal convexity lesion measuring 14 mm x 27 x 31 mm, 14 mm left  occipital white matter lesion, 9 mm left frontal lesion, and 2 left parietal  enhancing lesions measuring respectively 7 mm and 24 mm.  There is some mild mass  upon the left ventricular atrium. The pituitary and sinuses are unremarkable. 7th and 8th nerves and orbits are unremarkable. There is some fluid in the right  mastoid air cells.       Impression:       IMPRESSION: Interval supratentorial metastatic lesions as above. DC4  The significant findings in this report have been referred to the Imaging  Navigator in order to communicate to the referring provider or his/her designee  as outlined in Section II.C.2.a.ii-iii of the ACR  Practice Guideline for Communication of Diagnostic Imaging Findings.           XR ABD (KUB) [[id number]] Collected: 03/17/18 0115     Order Status: Completed Updated: 03/17/18 0117     Narrative:       Abdomen x-ray single view. HISTORY: Nasogastric tube placement. COMPARISON: None. FINDINGS: Nasogastric tube its tip in the gastric body. Bowel gas pattern is  nonspecific.       Impression:       IMPRESSION:    Nasogastric tube its tip in the gastric body.     XR CHEST PORT [[id number]] Collected: 03/16/18 2138     Order Status: Completed Updated: 03/16/18 2146     Narrative:       EXAM:  XR CHEST PORT    INDICATION:  AMS    COMPARISON:  4/18/2017    FINDINGS: A portable AP radiograph of the chest was obtained at 2124 hours. The  patient is on a cardiac monitor.  The lungs are clear.  The cardiac and  mediastinal contours and pulmonary vascularity are normal.  The bones and soft  tissues are grossly within normal limits.        Impression:       IMPRESSION: No acute cardiopulmonary disease.         CT HEAD WITHOUT CONTRAST [[id number]] Collected: 03/16/18 2130     Order Status: Completed Updated: 03/16/18 2134     Narrative:       CT HEAD WITHOUT CONTRAST     HISTORY:  Seizure. COMPARISON: None. TECHNIQUE: Axial imaging was performed without intravenous contrast utilizing  5mm slice thickness. Sagittal and coronal reformats were performed. Radiation  dose reduction techniques were used for this study.  Our CT scanner uses one or  all of the following:    Automated exposure control, adjustment of the MAS or KUB according to patient's  size and iterative reconstruction. FINDINGS:        *BRAIN:      -  There are no early signs of territorial or lacunar infarction by CT.     -  1.3 x 1.6 cm left parietal mass with surrounding vasogenic edema. 0.6 x  0.9 cm mass in left frontal lobe with vasogenic edema. Partly calcified mass in  left superior parafalcine region measuring 2.9 x 2.6 cm. Consider the  possibility of meningioma.     -  No gross white matter abnormality by CT.    *VISUALIZED PARANASAL SINUSES: Well aerated. *MASTOIDS:  Clear. *CALVARIUM AND SCALP: Unremarkable.         Impression:       IMPRESSION:    Cerebral metastatic disease. Possible left parietal convexity meningioma.     Date of Dictation: 3/16/2018 9:30 PM         ASSESSMENT:    Problem List  Date Reviewed: 3/14/2018          Codes Class Noted    * (Principal)Epilepsy with status epilepticus ([facility] ICD-10-CM: G40.901  ICD-9-CM: 345.3  3/18/2018        Hyperkalemia ICD-10-CM: E87.5  ICD-9-CM: 276.7  3/17/2018        Abnormal EKG ICD-10-CM: R94.31  ICD-9-CM: 794.31  3/17/2018        Seizure ([facility] ICD-10-CM: R56.9  ICD-9-CM: 780.39  3/16/2018        Lung cancer ([facility] ICD-10-CM: C34.90  ICD-9-CM: 162.9  3/16/2018        Brain metastasis ([facility] ICD-10-CM: C79.31  ICD-9-CM: 198.3  3/16/2018        Acute encephalopathy ICD-10-CM: G93.40  ICD-9-CM: 348.30  3/16/2018        Bone metastases ([facility] ICD-10-CM: C79.51  ICD-9-CM: 198.5  8/30/2017        Small cell carcinoma of right lung (HCC) ICD-10-CM: C34.91  ICD-9-CM: 162.9  4/21/2017        Smoker (Chronic) ICD-10-CM: A99.237  ICD-9-CM: 305.1  Unknown        Mediastinal mass ICD-10-CM: J98.59  ICD-9-CM: 786.6  4/18/2017        SVC (superior vena cava obstruction) ICD-10-CM: I87.1  ICD-9-CM: 459.2  4/18/2017        Essential hypertension ICD-10-CM: I10  ICD-9-CM: 401.9  4/18/2017        SVC syndrome ICD-10-CM: I87.1  ICD-9-CM: 459.2  4/18/2017            Ms. Pratt Child is a 63 y.o. female admitted on 3/16/2018 with a primary diagnosis of lung cancer, acute encephalopathy, brain metastasis, and fever. Ms. Pratt Child is a well known patient of Dr. Vincent Chen. She was just seen in the clinic on 3/14/18. While planning for Nivolumab she had rapid growth of b/l cervical LAD. He discussed  the aggressive pattern of her disease. Restaging CT and MRI of brain were ordered. The plan was to start nivolumab/ipilimimab on 4/4/18. He discussed the need for her to inform family of her aggressive disease and to designate POA. Unfortunately, yesterday she was confused, lethargic and somnolent. Patient's sister was driving her home when she became more confused and start having what her sister described as generalized body shakes. 911 was called. She received 2 doses of ativan PTA to ED. In ED she was post ictal and difficult to arouse. CT head showed cerebral metastatic disease with left parietal convexity meningioma. CXR was unremarkable. CT chest on 1/30/18 showed interval treatment response with significantly decreased mediastinal and right hilar lymphadenopathy. PLAN:  Stage IV SCLC with brain mets  - s/p C1 nivolumab/ipilimumab on 3/15. C2 due 4/4  3/21 Rad Onc consult scheduled for this AM  3/22 Pt met with Dr. Xena Rice yesterday. He spoke with pt's family. Plan is to watch her over the weekend to see how much she improves before making decision in regards to treatment  3/26 Pt continues to improve daily with speech and ability to communicate. She expresses interest today in pursing XRT. Dr. Xena Rice notified - he states they will get her in for consent and simulation, probably at [facility] tomorrow. 3/27 Went to [place] this AM for simulation  3/30 Day 3/14 XRT treatments. C2 nivo/ipi due on 4/4. 4/2 XRT 4/14 today    Seizures  3/19 CT head revealed cerebral metastatic disease with L parietal convxity meningioma.   Per teleneuro, pt has ongoing L-sided seizures and was started on Fosphenytoin yesterday - currently being held for high level phenytoin. Also on Dex and Keppra. Dr. Rk Francisco to check with Dr. Lyndsay Adan to see if pt needs to be transferred to [facility] to neuro service vs. daily teleneuro eval here. Pt alert - responds with \"OK\" to every question. SLP following. 3/20  Follow-up teleneuro eval pending. Remains confused, but able to speak more words today. SLP following. Continues on Dex, Keppra, and Fosphenytoin. Phenytoin level pending. 3/21 Per neuro, Dr. Ramsey Monson, hold phenytoin and allow levels to trend down. Repeat EEG. Give loading dose of Phenobarbital 90mg IV x 1. Then Phenobarbital 30mg BID.  3/22 Repeat EEG pending. Pt's expressive aphasia is improving daily. Pt was able to answer questions and use phrases appropriately during exam this AM.    3/23 Repeat EEG \"moderately abnormal EGG d/t the presence of persistent PLEDs through the recording in the L hemisphere with focus likely at frontal leads. No subclinical seizure is identified\". Follow-up teleneuro consult ordered. Con't Dex, Keppra, and Phenobarbital.  3/25 Speech improving daily. Per neuro, continue current management  3/28 No new seizures reported  4/2 No seizures. Continue meds. Hypokalemia/Hypomagnesemia  3/19 Replete K+  3/20 K+ up to 3.8  3/24 Replete Mg+    Continue home meds  Moni SOPs  SCDs for DVT prophylaxis  Per PT, may benefit from rehab upon discharge. CM working on placement. Disposition: Transportation to and from radiation has been arranged to start April 6th therefore is all goes well she will be able to be discharged on Thursday after her radiation treatment.              YAMILKA Haines [facility]  [address] [address]  Office : [phone]  Fax : [phone]

## 2018-04-04 NOTE — PROGRESS NOTES
Problem: Self Care Deficits Care Plan (Adult)  Goal: *Acute Goals and Plan of Care (Insert Text)  1. Patient will complete lower body bathing and dressing with modified independence and adaptive equipment as needed. 2. Patient will complete toileting with modified independence. 3. Patient will tolerate 20 minutes of OT treatment with no rest breaks to increase activity tolerance for ADLs. 4. Patient will complete functional transfers with modified independence and adaptive equipment as needed. Timeframe: 7 visits       OCCUPATIONAL THERAPY: Daily Note, Treatment Day: 3rd and PM    4/4/2018  INPATIENT: Hospital Day: 20  Payor: ABSOLUTE TOTAL CARE / [place] / Product Type: Managed Care Medicaid /      NAME/AGE/GENDER: Jose C Macias is a 63 y.o. female   PRIMARY DIAGNOSIS:  Acute encephalopathy  Brain metastasis ([address]  Seizure ([address]  Lung cancer ([address] Epilepsy with status epilepticus ([address] Epilepsy with status epilepticus ([address]        ICD-10: Treatment Diagnosis:    · Other lack of cordination (R27.8)   Precautions/Allergies:     Metaxalone and Oxycodone      ASSESSMENT:   Ms. Chuy Massey was admitted for the above diagnosis. Pt presents sitting up in chair upon arrival. Pt completed sit to stand independently and  functional mobility in room and out into hallway with SBA. Pt returned to room and participated in exercises. Pt perseverates greatly during all treatment needing constant re-direction. Good effort otherwise. Continue OT POC. This section established at most recent assessment   PROBLEM LIST (Impairments causing functional limitations):  1. Decreased ADL/Functional Activities  2. Decreased Transfer Abilities  3. Decreased Ambulation Ability/Technique  4. Decreased Balance  5. Decreased Cognition   INTERVENTIONS PLANNED: (Benefits and precautions of occupational therapy have been discussed with the patient.)  1. Activities of daily living training  2.  Adaptive equipment training  3. Cognitive training  4. Group therapy  5. Neuromuscular re-eduation  6. Therapeutic activity  7. Therapeutic exercise     TREATMENT PLAN: Frequency/Duration: Follow patient 3x/ week to address above goals. Rehabilitation Potential For Stated Goals: Fair     RECOMMENDED REHABILITATION/EQUIPMENT: (at time of discharge pending progress): Due to the probability of continued deficits (see above) this patient will likely need continued skilled occupational therapy after discharge. Equipment:    None at this time          OCCUPATIONAL PROFILE AND HISTORY:   History of Present Injury/Illness (Reason for Referral):  Per H&P, \"Patient is 63years old female with pmhx of extensive stage small cell lung cancer with brain metastasis, GERD, HTN, previous tobacco abuse presented in view of new onset seizure like activity. As per the pt's sister, pt was in her usual health until yesterday, today she appeared more confused, lethargic and somnolent. While she was driving the pt home, pt started appearing confused and then had generalized body shakes. EMS was summoned. Pt received 2 doses of ativan PTA. In ER, pt is post ictal, sleeping, difficult to arouse. Pt is DNR. In ER, CT head showed cerebral metastatic disease with left parietal convexity meningioma, CXR was unremarkable. CT chest on 1/30/18 showed interval treatment response with significantly decreased mediastinal and right hilar lymphadenopathy. \"  Past Medical History/Comorbidities:   Ms. Abundio Dueñas  has a past medical history of Former cigarette smoker; GERD (gastroesophageal reflux disease); Hypertension; and Lung cancer ([facility] She also has no past medical history of Adverse effect of anesthesia; Difficult intubation; Malignant hyperthermia due to anesthesia; Nausea & vomiting; or Pseudocholinesterase deficiency. Ms. Abundio Dueñas  has a past surgical history that includes hx tubal ligation and hx vascular access.   Social History/Living Environment:   Home Environment: Private residence  # Steps to Enter: 3  Rails to [facility]: No  One/Two Story Residence: One story  Living Alone: No  Support Systems: Parent, Family member(s) (takes care of mother whom she lives with)  Patient Expects to be Discharged to[de-identified] Unknown  Current DME Used/Available at Home: None  Tub or Shower Type: Tub/Shower combination  Prior Level of Function/Work/Activity:  Patient lives with her mother and is her caregiver. She is typically independent with ADLs. Number of Personal Factors/Comorbidities that affect the Plan of Care: Brief history (0):  LOW COMPLEXITY   ASSESSMENT OF OCCUPATIONAL PERFORMANCE[de-identified]   Activities of Daily Living:           Basic ADLs (From Assessment) Complex ADLs (From Assessment)   Basic ADL  Feeding: Minimum assistance  Oral Facial Hygiene/Grooming: Minimum assistance  Bathing: Moderate assistance  Type of Bath: Chlorhexidine (CHG), Bath Pack  Upper Body Dressing: Minimum assistance  Lower Body Dressing: Moderate assistance  Toileting: Minimum assistance Instrumental ADL  Meal Preparation: Total assistance  Homemaking:  Total assistance  Medication Management: Total assistance  Financial Management: Total assistance   Grooming/Bathing/Dressing Activities of Daily Living                             Bed/Mat Mobility  Sit to Stand: Independent       Most Recent Physical Functioning:   Gross Assessment:                  Posture:  Posture (WDL): Exceptions to WDL  Posture Assessment: Trunk flexion, Rounded shoulders  Balance:  Sitting: Intact  Sitting - Static: Good (unsupported)  Sitting - Dynamic: Good (unsupported)  Standing: Intact  Standing - Static: Good  Standing - Dynamic : Good Bed Mobility:     Wheelchair Mobility:     Transfers:  Sit to Stand: Independent  Stand to Sit: Independent                Patient Vitals for the past 6 hrs:   BP SpO2 Pulse   04/04/18 1058 (!) 140/91 99 % 85       Mental Status  Neurologic State: Alert  Orientation Level: Oriented X4  Cognition: Appropriate for age attention/concentration  Perception: Appears intact  Perseveration: No perseveration noted  Safety/Judgement: Awareness of environment                          Physical Skills Involved:  1. Balance  2. Strength  3. Activity Tolerance Cognitive Skills Affected (resulting in the inability to perform in a timely and safe manner):  1. Perception  2. Executive Function  3. Comprehension  4. Expression Psychosocial Skills Affected:  1. Habits/Routines  2. Environmental Adaptation   Number of elements that affect the Plan of Care: 5+:  HIGH COMPLEXITY   CLINICAL DECISION MAKING:   [facility] AM-PAC 6 Clicks   Daily Activity Inpatient Short Form  How much help from another person does the patient currently need. .. Total A Lot A Little None   1. Putting on and taking off regular lower body clothing? [] 1   [] 2   [x] 3   [] 4   2. Bathing (including washing, rinsing, drying)? [] 1   [] 2   [x] 3   [] 4   3. Toileting, which includes using toilet, bedpan or urinal?   [] 1   [] 2   [x] 3   [] 4   4. Putting on and taking off regular upper body clothing? [] 1   [] 2   [x] 3   [] 4   5. Taking care of personal grooming such as brushing teeth? [] 1   [] 2   [x] 3   [] 4   6. Eating meals? [] 1   [] 2   [x] 3   [] 4   © 2007, Trustees of [facility], under license to [employer]. All rights reserved      Score:  Initial: 18 Most Recent: X (Date: -- )    Interpretation of Tool:  Represents activities that are increasingly more difficult (i.e. Bed mobility, Transfers, Gait). Score 24 23 22-20 19-15 14-10 9-7 6     Modifier CH CI CJ CK CL CM CN      ?  Self Care:     - CURRENT STATUS: CK - 40%-59% impaired, limited or restricted    - GOAL STATUS: CJ - 20%-39% impaired, limited or restricted    - D/C STATUS:  ---------------To be determined---------------  Payor: ABSOLUTE TOTAL CARE / [place] / Product Type: Managed Care Medicaid /      Medical Necessity:     · Patient demonstrates good rehab potential due to higher previous functional level. Reason for Services/Other Comments:  · Patient continues to require present interventions due to patient's inability to care for self at baseline level of function. Use of outcome tool(s) and clinical judgement create a POC that gives a: MODERATE COMPLEXITY         TREATMENT:   (In addition to Assessment/Re-Assessment sessions the following treatments were rendered)     Pre-treatment Symptoms/Complaints:  None   Pain: Initial:   Pain Intensity 1: 0  Post Session:  None      Therapeutic Activity: (25 minutes): Therapeutic activities including Bed transfers, Chair transfers, Ambulation on level ground, Ambulation on slight incline and Carry objects to improve mobility, strength, balance and activity tolerance. Required SBA  to promote static and dynamic balance in standing. Therapeutic Exercise: (15 minutes):  Exercises per grid below to improve mobility, strength and balance. Required moderate visual, verbal and manual cues to promote proper body mechanics. Progressed range and repetitions as indicated.        Date:  3/28/18 Date:  4/4/18 Date:     Activity/Exercise Parameters Parameters Parameters   Shoulder flexion/extension 15 reps with red theraband 15 reps with red theraband    Shoulder horizontal add/abb 15 reps with red theraband 15 reps with red theraband    Punches 15 reps with red theraband 15 reps with red theraband    Elbow flexion/extension 15 reps with red theraband 15 reps with red theraband    Tricep extension 15 reps with red theraband 15 reps with red theraband          Braces/Orthotics/Lines/Etc:   · IV  · O2 Device: Room air  Treatment/Session Assessment:    · Response to Treatment:  Tolerated well   · Interdisciplinary Collaboration:   o Certified Occupational Therapy Assistant  o Registered Nurse  · After treatment position/precautions:   o Up in chair  o Bed/Chair-wheels locked  o Call light within reach  o RN notified   · Compliance with Program/Exercises: Will assess as treatment progresses. · Recommendations/Intent for next treatment session: \"Next visit will focus on advancements to more challenging activities and reduction in assistance provided\".   Total Treatment Duration:  OT Patient Time In/Time Out  Time In: 1345  Time Out: 2313 Navid Last

## 2018-04-04 NOTE — PROGRESS NOTES
END OF SHIFT NOTE:    Intake/Output      Voiding: yes  Catheter: no  Drain:        Stool:  {0 occurrences. Stool Assessment  Stool Color: Brown (04/03/18 0820)  Stool Appearance: Formed; Soft (04/03/18 0820)  Stool Amount: Small (04/03/18 0820)  Stool Source/Status: Rectum (04/03/18 0820)    Emesis:  0 occurrences. VITAL SIGNS  Patient Vitals for the past 12 hrs:   Temp Pulse Resp BP SpO2   04/03/18 2352 97.4 °F (36.3 °C) 88 18 121/78 97 %   04/03/18 2015 97.8 °F (36.6 °C) 83 19 137/90 98 %   04/03/18 1759 - - - 137/81 -       Pain Assessment  Pain 1  Pain Scale 1: Visual (04/04/18 0303)  Pain Intensity 1: 0 (04/04/18 0303)  Patient Stated Pain Goal: 0 (04/04/18 0303)  Pain Reassessment 1: Patient sleeping (04/04/18 0303)  Pain Location 1: Back (03/30/18 2024)  Pain Orientation 1: Mid (03/30/18 2024)  Pain Description 1: Laverle Mandes (03/30/18 2024)  Pain Intervention(s) 1: Declines (03/30/18 2024)    Ambulating  yes    Additional Information: Pt is alert and oriented. Pt has been ambulating in room. Family was here half the night. No needs voiced. Shift report given to oncoming nurse at the bedside.     Michael Jensen RN

## 2018-04-04 NOTE — PROGRESS NOTES
END OF SHIFT NOTE:  Patient had a quiet day , she did not sit up in recliner as most as she did yesterday. No complain  of pain or nausea  Intake/Output       Voiding: yes   Catheter: no   Drain:              Stool:  One  occurrences. Stool Assessment  Stool Color: Brown (04/04/18 1801)  Stool Appearance: Formed; Soft (04/04/18 1801)  Stool Amount: Medium (04/04/18 1801)  Stool Source/Status: Rectum (04/04/18 1801)    Emesis:  No  occurrences. VITAL SIGNS  Patient Vitals for the past 12 hrs:   Temp Pulse Resp BP SpO2   04/04/18 1902 97.3 °F (36.3 °C) 85 18 138/87 98 %   04/04/18 1611 97.5 °F (36.4 °C) 82 18 115/64 96 %   04/04/18 1058 97.5 °F (36.4 °C) 85 18 (!) 140/91 99 %       Pain Assessment  Pain 1  Pain Scale 1: Numeric (0 - 10) (04/04/18 1345)  Pain Intensity 1: 0 (04/04/18 1345)  Patient Stated Pain Goal: 0 (04/04/18 0303)  Pain Reassessment 1: Patient sleeping (04/04/18 0303)  Pain Location 1: Back (03/30/18 2024)  Pain Orientation 1: Mid (03/30/18 2024)  Pain Description 1: Thyra Infield (03/30/18 2024)  Pain Intervention(s) 1: Declines (03/30/18 2024)    Ambulating  Yes in room - bathroom     Additional Information:  Sed above     Shift report given to oncoming nurse Vidya Alford RN  at the bedside.     Carol Husain RN

## 2018-04-05 NOTE — PROGRESS NOTES
END OF SHIFT NOTE:    Intake/Output  04/04 1901 - 04/05 0700  In: -   Out: 1000 [Urine:1000]   Voiding: YES  Catheter: NO  Drain:              Stool:  1 occurrences. Stool Assessment  Stool Color: Carolynn Geovanny (04/04/18 2134)  Stool Appearance: Formed; Soft (04/04/18 2134)  Stool Amount: Medium (04/04/18 2134)  Stool Source/Status: Rectum (04/04/18 2134)    Emesis:  0 occurrences. VITAL SIGNS  Patient Vitals for the past 12 hrs:   Temp Pulse Resp BP SpO2   04/04/18 2353 97.4 °F (36.3 °C) 79 18 130/87 97 %   04/04/18 1902 97.3 °F (36.3 °C) 85 18 138/87 98 %       Pain Assessment  Pain 1  Pain Scale 1: Visual (04/05/18 0039)  Pain Intensity 1: 0 (04/05/18 0039)  Patient Stated Pain Goal: 0 (04/05/18 0039)  Pain Reassessment 1: Patient sleeping (04/05/18 0039)  Pain Location 1: Back (03/30/18 2024)  Pain Orientation 1: Mid (03/30/18 2024)  Pain Description 1: Candance Pronto (03/30/18 2024)  Pain Intervention(s) 1: Declines (03/30/18 2024)    Ambulating  Yes    Additional Information: Pt is alert and ambulatory. No needs voiced. Pt slept well through the night. Shift report given to oncoming nurse at the bedside.     Archana Wu RN

## 2018-04-05 NOTE — DISCHARGE INSTRUCTIONS
DISCHARGE SUMMARY from Nurse    PATIENT INSTRUCTIONS:    After general anesthesia or intravenous sedation, for 24 hours or while taking prescription Narcotics:  · Limit your activities  · Do not drive and operate hazardous machinery  · Do not make important personal or business decisions  · Do  not drink alcoholic beverages  · If you have not urinated within 8 hours after discharge, please contact your surgeon on call. Report the following to your surgeon:  · Excessive pain, swelling, redness or odor of or around the surgical area  · Temperature over 100.5  · Nausea and vomiting lasting longer than 4 hours or if unable to take medications  · Any signs of decreased circulation or nerve impairment to extremity: change in color, persistent  numbness, tingling, coldness or increase pain  · Any questions    What to do at Home:  Recommended activity: Activity as tolerated, per MD instructions    If you experience any of the following symptoms fever > 100.5, nausea, vomiting, pain, chest pain and/or shortness of breath please follow up with MD.    *  Please give a list of your current medications to your Primary Care Provider. *  Please update this list whenever your medications are discontinued, doses are      changed, or new medications (including over-the-counter products) are added. *  Please carry medication information at all times in case of emergency situations. These are general instructions for a healthy lifestyle:    No smoking/ No tobacco products/ Avoid exposure to second hand smoke  Surgeon General's Warning:  Quitting smoking now greatly reduces serious risk to your health.     Obesity, smoking, and sedentary lifestyle greatly increases your risk for illness    A healthy diet, regular physical exercise & weight monitoring are important for maintaining a healthy lifestyle    You may be retaining fluid if you have a history of heart failure or if you experience any of the following symptoms: Weight gain of 3 pounds or more overnight or 5 pounds in a week, increased swelling in our hands or feet or shortness of breath while lying flat in bed. Please call your doctor as soon as you notice any of these symptoms; do not wait until your next office visit. Recognize signs and symptoms of STROKE:    F-face looks uneven    A-arms unable to move or move unevenly    S-speech slurred or non-existent    T-time-call 911 as soon as signs and symptoms begin-DO NOT go       Back to bed or wait to see if you get better-TIME IS BRAIN. Warning Signs of HEART ATTACK     Call 911 if you have these symptoms:   Chest discomfort. Most heart attacks involve discomfort in the center of the chest that lasts more than a few minutes, or that goes away and comes back. It can feel like uncomfortable pressure, squeezing, fullness, or pain.  Discomfort in other areas of the upper body. Symptoms can include pain or discomfort in one or both arms, the back, neck, jaw, or stomach.  Shortness of breath with or without chest discomfort.  Other signs may include breaking out in a cold sweat, nausea, or lightheadedness. Don't wait more than five minutes to call 911 - MINUTES MATTER! Fast action can save your life. Calling 911 is almost always the fastest way to get lifesaving treatment. Emergency Medical Services staff can begin treatment when they arrive -- up to an hour sooner than if someone gets to the hospital by car. The discharge information has been reviewed with the patient. The patient verbalized understanding. Discharge medications reviewed with the patient and appropriate educational materials and side effects teaching were provided. ___________________________________________________________________________________________________________________________________             Epilepsy: Care Instructions  Your Care Instructions    Epilepsy is a common condition that causes repeated seizures.  The seizures are caused by bursts of electrical activity in the brain that aren't normal. Seizures may cause problems with muscle control, movement, speech, vision, or awareness. They can be scary. Epilepsy affects each person differently. Some people have only a few seizures. Others get them more often. If you know what triggers a seizure, you may be able to avoid having one. You can take medicines to control and reduce seizures. You and your doctor will need to find the right combination, schedule, and dose of medicine. This may take time and careful changes. Seizures may get worse and happen more often over time. Follow-up care is a key part of your treatment and safety. Be sure to make and go to all appointments, and call your doctor if you are having problems. It's also a good idea to know your test results and keep a list of the medicines you take. How can you care for yourself at home? · Be safe with medicines. Take your medicines exactly as prescribed. Call your doctor if you think you are having a problem with your medicine. · Make a treatment plan with your doctor. Be sure to follow your plan. · Try to identify and avoid things that may make you more likely to have a seizure. These may include:  ¨ Not getting enough sleep. ¨ Using drugs or alcohol. ¨ Being emotionally stressed. ¨ Skipping meals. · Keep a record of any seizures you have. Note the date, time of day, and any details about the seizure that you can remember. Your doctor can use this information to plan or adjust your medicine or other treatment. · Be sure that any doctor treating you for another condition knows that you have epilepsy. Each doctor should know what medicines you are taking, if any. · Wear a medical ID bracelet. You can buy this at most [employer]. If you have a seizure that leaves you unconscious or unable to speak for yourself, this bracelet will let those who are treating you know that you have epilepsy.   · Talk to your doctor about whether it is safe for you to do certain activities, such as drive or swim. When should you call for help? Call 911 anytime you think you may need emergency care. For example, call if:  ? · A seizure does not stop as it normally does. ? · You have new symptoms such as:  ¨ Numbness, tingling, or weakness on one side of your body or face. ¨ Vision changes. ¨ Trouble speaking or thinking clearly. ?Call your doctor now or seek immediate medical care if:  ? · You have a fever. ? · You have a severe headache. ? Watch closely for changes in your health, and be sure to contact your doctor if:  ? · The normal pattern or features of your seizures change. Where can you learn more? Go to http://neville-joaquin.info/. Jerman Brown in the search box to learn more about \"Epilepsy: Care Instructions. \"  Current as of: October 14, 2016  Content Version: 11.4  © 1197-9832 [employer]. Care instructions adapted under license by [employer] (which disclaims liability or warranty for this information). If you have questions about a medical condition or this instruction, always ask your healthcare professional. [place] [address] any warranty or liability for your use of this information. Seizure: Care Instructions  Your Care Instructions    Seizures are caused by abnormal patterns of electrical signals in the brain. They are different for each person. Seizures can affect movement, speech, vision, or awareness. Some people have only slight shaking of a hand and do not pass out. Other people may pass out and have violent shaking of the whole body. Some people appear to stare into space. They are awake, but they can't respond normally. Later, they may not remember what happened. You may need tests to identify the type and cause of the seizures. A seizure may occur only once, or you may have them more than one time.  Taking medicines as directed and following up with your doctor may help keep you from having more seizures. The doctor has checked you carefully, but problems can develop later. If you notice any problems or new symptoms, get medical treatment right away. Follow-up care is a key part of your treatment and safety. Be sure to make and go to all appointments, and call your doctor if you are having problems. It's also a good idea to know your test results and keep a list of the medicines you take. How can you care for yourself at home? · Be safe with medicines. Take your medicines exactly as prescribed. Call your doctor if you think you are having a problem with your medicine. · Do not do any activity that could be dangerous to you or others until your doctor says it is safe to do so. For example, do not drive a car, operate machinery, swim, or climb ladders. · Be sure that anyone treating you for any health problem knows that you have had a seizure and what medicines you are taking for it. · Identify and avoid things that may make you more likely to have a seizure. These may include lack of sleep, alcohol or drug use, stress, or not eating. · Make sure you go to your follow-up appointment. When should you call for help? Call 911 anytime you think you may need emergency care. For example, call if:  ? · You have another seizure. ? · You have more than one seizure in 24 hours. ? · You have new symptoms, such as trouble walking, speaking, or thinking clearly. ?Call your doctor now or seek immediate medical care if:  ? · You are not acting normally. ? Watch closely for changes in your health, and be sure to contact your doctor if you have any problems. Where can you learn more? Go to http://neville-joaquin.info/. Enter Y629 in the search box to learn more about \"Seizure: Care Instructions. \"  Current as of: October 14, 2016  Content Version: 11.4  © 9685-1013 [employer].  Care instructions adapted under license by [address] (which disclaims liability or warranty for this information). If you have questions about a medical condition or this instruction, always ask your healthcare professional. [address] any warranty or liability for your use of this information.

## 2018-04-05 NOTE — DISCHARGE SUMMARY
[employer] [facility]: Inpatient Hematology / Oncology Discharge Summary Note    Patient ID:  Jodine Bernheim  [id number]  63 y.o.  1960    Admit Date: 3/16/2018    Discharge Date: 2018    Admission Diagnoses: Acute encephalopathy  Brain metastasis ([address]  Seizure ([address]  Lung cancer [facility])    Discharge Diagnoses:  Principal Diagnosis: Epilepsy with status epilepticus ([address]  Principal Problem:    Epilepsy with status epilepticus ([address] (3/18/2018)    Active Problems:    SVC syndrome (2017)      Small cell carcinoma of right lung ([address] (2017)      Bone metastases ([address] (2017)      Seizure ([address] (3/16/2018)      Brain metastasis ([address] (3/16/2018)      Acute encephalopathy (3/16/2018)      Hyperkalemia (3/17/2018)      Abnormal EKG (3/17/2018)        Hospital Course:  Ms. Orlando Gerardo is a 63 y.o. female admitted on 3/16/2018 with a primary diagnosis of lung cancer, acute encephalopathy, brain metastasis, and fever. Ms. Orlando Gerardo is a well known patient of Dr. Lyndsay Adan. While planning for Nivolumab she had rapid growth of b/l cervical LAD. He discussed  the aggressive pattern of her disease. Restaging CT and MRI of brain were ordered. The plan was to start nivolumab/ipilimimab on 18. He discussed the need for her to inform family of her aggressive disease and to designate POA. Unfortunately, the day prior to admission, she was confused, lethargic and somnolent. Patient's sister was driving her home when she became more confused and start having what her sister described as generalized body shakes. 911 was called. She received 2 doses of ativan PTA to ED. In ED she was post-ictal and difficult to arouse. CT head showed cerebral metastatic disease with left parietal convexity meningioma. CXR was unremarkable. CT chest on 18 showed interval treatment response with significantly decreased mediastinal and right hilar lymphadenopathy.   Her seizures have been controlled and subsequently aphasia has resolved with regimen of Keppra, Phenobarbital, and Dex. Referral to neurologist for OP follow-up as been ordered. She began radiation on 3/28, she is currently on 7/14 fx. She will receive C2 nivo/ipi prior to discharge today. She is feeling well and is ready to be discharged home. Script for phenobarbital given to pt. I have reviewed the patient's controlled substance prescription history, as maintained in the [place] prescription monitoring program, so that the prescriptions(s) for a controlled substance can be given. She will f/u with Dr. Linn So within 1 week. Advised to call with fever, chills, uncontrollable symptoms, or with any other concerns. Pt verbalizes understanding. Stage IV SCLC with brain mets  - s/p C1 nivolumab/ipilimumab on 3/15. C2 due 4/4  3/21 Rad Onc consult scheduled for this AM  3/22 Pt met with Dr. Gianni Torres yesterday. He spoke with pt's family. Plan is to watch her over the weekend to see how much she improves before making decision in regards to treatment  3/26 Pt continues to improve daily with speech and ability to communicate. She expresses interest today in pursing XRT. Dr. Gianni Torres notified - he states they will get her in for consent and simulation, probably at [facility] tomorrow. 3/27 Went to [place] this AM for simulation  3/30 Day 3/14 XRT treatments. C2 nivo/ipi due on 4/4. 4/2 XRT 4/14 today     Seizures  3/19 CT head revealed cerebral metastatic disease with L parietal convxity meningioma. Per teleneuro, pt has ongoing L-sided seizures and was started on Fosphenytoin yesterday - currently being held for high level phenytoin. Also on Dex and Keppra. Dr. Donald Quintero to check with Dr. Linn So to see if pt needs to be transferred to [facility] to neuro service vs. daily teleneuro eval here. Pt alert - responds with \"OK\" to every question. SLP following. 3/20  Follow-up teleneuro eval pending. Remains confused, but able to speak more words today. SLP following. Continues on Dex, Keppra, and Fosphenytoin. Phenytoin level pending. 3/21 Per neuro, Dr. Kailey Gracia, hold phenytoin and allow levels to trend down. Repeat EEG. Give loading dose of Phenobarbital 90mg IV x 1. Then Phenobarbital 30mg BID.  3/22 Repeat EEG pending. Pt's expressive aphasia is improving daily. Pt was able to answer questions and use phrases appropriately during exam this AM.    3/23 Repeat EEG \"moderately abnormal EGG d/t the presence of persistent PLEDs through the recording in the L hemisphere with focus likely at frontal leads. No subclinical seizure is identified\". Follow-up teleneuro consult ordered. Con't Dex, Keppra, and Phenobarbital.  3/25 Speech improving daily. Per neuro, continue current management  3/28 No new seizures reported  4/2 No seizures. Continue meds. Consults:  IP CONSULT TO PALLIATIVE CARE - PROVIDER  IP CONSULT TO ONCOLOGY  IP CONSULT TO RADIATION ONCOLOGY  IP CONSULT TO NEUROLOGY  IP CONSULT TO NEUROLOGY  IP CONSULT TO RADIATION ONCOLOGY  IP CONSULT TO NEUROLOGY  IP CONSULT TO CARDIOLOGY    Pertinent Diagnostic Studies:   Labs:    Recent Labs      04/05/18   0341  04/04/18   0520   WBC  4.7  5.5   HGB  9.0*  9.7*   PLT  144*  156   ANEU  3.4  4.2    Recent Labs      04/05/18   0341  04/04/18   0520  04/03/18   0521   NA  139  136  136   K  4.7  4.4  4.9   CL  101  98  101   CO2  29  29  28   GLU  106*  152*  113*   BUN  24*  28*  30*   CREA  0.81  1.03*  0.99   CA  7.8*  8.3  8.6   SGOT  29  30  28   AP  61  75  64   TP  6.2*  6.9  6.2*   ALB  3.2*  3.5  3.2*   MG   --   2.4   --        Imaging:  MRI BRAIN W WO CONT [[id number]] Collected: 03/18/18 1307      Order Status: Completed Updated: 03/18/18 1319     Narrative:       MRI of the Brain with contrast: 3/18/2018  History: Lung cancer  Sequences: Axial pre and post contrast T1, FLAIR, T2, diffusion, coronal post  contrast T1 and sagittal precontrast T1-weighted images of the brain.    Comparison: MRI the brain 4/21/2017  20 cc of IV MultiHance   was injected to aid in the detection of intracranial  pathology. Findings:    Supratentorial enhancing metastatic lesions are now demonstrated. These lesions  all display hemosiderin and restricted diffusion. There is a 5 mm lesion in the  right temporal white matter, 1 cm lesion right frontal white matter, left  posterior parasagittal convexity lesion measuring 14 mm x 27 x 31 mm, 14 mm left  occipital white matter lesion, 9 mm left frontal lesion, and 2 left parietal  enhancing lesions measuring respectively 7 mm and 24 mm. There is some mild mass  upon the left ventricular atrium. The pituitary and sinuses are unremarkable. 7th and 8th nerves and orbits are unremarkable. There is some fluid in the right  mastoid air cells.       Impression:       IMPRESSION: Interval supratentorial metastatic lesions as above. DC4  The significant findings in this report have been referred to the Imaging  Navigator in order to communicate to the referring provider or his/her designee  as outlined in Section II.C.2.a.ii-iii of the ACR  Practice Guideline for Communication of Diagnostic Imaging Findings.           XR ABD (KUB) [[id number]] Collected: 03/17/18 0115     Order Status: Completed Updated: 03/17/18 0117     Narrative:       Abdomen x-ray single view. HISTORY: Nasogastric tube placement. COMPARISON: None. FINDINGS: Nasogastric tube its tip in the gastric body. Bowel gas pattern is  nonspecific.       Impression:       IMPRESSION:    Nasogastric tube its tip in the gastric body.     XR CHEST PORT [[id number]] Collected: 03/16/18 2138     Order Status: Completed Updated: 03/16/18 2146     Narrative:       EXAM:  XR CHEST PORT    INDICATION:  AMS    COMPARISON:  4/18/2017    FINDINGS: A portable AP radiograph of the chest was obtained at 2124 hours.  The  patient is on a cardiac monitor.  The lungs are clear.  The cardiac and  mediastinal contours and pulmonary vascularity are normal.  The bones and soft  tissues are grossly within normal limits.        Impression:       IMPRESSION: No acute cardiopulmonary disease.         CT HEAD WITHOUT CONTRAST [[id number]] Collected: 03/16/18 2130     Order Status: Completed Updated: 03/16/18 2134     Narrative:       CT HEAD WITHOUT CONTRAST     HISTORY:  Seizure. COMPARISON: None. TECHNIQUE: Axial imaging was performed without intravenous contrast utilizing  5mm slice thickness. Sagittal and coronal reformats were performed. Radiation  dose reduction techniques were used for this study. Our CT scanner uses one or  all of the following:    Automated exposure control, adjustment of the MAS or KUB according to patient's  size and iterative reconstruction. FINDINGS:        *BRAIN:      -  There are no early signs of territorial or lacunar infarction by CT.     -  1.3 x 1.6 cm left parietal mass with surrounding vasogenic edema. 0.6 x  0.9 cm mass in left frontal lobe with vasogenic edema. Partly calcified mass in  left superior parafalcine region measuring 2.9 x 2.6 cm. Consider the  possibility of meningioma.     -  No gross white matter abnormality by CT.    *VISUALIZED PARANASAL SINUSES: Well aerated. *MASTOIDS:  Clear. *CALVARIUM AND SCALP: Unremarkable.         Impression:       IMPRESSION:    Cerebral metastatic disease. Possible left parietal convexity meningioma. Date of Dictation: 3/16/2018 9:30 PM         Current Discharge Medication List      START taking these medications    Details   levETIRAcetam 1,000 mg tablet Take 1 Tab by mouth every twelve (12) hours. Qty: 60 Tab, Refills: 0      pantoprazole (PROTONIX) 40 mg tablet Take 1 Tab by mouth Daily (before breakfast). Qty: 30 Tab, Refills: 0      PHENobarbital (LUMINAL) 30 mg tablet Take 1 Tab by mouth two (2) times a day. Max Daily Amount: 60 mg.  Qty: 60 Tab, Refills: 0    Associated Diagnoses: Metastasis to brain ([address]  Seizure (HCC)      dexamethasone (DECADRON) 4 mg tablet Take 1 Tab by mouth every six (6) hours. Qty: 120 Tab, Refills: 0         CONTINUE these medications which have NOT CHANGED    Details   mirtazapine (REMERON) 15 mg tablet Take 1 Tab by mouth nightly. Qty: 30 Tab, Refills: 1    Associated Diagnoses: Small cell carcinoma of right lung (HCC)      ondansetron hcl (ZOFRAN) 8 mg tablet Take 1 Tab by mouth every eight (8) hours as needed for Nausea. Qty: 90 Tab, Refills: 1    Associated Diagnoses: Small cell carcinoma of right lung (HCC)      lidocaine-prilocaine (EMLA) topical cream Apply 1/2 to 1 inch to port site one hour prior to needle access. Qty: 30 g, Refills: 1    Associated Diagnoses: Small cell carcinoma of right lung (HCC)      nicotine (NICODERM CQ) 7 mg/24 hr 1 Patch by TransDERmal route every twenty-four (24) hours. amLODIPine (NORVASC) 5 mg tablet Take 5 mg by mouth daily. senna-docusate (SHERRI-COLACE) 8.6-50 mg per tablet Take 1 Tab by mouth two (2) times a day. Qty: 60 Tab, Refills: 2    Associated Diagnoses: Therapeutic opioid induced constipation      raNITIdine (ZANTAC) 150 mg tablet Take 150 mg by mouth every morning. prochlorperazine (COMPAZINE) 10 mg tablet Take 5 mg by mouth every six (6) hours as needed for Nausea. acetaminophen (TYLENOL) 325 mg tablet Take 2 Tabs by mouth every four (4) hours as needed. Qty: 30 Tab, Refills: 0                 OBJECTIVE:  Patient Vitals for the past 8 hrs:   BP Temp Pulse Resp SpO2   18 1336 131/80 98 °F (36.7 °C) 82 18 95 %   18 0716 127/82 97.8 °F (36.6 °C) 80 18 97 %     Temp (24hrs), Av.6 °F (36.4 °C), Min:97.3 °F (36.3 °C), Max:98 °F (36.7 °C)     0701 -  1900  In: 240 [P.O.:240]  Out: -     Physical Exam:  Constitutional: Well developed, well nourished female in no acute distress, sitting comfortably on the bedside chair. HEENT: Normocephalic and atraumatic. Oropharynx is clear, mucous membranes are moist. Extraocular muscles are intact.   Sclerae anicteric. Neck supple without JVD. No thyromegaly present. Lymph node   Deferred   Skin Warm and dry. No bruising and no rash noted. No erythema. No pallor. Respiratory Lungs are clear to auscultation bilaterally without wheezes, rales or rhonchi, normal air exchange without accessory muscle use. CVS Normal rate, regular rhythm and normal S1 and S2. No murmurs, gallops, or rubs. Abdomen Soft, nontender and nondistended, normoactive bowel sounds. No palpable mass. No hepatosplenomegaly. Neuro Grossly nonfocal with no obvious sensory or motor deficits. MSK Normal range of motion in general.  No edema and no tenderness. Psych Appropriate mood and affect. ASSESSMENT:    Principal Problem:    Epilepsy with status epilepticus ([address] (3/18/2018)    Active Problems:    SVC syndrome (4/18/2017)      Small cell carcinoma of right lung ([address] (4/21/2017)      Bone metastases ([address] (8/30/2017)      Seizure ([address] (3/16/2018)      Brain metastasis ([address] (3/16/2018)      Acute encephalopathy (3/16/2018)      Hyperkalemia (3/17/2018)      Abnormal EKG (3/17/2018)        DISPOSITION:  Follow-up Appointments   Procedures    FOLLOW UP VISIT Appointment in: One Week Please schedule hospital f/u visit with Dr. Юляи Cardoso within one week     Please schedule hospital f/u visit with Dr. Юлия Cardoso within one week     Standing Status:   Standing     Number of Occurrences:   1     Order Specific Question:   Appointment in     Answer: One Week           Over 30 minutes was spent in discharge planning and coordination of care.             Venice Anderson NP  [facility]  [address]  Office : [phone]  Fax : [phone]

## 2018-04-06 NOTE — PROGRESS NOTES
This note will not be viewable in [address] Transition of Care Discharge Follow-up Questionnaire   Date/Time of Call:   4/6/2018 9:59am   What was the patient hospitalized for? Acute encephalopathy/ Epilepsy  Secondary to Small Cell lung Ca with mets to brain and bone   Does the patient understand his/her diagnosis and/or treatment and what happened during the hospitalization? Patient verbalized understanding of diagnosis and treatment plan. Patient did report that her memory is not good right now but she was able to relate discharge information appropriately. Did the patient receive discharge instructions? yes   CM Assessed Risk for Readmission:       Patient stated Risk for Readmission:      High risk for readmission due to severity of illness and multiple dx as well as declining cognitive ability related to metastatic brain  disease. Per patient, complications from Cancer. Review any discharge instructions (see discharge instructions/AVS in [facility]). Ask patient if they understand these. Do they have any questions? Discharge instructions reviewed with patient who verbalized understanding. Focused education on post discharge follow up appointments and medications compliance   Were home services ordered (nursing, PT, OT, ST, etc.)? No   If so, has the first visit occurred? If not, why? (Assist with coordination of services if necessary.)   N/A   Was any DME ordered? No.    If so, has it been received? If not, why?  (Assist patient in obtaining DME orders &/or equipment if necessary.)   N/A       Complete a review of all medications (new, continued and discontinued meds per the D/C instructions and medication tab in [facility]). Medications were reviewed with patient. Patient verbalized understanding of medication regime. Were all new prescriptions filled?   If not, why?  (Assist patient in obtaining medications if necessary  escalate for CCM &/or SW if ongoing issues are verbalized by pt or anticipated)   New prescriptions for all medications except one were filled and in the home. Patient reports pharmacy did not have one of her meds until next Monday but was unable to recall which medication she did not have. Does the patient understand the purpose and dosing instructions for all medications? (If patient has questions, provide explanation and education.)   Yes, patient seemed to understand purpose and dosing instructions for all medications. Does the patient have any problems in performing ADLs? (If patient is unable to perform ADLs  what is the limiting factor(s)? Do they have a support system that can assist? If no support system is present, discuss possible assistance that they may be able to obtain. Escalate for CCM/SW if ongoing issues are verbalized by pt or anticipated)   Patient is normally independent with ADLs. Reports weakness since discharge, reports she has family to assist when needed. Does the patient have all follow-up appointments scheduled? 7 day f/up with PCP?   (f/up with PCP may be w/in 14 days if patient has a f/up with their specialist w/in 7 days)    7-14 day f/up with specialist?   (or per discharge instructions)    If f/up has not been made  what actions has the care coordinator made to accomplish this? Has transportation been arranged? Patient had a follow up appointment with Dr Manjeet Caal on 4/16/18. Patient has daily XRT at [facility]. Transportation was arranged prior to discharge by CM on floor through [address] for 4/6/18 through 4/10/18. Rj Rincon was contacted and stated they would arrange further transportation after that. Patient states she has plans to call PCP, Dr Charlie Sarkar, for follow up appointment but does not want to schedule now due to daily XRT and other upcoming appointments. Patient states she usually has transportation to MD appointments   Any other questions or concerns expressed by the patient? Patient reports concerns over transportation to daily XRT as well as concerns about keeping medications straight and continued health. Discussed referral for CCM with patient. Patient appreciative and very agreeable for any and all assistance that could be given. Schedule next appointment with [facility] Coordinator or refer to RN Case Manager/ per the workflow guidelines. When is care coordinators next follow-up call scheduled? If referred for CCM  what RN care manager was the referral assigned? Patient will be referred for CCM for evaluation and continued follow up and assistance with education, transportation and guidance of complex medical dx. Per Felicitas Thakkar, RN, BS, CCM  Director, Ambulatory Care Coordination    Referral info sent to [facility] CCM for continued follow up.    THOMAS Call Completed By: Hosie Nissen LPN, [facility] Coordinator

## 2018-04-09 NOTE — PROGRESS NOTES
Community Care Management  Follow up Outreach Note   Outreach type: Phone call - READMISSION RISK   Date/Time of Outreach: 4/9/2018     Reason for follow-up:   READMISSION RISK - May need ongoing CM services   Disease specific complaints/issues: Small Cell CA     Patient progress towards goals set from last contact:   Transportation coordinated for this week   CM Assessed Risk for Readmission:   Patient stated Risk for Readmission:   Risk for readmission   Has patient attended any PCP or specialist follow-up appointments since last contact? What was outcome of appointment? When is next follow-up scheduled? No PCP follow up per patient's request right now but she will see cancer doctor 4/16/18 and has daily treatments at [facility]   Review medications. Any medication changes since last outreach? Does patient have any questions or issues related to their medications? Reviewed by [facility] team   Home health active? If yes  any issue? Progress? No   Referrals needed?  (SW, Diabetes education, HH, etc. ) Not as of yet   Other issues/Miscellaneous? (Transportation, access to meals, ability to perform ADLs, adequate caregiver support, etc.) Transportation may be needed next week again   Next Outreach Scheduled: 4/10/18     Next Steps/Goals:   I will attempt another outreach again tomorrow to the patient and /or family.     Community Care Manager: Quiana Teran

## 2018-04-10 NOTE — PROGRESS NOTES
CCM follow up call to Ms. Ron Hensley, she states she is doing well, able to make her treatments this week. She has appointments next week for Ronan HILTON with cancer center and Dr. Ernestine Fernandez and unsure if transportation is scheduled already. I agreed to check on this for her. She says her sister helps her at home and gets her medicines and she states she is following all instructions she was given. Called to [facility], spoke with Telma Astudillo who placed me on hold and checked to verify transportation was scheduled for Monday and Tuesday next week for her appointments and also for Dr. Ernestine Fernandez on 4/16/18 following treatment. Call to Salma Albarado at our [facility] and confirmed she was also working with patient as well and patient has her contact number. Call back to Ms. Selvin and confirmed she has the phone contacts for all transportation and SW. This note will not be viewable in [address]

## 2018-04-14 NOTE — ED NOTES
I have reviewed discharge instructions with the patient. The patient verbalized understanding. Patient left ED via Discharge Method: ambulatory to Home with family member. Opportunity for questions and clarification provided. Patient given 5 scripts. To continue your aftercare when you leave the hospital, you may receive an automated call from our care team to check in on how you are doing. This is a free service and part of our promise to provide the best care and service to meet your aftercare needs.  If you have questions, or wish to unsubscribe from this service please call [phone]. Thank you for Choosing our [facility] Emergency Department.

## 2018-04-14 NOTE — DISCHARGE INSTRUCTIONS
Lung Cancer: Care Instructions  Your Care Instructions    Lung cancer occurs when abnormal cells grow out of control in the lung. Lung cancer can start anywhere in the lungs and spread to other parts of the body. Treatment for lung cancer depends on what type of lung cancer you have and how advanced it is. Treatment may include surgery to remove the cancer. It could also include medicines (chemotherapy) or radiation to destroy cancer cells. Being treated for cancer can weaken your body, and you may feel very tired. Home treatment and certain medicines can help you feel better. Finding out that you have cancer is scary. You may feel many emotions and may need some help coping. Seek out family, friends, and counselors for support. You also can do things at home to make yourself feel better while you go through treatment. Call the [employer] [facility] (6-710.768.6309) or visit its website at [address] for more information. Follow-up care is a key part of your treatment and safety. Be sure to make and go to all appointments, and call your doctor if you are having problems. It's also a good idea to know your test results and keep a list of the medicines you take. How can you care for yourself at home? · Take your medicines exactly as prescribed. Call your doctor if you think you are having a problem with your medicine. You will get more details on the specific medicines your doctor prescribes. · Follow your doctor's instructions to relieve pain. Use pain medicine when you first feel pain, before it becomes severe. Taking pain medicines regularly is often the best way to keep pain under control. · Eat healthy food. If you do not feel like eating, try to eat food that has protein and extra calories to keep up your strength and prevent weight loss. Drink liquid meal replacements for extra calories and protein. Try to eat your main meal early. Eating smaller portions more often may help as well.   · Get some physical activity every day, but do not get too tired. Keep doing the hobbies you enjoy as your energy allows. · Do not smoke. Smoking can make your cancer symptoms worse. If you need help quitting, talk to your doctor about stop-smoking programs and medicines. These can increase your chances of quitting for good. · If you use oxygen, do not smoke, light a cigarette, or use a flame while your oxygen is on. Smoking while using oxygen can lead to fire and even explosion. · If you have nausea, try to eat several small meals a day. When you feel better, eat clear soups and mild foods until all symptoms are gone for 12 to 48 hours. Other good choices include dry toast, crackers, cooked cereal, and gelatin dessert, such as Jell-O.  · If you are vomiting or have diarrhea:  ¨ Drink plenty of fluids (enough so that your urine is light yellow or clear like water) to prevent dehydration. Choose water and other caffeine-free clear liquids. If you have kidney, heart, or liver disease and have to limit fluids, talk with your doctor before you increase the amount of fluids you drink. ¨ When you are able to eat, try clear soups, mild foods, and liquids until all symptoms are gone for 12 to 48 hours. Other good choices include dry toast, crackers, cooked cereal, and gelatin dessert, such as Jell-O.  · Take steps to control your stress and workload. Learn relaxation techniques. ¨ Share your feelings. Stress and tension affect our emotions. By expressing your feelings to others, you may be able to understand and cope with them. ¨ Consider joining a support group. Talking about a problem with your spouse, a good friend, or other people with similar problems is a good way to reduce tension and stress. ¨ Express yourself with art. Try writing, crafts, dance, or art to relieve stress. Some dance, writing, or art groups may be available just for people who have cancer. ¨ Be kind to your body and mind.  Getting enough sleep, eating a healthy diet, and taking time to do things you enjoy can contribute to an overall feeling of balance in your life and help reduce stress. ¨ Get help if you need it. Discuss your concerns with your doctor or counselor. · If you have not already done so, prepare a list of advance directives. Advance directives are instructions to your doctor and family members about what kind of care you want if you become unable to speak or express yourself. When should you call for help? Call 911 anytime you think you may need emergency care. For example, call if:  ? · You passed out (lost consciousness). ? · You have severe trouble breathing. ? · You cough up a lot of blood. ?Call your doctor now or seek immediate medical care if:  ? · You have a fever. ? · You are short of breath. ? · You have new or worse pain. ? · You have a new or worse cough. ? · You think you have an infection. ? Watch closely for changes in your health, and be sure to contact your doctor if:  ? · You do not get better as expected. Where can you learn more? Go to http://neville-joaquin.info/. Enter F104 in the search box to learn more about \"Lung Cancer: Care Instructions. \"  Current as of: May 12, 2017  Content Version: 11.4  © 9008-6722 [employer]wise, [employer]. Care instructions adapted under license by [employer] (which disclaims liability or warranty for this information). If you have questions about a medical condition or this instruction, always ask your healthcare professional. [place] [address] any warranty or liability for your use of this information.

## 2018-04-14 NOTE — ED PROVIDER NOTES
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 Patient is currently taking radiation therapy for her cancer. Patient is a 63 y.o. female presenting with neck pain. The history is provided by the patient. Neck Pain    This is a chronic problem. The current episode started more than 2 days ago. The problem occurs constantly. The problem has been gradually worsening. The pain is associated with nothing. There has been no fever. The pain is present in the generalized neck. The pain is at a severity of 10/10. The pain is severe. Pertinent negatives include no photophobia, no visual change and no chest pain. Past Medical History:   Diagnosis Date    Former cigarette smoker     using Nicotine patch    GERD (gastroesophageal reflux disease)     managed with Zantac    Hypertension     no complications at this time, no current medications    Lung cancer [facility])        Past Surgical History:   Procedure Laterality Date    HX TUBAL LIGATION      HX VASCULAR ACCESS      lt chest         Family History:   Problem Relation Age of Onset    Hypertension Mother     Asthma Mother     No Known Problems Father     Thyroid Disease Sister     Hypertension Brother     Diabetes Brother        Social History     Social History    Marital status: SINGLE     Spouse name: N/A    Number of children: N/A    Years of education: N/A     Occupational History    works at [employer] Topics    Smoking status: Former Smoker     Packs/day: 0.50     Years: 39.00     Quit date: 4/18/2017    Smokeless tobacco: Never Used    Alcohol use No    Drug use: No    Sexual activity: Not on file     Other Topics Concern    Not on file     Social History Narrative    . 1 daughter and 1 son. Works as  at [employer] in [facility].           ALLERGIES: Metaxalone and Oxycodone    Review of Systems   Constitutional: Negative. Negative for activity change. HENT: Negative. Eyes: Negative. Negative for photophobia. Respiratory: Negative. Cardiovascular: Negative. Negative for chest pain. Gastrointestinal: Negative. Genitourinary: Negative. Musculoskeletal: Positive for neck pain. Skin: Negative. Neurological: Negative. Psychiatric/Behavioral: Negative. All other systems reviewed and are negative. Vitals:    04/14/18 0047   BP: 133/85   Pulse: (!) 101   Resp: 19   Temp: 97.9 °F (36.6 °C)   SpO2: 92%   Weight: 52.6 kg (116 lb)   Height: 5' 8\" (1.727 m)            Physical Exam   Constitutional: She is oriented to person, place, and time. She appears listless. Non-toxic appearance. She has a sickly appearance. She appears ill. No distress. HENT:   Head: Normocephalic and atraumatic. Right Ear: External ear normal.   Left Ear: External ear normal.   Nose: Nose normal.   Mouth/Throat: Oropharynx is clear and moist. No oropharyngeal exudate. Eyes: Conjunctivae and EOM are normal. Pupils are equal, round, and reactive to light. Right eye exhibits no discharge. Left eye exhibits no discharge. No scleral icterus. Neck: Normal range of motion. Neck supple. No JVD present. No tracheal deviation present. Cardiovascular: Normal rate, regular rhythm and intact distal pulses. Pulmonary/Chest: Breath sounds normal. Accessory muscle usage present. No stridor. Tachypnea noted. She is in respiratory distress. She has no wheezes. She exhibits no tenderness. Abdominal: Soft. Bowel sounds are normal. She exhibits no distension and no mass. There is no tenderness. Musculoskeletal: Normal range of motion. She exhibits no edema or tenderness. Neurological: She is oriented to person, place, and time. She appears listless. No cranial nerve deficit. Skin: Skin is warm and dry. No rash noted. She is not diaphoretic. No erythema. No pallor. Psychiatric: She has a normal mood and affect. Her behavior is normal. Thought content normal.   Nursing note and vitals reviewed. MDM  Number of Diagnoses or Management Options  Diagnosis management comments: Patient feeling much better after Solu-Medrol treatment.   SaO2 99%    Assessment: Lung cancer and  pneumonitis       Amount and/or Complexity of Data Reviewed  Clinical lab tests: ordered and reviewed  Tests in the radiology section of CPT®: ordered and reviewed  Tests in the medicine section of CPT®: ordered and reviewed    Risk of Complications, Morbidity, and/or Mortality  Presenting problems: high  Diagnostic procedures: high  Management options: high          ED Course       Procedures

## 2018-04-14 NOTE — ED TRIAGE NOTES
Pt has stage 4 lymphoma and was recently discharged from the ER. Pt has known swollen lymph nodes in neck and until today they haven't bothered her. Pt states the lymph nodes are bigger now and she cannot sleep, eat, get a shower or do basic self care. Pt last dose of chemo on 4-5-2018. She is being followed by Dr. Lorraine Hamilton at [facility].

## 2018-04-17 NOTE — PROGRESS NOTES
CCM outreach attempt, no answer, left message with my contact information    This note will not be viewable in [address]

## 2018-05-04 PROBLEM — Z87.891 FORMER SMOKER: Status: ACTIVE | Noted: 2017-01-01

## 2018-05-05 VITALS
SYSTOLIC BLOOD PRESSURE: 112 MMHG | HEART RATE: 114 BPM | DIASTOLIC BLOOD PRESSURE: 70 MMHG | TEMPERATURE: 101.6 F | RESPIRATION RATE: 56 BRPM

## 2022-02-23 NOTE — ED NOTES
TRANSFER - OUT REPORT:    Verbal report given to Amy Soliman  being transferred to [facility] [phone] for routine progression of care       Report consisted of patients Situation, Background, Assessment and   Recommendations(SBAR). Information from the following report(s) ED Summary was reviewed with the receiving nurse. Lines:   Peripheral IV 04/18/17 Right Forearm (Active)   Site Assessment Clean, dry, & intact 4/18/2017  8:40 AM   Phlebitis Assessment 0 4/18/2017  8:40 AM   Infiltration Assessment 0 4/18/2017  8:40 AM   Dressing Status Clean, dry, & intact 4/18/2017  8:40 AM   Dressing Type Transparent;Tape 4/18/2017  8:40 AM   Hub Color/Line Status Pink 4/18/2017  8:40 AM   Action Taken Blood drawn 4/18/2017  8:40 AM        Opportunity for questions and clarification was provided.       Patient transported with:   Stubmatic Admitted for induction of labor for IUGR.  Uncomplicated  and postpartum course.

## 2022-10-12 NOTE — PROGRESS NOTES
Critical lab called, phenytoin level 34.9. Dr. Vladislav Holt on unit and notified; pharmacy also notified, pharmacy to adjust next dose. [place] Pregnancy And Lactation Text: It is unknown if this medication is safe to use during pregnancy.  It is unknown if this medication is excreted in breast milk.  Breastfeeding women should use the topical cream on the smallest area of the skin for the shortest time needed while breastfeeding.  Do not apply to nipple and areola.

## 2024-03-29 NOTE — PROGRESS NOTES
Problem: Mobility Impaired (Adult and Pediatric)  Goal: *Acute Goals and Plan of Care (Insert Text)  STG:  (1.)Ms. Mo Cooley will move from supine to sit and sit to supine , scoot up and down and roll side to side with SUPERVISION within 3 treatment day(s). Goal met 3/22/2018  (2.)Ms. Mo Cooley will transfer from bed to chair and chair to bed with MINIMAL ASSIST using the least restrictive device within 3 treatment day(s). Goal met 3/21/2018  (3.)Ms. Mo Cooley will ambulate with MINIMAL ASSIST for 30 feet with the least restrictive device within 3 treatment day(s). Goal met 3/22/2018    LTG:  (1.)Ms. Mo Cooley will move from supine to sit and sit to supine , scoot up and down and roll side to side in bed with MODIFIED INDEPENDENCE within 7 treatment day(s). Goal met 3/27/18  (2.)Ms. Mo Cooley will transfer from bed to chair and chair to bed with CONTACT GUARD ASSIST using the least restrictive device within 7 treatment day(s). Goal met 3/27/18  (3.)Ms. Mo Cooley will ambulate with CONTACT GUARD ASSIST for 150 feet with the least restrictive device within 7 treatment day(s). Goal met 3/27/18    New Goals (LTG) Updated: 3/27/18  (1.)Ms. Mo Cooley will move from supine to sit and sit to supine  in bed with INDEPENDENT within 7 treatment day(s). Goal met 4/3/18  (2.)Ms. Mo Cooley will transfer from bed to chair and chair to bed with INDEPENDENT using the least restrictive device within 7 treatment day(s). Goal met 4/3/18  (3.)Ms. oM Cooley will ambulate with SUPERVISION for 500+ feet with the least restrictive device within 7 treatment day(s). (4.)Ms. Mo Cooley will ascend/descend 3 steps with use of 1 railing with SUPERVISION within 7 treatment days.   ________________________________________________________________________________________________        ________________________________________________________________________________________________      PHYSICAL THERAPY: Daily Note, Treatment Day: 5th, AM 4/3/2018  INPATIENT: Hospital Day: 23  Payor: ABSOLUTE TOTAL CARE / [place] ABS[place]OTAL [place] / Product Type: Managed Care Medicaid /      NAME/AGE/GENDER: Henny Sparks is a 63 y.o. female   PRIMARY DIAGNOSIS: Acute encephalopathy  Brain metastasis ([facility]  Seizure ([facility]  Lung cancer ([facility] Epilepsy with status epilepticus ([facility] Epilepsy with status epilepticus ([facility]        ICD-10: Treatment Diagnosis:   · Generalized Muscle Weakness (M62.81)  · Difficulty in walking, Not elsewhere classified (R26.2)   Precaution/Allergies:  Metaxalone and Oxycodone      ASSESSMENT:     Ms. Milligan Mort sitting in recliner chair upon contact and is very agreeable to working with therapy. Sit to stand with supervision. Gait training x 250 feet without an assistive device. No loss of balance noted. Patient has more symmetrical gait pattern. Strengthening exercises performed in  standing with UE support. Some SOB noted. Tolerated well. Patient perform exercises well. Progress demonstrated as patient is meeting goals. Patient is very cooperative and pleasant to work with. Will continue PT efforts. This section established at most recent assessment   PROBLEM LIST (Impairments causing functional limitations):  1. Decreased Strength  2. Decreased ADL/Functional Activities  3. Decreased Transfer Abilities  4. Decreased Ambulation Ability/Technique  5. Decreased Balance  6. Decreased Activity Tolerance  7. Decreased Pacing Skills  8. Decreased [place] with Home Exercise Program  9. Decreased Cognition   INTERVENTIONS PLANNED: (Benefits and precautions of physical therapy have been discussed with the patient.)  1. Balance Exercise  2. Bed Mobility  3. Cold  4. Family Education  5. Gait Training  6. Home Exercise Program (HEP)  7. Manual Therapy  8. Neuromuscular Re-education/Strengthening  9. Range of Motion (ROM)  10. Therapeutic Activites  11. Therapeutic Exercise/Strengthening  12.  Transfer Training     TREATMENT PLAN: Frequency/Duration: 3 times a week for duration of hospital stay  Rehabilitation Potential For Stated Goals: EXCELLENT     RECOMMENDED REHABILITATION/EQUIPMENT: (at time of discharge pending progress): Due to the probability of continued deficits (see above) this patient will likely need continued skilled physical therapy after discharge. Equipment:    None at this time              HISTORY:   History of Present Injury/Illness (Reason for Referral):  Patient is 63years old female with pmhx of extensive stage small cell lung cancer with brain metastasis, GERD, HTN, previous tobacco abuse presented in view of new onset seizure like activity. As per the pt's sister, pt was in her usual health until yesterday, today she appeared more confused, lethargic and somnolent. While she was driving the pt home, pt started appearing confused and then had generalized body shakes. EMS was summoned. Pt received 2 doses of ativan PTA. In ER, pt is post ictal, sleeping, difficult to arouse. Pt is DNR. In ER, CT head showed cerebral metastatic disease with left parietal convexity meningioma, CXR was unremarkable. CT chest on 1/30/18 showed interval treatment response with significantly decreased mediastinal and right hilar lymphadenopathy. Past Medical History/Comorbidities:   Ms. Reina Farias  has a past medical history of Former cigarette smoker; GERD (gastroesophageal reflux disease); Hypertension; and Lung cancer ([facility] [address] She also has no past medical history of Adverse effect of anesthesia; Difficult intubation; Malignant hyperthermia due to anesthesia; Nausea & vomiting; or Pseudocholinesterase deficiency. Ms. Reina Farias  has a past surgical history that includes hx tubal ligation and hx vascular access.   Social History/Living Environment:   Home Environment: Private residence  # Steps to Enter: 3  Rails to Enter: No  One/Two Story Residence: One story  Living Alone: No  Support Systems: Parent, Family member(s) (takes care of mother whom she lives with)  Patient Expects to be Discharged to[de-identified] Unknown  Current DME Used/Available at Home: None  Tub or Shower Type: Tub/Shower combination  Prior Level of Function/Work/Activity:  Taking care of mother, independent, no AD, intact cognition and communication     Number of Personal Factors/Comorbidities that affect the Plan of Care: 3+: HIGH COMPLEXITY   EXAMINATION:   Most Recent Physical Functioning:   Gross Assessment:                  Posture:     Balance:  Sitting: Intact  Sitting - Static: Good (unsupported)  Sitting - Dynamic: Good (unsupported)  Standing: Intact  Standing - Static: Good  Standing - Dynamic : Fair Bed Mobility:     Wheelchair Mobility:     Transfers:  Sit to Stand: Independent  Stand to Sit: Independent  Gait:     Distance (ft): 250 Feet (ft)  Assistive Device: Other (comment) (no assistive device used)  Ambulation - Level of Assistance: Stand-by assistance      Body Structures Involved:  1. Heart  2. Lungs  3. Bones  4. Joints  5. Muscles  6. Ligaments Body Functions Affected:  1. Mental  2. Voice and Speech  3. Cardio  4. Respiratory  5. Neuromusculoskeletal  6. Movement Related Activities and Participation Affected:  1. Learning and Applying Knowledge  2. General Tasks and Demands  3. Communication  4. Mobility  5. Self Care  6. Domestic Life  7. Interpersonal Interactions and Relationships  8. Community, Social and [facility]   Number of elements that affect the Plan of Care: 4+: HIGH COMPLEXITY   CLINICAL PRESENTATION:   Presentation: Evolving clinical presentation with changing clinical characteristics: MODERATE COMPLEXITY   CLINICAL DECISION MAKIN [facility] Mobility Inpatient Short Form  How much difficulty does the patient currently have. .. Unable A Lot A Little None   1. Turning over in bed (including adjusting bedclothes, sheets and blankets)? [] 1   [] 2   [] 3   [x] 4   2.   Sitting down on and standing up from a chair with arms ( e.g., wheelchair, bedside commode, etc.) [] 1   [] 2   [] 3   [x] 4   3. Moving from lying on back to sitting on the side of the bed? [] 1   [] 2   [] 3   [x] 4   How much help from another person does the patient currently need. .. Total A Lot A Little None   4. Moving to and from a bed to a chair (including a wheelchair)? [] 1   [] 2   [x] 3   [] 4   5. Need to walk in hospital room? [] 1   [] 2   [x] 3   [] 4   6. Climbing 3-5 steps with a railing? [] 1   [] 2   [x] 3   [] 4   © 2007, Trustees of [address] [place] [address], under license to [employer]. All rights reserved      Score:  Initial: 17 Most Recent: 21 (Date: 3/22/2018 )    Interpretation of Tool:  Represents activities that are increasingly more difficult (i.e. Bed mobility, Transfers, Gait). Score 24 23 22-20 19-15 14-10 9-7 6     Modifier CH CI CJ CK CL CM CN      ? Mobility - Walking and Moving Around:     - CURRENT STATUS: CJ - 20%-39% impaired, limited or restricted    - GOAL STATUS: CI - 1%-19% impaired, limited or restricted    - D/C STATUS:  ---------------To be determined---------------  Payor: ABSOLUTE TOTAL CARE / [place] / Product Type: Managed Care Medicaid /      Medical Necessity:     · Patient is expected to demonstrate progress in strength, range of motion, balance and coordination to decrease assistance required with transfers, ambulation, and functional mobility. Reason for Services/Other Comments:  · Patient continues to require skilled intervention due to decreased cognition, activity tolerance, and functional mobility.    Use of outcome tool(s) and clinical judgement create a POC that gives a: Clear prediction of patient's progress: LOW COMPLEXITY            TREATMENT:   (In addition to Assessment/Re-Assessment sessions the following treatments were rendered)   Pre-treatment Symptoms/Complaints: \"Good morning\"  Pain: Initial: 0/10  Pain Intensity 1: 0  Post Session:  0/10   Therapeutic Activity (12 minutes):  Functional mobility activities to increase mobility, stability and tolerance/independnece for ADL's and  Gait training to improve and/or restore physical functioning as related to mobility. Ambulated 250 Feet (ft) with supervision/set-up w/ cues to improve attention to R side as well as improve trunk rotation/arm swing. Assistive Device: Other (comment) (no assistive device used)  Ambulation - Level of Assistance: Stand-by assistance  Distance (ft): 250 Feet (ft)  Therapeutic Exercise (15 minutes):  Pt. Performed B LE standing exercises  w/ B UE support. Exercises are as follows:  exercises 4/3/18 reps     Toe ups  20     abduction  20     Hip extension  20     marching  20     squats  20                                   Braces/Orthotics/Lines/Etc:   · O2 Device: Room air  Treatment/Session Assessment:    · Response to Treatment: tolerated well  · Interdisciplinary Collaboration:   o Physical Therapy Assistant  o Registered Nurse  · After treatment position/precautions:   o Up in chair  o Bed/Chair-wheels locked  o Bed in low position  o Call light within reach   · Compliance with Program/Exercises: compliant  · Recommendations/Intent for next treatment session: \"Next visit will focus on reduction in assistance provided\".   Total Treatment Duration:  PT Patient Time In/Time Out  Time In: 0830  Time Out: 0857    Lisha Ortega, PTA 87

## 2024-09-09 NOTE — PROGRESS NOTES
Left message on voicemail for mom to call back when received message in regards to scheduling sedated hearing test.      STG: Patient will answer basic yes/no comprehension questions with 75% accuracy given max cues. - MET   STG: Patient will participate in automatic speech tasks with 75% accuracy given max cues - MET   STG: Patient will follow 1 step verbal command with visual cues with 60% accuracy with max cues. - MET   STG: Patient will participate in moderate level word findings tasks with 80% accuracy- ADDED 4/3/18  STG: Patient will recall functional verbal information after a 5 minute delay with 80% accuracy and minimal assistance. STG: Patient will provide 2 solutions to functional problems with 80% accuracy. STG: Patient will maintain attention to task for 10 minutes without need for re-directions. LTG: Patient will increase neuro-linguistic abilities to increase safety and awareness of deficits. Speech language pathology: Speech-language and cognitive note: Daily Note  7    NAME/AGE/GENDER: Donita Morales is a 63 y.o. female  DATE: 2018  PRIMARY DIAGNOSIS: Acute encephalopathy  Brain metastasis ([facility] [address]  Seizure ([facility] [address]  Lung cancer ([facility] [address]       ICD-10: Treatment Diagnosis: R.41.841 Cognitive-Communication Deficit    INTERDISCIPLINARY COLLABORATION: Registered NurseASSESSMENT:Patient participate in cognitive assessment via the Cognitive Linguistic Quick Test (CLGT). Specific results are as follows:   - Attention: 138/215- Moderate impairment   - Memory: 115/185- Moderate impairment   - Executive Functionin/40- Severe impairment   - Language: 26/37 : Mild impairment    - Visuospatial Skills: 50/105 Moderate impairment  Patient exhibited moderate impairments in cognition. Poor sustained attention to task, especially when encountering difficulty with specific sections of assessment. Impaired reasoning for clock drawing, maze task, and designs generation. Relative strengths in visual memory (score 6/6) as compared to verbal memory (5/18). Goals added to reflect results of this assessment.  Reviewed results with patient who expressed understanding and agreement with findings. She verbalized awareness of \"changes, but I think I am still doing ok overall\". Would benefit from continued speech therapy services during this hospitalization and upon discharge from this facility. Patient will benefit from skilled intervention to address the below impairments. ?????? ? ? This section established at most recent assessment??????????  PROBLEM LIST (Impairments causing functional limitations):  1. Expressive Language  2. Receptive language  REHABILITATION POTENTIAL FOR STATED GOALS: Guarded  PLAN OF CARE:   Patient will benefit from skilled intervention to address the following impairments. INTERVENTIONS PLANNED: (Benefits and precautions of therapy have been discussed with the patient.)  1. Cognitive-linguistic deficits  FREQUENCY/DURATION: Continue to follow patient 3 session trial to assess patient' ability to benefit from skilled therapy services to address above goals. RECOMMENDED REHABILITATION/EQUIPMENT: (at time of discharge pending progress): Due to the probability of continued deficits (see above) this patient will not likely need continued skilled speech therapy after discharge. SUBJECTIVE:   Pleasant. Seen sitting upright in bedside chair. History of Present Injury/Illness: Ms. Ad Portillo  has a past medical history of Former cigarette smoker; GERD (gastroesophageal reflux disease); Hypertension; and Lung cancer ([facility] [address] She also has no past medical history of Adverse effect of anesthesia; Difficult intubation; Malignant hyperthermia due to anesthesia; Nausea & vomiting; or Pseudocholinesterase deficiency. .  She also  has a past surgical history that includes hx tubal ligation and hx vascular access.    Present Symptoms: Expressive/receptive language defiicts     Pain Intensity 1: 0  Pain Location 1: Back  Pain Orientation 1: Mid  Pain Intervention(s) 1: Declines  Current Medications:   No current facility-administered medications on file prior to encounter. Current Outpatient Prescriptions on File Prior to Encounter   Medication Sig Dispense Refill    mirtazapine (REMERON) 15 mg tablet Take 1 Tab by mouth nightly. 30 Tab 1    ondansetron hcl (ZOFRAN) 8 mg tablet Take 1 Tab by mouth every eight (8) hours as needed for Nausea. 90 Tab 1    lidocaine-prilocaine (EMLA) topical cream Apply 1/2 to 1 inch to port site one hour prior to needle access. 30 g 1    nicotine (NICODERM CQ) 7 mg/24 hr 1 Patch by TransDERmal route every twenty-four (24) hours.  amLODIPine (NORVASC) 5 mg tablet Take 5 mg by mouth daily.  senna-docusate (SHERRI-COLACE) 8.6-50 mg per tablet Take 1 Tab by mouth two (2) times a day. 60 Tab 2    raNITIdine (ZANTAC) 150 mg tablet Take 150 mg by mouth every morning.  prochlorperazine (COMPAZINE) 10 mg tablet Take 5 mg by mouth every six (6) hours as needed for Nausea.  acetaminophen (TYLENOL) 325 mg tablet Take 2 Tabs by mouth every four (4) hours as needed. 30 Tab 0     Current Dietary Status:  Mechanical soft/thin liquids      Social History/Home Situation:    Home Environment: Private residence  # Steps to Enter: 3  Rails to Enter: No  One/Two Story Residence: One story  Living Alone: No  Support Systems: Parent, Family member(s) (takes care of mother whom she lives with)  Patient Expects to be Discharged to[de-identified] Unknown  Current DME Used/Available at Home: None  Tub or Shower Type: Tub/Shower combination  Work/Activity History:   OBJECTIVE:   Oral Motor Structure/Speech:  Oral-Motor Structure/Motor Speech  Labial: No impairment  Dentition: Edentulous, Natural, Limited  Oral Hygiene: Adequate  Lingual: No impairment  Velum: No impairment    SPEECH-LANGUAGE COGNITIVE EVALUATION    Mental Status:                 Neuro-Linguistics:                 Speech/Language Activities: Activities/Procedures listed utilized to improve expressive communication, receptive ability and cognitive ability.  Required moderate cueing to increase independence with activities of daily living. Tool Used: Functional [place] Measure (FIMTM)   Score Comments   Eating       Comprehension       Expression   2     Social Interaction       Problem Solving       Memory          Score:  Initial: 1 Most Recent: X (Date: -- )   Interpretation of Tool: Provides a uniform system of measurement for disability based on the International Classification of Impairment, Disabilities and Handicaps; measures the level of a patient's disability and indicates how much assistance is required for the individual to carry out activities of daily living. Score 7 6 5 4 3 2 1   Modifier CH CI CJ CK CL CM CN   ? Spoken Expression:    [id number] - CURRENT STATUS: CJ - 20%-39% impaired, limited or restricted    - GOAL STATUS:  CL - 60%-79% impaired, limited or restricted    - D/C STATUS:  ---------------To be determined---------------  Payor: [employer]OL[employer] / [place] [employer] / Product Type: Managed Care Medicaid /   __________________________________________________________________________________________________  Safety:   After treatment position/precautions:  · Up in chair. Progression/Medical Necessity:   · Patient is expected to demonstrate progress in expressive communication and receptive ability to decrease assistance required communication, increase independence with activities of daily living and increase communication with family/caregivers. Compliance with Program/Exercises: Will assess as treatment progresses. Reason for Continuation of Services/Other Comments:  · Patient continues to require skilled intervention due to language deficits. Recommendations/Intent for next treatment session: \"Treatment next visit will focus on language tasks\".     Total Treatment Duration:  Time In: 1237  Time Out: 1307    Everette Chavez, RUBY, CCC-SLP, CBIS
